# Patient Record
Sex: FEMALE | Race: BLACK OR AFRICAN AMERICAN | Employment: UNEMPLOYED | URBAN - METROPOLITAN AREA
[De-identification: names, ages, dates, MRNs, and addresses within clinical notes are randomized per-mention and may not be internally consistent; named-entity substitution may affect disease eponyms.]

---

## 2017-01-27 ENCOUNTER — OFFICE VISIT (OUTPATIENT)
Dept: ORTHOPEDIC SURGERY | Age: 32
End: 2017-01-27

## 2017-01-27 VITALS
HEIGHT: 59 IN | HEART RATE: 78 BPM | DIASTOLIC BLOOD PRESSURE: 88 MMHG | BODY MASS INDEX: 21.69 KG/M2 | WEIGHT: 107.6 LBS | SYSTOLIC BLOOD PRESSURE: 136 MMHG | RESPIRATION RATE: 12 BRPM

## 2017-01-27 DIAGNOSIS — M54.6 THORACIC BACK PAIN, UNSPECIFIED BACK PAIN LATERALITY, UNSPECIFIED CHRONICITY: ICD-10-CM

## 2017-01-27 DIAGNOSIS — M54.5 LOW BACK PAIN, UNSPECIFIED BACK PAIN LATERALITY, UNSPECIFIED CHRONICITY, WITH SCIATICA PRESENCE UNSPECIFIED: ICD-10-CM

## 2017-01-27 DIAGNOSIS — M54.50 LOW BACK PAIN WITHOUT SCIATICA, UNSPECIFIED BACK PAIN LATERALITY, UNSPECIFIED CHRONICITY: Primary | ICD-10-CM

## 2017-01-27 DIAGNOSIS — M54.12 BRACHIAL NEURITIS: ICD-10-CM

## 2017-01-27 DIAGNOSIS — M54.2 NECK PAIN: ICD-10-CM

## 2017-01-27 NOTE — PROGRESS NOTES
[facility]  [address], [address]  Phone: [phone]  Fax: [phone]        INITIAL CONSULTATION      HISTORY OF PRESENT ILLNESS:  Chacha Syed is a RH dominant  32 y.o. female whom is referred from Dr. Noel Mckeon secondary to neck and back pain. Pt has been diagnosed with fibromyalgia. Note from Dr. Noel Mckeon dated 2016 indicating patient was seen with c/o headaches and numbness and tingling. She is intolerant of NEURONTIN and TOPAMAX. Pt has been diagnosed with migraine headaches. ER Note from Kiarra Taylor dated 10/19/2016 indicating patient was seen with c/o headaches. Today, she c/o pain in the lower back, neck, left leg and right hand. Pt states that her lower back pain is most severe. Pt c/o pain radiating laterally down the left leg through the feet and toes. She reports intermittent right leg pain. She recently had a  section but is not pregnant or breast feeding at this time. Patient denies change in bowel or bladder habits. Patient denies loss of balance. She denies any prior spinal surgery or lumbar blocks. Pt has not recently tried physical therapy. BLE EMG dated 2016 was reviewed and were within normal limits. Cervical Spine CT from 2012 demonstrated sclerotic lucency through the anterior/inferior bleeding of C1. This is likely chronic. No definite acute fracture or subluxation of the cervical spine. She has tried Topamax in the past but no longer takes the medication. She reports no relief when try LYRICA or CYMBALTA. Pt will follow up with Dr. Noel Mckeon next week.  reviewed.        Past Medical History   Diagnosis Date    Abdominal pain 2009     With Constipation    Chronic constipation     Common migraine     FHx: allergies     GERD (gastroesophageal reflux disease)     HX OTHER MEDICAL      endoscopy    IBS (irritable bowel syndrome)     Pain      Pain in Neck and Back    Scoliosis     Urinary incontinence 11/10/2010   (NEX  Past Surgical History   Procedure Laterality Date    Hx  section      Endoscopy, colon, diagnostic     IUM) 40 mg capsule Take  by mouth daily.  fluticasone (FLOVENT DISKUS) 50 mcg/actuation inhaler Take  by inhalation.  clindamycin (CLEOCIN T) 1 % lotion Apply  to affected area two (2) times a day. use thin film on affected area      SUMAtriptan (IMITREX) 25 mg tablet Take 25 mg by mouth once as needed.  POLYETHYLENE GLYCOL 3350 (MIRALAX PO) Take  by mouth.  CALCIUM CARBONATE/MAG HYDROX (MYLANTA PO) Take  by mouth.  hyoscyamine (ANASPAZ, LEVSIN) 0.125 mg tablet Take 125 mcg by mouth every four (4) hours as needed.  promethazine (PHENERGAN) 12.5 mg tablet Take  by mouth every six (6) hours as needed.  topiramate (TOPAMAX) 50 mg tablet Take  by mouth two (2) times a day.  Ferrous Fumarate 325 mg (106 mg iron) Tab Take  by mouth.  ACETAMINOPHEN WITH CODEINE (TYLENOL-CODEINE #3 PO) Take  by mouth.  HYDROCODONE BIT/ACETAMINOPHEN (VICODIN PO) Take  by mouth.  METHOCARBAMOL, BULK, by Does Not Apply route.  OTHER,NON-FORMULARY,          Allergies   Allergen Reactions    Reglan [Metoclopramide] Not Reported This Time    Sulfa (Sulfonamide Antibiotics) Not Reported This Time    Wygesic Not Reported This Time            Family History   Problem Relation Age of Onset    Kidney Disease Father     Other Father      Pancreatic Disease    Cancer Father     Thyroid Disease Other      Grandparent         REVIEW OF SYSTEMS  Constitutional symptoms: Negative  Eyes: Negative  Ears, Nose, Throat, and Mouth: Negative  Cardiovascular: Negative  Respiratory: Negative  Genitourinary: Negative  Integumentary (Skin and/or breast): Negative  Musculoskeletal: Positive for cervical and lumbar  Extremities: Negative for edema.   Endocrine/Rheumatologic: Negative  Hematologic/Lymphatic: Negative  Allergic/Immunologic: Negative  Psychiatric: Negative       PHYSICAL EXAMINATION    Visit Vitals    /88    Pulse 78    Resp 12    Ht 4' 11\" (1.499 m)    Wt 107 lb 9.6 oz (48.8 kg)    BMI 21.73 kg/m2       CONSTITUTIONAL: NAD, A&O x 3  HEART: Regular rate and rhythm  ABDOMEN: Positive bowel sounds, soft, nontender, and nondistended  LUNGS: Clear to auscultation bilaterally. RANGE OF MOTION: The patient has full passive range of motion in all four extremities. SENSATION: Sensation is intact to light touch throughout. MOTOR:   Straight Leg Raise: Negative, bilateral  Perry: Negative, bilateral  Tandem Gait: Neg. Deep tendon reflexes are 1+ at the brachioradialis, biceps, and triceps. Deep tendon reflexes are 2+ at the knees and ankles bilaterally. Shoulder AB/Flex Elbow Flex Wrist Ext Elbow Ext Wrist Flex Hand Intrin Tone   Right +4/5 +4/5 +4/5 +4/5 +4/5 +4/5 +4/5   Left +4/5 +4/5 +4/5 +4/5 +4/5 +4/5 +4/5              Hip Flex Knee Ext Knee Flex Ankle DF GTE Ankle PF Tone   Right +4/5 +4/5 +4/5 +4/5 +4/5 +4/5 +4/5   Left +4/5 +4/5 +4/5 +4/5 +4/5 +4/5 +4/5     RADIOGRAPHS  Preliminary reading of lumbar plain films:  No acute pathology identified. Normal lumbar spine. These are being sent out for official reading by Dr. Ute García. ELIZA Harrell was seen today for neck pain and back pain. Diagnoses and all orders for this visit:    Low back pain without sciatica, unspecified back pain laterality, unspecified chronicity  -     [79191] L/S 2-3 views    Low back pain, unspecified back pain laterality, unspecified chronicity, with sciatica presence unspecified    Neck pain    Thoracic back pain, unspecified back pain laterality, unspecified chronicity    Brachial neuritis           IMPRESSIONS/RECOMMENDATIONS:  At this point, I do not think her pain is related to spine pathology. She has a negative LUE EMG and normal physical examination. In my opinion, her pain is related to her previously diagnosed fibromyalgia.  She will follow up with Dr. Caroline Pearson. Consideration referral to a chronic pain management center. I discussed with patient multiple treatment options including medications, physical therapy, lumbar blocks, and surgery. Pt is not particularly interested in surgery at this time. I will see the patient back in as needed. Written by Tamika Calderon, as dictated by Cassius Cowden, MD  I examined the patient, reviewed and agree with the note.

## 2017-01-27 NOTE — MR AVS SNAPSHOT
Visit Information Date & Time Provider Department Dept. Phone Encounter #  
 1/27/2017  1:20 PM Shara Garsia MD [facility] [place] [phone] [id number] Upcoming Health Maintenance Date Due DTaP/Tdap/Td series (1 - Tdap) 3/1/2006 PAP AKA CERVICAL CYTOLOGY 3/1/2006 INFLUENZA AGE 9 TO ADULT 8/1/2016 Allergies as of 1/27/2017  Review Complete On: 1/27/2017 By: Shara Garsia MD  
  
 Severity Noted Reaction Type Reactions Reglan [Metoclopramide]  11/10/2010    Not Reported This Time  
 Sulfa (Sulfonamide Antibiotics)  11/10/2010    Not Reported This Time Wygesic  11/10/2010    Not Reported This Time Current Immunizations  Never Reviewed No immunizations on file. Not reviewed this visit You Were Diagnosed With   
  
 Codes Comments Low back pain without sciatica, unspecified back pain laterality, unspecified chronicity    -  Primary ICD-10-CM: M54.5 ICD-9-CM: 724.2 Vitals BP Pulse Resp Height(growth percentile) Weight(growth percentile) BMI  
 136/88 78 12 4' 11\" (1.499 m) 107 lb 9.6 oz (48.8 kg) 21.73 kg/m2 OB Status Smoking Status Having regular periods Never Smoker BMI and BSA Data Body Mass Index Body Surface Area 21.73 kg/m 2 1.43 m 2 Preferred Pharmacy Pharmacy Name Phone [facility], 2004 [address] [phone] Your Updated Medication List  
  
   
This list is accurate as of: 1/27/17  2:25 PM.  Always use your most recent med list.  
  
  
  
  
 clindamycin 1 % lotion Commonly known as:  CLEOCIN T Apply  to affected area two (2) times a day. use thin film on affected area Ferrous Fumarate 325 mg (106 mg iron) Tab Take  by mouth. fluticasone 50 mcg/actuation inhaler Commonly known as:  FLOVENT DISKUS Take  by inhalation. hyoscyamine 0.125 mg tablet Commonly known as:  Anaid Donta Take 125 mcg by mouth every four (4) hours as needed. METHOCARBAMOL (BULK)  
by Does Not Apply route. MIRALAX PO Take  by mouth. MYLANTA PO Take  by mouth. NexIUM 40 mg capsule Generic drug:  esomeprazole Take  by mouth daily. OTHER(NON-FORMULARY)  
  
 promethazine 12.5 mg tablet Commonly known as:  PHENERGAN Take  by mouth every six (6) hours as needed. SUMAtriptan 25 mg tablet Commonly known as:  IMITREX Take 25 mg by mouth once as needed. topiramate 50 mg tablet Commonly known as:  TOPAMAX Take  by mouth two (2) times a day. TYLENOL-CODEINE #3 PO Take  by mouth. VICODIN PO Take  by mouth. We Performed the Following AMB POC XRAY, SPINE, LUMBOSACRAL; 2 O [40567 CPT(R)] Introducing [facility]! Ronni Lopez introduces [employer] patient portal. Now you can access parts of your medical record, email your doctor's office, and request medication refills online. 1. In your internet browser, go to [email or web address] 2. Click on the First Time User? Click Here link in the Sign In box. You will see the New Member Sign Up page. 3. Enter your [employer] Access Code exactly as it appears below. You will not need to use this code after youve completed the sign-up process. If you do not sign up before the expiration date, you must request a new code. · [employer] Access Code: [email or web address] Expires: 4/27/2017 12:46 PM 
 
4. Enter the last four digits of your Social Security Number (xxxx) and Date of Birth (mm/dd/yyyy) as indicated and click Submit. You will be taken to the next sign-up page. 5. Create a [employer] ID. This will be your [employer] login ID and cannot be changed, so think of one that is secure and easy to remember. 6. Create a [employer] password. You can change your password at any time. 7. Enter your Password Reset Question and Answer. This can be used at a later time if you forget your password. 8. Enter your e-mail address. You will receive e-mail notification when new information is available in [address] 9. Click Sign Up. You can now view and download portions of your medical record. 10. Click the Download Summary menu link to download a portable copy of your medical information. If you have questions, please visit the Frequently Asked Questions section of the [employer] website. Remember, [employer] is NOT to be used for urgent needs. For medical emergencies, dial 911. Now available from your iPhone and Android! Please provide this summary of care documentation to your next provider. Your primary care clinician is listed as Reilly Galarza. If you have any questions after today's visit, please call [phone].

## 2017-05-10 ENCOUNTER — OFFICE VISIT (OUTPATIENT)
Dept: ORTHOPEDIC SURGERY | Age: 32
End: 2017-05-10

## 2017-05-10 VITALS
WEIGHT: 114.6 LBS | SYSTOLIC BLOOD PRESSURE: 122 MMHG | HEART RATE: 86 BPM | RESPIRATION RATE: 14 BRPM | HEIGHT: 59 IN | BODY MASS INDEX: 23.1 KG/M2 | DIASTOLIC BLOOD PRESSURE: 87 MMHG

## 2017-05-10 DIAGNOSIS — M54.6 ACUTE BILATERAL THORACIC BACK PAIN: Primary | ICD-10-CM

## 2017-05-10 DIAGNOSIS — G89.29 CHRONIC BILATERAL LOW BACK PAIN WITH LEFT-SIDED SCIATICA: ICD-10-CM

## 2017-05-10 DIAGNOSIS — M54.42 CHRONIC BILATERAL LOW BACK PAIN WITH LEFT-SIDED SCIATICA: ICD-10-CM

## 2017-05-10 PROBLEM — M54.40 CHRONIC BILATERAL LOW BACK PAIN WITH SCIATICA: Status: ACTIVE | Noted: 2017-05-10

## 2017-05-10 RX ORDER — TRAMADOL HYDROCHLORIDE 50 MG/1
50 TABLET ORAL
COMMUNITY
End: 2018-12-06

## 2017-05-10 RX ORDER — KETOROLAC TROMETHAMINE 15 MG/ML
60 INJECTION, SOLUTION INTRAMUSCULAR; INTRAVENOUS ONCE
Qty: 1 VIAL | Refills: 0
Start: 2017-05-10 | End: 2017-05-10

## 2017-05-10 RX ORDER — METHYLPREDNISOLONE 4 MG/1
TABLET ORAL
Qty: 1 DOSE PACK | Refills: 0 | Status: SHIPPED | OUTPATIENT
Start: 2017-05-10 | End: 2018-12-06

## 2017-05-10 NOTE — PROGRESS NOTES
Chief complaint/History of Present Illness:  Chief Complaint   Patient presents with    Back Pain     Follow up     Leg Pain     TAISHASUHA De Los Santos is a  28 y.o.  female      HISTORY OF PRESENT ILLNESS:  The patient comes in today stating she is having a flare of low back pain, and it is going up into her thoracic spine. She states the thoracic part has been going on for two weeks. She does not remember any activity or injury or fall. She does have chronic low back pain that radiates to her left leg down to her toes. She describes the pain as sharp, tingling, and throbbing. She has tried heat, ice, stretching, and rest. She takes Tramadol through her PCP and nothing is really helping it. She denies fever or bowel or bladder dysfunction. She does have a history of fibromyalgia and migraines. PHYSICAL EXAM:  Mr. Akua Woodson is a 27-year-old female. She is alert and oriented. She has a full weight bearing, non-antalgic gait. She has 5/5 strength of her bilateral lower extremities and 4/5 strength of her bilateral upper extremities. She has negative Perrys and negative straight leg raise. Upon palpation of her thoracic spine, she is tender in the midline between her shoulder blades. ASSESSENT/PLAN:  This is a patient who is having her chronic low back pain with new onset thoracic pain. She is tender to palpation there. We did a thoracic AP and lateral x-ray. I do not see any compression fractures. We are going to try Toradol 60 mg IM followed by a Medrol Dosepak tomorrow. If she does not need the Medrol Dosepak, she does not need to take it. She does not have to take it if the Toradol resolves her pain. She knows not to take any other NSAIDs and to take the Medrol Dosepak with food. We will see her back as-needed.         Review of systems:    Past Medical History:   Diagnosis Date    Abdominal pain 1/2009    With Constipation    Chronic constipation     Common migraine     FHx: allergies     GERD (gastroesophageal reflux disease)     HX OTHER MEDICAL     endoscopy    IBS (irritable bowel syndrome)     Pain     Pain in Neck and Back    Scoliosis     Urinary incontinence 11/10/2010     Past Surgical History:   Procedure Laterality Date    ENDOSCOPY, COLON, DIAGNOSTIC      HX  SECTION       Social History     Social History    Marital status: SINGLE     Spouse name: N/A    Number of children: N/A    Years of education: N/A     Occupational History    Not on file. Social History Main Topics    Smoking status: Never Smoker    Smokeless tobacco: Never Used    Alcohol use No    Drug use: Not on file    Sexual activity: Not on file     Other Topics Concern    Not on file     Social History Narrative     Family History   Problem Relation Age of Onset    Kidney Disease Father     Other Father      Pancreatic Disease    Cancer Father     Thyroid Disease Other      Grandparent       Physical Exam:  Visit Vitals    /87 (BP 1 Location: Left arm, BP Patient Position: Sitting)    Pulse 86    Resp 14    Ht 4' 11\" (1.499 m)    Wt 114 lb 9.6 oz (52 kg)    BMI 23.15 kg/m2     Pain Scale: 5/10     has been . reviewed and is appropriate      Sharri was seen today for back pain and leg pain. Diagnoses and all orders for this visit:    Acute bilateral thoracic back pain  -     POC XRAY, SPINE; THOR-LUMB SP, 2 VIEW  -     KETOROLAC TROMETHAMINE INJ  -     ketorolac (TORADOL) 15 mg/mL soln injection; 4 mL by IntraMUSCular route once for 1 dose. -     THER/PROPH/DIAG INJECTION, SUBCUT/IM    Chronic bilateral low back pain with left-sided sciatica  -     POC XRAY, SPINE; THOR-LUMB SP, 2 VIEW  -     KETOROLAC TROMETHAMINE INJ  -     ketorolac (TORADOL) 15 mg/mL soln injection; 4 mL by IntraMUSCular route once for 1 dose.   -     THER/PROPH/DIAG INJECTION, SUBCUT/IM    Other orders  -     methylPREDNISolone (MEDROL DOSEPACK) 4 mg tablet; Per dose pack instructions            Follow-up Disposition:  Return if symptoms worsen or fail to improve.         We have informed Trev Mitchell to notify us for immediate appointment if she has any worsening neurogical symptoms or if an emergency situation presents, then call [phone]

## 2017-05-10 NOTE — PATIENT INSTRUCTIONS
Back Pain: Care Instructions  Your Care Instructions    Back pain has many possible causes. It is often related to problems with muscles and ligaments of the back. It may also be related to problems with the nerves, discs, or bones of the back. Moving, lifting, standing, sitting, or sleeping in an awkward way can strain the back. Sometimes you don't notice the injury until later. Arthritis is another common cause of back pain. Although it may hurt a lot, back pain usually improves on its own within several weeks. Most people recover in 12 weeks or less. Using good home treatment and being careful not to stress your back can help you feel better sooner. Follow-up care is a key part of your treatment and safety. Be sure to make and go to all appointments, and call your doctor if you are having problems. Its also a good idea to know your test results and keep a list of the medicines you take. How can you care for yourself at home? · Sit or lie in positions that are most comfortable and reduce your pain. Try one of these positions when you lie down:  ¨ Lie on your back with your knees bent and supported by large pillows. ¨ Lie on the floor with your legs on the seat of a sofa or chair. Eddie Lillian on your side with your knees and hips bent and a pillow between your legs. ¨ Lie on your stomach if it does not make pain worse. · Do not sit up in bed, and avoid soft couches and twisted positions. Bed rest can help relieve pain at first, but it delays healing. Avoid bed rest after the first day of back pain. · Change positions every 30 minutes. If you must sit for long periods of time, take breaks from sitting. Get up and walk around, or lie in a comfortable position. · Try using a heating pad on a low or medium setting for 15 to 20 minutes every 2 or 3 hours. Try a warm shower in place of one session with the heating pad. · You can also try an ice pack for 10 to 15 minutes every 2 to 3 hours.  Put a thin cloth between the ice pack and your skin. · Take pain medicines exactly as directed. ¨ If the doctor gave you a prescription medicine for pain, take it as prescribed. ¨ If you are not taking a prescription pain medicine, ask your doctor if you can take an over-the-counter medicine. · Take short walks several times a day. You can start with 5 to 10 minutes, 3 or 4 times a day, and work up to longer walks. Walk on level surfaces and avoid hills and stairs until your back is better. · Return to work and other activities as soon as you can. Continued rest without activity is usually not good for your back. · To prevent future back pain, do exercises to stretch and strengthen your back and stomach. Learn how to use good posture, safe lifting techniques, and proper body mechanics. When should you call for help? Call your doctor now or seek immediate medical care if:  · You have new or worsening numbness in your legs. · You have new or worsening weakness in your legs. (This could make it hard to stand up.)  · You lose control of your bladder or bowels. Watch closely for changes in your health, and be sure to contact your doctor if:  · Your pain gets worse. · You are not getting better after 2 weeks. Where can you learn more? Go to http://yuri-danilo.info/. Enter Q388 in the search box to learn more about \"Back Pain: Care Instructions. \"  Current as of: May 23, 2016  Content Version: 11.2  © 0934-3238 [employer]. Care instructions adapted under license by [employer] (which disclaims liability or warranty for this information). If you have questions about a medical condition or this instruction, always ask your healthcare professional. [address] any warranty or liability for your use of this information.

## 2018-12-06 ENCOUNTER — OFFICE VISIT (OUTPATIENT)
Dept: FAMILY MEDICINE CLINIC | Age: 33
End: 2018-12-06

## 2018-12-06 VITALS
WEIGHT: 130 LBS | RESPIRATION RATE: 16 BRPM | TEMPERATURE: 97 F | HEIGHT: 59 IN | BODY MASS INDEX: 26.21 KG/M2 | OXYGEN SATURATION: 98 % | SYSTOLIC BLOOD PRESSURE: 136 MMHG | DIASTOLIC BLOOD PRESSURE: 88 MMHG | HEART RATE: 100 BPM

## 2018-12-06 DIAGNOSIS — R06.02 SOB (SHORTNESS OF BREATH): ICD-10-CM

## 2018-12-06 DIAGNOSIS — R07.9 CHEST PAIN, UNSPECIFIED TYPE: Primary | ICD-10-CM

## 2018-12-06 DIAGNOSIS — M79.604 RIGHT LEG PAIN: ICD-10-CM

## 2018-12-06 DIAGNOSIS — R51.9 NONINTRACTABLE EPISODIC HEADACHE, UNSPECIFIED HEADACHE TYPE: ICD-10-CM

## 2018-12-06 DIAGNOSIS — M25.561 ACUTE PAIN OF RIGHT KNEE: ICD-10-CM

## 2018-12-06 RX ORDER — CYCLOBENZAPRINE HCL 5 MG
5 TABLET ORAL
Qty: 10 TAB | Refills: 0 | Status: SHIPPED | OUTPATIENT
Start: 2018-12-06 | End: 2019-03-31

## 2018-12-06 RX ORDER — ALBUTEROL SULFATE 90 UG/1
2 AEROSOL, METERED RESPIRATORY (INHALATION)
Qty: 1 INHALER | Refills: 0 | Status: SHIPPED | OUTPATIENT
Start: 2018-12-06

## 2018-12-06 NOTE — PROGRESS NOTES
OFFICE NOTE    Schuyler Quinteros is a 35 y.o. female presenting today for office visit. 12/6/2018  12:49 PM    Chief Complaint   Patient presents with    Chest Pain    Shortness of Breath    Headache       HPI: Here today for ED follow up. PCP Karolina Carcamo MD but she is considering switching. Evaluated at [facility] ED on 12/3 for multiple complaints but it appears that most bothersome was that chest pain and right knee pain. Today, she reports that she continues to have chest pains that come and go- do not seem to be with just activity, some at rest. She is not able to describe the pains because they vary from time to time. She reports that this is her \"first day really coming outside\" since the ED visit so she is not sure if it has been better or worse. Noticed that the cold air and driving made the pain occur this morning. No acid reflux- \"different type of pain. \" Does feel nauseated but this occurs a lot due to her \"GI issues. \" No wheezing but does feel SOB sometimes- does have asthma- denies having an Albuterol inhaler. No cough. Does hurt some to push on the chest wall and sometimes with movement but not all the time. Also complains of headaches that have been occurring- states that it is the same headache that she had in the ED- has not improved. But yet states that the headache moves from place to place- sometimes on the left side of her head and into her face and neck, sometimes on the right side, and sometimes in the whole head. History of migraines but usually has photosensitivity and phonophobia- has not had this. She has taken medications- what she is \"already prescribed\" but she is not sure of the names- noted Topamax and Imitrex on chart. She is not sure if these are what she has taken but she has also tried Motrin OTC- not sure if it helped.  Denies any dizziness, weakness, ear pain, numbness/tingling, speech difficulty, etc.     Also complains of right knee lump that she woke up with a few weeks ago- now resolved but continues to have right lower leg pain from the knee to her foot. No swelling or numbness/tingling. Hard to stand and hurts with movement. Had a similar problem in her left leg- the right leg is her \"good leg. \" She denies any injuries or possible causes. Left leg got better with time and she had to wear a brace and do PT for a while. Review of Systems   Constitutional: Negative for chills, fatigue and fever. HENT: Negative for congestion, sinus pressure and sore throat. Respiratory: Positive for shortness of breath. Negative for cough and wheezing. Cardiovascular: Positive for chest pain. Negative for palpitations and leg swelling. Gastrointestinal: Positive for nausea. Negative for abdominal pain, constipation, diarrhea and vomiting. Genitourinary: Negative for difficulty urinating and frequency. Musculoskeletal: Positive for arthralgias. Negative for back pain and myalgias. Skin: Negative for rash. Neurological: Positive for headaches. Negative for dizziness, tremors, seizures, syncope, weakness and numbness.          PHQ Screening   PHQ over the last two weeks 2018   Little interest or pleasure in doing things Not at all   Feeling down, depressed, irritable, or hopeless More than half the days   Total Score PHQ 2 2           History  Past Medical History:   Diagnosis Date    Asthma     Chronic constipation     Fibromyalgia     GERD (gastroesophageal reflux disease)     IBS (irritable bowel syndrome)     Migraines     unknown if aura    Psychiatric disorder     she denies any diagnosis despite being on antipsychotics, SSRIs, etc in the past    Scoliosis     Urinary incontinence     mixed       Past Surgical History:   Procedure Laterality Date    ENDOSCOPY, COLON, DIAGNOSTIC      HX  SECTION         Social History     Socioeconomic History    Marital status: SINGLE     Spouse name: Not on file    Number of children: Not on file  Years of education: Not on file    Highest education level: Not on file   Social Needs    Financial resource strain: Not on file    Food insecurity - worry: Not on file    Food insecurity - inability: Not on file    Transportation needs - medical: Not on file   IntraStage needs - non-medical: Not on file   Occupational History    Not on file   Tobacco Use    Smoking status: Never Smoker    Smokeless tobacco: Never Used   Substance and Sexual Activity    Alcohol use: No    Drug use: No    Sexual activity: Not on file   Other Topics Concern    Not on file   Social History Narrative    Not on file       Family History   Problem Relation Age of Onset    Kidney Disease Father     Other Father         Pancreatic Disease    Cancer Father     Heart Disease Father     Heart Attack Father     Thyroid Disease Other         Grandparent       Allergies   Allergen Reactions    Reglan [Metoclopramide] Not Reported This Time    Sulfa (Sulfonamide Antibiotics) Not Reported This Time    Wygesic Not Reported This Time       Current Outpatient Medications   Medication Sig Dispense Refill    hyoscyamine (ANASPAZ, LEVSIN) 0.125 mg tablet Take 125 mcg by mouth every four (4) hours as needed.  promethazine (PHENERGAN) 12.5 mg tablet Take  by mouth every six (6) hours as needed.  esomeprazole (NEXIUM) 40 mg capsule Take  by mouth daily.  fluticasone (FLOVENT DISKUS) 50 mcg/actuation inhaler Take  by inhalation.  topiramate (TOPAMAX) 50 mg tablet Take  by mouth two (2) times a day.  SUMAtriptan (IMITREX) 25 mg tablet Take 25 mg by mouth once as needed.  Ferrous Fumarate 325 mg (106 mg iron) Tab Take  by mouth.  POLYETHYLENE GLYCOL 3350 (MIRALAX PO) Take  by mouth.  CALCIUM CARBONATE/MAG HYDROX (MYLANTA PO) Take  by mouth.  traMADol (ULTRAM) 50 mg tablet Take 50 mg by mouth every six (6) hours as needed for Pain.       methylPREDNISolone (MEDROL DOSEPACK) 4 mg tablet Per dose pack instructions 1 Dose Pack 0    clindamycin (CLEOCIN T) 1 % lotion Apply  to affected area two (2) times a day. use thin film on affected area      ACETAMINOPHEN WITH CODEINE (TYLENOL-CODEINE #3 PO) Take  by mouth.  HYDROCODONE BIT/ACETAMINOPHEN (VICODIN PO) Take  by mouth.  METHOCARBAMOL, BULK, by Does Not Apply route. Rosemary Robbins,              Advance Care Planning:   Patient was offered the opportunity to discuss advance care planning NO   Does patient have an Advance Directive: If no, did you provide information on [facility]?          Patient Care Team:  Patient Care Team:  Abraham Bonilla MD as PCP - General ([facility])        LABS:    TROPONIN (12/03/2018 4:04 PM)  TROPONIN (12/03/2018 4:04 PM)   Component Value Ref Range   Troponin (T) Quantitative <0.01 0.00 - 0.01 ng/mL       D-DIMER QUANTITATIVE (12/03/2018 12:43 PM)  D-DIMER QUANTITATIVE (12/03/2018 12:43 PM)   Component Value Ref Range   DDIMER QUANTITATIVE 0.46 0.00 - 1.19 mcg FEU/mL        CBC WITH DIFFERENTIAL AUTO (12/03/2018 12:40 PM)  CBC WITH DIFFERENTIAL AUTO (12/03/2018 12:40 PM)   Component Value Ref Range   WBC x 10*3 6.8 4.0 - 11.0 K/uL   RBC x 10^6 4.58 3.80 - 5.20 M/uL   HGB 12.1 11.7 - 15.5 g/dL   HCT 38.1 35.1 - 46.5 %   MCV 83 80 - 95 fL   MCH 26 26 - 34 pg   MCHC 32 31 - 36 g/dL   RDW 14.4 10.0 - 15.5 %   Platelet 440 555 - 168 K/uL   MPV 11.2 9.0 - 13.0 fL   Segmented Neutrophils 48 40 - 75 %   Lymphocytes 46 (H) 20 - 45 %   Monocytes 6 3 - 12 %   Eosinophil 0 0 - 6 %   Basophils 0 0 - 2 %   Absolute Neutrophils 3.3 1.8 - 7.7 K/uL   Absolute Lymphocytes 3.1 1.0 - 4.8 K/uL   Absolute Monocyte Count 0.4 0.1 - 1.0 K/uL   Absolute Eosinophil 0.0 0.0 - 0.5 K/uL   Absolute Basophil Count 0.0 0.0 - 0.2 K/uL        BASIC METABOLIC PANEL (31/95/9412 12:40 PM)  BASIC METABOLIC PANEL (74/14/2285 12:40 PM)   Component Value Ref Range   Potassium 3.8 3.5 - 5.5 mmol/L   SODIUM 144 133 - 145 mmol/L   CHLORIDE 105 98 - 110 mmol/L   Glucose 87 70 - 99 mg/dL   CALCIUM 9.7 8.4 - 10.5 mg/dL   BUN 12 6 - 22 mg/dL   CREATININE 0.7 0.5 - 1.2 mg/dL   CO2 24 20 - 32 mmol/L   eGFR African American >60.0 >60.0   eGFR Non African American >60.0 >60.0   ANION GAP 14.6 mmol/L        TROPONIN (12/03/2018 12:40 PM)  TROPONIN (12/03/2018 12:40 PM)   Component Value Ref Range   Troponin (T) Quantitative <0.01 0.00 - 0.01 ng/mL         RADIOLOGY:    CHEST PA AND LATERAL (12/03/2018 12:54 PM)  CHEST PA AND LATERAL (12/03/2018 12:54 PM)   Specimen Performing Laboratory     RADIOLOGY       CHEST PA AND LATERAL (12/03/2018 12:54 PM)   Impressions       No active cardiopulmonary disease.        EKG 12 LEAD UNIT PERFORMED (12/03/2018 4:04 PM)  EKG 12 LEAD UNIT PERFORMED (12/03/2018 4:04 PM)   Component Value Ref Range   Heart Rate 88 bpm   RR Interval 682 ms   Atrial Rate 89 ms   P-R Interval 150 ms   P Duration 98 ms   P Horizontal Axis -3 deg   P Front Axis 64 deg   Q Onset 507 ms   QRSD Interval 79 ms   QT Interval 343 ms   QTcB 415 ms   QTcF 390 ms   QRS Horizontal Axis -47 deg   QRS Axis 7 deg   I-40 Front Axis 81 deg   t-40 Horizontal Axis -70 deg   T-40 Front Axis -13 deg   T Horizontal Axis 50 deg   T Wave Axis 15 deg   S-T Horizontal Axis 96 deg   S-T Front Axis 47 deg   Impression - BORDERLINE ECG -     Impression SR-Sinus rhythm-normal P axis, V-rate 50-99     Impression [id number] T wave abnormalities-T/QRS ratio < 1/20 or flat T     Impression -no sig change compared to previous-           Physical Exam   Constitutional: She is oriented to person, place, and time. She appears well-developed and well-nourished. No distress. HENT:   Right Ear: Tympanic membrane, external ear and ear canal normal.   Left Ear: Tympanic membrane, external ear and ear canal normal.   Nose: Nose normal. No mucosal edema or rhinorrhea. Right sinus exhibits no maxillary sinus tenderness and no frontal sinus tenderness. Left sinus exhibits no maxillary sinus tenderness and no frontal sinus tenderness. Mouth/Throat: Uvula is midline, oropharynx is clear and moist and mucous membranes are normal. No oropharyngeal exudate or posterior oropharyngeal erythema. Eyes: EOM and lids are normal. Pupils are equal, round, and reactive to light. Right eye exhibits no discharge. Left eye exhibits no discharge. Right eye exhibits normal extraocular motion. Left eye exhibits normal extraocular motion. Neck: Normal range of motion. Neck supple. Cardiovascular: Normal rate, regular rhythm and normal heart sounds. No murmur heard. Pulmonary/Chest: Effort normal and breath sounds normal. No respiratory distress. She exhibits tenderness. She exhibits no crepitus. Abdominal: Soft. Bowel sounds are normal. There is no tenderness. Musculoskeletal:        Right knee: She exhibits normal range of motion, no effusion, no erythema, no LCL laxity, normal patellar mobility and no MCL laxity. Tenderness found. Right lower leg: She exhibits tenderness. She exhibits no swelling and no deformity. Legs:  -negative Tracey's sign bilaterally   Lymphadenopathy:     She has no cervical adenopathy. Neurological: She is alert and oriented to person, place, and time. She has normal strength. No cranial nerve deficit or sensory deficit. She exhibits normal muscle tone. Coordination and gait normal. GCS eye subscore is 4. GCS verbal subscore is 5. GCS motor subscore is 6. Skin: Skin is warm and dry. No rash noted. Psychiatric: Her speech is normal and behavior is normal. Judgment normal. Her mood appears not anxious. Cognition and memory are normal. She does not exhibit a depressed mood.          Vitals:    12/06/18 1245   BP: 136/88   Pulse: 100   Resp: 16   Temp: 97 °F (36.1 °C)   TempSrc: Oral   SpO2: 98%   Weight: 130 lb (59 kg)   Height: 4' 11\" (1.499 m)   PainSc:   8   PainLoc: Head         Assessment and Plan    Chest pain, unspecified type  *Discussed with patient about possible causes including cardiac, respiratory, GI, musculoskeletal and/or mental health. Noted to have been on Depakote, Seroquel, and several other medications in the past- she denies ever being diagnosed with psychiatric illness- just went through a hard time with the loss of her father and Marielos Speaker so they were \"trying her on some medications. \" Not on any psychiatric medications, per patient, at this time. *Will refer to cardiology to father heart issues at young age. Trial Albuterol inhaler and trial NSAID and muscle relaxer with heat and ice. On PPI therapy. Consider referral to psychiatry if felt to be mental health related. - albuterol (PROVENTIL HFA, VENTOLIN HFA, PROAIR HFA) 90 mcg/actuation inhaler; Take 2 Puffs by inhalation every four (4) hours as needed for Wheezing or Shortness of Breath. Dispense: 1 Inhaler; Refill: 0  - cyclobenzaprine (FLEXERIL) 5 mg tablet; Take 1 Tab by mouth every twelve (12) hours as needed for Muscle Spasm(s). Dispense: 10 Tab; Refill: 0  - REFERRAL TO CARDIOLOGY    SOB (shortness of breath)  *Trial Albuterol, as above. - albuterol (PROVENTIL HFA, VENTOLIN HFA, PROAIR HFA) 90 mcg/actuation inhaler; Take 2 Puffs by inhalation every four (4) hours as needed for Wheezing or Shortness of Breath. Dispense: 1 Inhaler; Refill: 0    Nonintractable episodic headache, unspecified headache type  *Unable to determine what all she is taking at this time make recommendations. Could increase up on Topamax if she is in fact taking or if she is not taking, use as a preventative. Unsure if on Imitrex or other abortive therapy- recommend that she bring in her pill bottles if further management is needed or desired. PRN Motrin combined with Tylenol and Benadryl can be helpful. Acute pain of right knee/ Right leg pain  *Pain throughout the right lower leg from knee to lower calf- no clinical abnormalities noted.  Will trial PRN Flexeril and advised on Motrin, Tylenol, and heat. Negative Ddimer in ED, no findings today consistent with DVT and no history of clots. - cyclobenzaprine (FLEXERIL) 5 mg tablet; Take 1 Tab by mouth every twelve (12) hours as needed for Muscle Spasm(s). Dispense: 10 Tab; Refill: 0        *Plan of care reviewed with patient. Patient in agreement with plan and expresses understanding. All questions answered and patient encouraged to call or RTO if further questions or concerns. *Discussed with patient about symptoms that would require an ER visit. Patient understands symptoms that are an emergency and will go to the ER if they occur. Follow-up Disposition:  Return if symptoms worsen or fail to improve.

## 2019-01-04 ENCOUNTER — OFFICE VISIT (OUTPATIENT)
Dept: CARDIOLOGY CLINIC | Age: 34
End: 2019-01-04

## 2019-01-04 VITALS
DIASTOLIC BLOOD PRESSURE: 90 MMHG | HEIGHT: 59 IN | HEART RATE: 85 BPM | WEIGHT: 131 LBS | BODY MASS INDEX: 26.41 KG/M2 | SYSTOLIC BLOOD PRESSURE: 127 MMHG

## 2019-01-04 DIAGNOSIS — R06.02 SOB (SHORTNESS OF BREATH) ON EXERTION: ICD-10-CM

## 2019-01-04 DIAGNOSIS — Z82.49 FAMILY HISTORY OF CHRONIC HEART FAILURE: ICD-10-CM

## 2019-01-04 DIAGNOSIS — R07.9 CHEST PAIN, UNSPECIFIED TYPE: Primary | ICD-10-CM

## 2019-01-04 DIAGNOSIS — J45.909 UNCOMPLICATED ASTHMA, UNSPECIFIED ASTHMA SEVERITY, UNSPECIFIED WHETHER PERSISTENT: ICD-10-CM

## 2019-01-04 RX ORDER — MONTELUKAST SODIUM 10 MG/1
10 TABLET ORAL DAILY
COMMUNITY
End: 2020-01-15

## 2019-01-04 NOTE — LETTER
Saranya Mercer 1985 
 
1/4/2019 Dear Flash Johns MD 
 
I had the pleasure of evaluating  Ms. Drake Saez in office today. Below are the relevant portions of my assessment and plan of care. ICD-10-CM ICD-9-CM 1. Chest pain, unspecified type R07.9 786.50 EXERCISE CARDIAC STRESS TEST  
   ECHO ADULT COMPLETE Atypical chest pain. Possible angina possible spasm. Cannot rule out chest wall pain or GERD. Less likely pericarditis 2. Family history of chronic heart failure Z82.49 V17.49 Family history of cardiomyopathy with transplant in father. 3. SOB (shortness of breath) on exertion R06.02 786.05 EXERCISE CARDIAC STRESS TEST  
   ECHO ADULT COMPLETE New onset. Unclear etiology. Rule out pulmonary hypertension and LV dysfunction 4. Uncomplicated asthma, unspecified asthma severity, unspecified whether persistent J45.909 493.90 Patient on multiple medication relatively stable Current Outpatient Medications Medication Sig Dispense Refill  fluticasone propionate (FLONASE NA) by Nasal route.  montelukast (SINGULAIR) 10 mg tablet Take 10 mg by mouth daily.  Cetirizine (ZYRTEC) 10 mg cap Take  by mouth.  albuterol (PROVENTIL HFA, VENTOLIN HFA, PROAIR HFA) 90 mcg/actuation inhaler Take 2 Puffs by inhalation every four (4) hours as needed for Wheezing or Shortness of Breath. 1 Inhaler 0  
 cyclobenzaprine (FLEXERIL) 5 mg tablet Take 1 Tab by mouth every twelve (12) hours as needed for Muscle Spasm(s). 10 Tab 0  
 hyoscyamine (ANASPAZ, LEVSIN) 0.125 mg tablet Take 125 mcg by mouth every four (4) hours as needed.  promethazine (PHENERGAN) 12.5 mg tablet Take  by mouth every six (6) hours as needed.  esomeprazole (NEXIUM) 40 mg capsule Take  by mouth daily.  clindamycin (CLEOCIN T) 1 % lotion Apply  to affected area two (2) times a day. use thin film on affected area  SUMAtriptan (IMITREX) 25 mg tablet Take 25 mg by mouth once as needed.  POLYETHYLENE GLYCOL 3350 (MIRALAX PO) Take  by mouth.  CALCIUM CARBONATE/MAG HYDROX (MYLANTA PO) Take  by mouth.  topiramate (TOPAMAX) 50 mg tablet Take  by mouth two (2) times a day.  Ferrous Fumarate 325 mg (106 mg iron) Tab Take  by mouth. Orders Placed This Encounter  fluticasone propionate (FLONASE NA) Sig: by Nasal route.  montelukast (SINGULAIR) 10 mg tablet Sig: Take 10 mg by mouth daily.  Cetirizine (ZYRTEC) 10 mg cap Sig: Take  by mouth. If you have questions, please do not hesitate to call me. I look forward to following Ms. Nadeem Contreras along with you. Sincerely, Nabil Rosales MD

## 2019-01-04 NOTE — PROGRESS NOTES
HISTORY OF PRESENT ILLNESS  Johnathan Muniz is a 35 y.o. female. New Patient   The history is provided by the patient. This is a new problem. Associated symptoms include chest pain and shortness of breath. Pertinent negatives include no abdominal pain and no headaches. Chest Pain (Angina)    The history is provided by the patient. This is a recurrent problem. The current episode started more than 1 week ago. The problem has not changed since onset. The problem occurs every several days. The pain is associated with rest and exertion. The pain is present in the lateral region and substernal region. The quality of the pain is described as pressure-like and sharp. The pain does not radiate. Associated symptoms include shortness of breath. Pertinent negatives include no abdominal pain, no claudication, no cough, no dizziness, no fever, no headaches, no hemoptysis, no nausea, no orthopnea, no palpitations, no PND, no sputum production, no vomiting and no weakness. Shortness of Breath   The history is provided by the patient. This is a new problem. The problem occurs intermittently. The problem has not changed since onset. Associated symptoms include chest pain. Pertinent negatives include no fever, no headaches, no cough, no sputum production, no hemoptysis, no wheezing, no PND, no orthopnea, no vomiting, no abdominal pain, no rash, no leg swelling and no claudication. The problem's precipitants include exercise. Review of Systems   Constitutional: Negative for chills and fever. HENT: Negative for nosebleeds. Eyes: Negative for blurred vision and double vision. Respiratory: Positive for shortness of breath. Negative for cough, hemoptysis, sputum production and wheezing. Cardiovascular: Positive for chest pain. Negative for palpitations, orthopnea, claudication, leg swelling and PND. Gastrointestinal: Negative for abdominal pain, heartburn, nausea and vomiting.    Musculoskeletal: Negative for myalgias. Skin: Negative for rash. Neurological: Negative for dizziness, weakness and headaches. Endo/Heme/Allergies: Does not bruise/bleed easily. Family History   Problem Relation Age of Onset    Kidney Disease Father     Other Father         Pancreatic Disease    Cancer Father     Heart Disease Father     Heart Attack Father     Thyroid Disease Other         Grandparent       Past Medical History:   Diagnosis Date    Asthma     Chronic constipation     Fibromyalgia     GERD (gastroesophageal reflux disease)     IBS (irritable bowel syndrome)     Migraines     unknown if aura    Psychiatric disorder     she denies any diagnosis despite being on antipsychotics, SSRIs, etc in the past    Scoliosis     Urinary incontinence     mixed       Past Surgical History:   Procedure Laterality Date    HX  SECTION  ,     HX COLONOSCOPY      HX DILATION AND CURETTAGE      HX ENDOSCOPY      HX PELVIC LAPAROSCOPY         Social History     Tobacco Use    Smoking status: Never Smoker    Smokeless tobacco: Never Used   Substance Use Topics    Alcohol use: No       Allergies   Allergen Reactions    Reglan [Metoclopramide] Not Reported This Time    Sulfa (Sulfonamide Antibiotics) Not Reported This Time    Wygesic Not Reported This Time       Prior to Admission medications    Medication Sig Start Date End Date Taking? Authorizing Provider   fluticasone propionate (FLONASE NA) by Nasal route. Yes Provider, Historical   montelukast (SINGULAIR) 10 mg tablet Take 10 mg by mouth daily. Yes Provider, Historical   Cetirizine (ZYRTEC) 10 mg cap Take  by mouth. Yes Provider, Historical   albuterol (PROVENTIL HFA, VENTOLIN HFA, PROAIR HFA) 90 mcg/actuation inhaler Take 2 Puffs by inhalation every four (4) hours as needed for Wheezing or Shortness of Breath.  18  Yes Qamar PETERSON, HELEN   cyclobenzaprine (FLEXERIL) 5 mg tablet Take 1 Tab by mouth every twelve (12) hours as needed for Muscle Spasm(s). 12/6/18  Yes Eual Ates, NP   hyoscyamine (ANASPAZ, LEVSIN) 0.125 mg tablet Take 125 mcg by mouth every four (4) hours as needed. Yes Provider, Historical   promethazine (PHENERGAN) 12.5 mg tablet Take  by mouth every six (6) hours as needed. Yes Provider, Historical   esomeprazole (NEXIUM) 40 mg capsule Take  by mouth daily. Yes Provider, Historical   clindamycin (CLEOCIN T) 1 % lotion Apply  to affected area two (2) times a day. use thin film on affected area   Yes Provider, Historical   SUMAtriptan (IMITREX) 25 mg tablet Take 25 mg by mouth once as needed. Yes Provider, Historical   POLYETHYLENE GLYCOL 3350 (MIRALAX PO) Take  by mouth. Yes Provider, Historical   CALCIUM CARBONATE/MAG HYDROX (MYLANTA PO) Take  by mouth. Yes Provider, Historical   topiramate (TOPAMAX) 50 mg tablet Take  by mouth two (2) times a day. Provider, Historical   Ferrous Fumarate 325 mg (106 mg iron) Tab Take  by mouth. Provider, Historical         Visit Vitals  /90   Pulse 85   Ht 4' 11\" (1.499 m)   Wt 59.4 kg (131 lb)   BMI 26.46 kg/m²         Physical Exam   Constitutional: She is oriented to person, place, and time. She appears well-developed and well-nourished. HENT:   Head: Normocephalic and atraumatic. Eyes: Conjunctivae are normal.   Neck: Neck supple. No JVD present. No tracheal deviation present. No thyromegaly present. Cardiovascular: Normal rate and regular rhythm. PMI is not displaced. Exam reveals no gallop, no S3 and no decreased pulses. No murmur heard. Pulmonary/Chest: No respiratory distress. She has no wheezes. She has no rales. She exhibits no tenderness. Abdominal: Soft. There is no tenderness. Musculoskeletal: She exhibits no edema. Neurological: She is alert and oriented to person, place, and time. Skin: Skin is warm. Psychiatric: She has a normal mood and affect.      ECHO CARDIOGRAM [facility]  Component Name Value Ref Range EF Echo 60     Result Impression   :   NORMAL LEFT VENTRICULAR CAVITY SIZE AND SYSTOLIC FUNCTION WITH AN EJECTION FRACTION OF  60  %. NORMAL DIASTOLIC FUNCTION. NORMAL RIGHT HEART FUNCTION AND SIZE. NO HEMODYNAMICALLY SIGNIFICANT VALVULAR PATHOLOGY. NORMAL PULMONARY ARTERY PRESSURE OF 15 MM/HG. NO PREVIOUS REPORT FOR COMPARISON. I have personally reviewed patient's records available from hospital and other providers and incorporated findings in patient care. 12/2018- ER notes, labs, EKG, chest x-ray and prior records  Ms. Cody Copeland has a reminder for a \"due or due soon\" health maintenance. I have asked that she contact her primary care provider for follow-up on this health maintenance. No flowsheet data found. Assessment         ICD-10-CM ICD-9-CM    1. Chest pain, unspecified type R07.9 786.50 EXERCISE CARDIAC STRESS TEST      ECHO ADULT COMPLETE    Atypical chest pain. Possible angina possible spasm. Cannot rule out chest wall pain or GERD. Less likely pericarditis   2. Family history of chronic heart failure Z82.49 V17.49     Family history of cardiomyopathy with transplant in father. 3. SOB (shortness of breath) on exertion R06.02 786.05 EXERCISE CARDIAC STRESS TEST      ECHO ADULT COMPLETE    New onset. Unclear etiology. Rule out pulmonary hypertension and LV dysfunction   4. Uncomplicated asthma, unspecified asthma severity, unspecified whether persistent J45.909 493.90     Patient on multiple medication relatively stable       There are no discontinued medications. No orders of the defined types were placed in this encounter. Follow-up Disposition:  Return for F/u after tests.

## 2019-01-28 ENCOUNTER — OFFICE VISIT (OUTPATIENT)
Dept: ORTHOPEDIC SURGERY | Age: 34
End: 2019-01-28

## 2019-01-28 VITALS
TEMPERATURE: 98.1 F | BODY MASS INDEX: 26.97 KG/M2 | SYSTOLIC BLOOD PRESSURE: 138 MMHG | HEART RATE: 92 BPM | WEIGHT: 133.8 LBS | DIASTOLIC BLOOD PRESSURE: 77 MMHG | HEIGHT: 59 IN | RESPIRATION RATE: 18 BRPM | OXYGEN SATURATION: 99 %

## 2019-01-28 DIAGNOSIS — M54.12 CERVICAL NEURITIS: ICD-10-CM

## 2019-01-28 DIAGNOSIS — M79.7 FIBROMYALGIA: Primary | ICD-10-CM

## 2019-01-28 DIAGNOSIS — M54.2 CERVICAL PAIN: ICD-10-CM

## 2019-01-28 RX ORDER — METHYLPREDNISOLONE 4 MG/1
TABLET ORAL
Qty: 1 DOSE PACK | Refills: 0 | Status: SHIPPED | OUTPATIENT
Start: 2019-01-28 | End: 2019-03-31

## 2019-01-28 NOTE — PROGRESS NOTES
[facility]  [address], [place]   Phone: [phone]  Fax: [phone]        PROGRESS NOTE      HISTORY OF PRESENT ILLNESS:  The patient is a 35 y.o. female and was seen today for follow up of neck and back pain. Pt has been diagnosed with fibromyalgia. Note from Dr. Jose Bunn dated 2016 indicating patient was seen with c/o headaches and numbness and tingling. She is intolerant of NEURONTIN and TOPAMAX. Pt has been diagnosed with migraine headaches. ER Note from Gus Delilah dated 10/19/2016 indicating patient was seen with c/o headaches. Today, she c/o pain in the lower back, neck, left leg and right hand. Pt states that her lower back pain is most severe. Pt c/o pain radiating laterally down the left leg through the feet and toes. She reports intermittent right leg pain. She recently had a  section but is not pregnant or breast feeding at this time. Patient denies change in bowel or bladder habits. Patient denies loss of balance. She denies any prior spinal surgery or lumbar blocks. Pt has not recently tried physical therapy. BLE EMG dated 2016 was reviewed and were within normal limits. Cervical Spine CT from 2012 demonstrated sclerotic lucency through the anterior/inferior bleeding of C1. This is likely chronic. No definite acute fracture or subluxation of the cervical spine. She has tried Topamax in the past but no longer takes the medication. She reports no relief when try LYRICA or CYMBALTA. Pt will follow up with Dr. Jose Bunn next week. Preliminary reading of lumbar plain films revealed: no acute pathology identified. Normal lumbar spine. At her last clinical appointment, at that point, I did not think her pain was related to spine pathology. She had a negative LUE EMG and normal physical examination. In my opinion, her pain was related to her previously diagnosed fibromyalgia. She followed up with Dr. Jose Bunn.  Consideration referral to a chronic pain management center. I discussed with patient multiple treatment options including medications, physical therapy, lumbar blocks, and surgery. Pt is not particularly interested in surgery at that time. The patient returns today with progressive pain in the right upper trapezius extending into the RUE to digits 2-5 x 3-4 months. She rates her pain 8/10. I last saw this patient 1/27/17. She was scheduled to f/u prn. She denies specific injury or trauma. She reports dropping things with her right hand. She denies LOB. She had recent PT with slight relief. Her PCP put her on Neurontin, despite our records indicating she was previously intolerance to that medication. She continues to be followed by Dr. Rasheeda Venegas for migraines. She denies possibility of pregnancy or breastfeeding. Lumbar spine XR dated 5/29/18. Films were not available for my review. Per report, no evidence of fracture or subluxation. Cervical spine XR dated 6/12/18. Films were not available for my review. Per report, persistent small bony density inferior to the anterior ring of C1 unchanged from prior CT of December 2011. No acute osseous abnormality. Thoracic spine XR dated 10/1/18. Films were not available for my review. Per report, mild dextrorotary scoliosis with no acute fracture or subluxation. Right shoulder XR dated 1/8/19 had minimal degenerative changes right AC joint. Films were not available for my review. Note from Katia Macario NP dated 5/10/17 indicating patient was seen with c/o flare up of low back and thoracic spine pain. At that time, she underwent a Toradol injection and was given MDP. Note from Ching Brower MD dated 1/7/19 indicating patient was seen with c/o 2 week hx of RUE pain, radiating from the neck to the fingertips. Treated with OTC medications without relief. She was being treated with Neurontin.  reviewed. Body mass index is 27.02 kg/m².       PCP: Ching Brower MD      Past Medical History:   Diagnosis Date    Asthma     Chronic constipation     Fibromyalgia     GERD (gastroesophageal reflux disease)     IBS (irritable bowel syndrome)     Migraines     unknown if aura    Psychiatric disorder     she denies any diagnosis despite being on antipsychotics, SSRIs, etc in the past    Scoliosis     Urinary incontinence     mixed        Social History     Socioeconomic History    Marital status: SINGLE     Spouse name: Not on file    Number of children: Not on file    Years of education: Not on file    Highest education level: Not on file   Social Needs    Financial resource strain: Not on file    Food insecurity - worry: Not on file    Food insecurity - inability: Not on file   sliceX needs - medical: Not on file   sliceX needs - non-medical: Not on file   Occupational History    Not on file   Tobacco Use    Smoking status: Never Smoker    Smokeless tobacco: Never Used   Substance and Sexual Activity    Alcohol use: No    Drug use: No    Sexual activity: Not on file   Other Topics Concern    Not on file   Social History Narrative    Not on file       Current Outpatient Medications   Medication Sig Dispense Refill    fluticasone propionate (FLONASE NA) by Nasal route.  montelukast (SINGULAIR) 10 mg tablet Take 10 mg by mouth daily.  Cetirizine (ZYRTEC) 10 mg cap Take  by mouth.  albuterol (PROVENTIL HFA, VENTOLIN HFA, PROAIR HFA) 90 mcg/actuation inhaler Take 2 Puffs by inhalation every four (4) hours as needed for Wheezing or Shortness of Breath. 1 Inhaler 0    cyclobenzaprine (FLEXERIL) 5 mg tablet Take 1 Tab by mouth every twelve (12) hours as needed for Muscle Spasm(s). 10 Tab 0    hyoscyamine (ANASPAZ, LEVSIN) 0.125 mg tablet Take 125 mcg by mouth every four (4) hours as needed.  promethazine (PHENERGAN) 12.5 mg tablet Take  by mouth every six (6) hours as needed.       esomeprazole (NEXIUM) 40 mg capsule Take by mouth daily.  topiramate (TOPAMAX) 50 mg tablet Take  by mouth two (2) times a day.  clindamycin (CLEOCIN T) 1 % lotion Apply  to affected area two (2) times a day. use thin film on affected area      SUMAtriptan (IMITREX) 25 mg tablet Take 25 mg by mouth once as needed.  Ferrous Fumarate 325 mg (106 mg iron) Tab Take  by mouth.  POLYETHYLENE GLYCOL 3350 (MIRALAX PO) Take  by mouth.  CALCIUM CARBONATE/MAG HYDROX (MYLANTA PO) Take  by mouth. Allergies   Allergen Reactions    Reglan [Metoclopramide] Not Reported This Time    Sulfa (Sulfonamide Antibiotics) Not Reported This Time    Wygesic Not Reported This Time          PHYSICAL EXAMINATION    Visit Vitals  /77   Pulse 92   Temp 98.1 °F (36.7 °C)   Resp 18   Ht 4' 11\" (1.499 m)   Wt 133 lb 12.8 oz (60.7 kg)   SpO2 99%   BMI 27.02 kg/m²       CONSTITUTIONAL: NAD, A&O x 3  SENSATION: Intact to light touch throughout  NEURO: Keira's is negative bilaterally. RANGE OF MOTION: The patient has full passive range of motion in all four extremities. Tandem gait: despite coaxing, patient did not follow instructions for tandem gait testing. Shoulder AB/Flex Elbow Flex Wrist Ext Elbow Ext Wrist Flex Hand Intrin Tone   Right +4/5 +4/5 +4/5 +4/5 +4/5 +4/5 +4/5   Left +4/5 +4/5 +4/5 +4/5 +4/5 +4/5 +4/5                 ASSESSMENT   Diagnoses and all orders for this visit:    1. Fibromyalgia    2. Cervical pain    3. Cervical neuritis          IMPRESSION AND PLAN:  I will start her on Medrol Dosepak. I will set her up for an MRI of the cervical spine and EMG of the RUE. I advised patient to bring copies of films to next visit. Patient is neurologically intact. I will see the patient back following MRI and EMG. Written by Nithya Rebollar, as dictated by Vilma De Los Santos MD  I examined the patient, reviewed and agree with the note.

## 2019-01-29 ENCOUNTER — DOCUMENTATION ONLY (OUTPATIENT)
Dept: ORTHOPEDIC SURGERY | Age: 34
End: 2019-01-29

## 2019-01-29 NOTE — PROGRESS NOTES
EMG RUE is scheduled with Dr. Nomi Ruiz, [address] [place] [address], [address], [facility], [phone] on 02/19/19, arrive 9:45AM, test 10:00AM.     MRI of the Cervical Spine without contrast is scheduled at [place] [address], [phone], on 02/20/19, arrive 10:15AM, test 10:45AM. Pre-authorization to be obtained.

## 2019-01-29 NOTE — PROGRESS NOTES
MRI of the Cervical Spine without contrast is scheduled at [facility], [phone], on 02/20/19, arrive 10:15AM, test 10:45AM. [employer] pre-authorization [id number], effective 01/29/19-03/29/19

## 2019-02-05 ENCOUNTER — OFFICE VISIT (OUTPATIENT)
Dept: CARDIOLOGY CLINIC | Age: 34
End: 2019-02-05

## 2019-02-05 VITALS
DIASTOLIC BLOOD PRESSURE: 82 MMHG | WEIGHT: 135 LBS | HEART RATE: 93 BPM | HEIGHT: 59 IN | SYSTOLIC BLOOD PRESSURE: 128 MMHG | BODY MASS INDEX: 27.21 KG/M2

## 2019-02-05 DIAGNOSIS — R06.02 SOB (SHORTNESS OF BREATH) ON EXERTION: ICD-10-CM

## 2019-02-05 DIAGNOSIS — R07.9 CHEST PAIN, UNSPECIFIED TYPE: Primary | ICD-10-CM

## 2019-02-05 NOTE — PROGRESS NOTES
HISTORY OF PRESENT ILLNESS  Angie Rosales is a 35 y.o. female. Patient is here for follow up of diagnostic tests. Results will be discussed. Chest Pain (Angina)    The history is provided by the patient. This is a recurrent problem. The current episode started more than 1 week ago. The problem has been gradually improving. The problem occurs every several days. The pain is associated with rest and exertion. The pain is present in the lateral region and substernal region. The quality of the pain is described as pressure-like and sharp. The pain does not radiate. Associated symptoms include shortness of breath. Pertinent negatives include no abdominal pain, no claudication, no cough, no dizziness, no fever, no headaches, no hemoptysis, no nausea, no orthopnea, no palpitations, no PND, no sputum production, no vomiting and no weakness. Shortness of Breath   The history is provided by the patient. This is a new problem. The problem occurs intermittently. The problem has not changed since onset. Associated symptoms include chest pain. Pertinent negatives include no fever, no headaches, no cough, no sputum production, no hemoptysis, no wheezing, no PND, no orthopnea, no vomiting, no abdominal pain, no rash, no leg swelling and no claudication. The problem's precipitants include exercise. Review of Systems   Constitutional: Negative for chills and fever. HENT: Negative for nosebleeds. Eyes: Negative for blurred vision and double vision. Respiratory: Positive for shortness of breath. Negative for cough, hemoptysis, sputum production and wheezing. Cardiovascular: Positive for chest pain. Negative for palpitations, orthopnea, claudication, leg swelling and PND. Gastrointestinal: Negative for abdominal pain, heartburn, nausea and vomiting. Musculoskeletal: Negative for myalgias. Skin: Negative for rash. Neurological: Negative for dizziness, weakness and headaches.    Endo/Heme/Allergies: Does not bruise/bleed easily. Family History   Problem Relation Age of Onset    Kidney Disease Father     Other Father         Pancreatic Disease    Cancer Father     Heart Disease Father     Heart Attack Father     Thyroid Disease Other         Grandparent       Past Medical History:   Diagnosis Date    Asthma     Chronic constipation     Fibromyalgia     GERD (gastroesophageal reflux disease)     IBS (irritable bowel syndrome)     Migraines     unknown if aura    Psychiatric disorder     she denies any diagnosis despite being on antipsychotics, SSRIs, etc in the past    Scoliosis     Urinary incontinence     mixed       Past Surgical History:   Procedure Laterality Date    HX  SECTION  ,     HX COLONOSCOPY      HX DILATION AND CURETTAGE      HX ENDOSCOPY      HX PELVIC LAPAROSCOPY         Social History     Tobacco Use    Smoking status: Never Smoker    Smokeless tobacco: Never Used   Substance Use Topics    Alcohol use: No       Allergies   Allergen Reactions    Reglan [Metoclopramide] Not Reported This Time    Sulfa (Sulfonamide Antibiotics) Not Reported This Time    Wygesic Not Reported This Time       Prior to Admission medications    Medication Sig Start Date End Date Taking? Authorizing Provider   methylPREDNISolone (MEDROL DOSEPACK) 4 mg tablet Per dose pack instructions 19  Yes Alcides Woody MD   fluticasone propionate (FLONASE NA) by Nasal route. Yes Provider, Historical   montelukast (SINGULAIR) 10 mg tablet Take 10 mg by mouth daily. Yes Provider, Historical   Cetirizine (ZYRTEC) 10 mg cap Take  by mouth. Yes Provider, Historical   albuterol (PROVENTIL HFA, VENTOLIN HFA, PROAIR HFA) 90 mcg/actuation inhaler Take 2 Puffs by inhalation every four (4) hours as needed for Wheezing or Shortness of Breath. 18  Yes Misty Lopez NP   hyoscyamine (ANASPAZ, LEVSIN) 0.125 mg tablet Take 125 mcg by mouth every four (4) hours as needed.    Yes Provider, Historical   promethazine (PHENERGAN) 12.5 mg tablet Take  by mouth every six (6) hours as needed. Yes Provider, Historical   esomeprazole (NEXIUM) 40 mg capsule Take  by mouth daily. Yes Provider, Historical   clindamycin (CLEOCIN T) 1 % lotion Apply  to affected area two (2) times a day. use thin film on affected area   Yes Provider, Historical   SUMAtriptan (IMITREX) 25 mg tablet Take 25 mg by mouth once as needed. Yes Provider, Historical   POLYETHYLENE GLYCOL 3350 (MIRALAX PO) Take  by mouth. Yes Provider, Historical   CALCIUM CARBONATE/MAG HYDROX (MYLANTA PO) Take  by mouth. Yes Provider, Historical   cyclobenzaprine (FLEXERIL) 5 mg tablet Take 1 Tab by mouth every twelve (12) hours as needed for Muscle Spasm(s). Patient not taking: Reported on 1/28/2019 12/6/18   Jennifer PETERSON, NP   topiramate (TOPAMAX) 50 mg tablet Take  by mouth two (2) times a day. Provider, Historical   Ferrous Fumarate 325 mg (106 mg iron) Tab Take  by mouth. Provider, Historical         Visit Vitals  /82   Pulse 93   Ht 4' 11\" (1.499 m)   Wt 61.2 kg (135 lb)   BMI 27.27 kg/m²         Physical Exam   Constitutional: She is oriented to person, place, and time. She appears well-developed and well-nourished. HENT:   Head: Normocephalic and atraumatic. Eyes: Conjunctivae are normal.   Neck: Neck supple. No JVD present. No tracheal deviation present. No thyromegaly present. Cardiovascular: Normal rate and regular rhythm. PMI is not displaced. Exam reveals no gallop, no S3 and no decreased pulses. No murmur heard. Pulmonary/Chest: No respiratory distress. She has no wheezes. She has no rales. She exhibits no tenderness. Abdominal: Soft. There is no tenderness. Musculoskeletal: She exhibits no edema. Neurological: She is alert and oriented to person, place, and time. Skin: Skin is warm. Psychiatric: She has a normal mood and affect.      ECHO CARDIOGRAM [id number]/28/2015  [facility]  Component Name Value Ref Range   EF Echo 60     Result Impression   :   NORMAL LEFT VENTRICULAR CAVITY SIZE AND SYSTOLIC FUNCTION WITH AN EJECTION FRACTION OF  60  %. NORMAL DIASTOLIC FUNCTION. NORMAL RIGHT HEART FUNCTION AND SIZE. NO HEMODYNAMICALLY SIGNIFICANT VALVULAR PATHOLOGY. NORMAL PULMONARY ARTERY PRESSURE OF 15 MM/HG. NO PREVIOUS REPORT FOR COMPARISON. I have personally reviewed patient's records available from hospital and other providers and incorporated findings in patient care. 12/2018- ER notes, labs, EKG, chest x-ray and prior records  Ms. Brittanei Vu has a reminder for a \"due or due soon\" health maintenance. I have asked that she contact her primary care provider for follow-up on this health maintenance. Interpretation Summary 1/2019       · Normal cavity size, wall thickness, systolic function (ejection fraction normal) and diastolic function. The estimated ejection fraction is 56 - 60%. No regional wall motion abnormality noted. · Normal right ventricular size and function. · No hemodynamically significant valvular pathology. Interpretation Summary 1/2019    · Baseline ECG: Normal sinus rhythm. · Low risk Duke treadmill score. · Negative stress electrocardiogram.          Assessment         ICD-10-CM ICD-9-CM    1. Chest pain, unspecified type R07.9 786.50     improving  Negative stress test monitor clinically   2. SOB (shortness of breath) on exertion R06.02 786.05     Normal ejection fraction no valvular heart disease. There are no discontinued medications. No orders of the defined types were placed in this encounter. Follow-up Disposition:  Return if symptoms worsen or fail to improve.

## 2019-02-05 NOTE — PROGRESS NOTES
1. Have you been to the ER, urgent care clinic since your last visit? Hospitalized since your last visit? No     2. Have you seen or consulted any other health care providers outside of the [facility] since your last visit? Include any pap smears or colon screening. No     3. Since your last visit, have you had any of the following symptoms? .           4. Have you had any blood work, X-rays or cardiac testing? 5. Where do you normally have your labs drawn? 6. Do you need any refills today?

## 2019-03-13 ENCOUNTER — OFFICE VISIT (OUTPATIENT)
Dept: ORTHOPEDIC SURGERY | Age: 34
End: 2019-03-13

## 2019-03-13 VITALS
HEIGHT: 59 IN | BODY MASS INDEX: 27.9 KG/M2 | SYSTOLIC BLOOD PRESSURE: 120 MMHG | DIASTOLIC BLOOD PRESSURE: 79 MMHG | WEIGHT: 138.4 LBS | OXYGEN SATURATION: 99 % | HEART RATE: 90 BPM

## 2019-03-13 DIAGNOSIS — M79.7 FIBROMYALGIA: ICD-10-CM

## 2019-03-13 DIAGNOSIS — M54.12 CERVICAL RADICULOPATHY: ICD-10-CM

## 2019-03-13 DIAGNOSIS — M54.2 CERVICAL PAIN: Primary | ICD-10-CM

## 2019-03-13 RX ORDER — EPINEPHRINE 0.3 MG/.3ML
INJECTION SUBCUTANEOUS
Refills: 5 | COMMUNITY
Start: 2019-02-20 | End: 2022-09-01 | Stop reason: SDUPTHER

## 2019-03-13 RX ORDER — IBUPROFEN 800 MG/1
800 TABLET ORAL
COMMUNITY
Start: 2016-02-27 | End: 2019-03-31

## 2019-03-13 RX ORDER — LINACLOTIDE 145 UG/1
CAPSULE, GELATIN COATED ORAL
Refills: 0 | COMMUNITY
Start: 2019-01-16 | End: 2020-01-15

## 2019-03-13 RX ORDER — AMOXICILLIN AND CLAVULANATE POTASSIUM 875; 125 MG/1; MG/1
TABLET, FILM COATED ORAL
Refills: 0 | COMMUNITY
Start: 2019-03-07 | End: 2019-03-31

## 2019-03-13 RX ORDER — ESCITALOPRAM OXALATE 20 MG/1
TABLET ORAL
Refills: 0 | COMMUNITY
Start: 2019-01-16 | End: 2020-01-15

## 2019-03-13 RX ORDER — MOMETASONE FUROATE 50 UG/1
SPRAY, METERED NASAL
Refills: 0 | COMMUNITY
Start: 2019-02-20

## 2019-03-13 RX ORDER — GABAPENTIN 300 MG/1
CAPSULE ORAL
Refills: 1 | COMMUNITY
Start: 2019-01-07 | End: 2019-03-31

## 2019-03-13 RX ORDER — DOCUSATE SODIUM 100 MG/1
CAPSULE, LIQUID FILLED ORAL
Refills: 2 | COMMUNITY
Start: 2019-03-07 | End: 2020-01-15

## 2019-03-13 RX ORDER — RIZATRIPTAN BENZOATE 10 MG/1
TABLET, ORALLY DISINTEGRATING ORAL
Refills: 0 | COMMUNITY
Start: 2019-02-21 | End: 2020-11-02

## 2019-03-13 RX ORDER — GABAPENTIN 600 MG/1
TABLET ORAL
Refills: 0 | COMMUNITY
Start: 2019-01-16 | End: 2020-01-15

## 2019-03-13 RX ORDER — CETIRIZINE HCL 10 MG
TABLET ORAL
Refills: 0 | COMMUNITY
Start: 2018-12-18 | End: 2021-08-05 | Stop reason: ALTCHOICE

## 2019-03-13 RX ORDER — IRON SUCCINYL-PROTEIN COMPLEX 40MG/15ML
LIQUID (ML) ORAL
Refills: 2 | COMMUNITY
Start: 2019-02-27 | End: 2021-12-08

## 2019-03-13 NOTE — PROGRESS NOTES
[facility]  [address], [place]   Phone: [phone]  Fax: [phone]        PROGRESS NOTE      HISTORY OF PRESENT ILLNESS:  The patient is a 29 y.o. female and was seen today for follow up of progressive pain in the right upper trapezius extending into the RUE to digits 2-5 x 3-4 months. She was initially seen with neck and back pain. She denies specific injury or trauma. She reports dropping things with her right hand. She denies LOB. She had recent PT with slight relief. Her PCP put her on Neurontin, despite our records indicating she was previously intolerance to that medication. Pt has been diagnosed with fibromyalgia. Note from Dr. Madison Brewer dated 2016 indicating patient was seen with c/o headaches and numbness and tingling. She is intolerant of NEURONTIN and TOPAMAX. Pt has been diagnosed with migraine headaches. ER Note from Alice Sorto dated 10/19/2016 indicating patient was seen with c/o headaches. Note from Cleveland Velasquez NP dated 5/10/17 indicating patient was seen with c/o flare up of low back and thoracic spine pain. At that time, she underwent a Toradol injection and was given MDP. Note from Chris Collazo MD dated 19 indicating patient was seen with c/o 2 week hx of RUE pain, radiating from the neck to the fingertips. Treated with OTC medications without relief. She was being treated with Neurontin. Today, she c/o pain in the lower back, neck, left leg and right hand. Pt states that her lower back pain is most severe. Pt c/o pain radiating laterally down the left leg through the feet and toes. She reports intermittent right leg pain. She recently had a  section but is not pregnant or breast feeding at this time. Patient denies change in bowel or bladder habits. Patient denies loss of balance. She denies any prior spinal surgery or lumbar blocks. Pt has not recently tried physical therapy.  BLE EMG dated 2016 was reviewed and were within normal limits. Cervical Spine CT from 01/18/2012 demonstrated sclerotic lucency through the anterior/inferior of C1. This is likely chronic. No definite acute fracture or subluxation of the cervical spine. She has tried Topamax in the past but no longer takes the medication. She reports no relief when try LYRICA or CYMBALTA. Pt continues to be followed by Dr. Zain Curry for migraines. Preliminary reading of lumbar plain films revealed: no acute pathology identified. Normal lumbar spine. Lumbar spine XR dated 5/29/18. Films were not available for my review. Per report, no evidence of fracture or subluxation. Cervical spine XR dated 6/12/18. Films were not available for my review. Per report, persistent small bony density inferior to the anterior ring of C1 unchanged from prior CT of December 2011. No acute osseous abnormality. Thoracic spine XR dated 10/1/18. Films were not available for my review. Per report, mild dextrorotary scoliosis with no acute fracture or subluxation. Right shoulder XR dated 1/8/19 had minimal degenerative changes right AC joint. Films were not available for my review. At her last clinical appointment, I started her on Medrol Dosepak. I set her up for an MRI of the cervical spine and EMG of the RUE. The patient returns today with pain location and distribution remain unchanged. She rates her pain 7/10, previously 8/10. Pt completed MDP with temporary relief. She denies possibility of pregnancy or breastfeeding. She has a pending appointment with Dr. Zain Curry for migraines in April. Pt denies dropping things or loss of balance. Cervical spine MRI dated 2/27/19 reviewed. Per report, minimal nonspecific alignment straightening. Unremarkable cervical spine. A RUE EMG dated 2/21/19 was suggestive of a mild carpal tunnel syndrome.  reviewed. Body mass index is 27.95 kg/m².       PCP: Kris Agosto MD      Past Medical History:   Diagnosis Date  Asthma     Chronic constipation     Fibromyalgia     GERD (gastroesophageal reflux disease)     IBS (irritable bowel syndrome)     Migraines     unknown if aura    Psychiatric disorder     she denies any diagnosis despite being on antipsychotics, SSRIs, etc in the past    Scoliosis     Urinary incontinence     mixed        Social History     Socioeconomic History    Marital status: SINGLE     Spouse name: Not on file    Number of children: Not on file    Years of education: Not on file    Highest education level: Not on file   Social Needs    Financial resource strain: Not on file    Food insecurity - worry: Not on file    Food insecurity - inability: Not on file    Transportation needs - medical: Not on file   DyMynd needs - non-medical: Not on file   Occupational History    Not on file   Tobacco Use    Smoking status: Never Smoker    Smokeless tobacco: Never Used   Substance and Sexual Activity    Alcohol use: No    Drug use: No    Sexual activity: Not on file   Other Topics Concern    Not on file   Social History Narrative    Not on file       Current Outpatient Medications   Medication Sig Dispense Refill    cetirizine (ZYRTEC) 10 mg tablet TAKE 1 TABLET EVERY DAY  0    rizatriptan (MAXALT-MLT) 10 mg disintegrating tablet PLEASE SEE ATTACHED FOR DETAILED DIRECTIONS  0    mometasone (NASONEX) 50 mcg/actuation nasal spray USE 1-2 SPRAYS INTO EACH NOSTRIL EVERY DAY AS NEEDED  0    LINZESS 145 mcg cap capsule TAKE ONE CAPSULE EVERY DAY 30MIN PRIOR TO BREAKFAST  0    FERRETTS IPS 40 mg/15 mL liqd 15 ML ORALLY 2 TIMES PER DAY. 2    ibuprofen (MOTRIN) 800 mg tablet Take 800 mg by mouth.       gabapentin (NEURONTIN) 600 mg tablet TAKE 1 TABLET TWICE A DAY  0    gabapentin (NEURONTIN) 300 mg capsule TAKE 1 CAP AT BED FOR 3 DAYS THEN TAKE 1 CAP TWICE A DAY  1    escitalopram oxalate (LEXAPRO) 20 mg tablet TAKE 1 TABLET EVERY DAY  0    EPINEPHrine (EPIPEN) 0.3 mg/0.3 mL injection AS DIRECTED AS NEEDED INTRAMUSCULAR 1 DAYS  5    docusate sodium (COLACE) 100 mg capsule TAKE 1 CAPSULE BY MOUTH TWO TIMES DAILY  2    amoxicillin-clavulanate (AUGMENTIN) 875-125 mg per tablet TAKE TABLET BY MOUTH 2 TIMES DAILY UNTIL COMPLETED  0    methylPREDNISolone (MEDROL DOSEPACK) 4 mg tablet Per dose pack instructions 1 Dose Pack 0    fluticasone propionate (FLONASE NA) by Nasal route.  montelukast (SINGULAIR) 10 mg tablet Take 10 mg by mouth daily.  Cetirizine (ZYRTEC) 10 mg cap Take  by mouth.  albuterol (PROVENTIL HFA, VENTOLIN HFA, PROAIR HFA) 90 mcg/actuation inhaler Take 2 Puffs by inhalation every four (4) hours as needed for Wheezing or Shortness of Breath. 1 Inhaler 0    cyclobenzaprine (FLEXERIL) 5 mg tablet Take 1 Tab by mouth every twelve (12) hours as needed for Muscle Spasm(s). 10 Tab 0    hyoscyamine (ANASPAZ, LEVSIN) 0.125 mg tablet Take 125 mcg by mouth every four (4) hours as needed.  promethazine (PHENERGAN) 12.5 mg tablet Take  by mouth every six (6) hours as needed.  esomeprazole (NEXIUM) 40 mg capsule Take  by mouth daily.  topiramate (TOPAMAX) 50 mg tablet Take  by mouth two (2) times a day.  clindamycin (CLEOCIN T) 1 % lotion Apply  to affected area two (2) times a day. use thin film on affected area      SUMAtriptan (IMITREX) 25 mg tablet Take 25 mg by mouth once as needed.  Ferrous Fumarate 325 mg (106 mg iron) Tab Take  by mouth.  POLYETHYLENE GLYCOL 3350 (MIRALAX PO) Take  by mouth.  CALCIUM CARBONATE/MAG HYDROX (MYLANTA PO) Take  by mouth.          Allergies   Allergen Reactions    Reglan [Metoclopramide] Not Reported This Time    Sulfa (Sulfonamide Antibiotics) Not Reported This Time    Wygesic Not Reported This Time          PHYSICAL EXAMINATION    Visit Vitals  /79   Pulse 90   Ht 4' 11\" (1.499 m)   Wt 138 lb 6.4 oz (62.8 kg)   SpO2 99%   BMI 27.95 kg/m²       CONSTITUTIONAL: NAD, A&O x 3  SENSATION: Intact to light touch throughout  NEURO: Keira's is negative bilaterally. RANGE OF MOTION: The patient has full passive range of motion in all four extremities. Shoulder AB/Flex Elbow Flex Wrist Ext Elbow Ext Wrist Flex Hand Intrin Tone   Right +4/5 +4/5 +4/5 +4/5 +4/5 +4/5 +4/5   Left +4/5 +4/5 +4/5 +4/5 +4/5 +4/5 +4/5                 ASSESSMENT   Diagnoses and all orders for this visit:    1. Cervical pain    2. Cervical radiculopathy    3. Fibromyalgia          IMPRESSION AND PLAN:  After reviewing diagnostic testing, I do not appreciate any spinal pathology to account for her sxs. I recommend she f/u with her PCP to explore other possible causes for her pain. My sense is that her fibromyalgia may be a contributing factor to her sxs. I recommend she attend her appointment with Dr. Nakul Perea in April as scheduled. Patient is neurologically intact. I will see the patient back prn. Written by Hans Peabody, as dictated by Shruti Tinsley MD  I examined the patient, reviewed and agree with the note.

## 2019-03-13 NOTE — PROGRESS NOTES
Chief Complaint   Patient presents with    Neck Pain     neck pain     1. Have you been to the ER, urgent care clinic since your last visit? Hospitalized since your last visit? No    2. Have you seen or consulted any other health care providers outside of the [facility] since your last visit? Include any pap smears or colon screening.  No

## 2019-03-22 DIAGNOSIS — M79.7 FIBROMYALGIA: ICD-10-CM

## 2019-03-22 DIAGNOSIS — M54.12 CERVICAL NEURITIS: ICD-10-CM

## 2019-03-22 DIAGNOSIS — M54.2 CERVICAL PAIN: ICD-10-CM

## 2019-03-28 ENCOUNTER — OFFICE VISIT (OUTPATIENT)
Dept: FAMILY MEDICINE CLINIC | Age: 34
End: 2019-03-28

## 2019-03-28 VITALS
HEART RATE: 92 BPM | WEIGHT: 138 LBS | RESPIRATION RATE: 16 BRPM | BODY MASS INDEX: 27.82 KG/M2 | TEMPERATURE: 98.6 F | HEIGHT: 59 IN | OXYGEN SATURATION: 99 % | SYSTOLIC BLOOD PRESSURE: 135 MMHG | DIASTOLIC BLOOD PRESSURE: 82 MMHG

## 2019-03-28 DIAGNOSIS — M54.10 RADICULOPATHY, UNSPECIFIED SPINAL REGION: ICD-10-CM

## 2019-03-28 DIAGNOSIS — G56.01 CARPAL TUNNEL SYNDROME OF RIGHT WRIST: Primary | ICD-10-CM

## 2019-03-28 NOTE — PROGRESS NOTES
Chief Complaint   Patient presents with    Arm Pain     Right arm pain, radiates from shoulder to hand    Neck Pain     BUE radicular pain     1. Have you been to the ER, urgent care clinic since your last visit? Hospitalized since your last visit? No    2. Have you seen or consulted any other health care providers outside of the [address] [place] [address] since your last visit? Include any pap smears or colon screening. No     HPI  Colan Gertrude comes in for f/u care. Right UE pain that comes on and off. Radicular type pain. She is able to  things but has been dropping objects. She is right hand dominant. Has had EMG studies that showed mild carpal tunnel syndrome. Given persistence of the pain and discomfort and this symptoms and also with the findings of carpal tunnel syndrome I will refer her to the orthopedist to discuss more of this. Patient also has arm and neck pain. This seems like cervical radiculopathy. Has been followed up by the spine specialist.  She has been on gabapentin. She has also been seen by the neurologist.  I will have her see the orthopedist and then I will follow-up after that. She can take ibuprofen or Tylenol arthritis as needed for the pain. Patient seen last month by the cardiologist for chest pain. Had negative workup. Has been stable. No chest pain, shortness of breath or diaphoresis. She has a history of fibromyalgia.       Past Medical History  Past Medical History:   Diagnosis Date    Asthma     Chronic constipation     Fibromyalgia     GERD (gastroesophageal reflux disease)     IBS (irritable bowel syndrome)     Migraines     unknown if aura    Psychiatric disorder     she denies any diagnosis despite being on antipsychotics, SSRIs, etc in the past    Scoliosis     Urinary incontinence     mixed       Surgical History  Past Surgical History:   Procedure Laterality Date    HX  SECTION  ,     HX COLONOSCOPY      HX DILATION AND CURETTAGE      HX ENDOSCOPY      HX PELVIC LAPAROSCOPY          Medications  Current Outpatient Medications   Medication Sig Dispense Refill    cetirizine (ZYRTEC) 10 mg tablet TAKE 1 TABLET EVERY DAY  0    rizatriptan (MAXALT-MLT) 10 mg disintegrating tablet PLEASE SEE ATTACHED FOR DETAILED DIRECTIONS  0    mometasone (NASONEX) 50 mcg/actuation nasal spray USE 1-2 SPRAYS INTO EACH NOSTRIL EVERY DAY AS NEEDED  0    LINZESS 145 mcg cap capsule TAKE ONE CAPSULE EVERY DAY 30MIN PRIOR TO BREAKFAST  0    FERRETTS IPS 40 mg/15 mL liqd 15 ML ORALLY 2 TIMES PER DAY. 2    gabapentin (NEURONTIN) 600 mg tablet TAKE 1 TABLET TWICE A DAY  0    escitalopram oxalate (LEXAPRO) 20 mg tablet TAKE 1 TABLET EVERY DAY  0    EPINEPHrine (EPIPEN) 0.3 mg/0.3 mL injection AS DIRECTED AS NEEDED INTRAMUSCULAR 1 DAYS  5    docusate sodium (COLACE) 100 mg capsule TAKE 1 CAPSULE BY MOUTH TWO TIMES DAILY  2    fluticasone propionate (FLONASE NA) by Nasal route.  montelukast (SINGULAIR) 10 mg tablet Take 10 mg by mouth daily.  albuterol (PROVENTIL HFA, VENTOLIN HFA, PROAIR HFA) 90 mcg/actuation inhaler Take 2 Puffs by inhalation every four (4) hours as needed for Wheezing or Shortness of Breath. 1 Inhaler 0    hyoscyamine (ANASPAZ, LEVSIN) 0.125 mg tablet Take 125 mcg by mouth every four (4) hours as needed.  promethazine (PHENERGAN) 12.5 mg tablet Take  by mouth every six (6) hours as needed.  esomeprazole (NEXIUM) 40 mg capsule Take  by mouth daily.  clindamycin (CLEOCIN T) 1 % lotion Apply  to affected area two (2) times a day. use thin film on affected area      POLYETHYLENE GLYCOL 3350 (MIRALAX PO) Take  by mouth.  CALCIUM CARBONATE/MAG HYDROX (MYLANTA PO) Take  by mouth.          Allergies  Allergies   Allergen Reactions    Reglan [Metoclopramide] Not Reported This Time    Sulfa (Sulfonamide Antibiotics) Not Reported This Time    Wygesic Not Reported This Time       Family History  Family History   Problem Relation Age of Onset    Kidney Disease Father     Other Father         Pancreatic Disease    Cancer Father     Heart Disease Father     Heart Attack Father     Thyroid Disease Other         Grandparent       Social History  Social History     Socioeconomic History    Marital status: SINGLE     Spouse name: Not on file    Number of children: Not on file    Years of education: Not on file    Highest education level: Not on file   Occupational History    Not on file   Social Needs    Financial resource strain: Not on file    Food insecurity:     Worry: Not on file     Inability: Not on file    Transportation needs:     Medical: Not on file     Non-medical: Not on file   Tobacco Use    Smoking status: Never Smoker    Smokeless tobacco: Never Used   Substance and Sexual Activity    Alcohol use: No    Drug use: No    Sexual activity: Not on file   Lifestyle    Physical activity:     Days per week: Not on file     Minutes per session: Not on file    Stress: Not on file   Relationships    Social connections:     Talks on phone: Not on file     Gets together: Not on file     Attends Christian service: Not on file     Active member of club or organization: Not on file     Attends meetings of clubs or organizations: Not on file     Relationship status: Not on file    Intimate partner violence:     Fear of current or ex partner: Not on file     Emotionally abused: Not on file     Physically abused: Not on file     Forced sexual activity: Not on file   Other Topics Concern    Not on file   Social History Narrative    Not on file       Review of Systems  Review of Systems -view of systems is negative except as noted above in the HPI.     Vital Signs  Visit Vitals  /82 (BP 1 Location: Left arm, BP Patient Position: Sitting)   Pulse 92   Temp 98.6 °F (37 °C) (Oral)   Resp 16   Ht 4' 11\" (1.499 m)   Wt 138 lb (62.6 kg)   SpO2 99%   BMI 27.87 kg/m²         Physical Exam  Physical Examination: General appearance - oriented to person, place, and time  Mental status - alert, oriented to person, place, and time  Chest - no tachypnea, retractions or cyanosis  Heart - S1 and S2 normal  Neurological -patient has numbness tingling right upper extremity with diminished  strength. Tinel's and Phalen's signs are positive. Paracervical muscle tightness  Extremities - no pedal edema noted, intact peripheral pulses    Results  Results for orders placed or performed in visit on 01/31/19   EXERCISE CARDIAC STRESS TEST   Result Value Ref Range    Baseline HR 89 bpm    Baseline  mmHg    Stress Base Diastolic BP 74 mmHg    Post peak  bpm    Percent  %    Stress Base Systolic  mmHg    Stress Rate Pressure Product 25,232 bpm*mmHg    Stress Base Diastolic BP 78 mmHg    Target  bpm    Exercise duration time 6:25 min:sec    Estimated workload 8.2 METS    Stress Stage 1  bpm    Stress Stage 1 /78 mmHg    Stress Stage 2  bpm    Stress Stage 2 /84 mmHg    Recovery Stage 1  bpm    Recovery Stage 1 /78 mmHg    Recovery Stage 2  bpm    Recovery Stage 2 /74 mmHg    Recovery Stage 3  bpm    Recovery Stage 3 /72 mmHg    STRESS SR DUNN TREADMILL SCORE 6     ST Elevation (mm) 0 mm    ST Depression (mm) 0 mm    Angina Index 0        ASSESSMENT and PLAN    ICD-10-CM ICD-9-CM    1. Carpal tunnel syndrome of right wrist G56.01 354.0 REFERRAL TO ORTHOPEDICS   2. Radiculopathy, unspecified spinal region M54.10 729.2      reviewed diet, exercise and weight control  reviewed medications and side effects in detail      I have discussed the diagnosis with the patient and the intended plan of care as seen in the above orders. The patient has received an after-visit summary and questions were answered concerning future plans. I have discussed medication, side effects, and warnings with the patient in detail.  The patient verbalized understanding and is in agreement with the plan of care. The patient will contact the office with any additional concerns.     Davie Buchanan MD

## 2019-04-19 ENCOUNTER — DOCUMENTATION ONLY (OUTPATIENT)
Dept: ORTHOPEDIC SURGERY | Age: 34
End: 2019-04-19

## 2019-04-19 ENCOUNTER — OFFICE VISIT (OUTPATIENT)
Dept: ORTHOPEDIC SURGERY | Age: 34
End: 2019-04-19

## 2019-04-19 VITALS
HEART RATE: 90 BPM | WEIGHT: 141 LBS | HEIGHT: 59 IN | DIASTOLIC BLOOD PRESSURE: 91 MMHG | SYSTOLIC BLOOD PRESSURE: 133 MMHG | BODY MASS INDEX: 28.43 KG/M2 | RESPIRATION RATE: 16 BRPM | TEMPERATURE: 98.2 F | OXYGEN SATURATION: 99 %

## 2019-04-19 DIAGNOSIS — G56.01 CARPAL TUNNEL SYNDROME OF RIGHT WRIST: ICD-10-CM

## 2019-04-19 DIAGNOSIS — G56.03 BILATERAL CARPAL TUNNEL SYNDROME: Primary | ICD-10-CM

## 2019-04-19 DIAGNOSIS — G56.02 CARPAL TUNNEL SYNDROME OF LEFT WRIST: ICD-10-CM
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
## 2019-04-19 NOTE — PROGRESS NOTES
EMG MOE is [facility] J[facility] Steen[phone], arrive 9:45AM, test 10:00AM

## 2019-04-19 NOTE — PATIENT INSTRUCTIONS
Carpal Tunnel Syndrome: Care Instructions  Your Care Instructions    Carpal tunnel syndrome is a nerve problem. It can cause tingling, numbness, weakness, or pain in the fingers, thumb, and hand. The median nerve and several tough tissues called tendons run through a space in the wrist called the carpal tunnel. The repeated hand motions used in work and some hobbies and sports can put pressure on the nerve. Pregnancy and several conditions, including diabetes, arthritis, and an underactive thyroid, also can cause carpal tunnel syndrome. You may be able to limit an activity or do it differently to reduce your symptoms. You also can take other steps to feel better. If your symptoms are mild, 1 to 2 weeks of home treatment are likely to ease your pain. Surgery is needed only if other treatments do not work. Follow-up care is a key part of your treatment and safety. Be sure to make and go to all appointments, and call your doctor if you are having problems. It's also a good idea to know your test results and keep a list of the medicines you take. How can you care for yourself at home? · If possible, stop or reduce the activity that causes your symptoms. If you cannot stop the activity, take frequent breaks to rest and stretch or change hand positions to do a task. Try switching hands, such as when using a computer mouse. · Try to avoid bending or twisting your wrists. · Ask your doctor if you can take an over-the-counter pain medicine, such as acetaminophen (Tylenol), ibuprofen (Advil, Motrin), or naproxen (Aleve). Be safe with medicines. Read and follow all instructions on the label. · If your doctor prescribes corticosteroid medicine to help reduce pain and swelling, take it exactly as prescribed. Call your doctor if you think you are having a problem with your medicine. · Put ice or a cold pack on your wrist for 10 to 20 minutes at a time to ease pain.  Put a thin cloth between the ice and your skin.  · If your doctor or your physical or occupational therapist tells you to wear a wrist splint, wear it as directed to keep your wrist in a neutral position. This also eases pressure on your median nerve. · Ask your doctor whether you should have physical or occupational therapy to learn how to do tasks differently. · Try a yoga class to stretch your muscles and build strength in your hands and wrists. Yoga has been shown to ease carpal tunnel symptoms. To prevent carpal tunnel  · When working at a computer, keep your hands and wrists in line with your forearms. Hold your elbows close to your sides. Take a break every 10 to 15 minutes. · Try these exercises:  ? Warm up: Rotate your wrist up, down, and from side to side. Repeat this 4 times. Stretch your fingers far apart, relax them, then stretch them again. Repeat 4 times. Stretch your thumb by pulling it back gently, holding it, and then releasing it. Repeat 4 times. ? Prayer stretch: Start with your palms together in front of your chest just below your chin. Slowly lower your hands toward your waistline while keeping your hands close to your stomach and your palms together until you feel a mild to moderate stretch under your forearms. Hold for 10 to 20 seconds. Repeat 4 times. ? Wrist flexor stretch: Hold your arm in front of you with your palm up. Bend your wrist, pointing your hand toward the floor. With your other hand, gently bend your wrist further until you feel a mild to moderate stretch in your forearm. Hold for 10 to 20 seconds. Repeat 4 times. ? Wrist extensor stretch: Repeat the steps for the wrist flexor stretch, but begin with your extended hand palm down. · Squeeze a rubber exercise ball several times a day to keep your hands and fingers strong. · Avoid holding objects (such as a book) in one position for a long time. When possible, use your whole hand to grasp an object.  Using just the thumb and index finger can put stress on the wrist.  · Do not smoke. It can make this condition worse by reducing blood flow to the median nerve. If you need help quitting, talk to your doctor about stop-smoking programs and medicines. These can increase your chances of quitting for good. When should you call for help? Watch closely for changes in your health, and be sure to contact your doctor if:    · Your pain or other problems do not get better with home care.     · You want more information about physical or occupational therapy.     · You have side effects of your corticosteroid medicine, such as:  ? Weight gain. ? Mood changes. ? Trouble sleeping. ? Bruising easily.     · You have any other problems with your medicine. Where can you learn more? Go to http://yuri-danilo.info/. Enter R432 in the search box to learn more about \"Carpal Tunnel Syndrome: Care Instructions. \"  Current as of: September 20, 2018  Content Version: 11.9  © 5167-5495 [employer]. Care instructions adapted under license by [employer] (which disclaims liability or warranty for this information). If you have questions about a medical condition or this instruction, always ask your healthcare professional. [place] [address] any warranty or liability for your use of this information.

## 2019-04-24 ENCOUNTER — OFFICE VISIT (OUTPATIENT)
Dept: FAMILY MEDICINE CLINIC | Age: 34
End: 2019-04-24

## 2019-04-24 VITALS
BODY MASS INDEX: 27.82 KG/M2 | RESPIRATION RATE: 20 BRPM | OXYGEN SATURATION: 98 % | HEART RATE: 100 BPM | HEIGHT: 59 IN | DIASTOLIC BLOOD PRESSURE: 94 MMHG | SYSTOLIC BLOOD PRESSURE: 135 MMHG | TEMPERATURE: 98 F | WEIGHT: 138 LBS

## 2019-04-24 DIAGNOSIS — R20.2 NUMBNESS AND TINGLING: ICD-10-CM

## 2019-04-24 DIAGNOSIS — R20.0 NUMBNESS AND TINGLING: ICD-10-CM

## 2019-04-24 DIAGNOSIS — R10.32 LEFT LOWER QUADRANT PAIN: ICD-10-CM

## 2019-04-24 DIAGNOSIS — R51.9 INTRACTABLE HEADACHE, UNSPECIFIED CHRONICITY PATTERN, UNSPECIFIED HEADACHE TYPE: ICD-10-CM

## 2019-04-24 DIAGNOSIS — R10.9 FLANK PAIN: Primary | ICD-10-CM

## 2019-04-24 NOTE — PROGRESS NOTES
Chief Complaint   Patient presents with    Headache     pt c/o having a headache    Abdominal Pain     pt c/o left side pain    Back Pain     pt c/o back pain     1. Have you been to the ER, urgent care clinic since your last visit? Hospitalized since your last visit? yes [facility]- 04/22    2. Have you seen or consulted any other health care providers outside of the [address] [place] [address] since your last visit? Include any pap smears or colon screening. No     HPI  Cathy Hooker comes in for acute care. She has been unwell for 4 days. Patient has abdominal pain. This is in the left lower quadrant and suprapubic area. She also has left flank pain. Pain radiates to the lower extremities and to the whole back area. Her lower extremities feel numb and weak especially when she tries to walk. She is however able to walk and bear weight. She has fever and chills. Was seen in the emergency room recently and had urinalysis done that was positive for UTI symptoms. She was put on an antibiotic and she completed the medication. Today we did a urinalysis that is reassuring. Patient has some nausea but no vomiting. Feels that symptoms have been worse compared to when she was first seen. She does have guarding especially left lower quadrant in the suprapubic area. I am concerned about pyelonephritis. She complains of headache that is persistent with occasional blurry vision. Given the symptoms and the presentation patient does need to acute work-up and may need to have radiological studies including CT scan of abdomen pelvis and possibly head. I will have her go to the emergency room for an evaluation and stabilization. Patient was agreeable to this. She is here with her mom who will take her to the emergency room.     Past Medical History  Past Medical History:   Diagnosis Date    Asthma     Chronic constipation     Fibromyalgia     GERD (gastroesophageal reflux disease)     IBS (irritable bowel syndrome)     Migraines     unknown if aura    Psychiatric disorder     she denies any diagnosis despite being on antipsychotics, SSRIs, etc in the past    Scoliosis     Urinary incontinence     mixed       Surgical History  Past Surgical History:   Procedure Laterality Date    HX  SECTION  ,     HX COLONOSCOPY      HX DILATION AND CURETTAGE      HX ENDOSCOPY      HX PELVIC LAPAROSCOPY          Medications  Current Outpatient Medications   Medication Sig Dispense Refill    cetirizine (ZYRTEC) 10 mg tablet TAKE 1 TABLET EVERY DAY  0    rizatriptan (MAXALT-MLT) 10 mg disintegrating tablet PLEASE SEE ATTACHED FOR DETAILED DIRECTIONS  0    mometasone (NASONEX) 50 mcg/actuation nasal spray USE 1-2 SPRAYS INTO EACH NOSTRIL EVERY DAY AS NEEDED  0    LINZESS 145 mcg cap capsule TAKE ONE CAPSULE EVERY DAY 30MIN PRIOR TO BREAKFAST  0    FERRETTS IPS 40 mg/15 mL liqd 15 ML ORALLY 2 TIMES PER DAY. 2    gabapentin (NEURONTIN) 600 mg tablet TAKE 1 TABLET TWICE A DAY  0    escitalopram oxalate (LEXAPRO) 20 mg tablet TAKE 1 TABLET EVERY DAY  0    docusate sodium (COLACE) 100 mg capsule TAKE 1 CAPSULE BY MOUTH TWO TIMES DAILY  2    fluticasone propionate (FLONASE NA) by Nasal route.  montelukast (SINGULAIR) 10 mg tablet Take 10 mg by mouth daily.  albuterol (PROVENTIL HFA, VENTOLIN HFA, PROAIR HFA) 90 mcg/actuation inhaler Take 2 Puffs by inhalation every four (4) hours as needed for Wheezing or Shortness of Breath. 1 Inhaler 0    hyoscyamine (ANASPAZ, LEVSIN) 0.125 mg tablet Take 125 mcg by mouth every four (4) hours as needed.  promethazine (PHENERGAN) 12.5 mg tablet Take  by mouth every six (6) hours as needed.  esomeprazole (NEXIUM) 40 mg capsule Take  by mouth daily.  clindamycin (CLEOCIN T) 1 % lotion Apply  to affected area two (2) times a day. use thin film on affected area      POLYETHYLENE GLYCOL 3350 (MIRALAX PO) Take  by mouth.       CALCIUM CARBONATE/MAG HYDROX (MYLANTA PO) Take  by mouth.       EPINEPHrine (EPIPEN) 0.3 mg/0.3 mL injection AS DIRECTED AS NEEDED INTRAMUSCULAR 1 DAYS  5       Allergies  Allergies   Allergen Reactions    Reglan [Metoclopramide] Not Reported This Time    Sulfa (Sulfonamide Antibiotics) Not Reported This Time    Wygesic Not Reported This Time       Family History  Family History   Problem Relation Age of Onset    Kidney Disease Father     Other Father         Pancreatic Disease    Cancer Father     Heart Disease Father     Heart Attack Father     Thyroid Disease Other         Grandparent       Social History  Social History     Socioeconomic History    Marital status: SINGLE     Spouse name: Not on file    Number of children: Not on file    Years of education: Not on file    Highest education level: Not on file   Occupational History    Not on file   Social Needs    Financial resource strain: Not on file    Food insecurity:     Worry: Not on file     Inability: Not on file    Transportation needs:     Medical: Not on file     Non-medical: Not on file   Tobacco Use    Smoking status: Never Smoker    Smokeless tobacco: Never Used   Substance and Sexual Activity    Alcohol use: No    Drug use: No    Sexual activity: Not on file   Lifestyle    Physical activity:     Days per week: Not on file     Minutes per session: Not on file    Stress: Not on file   Relationships    Social connections:     Talks on phone: Not on file     Gets together: Not on file     Attends Synagogue service: Not on file     Active member of club or organization: Not on file     Attends meetings of clubs or organizations: Not on file     Relationship status: Not on file    Intimate partner violence:     Fear of current or ex partner: Not on file     Emotionally abused: Not on file     Physically abused: Not on file     Forced sexual activity: Not on file   Other Topics Concern    Not on file   Social History Narrative    Not on file Review of Systems  Review of Systems - Review of all systems is negative except as noted above in the HPI.     Vital Signs  Visit Vitals  BP (!) 135/94 (BP 1 Location: Left arm, BP Patient Position: Sitting)   Pulse 100   Temp 98 °F (36.7 °C)   Resp 20   Ht 4' 11\" (1.499 m)   Wt 138 lb (62.6 kg)   SpO2 98%   BMI 27.87 kg/m²         Physical Exam  Physical Examination: General appearance -in distress with low back pain and flank pain  Mental status - alert, oriented to person, place, and time  Mouth - mucous membranes moist, pharynx normal without lesions  Lymphatics - no palpable lymphadenopathy  Chest - clear to auscultation, no wheezes, rales or rhonchi, symmetric air entry  Heart - S1 and S2 normal  Abdomen - no rebound tenderness noted  Back exam - limited range of motion, pain with motion noted during exam, tenderness noted paralumbar muscles  Neurological - neck supple without rigidity  Musculoskeletal - full range of motion without pain  Extremities - no pedal edema noted, intact peripheral pulses    Results  Results for orders placed or performed in visit on 01/31/19   EXERCISE CARDIAC STRESS TEST   Result Value Ref Range    Baseline HR 89 bpm    Baseline  mmHg    Stress Base Diastolic BP 74 mmHg    Post peak  bpm    Percent  %    Stress Base Systolic  mmHg    Stress Rate Pressure Product 25,232 bpm*mmHg    Stress Base Diastolic BP 78 mmHg    Target  bpm    Exercise duration time 6:25 min:sec    Estimated workload 8.2 METS    Stress Stage 1  bpm    Stress Stage 1 /78 mmHg    Stress Stage 2  bpm    Stress Stage 2 /84 mmHg    Recovery Stage 1  bpm    Recovery Stage 1 /78 mmHg    Recovery Stage 2  bpm    Recovery Stage 2 /74 mmHg    Recovery Stage 3  bpm    Recovery Stage 3 /72 mmHg    STRESS SR DUNN TREADMILL SCORE 6     ST Elevation (mm) 0 mm    ST Depression (mm) 0 mm    Angina Index 0        ASSESSMENT and PLAN ICD-10-CM ICD-9-CM    1. Flank pain R10.9 789.09 AMB POC URINALYSIS DIP STICK AUTO W/O MICRO   2. Left lower quadrant pain R10.32 789.04 AMB POC URINALYSIS DIP STICK AUTO W/O MICRO   3. Numbness and tingling R20.0 782.0     R20.2     4. Intractable headache, unspecified chronicity pattern, unspecified headache type R51 784.0      lab results and schedule of future lab studies reviewed with patient  reviewed diet, exercise and weight control  reviewed medications and side effects in detail    I have discussed the diagnosis with the patient and the intended plan of care as seen in the above orders. The patient has received an after-visit summary and questions were answered concerning future plans. I have discussed medication, side effects, and warnings with the patient in detail. The patient verbalized understanding and is in agreement with the plan of care. The patient will contact the office with any additional concerns.     Dali Flores MD

## 2019-04-24 NOTE — PATIENT INSTRUCTIONS
Patient severe headache, numbness and tingling lower extremities and left lower quadrant pain. Also has severe left flank pain. Recently treated for UTI. Given worsening symptoms will benefit from lab workup and radiological testing possible CT scan of head and abdomen/pelvis. Referred to ED given severe headache and guarding LLQ.

## 2019-04-25 LAB
BILIRUB UR QL STRIP: NEGATIVE
GLUCOSE UR-MCNC: NEGATIVE MG/DL
KETONES P FAST UR STRIP-MCNC: NEGATIVE MG/DL
PH UR STRIP: 6 [PH] (ref 4.6–8)
PROT UR QL STRIP: NORMAL
SP GR UR STRIP: 1.02 (ref 1–1.03)
UA UROBILINOGEN AMB POC: NORMAL (ref 0.2–1)
URINALYSIS CLARITY POC: CLEAR
URINALYSIS COLOR POC: YELLOW
URINE BLOOD POC: NEGATIVE
URINE LEUKOCYTES POC: NEGATIVE
URINE NITRITES POC: NEGATIVE

## 2019-05-02 ENCOUNTER — OFFICE VISIT (OUTPATIENT)
Dept: FAMILY MEDICINE CLINIC | Age: 34
End: 2019-05-02

## 2019-05-02 VITALS
DIASTOLIC BLOOD PRESSURE: 87 MMHG | RESPIRATION RATE: 20 BRPM | SYSTOLIC BLOOD PRESSURE: 132 MMHG | TEMPERATURE: 98.6 F | BODY MASS INDEX: 28.02 KG/M2 | OXYGEN SATURATION: 99 % | WEIGHT: 139 LBS | HEIGHT: 59 IN | HEART RATE: 82 BPM

## 2019-05-02 DIAGNOSIS — R09.81 SINUS CONGESTION: ICD-10-CM

## 2019-05-02 DIAGNOSIS — N39.0 URINARY TRACT INFECTION WITHOUT HEMATURIA, SITE UNSPECIFIED: ICD-10-CM

## 2019-05-02 DIAGNOSIS — B37.31 YEAST VAGINITIS: ICD-10-CM

## 2019-05-02 DIAGNOSIS — R51.9 NONINTRACTABLE HEADACHE, UNSPECIFIED CHRONICITY PATTERN, UNSPECIFIED HEADACHE TYPE: Primary | ICD-10-CM

## 2019-05-02 DIAGNOSIS — E66.3 OVERWEIGHT (BMI 25.0-29.9): ICD-10-CM

## 2019-05-02 DIAGNOSIS — Z00.00 ENCOUNTER FOR MEDICARE ANNUAL WELLNESS EXAM: ICD-10-CM

## 2019-05-02 RX ORDER — FLUCONAZOLE 150 MG/1
150 TABLET ORAL DAILY
Qty: 1 TAB | Refills: 0 | Status: SHIPPED | OUTPATIENT
Start: 2019-05-02 | End: 2019-05-03

## 2019-05-02 NOTE — PROGRESS NOTES
This is an Initial Medicare Annual Wellness Exam (AWV) (Performed 12 months after IPPE or effective date of Medicare Part B enrollment, Once in a lifetime)    I have reviewed the patient's medical history in detail and updated the computerized patient record. History   Constance Olea comes in for [employer] wellness exam.    Past Medical History:   Diagnosis Date    Asthma     Chronic constipation     Fibromyalgia     GERD (gastroesophageal reflux disease)     IBS (irritable bowel syndrome)     Migraines     unknown if aura    Psychiatric disorder     she denies any diagnosis despite being on antipsychotics, SSRIs, etc in the past    Scoliosis     Urinary incontinence     mixed      Past Surgical History:   Procedure Laterality Date    HX  SECTION  ,     HX COLONOSCOPY      HX DILATION AND CURETTAGE      HX ENDOSCOPY      HX PELVIC LAPAROSCOPY       Current Outpatient Medications   Medication Sig Dispense Refill    cetirizine (ZYRTEC) 10 mg tablet TAKE 1 TABLET EVERY DAY  0    rizatriptan (MAXALT-MLT) 10 mg disintegrating tablet PLEASE SEE ATTACHED FOR DETAILED DIRECTIONS  0    mometasone (NASONEX) 50 mcg/actuation nasal spray USE 1-2 SPRAYS INTO EACH NOSTRIL EVERY DAY AS NEEDED  0    LINZESS 145 mcg cap capsule TAKE ONE CAPSULE EVERY DAY 30MIN PRIOR TO BREAKFAST  0    FERRETTS IPS 40 mg/15 mL liqd 15 ML ORALLY 2 TIMES PER DAY. 2    gabapentin (NEURONTIN) 600 mg tablet TAKE 1 TABLET TWICE A DAY  0    escitalopram oxalate (LEXAPRO) 20 mg tablet TAKE 1 TABLET EVERY DAY  0    EPINEPHrine (EPIPEN) 0.3 mg/0.3 mL injection AS DIRECTED AS NEEDED INTRAMUSCULAR 1 DAYS  5    docusate sodium (COLACE) 100 mg capsule TAKE 1 CAPSULE BY MOUTH TWO TIMES DAILY  2    fluticasone propionate (FLONASE NA) by Nasal route.  montelukast (SINGULAIR) 10 mg tablet Take 10 mg by mouth daily.       albuterol (PROVENTIL HFA, VENTOLIN HFA, PROAIR HFA) 90 mcg/actuation inhaler Take 2 Puffs by inhalation every four (4) hours as needed for Wheezing or Shortness of Breath. 1 Inhaler 0    hyoscyamine (ANASPAZ, LEVSIN) 0.125 mg tablet Take 125 mcg by mouth every four (4) hours as needed.  promethazine (PHENERGAN) 12.5 mg tablet Take  by mouth every six (6) hours as needed.  esomeprazole (NEXIUM) 40 mg capsule Take  by mouth daily.  clindamycin (CLEOCIN T) 1 % lotion Apply  to affected area two (2) times a day. use thin film on affected area      POLYETHYLENE GLYCOL 3350 (MIRALAX PO) Take  by mouth.  CALCIUM CARBONATE/MAG HYDROX (MYLANTA PO) Take  by mouth. Allergies   Allergen Reactions    Reglan [Metoclopramide] Not Reported This Time    Sulfa (Sulfonamide Antibiotics) Not Reported This Time    Wygesic Not Reported This Time     Family History   Problem Relation Age of Onset    Kidney Disease Father     Other Father         Pancreatic Disease    Cancer Father     Heart Disease Father     Heart Attack Father     Thyroid Disease Other         Grandparent     Social History     Tobacco Use    Smoking status: Never Smoker    Smokeless tobacco: Never Used   Substance Use Topics    Alcohol use: No     Patient Active Problem List   Diagnosis Code    Chronic constipation K59.09    Urinary incontinence R32    Low back pain without sciatica M54.5    Acute bilateral thoracic back pain M54.6    Chronic bilateral low back pain with sciatica M54.40, U46.66    Uncomplicated asthma R39.586    SOB (shortness of breath) on exertion R06.02    Family history of chronic heart failure Z82.49    Chest pain R07.9       Depression Risk Factor Screening:     3 most recent PHQ Screens 5/2/2019   Little interest or pleasure in doing things Not at all   Feeling down, depressed, irritable, or hopeless Not at all   Total Score PHQ 2 0     Alcohol Risk Factor Screening: You do not drink alcohol or very rarely. Functional Ability and Level of Safety:   1.  Was the patient's timed Up and GO test unsteady or longer than 30 seconds? Yes  2. Does the patient need help with the phone, transportation, shopping, preparing meals, housework, laundry, medications or managing money? No  3. Does the patients' home have rugs in the hallway, lack grab bars in the bathroom, lack handrails on the stairs or have poor lighting? No  4. Have you noticed any hearing difficulties? No  Hearing Evaluation:    Hearing Loss  Hearing is good. Activities of Daily Living  The home contains: no safety equipment. Patient does total self care    Fall Risk  No flowsheet data found. Abuse Screen  Patient is not abused    Cognitive Screening   Evaluation of Cognitive Function:  Has your family/caregiver stated any concerns about your memory: no  Normal    Patient Care Team   Patient Care Team:  Dax Lamar MD as PCP - General (Family Practice)    Assessment/Plan   Education and counseling provided:  Are appropriate based on today's review and evaluation  Pneumococcal Vaccine  Screening Pap and pelvic (covered once every 2 years)  1. Encounter for Medicare annual wellness exam    Health Maintenance Due   Topic Date Due    Pneumococcal 0-64 years (1 of 1 - PPSV23) 03/01/1991    DTaP/Tdap/Td series (1 - Tdap) 03/01/2006    PAP AKA CERVICAL CYTOLOGY  03/01/2006    MEDICARE YEARLY EXAM  03/14/2018     I have discussed the diagnosis with the patient and the intended plan of care as seen in the above orders. The patient has received an after-visit summary and questions were answered concerning future plans. I have discussed medication, side effects, and warnings with the patient in detail. The patient verbalized understanding and is in agreement with the plan of care. The patient will contact the office with any additional concerns. Personalized preventative plan of care was discussed, printed and given to patient.     Margo Medina MD

## 2019-05-02 NOTE — PROGRESS NOTES
[facility]  Lilatorres Raphael comes in for f/u care. Headache: seen by the neurologist, had injectable medication for headache. MRI head was normal. Still has weaknes sof the lower extremities and headache on and off. UTI: Seen with abdominal pain previously, sent to ED and ct abd pelvis normal. Noted to have UTI and is on keflex. Urine culture with mixed bacteria. She ritchie shave yeast vaginitis, will send in diflucan. Sinus and nasal congestion: on nasonex and zyrtec. Overweight: patient has a BMI of 28.07, discussed normal BMI, she will intensify lifestyle and dietary modification. Past Medical History  Past Medical History:   Diagnosis Date    Asthma     Chronic constipation     Fibromyalgia     GERD (gastroesophageal reflux disease)     IBS (irritable bowel syndrome)     Migraines     unknown if aura    Psychiatric disorder     she denies any diagnosis despite being on antipsychotics, SSRIs, etc in the past    Scoliosis     Urinary incontinence     mixed       Surgical History  Past Surgical History:   Procedure Laterality Date    HX  SECTION  ,     HX COLONOSCOPY      HX DILATION AND CURETTAGE      HX ENDOSCOPY      HX PELVIC LAPAROSCOPY          Medications  Current Outpatient Medications   Medication Sig Dispense Refill    cetirizine (ZYRTEC) 10 mg tablet TAKE 1 TABLET EVERY DAY  0    rizatriptan (MAXALT-MLT) 10 mg disintegrating tablet PLEASE SEE ATTACHED FOR DETAILED DIRECTIONS  0    mometasone (NASONEX) 50 mcg/actuation nasal spray USE 1-2 SPRAYS INTO EACH NOSTRIL EVERY DAY AS NEEDED  0    LINZESS 145 mcg cap capsule TAKE ONE CAPSULE EVERY DAY 30MIN PRIOR TO BREAKFAST  0    FERRETTS IPS 40 mg/15 mL liqd 15 ML ORALLY 2 TIMES PER DAY.   2    gabapentin (NEURONTIN) 600 mg tablet TAKE 1 TABLET TWICE A DAY  0    escitalopram oxalate (LEXAPRO) 20 mg tablet TAKE 1 TABLET EVERY DAY  0    EPINEPHrine (EPIPEN) 0.3 mg/0.3 mL injection AS DIRECTED AS NEEDED INTRAMUSCULAR 1 DAYS  5    docusate sodium (COLACE) 100 mg capsule TAKE 1 CAPSULE BY MOUTH TWO TIMES DAILY  2    fluticasone propionate (FLONASE NA) by Nasal route.  montelukast (SINGULAIR) 10 mg tablet Take 10 mg by mouth daily.  albuterol (PROVENTIL HFA, VENTOLIN HFA, PROAIR HFA) 90 mcg/actuation inhaler Take 2 Puffs by inhalation every four (4) hours as needed for Wheezing or Shortness of Breath. 1 Inhaler 0    hyoscyamine (ANASPAZ, LEVSIN) 0.125 mg tablet Take 125 mcg by mouth every four (4) hours as needed.  promethazine (PHENERGAN) 12.5 mg tablet Take  by mouth every six (6) hours as needed.  esomeprazole (NEXIUM) 40 mg capsule Take  by mouth daily.  clindamycin (CLEOCIN T) 1 % lotion Apply  to affected area two (2) times a day. use thin film on affected area      POLYETHYLENE GLYCOL 3350 (MIRALAX PO) Take  by mouth.  CALCIUM CARBONATE/MAG HYDROX (MYLANTA PO) Take  by mouth.          Allergies  Allergies   Allergen Reactions    Reglan [Metoclopramide] Not Reported This Time    Sulfa (Sulfonamide Antibiotics) Not Reported This Time    Wygesic Not Reported This Time       Family History  Family History   Problem Relation Age of Onset    Kidney Disease Father     Other Father         Pancreatic Disease    Cancer Father     Heart Disease Father     Heart Attack Father     Thyroid Disease Other         Grandparent       Social History  Social History     Socioeconomic History    Marital status: SINGLE     Spouse name: Not on file    Number of children: Not on file    Years of education: Not on file    Highest education level: Not on file   Occupational History    Not on file   Social Needs    Financial resource strain: Not on file    Food insecurity:     Worry: Not on file     Inability: Not on file    Transportation needs:     Medical: Not on file     Non-medical: Not on file   Tobacco Use    Smoking status: Never Smoker    Smokeless tobacco: Never Used   Substance and Sexual Activity    Alcohol use: No    Drug use: No    Sexual activity: Not on file   Lifestyle    Physical activity:     Days per week: Not on file     Minutes per session: Not on file    Stress: Not on file   Relationships    Social connections:     Talks on phone: Not on file     Gets together: Not on file     Attends Congregational service: Not on file     Active member of club or organization: Not on file     Attends meetings of clubs or organizations: Not on file     Relationship status: Not on file    Intimate partner violence:     Fear of current or ex partner: Not on file     Emotionally abused: Not on file     Physically abused: Not on file     Forced sexual activity: Not on file   Other Topics Concern    Not on file   Social History Narrative    Not on file       Review of Systems  Review of Systems - Review of all systems is negative except as noted above in the HPI.     Vital Signs  Visit Vitals  /87 (BP 1 Location: Right arm, BP Patient Position: Sitting)   Pulse 82   Temp 98.6 °F (37 °C) (Oral)   Resp 20   Ht 4' 11\" (1.499 m)   Wt 139 lb (63 kg)   SpO2 99%   BMI 28.07 kg/m²         Physical Exam  Physical Examination: General appearance - alert, well appearing, and in no distress, oriented to person, place, and time and acyanotic, in no respiratory distress  Mental status - alert, oriented to person, place, and time, affect appropriate to mood  Chest - clear to auscultation, no wheezes, rales or rhonchi, symmetric air entry  Heart - normal rate, regular rhythm, normal S1, S2, no murmurs, rubs, clicks or gallops  Neurological - alert, oriented, normal speech, no focal findings or movement disorder noted  Musculoskeletal - no joint tenderness, deformity or swelling  Extremities - intact peripheral pulses  Ears - bilateral TM's and external ear canals normal  Nose - mucosal congestion, mucosal erythema and clear rhinorrhea  Mouth - mucous membranes moist, pharynx normal without lesions          Results  Results for orders placed or performed in visit on 04/24/19   AMB POC URINALYSIS DIP STICK AUTO W/O MICRO   Result Value Ref Range    Color (UA POC) Yellow     Clarity (UA POC) Clear     Glucose (UA POC) Negative Negative    Bilirubin (UA POC) Negative Negative    Ketones (UA POC) Negative Negative    Specific gravity (UA POC) 1.025 1.001 - 1.035    Blood (UA POC) Negative Negative    pH (UA POC) 6.0 4.6 - 8.0    Protein (UA POC) Trace Negative    Urobilinogen (UA POC) 1 mg/dL 0.2 - 1    Nitrites (UA POC) Negative Negative    Leukocyte esterase (UA POC) Negative Negative       ASSESSMENT and PLAN  There are no diagnoses linked to this encounter. ICD-10-CM ICD-9-CM    1. Nonintractable headache, unspecified chronicity pattern, unspecified headache type R51 784.0    2. Yeast vaginitis B37.3 112.1 fluconazole (DIFLUCAN) 150 mg tablet   3. Urinary tract infection without hematuria, site unspecified N39.0 599.0    4. Sinus congestion R09.81 478.19    5. Overweight (BMI 25.0-29. 9) E66.3 278.02    Discussed the patient's BMI with her. The BMI follow up plan is as follows:     dietary management education, guidance, and counseling  encourage exercise  monitor weight  lab results and schedule of future lab studies reviewed with patient  reviewed diet, exercise and weight control  reviewed medications and side effects in detail      I have discussed the diagnosis with the patient and the intended plan of care as seen in the above orders. The patient has received an after-visit summary and questions were answered concerning future plans. I have discussed medication, side effects, and warnings with the patient in detail. The patient verbalized understanding and is in agreement with the plan of care. The patient will contact the office with any additional concerns. Edwina Muñoz MD    PLEASE NOTE:   This document has been produced using voice recognition software.  Unrecognized errors in transcription may be present

## 2019-05-02 NOTE — PATIENT INSTRUCTIONS
Medicare Part B Preventive Services Limitations Recommendation Scheduled   Bone Mass Measurement  (age 72 & older, biennial) A bone mass density test is recommended when a woman turns 65 to screen for osteoporosis. This test is only recommended one time, as a screening. Some providers will use this same test as a disease monitoring tool if you already have osteoporosis. N/a        Cardiovascular Screening Blood Tests (every 5 years)  Total cholesterol, HDL, Triglycerides and ECG Order blood work  as a panel if possible and adults with routine risk  an electrocardiogram (ECG) at intervals determined by your doctor. n/a    Colorectal Cancer Screening  -Fecal occult blood test (annual)  -Flexible sigmoidoscopy (5y)  -Screening colonoscopy (10y)  -Barium Enema Colorectal cancer screenings should be done for adults age 54-65 with no increased risk factors for colorectal cancer. There are a number of acceptable methods of screening for this type of cancer. Each test has its own benefits and drawbacks. Discuss with your doctor what is most appropriate for you during your annual wellness visit. The different tests include: colonoscopy (considered the best screening method), a fecal occult blood test, a fecal DNA test, and sigmoidoscopy.  n/a    Counseling to Prevent Tobacco Use (up to 8 sessions per year)  - Counseling greater than 3 and up to 10 minutes  - Counseling greater than 10 minutes Patients must be asymptomatic of tobacco-related conditions to receive as preventive service n/a    Diabetes Screening Tests (at least every 3 years, Medicare covers annually or at 6-month intervals for prediabetic patients)    Fasting blood sugar (FBS) or glucose tolerance test (GTT) -All adults age 38-68 who are overweight should have a diabetes screening test once every three years.   -Other screening tests and preventive services for persons with diabetes include: an eye exam to screen for diabetic retinopathy, a kidney function test, a foot exam, and stricter control over your cholesterol. n/a    Diabetes Self-Management Training (DSMT) (no USPSTF recommendation) Requires referral by treating physician for patient with diabetes or renal disease. 10 hours of initial DSMT session of no less than 30 minutes each in a continuous 12-month period. 2 hours of follow-up DSMT in subsequent years. n/a    Glaucoma Screening (no USPSTF recommendation) Diabetes mellitus, family history, , age 48 or over,  American, age 72 or over n/a    Human Immunodeficiency Virus (HIV) Screening (annually for increased risk patients)  HIV-1 and HIV-2 by EIA, ZACHARY, rapid antibody test, or oral mucosa transudate Patient must be at increased risk for HIV infection per USPSTF guidelines or pregnant. Tests covered annually for patients at increased risk. Pregnant patients may receive up to 3 test during pregnancy. Medical Nutrition Therapy (MNT) (for diabetes or renal disease not recommended schedule) Requires referral by treating physician for patient with diabetes or renal disease. Can be provided in same year as diabetes self-management training (DSMT), and CMS recommends medical nutrition therapy take place after DSMT. Up to 3 hours for initial year and 2 hours in subsequent years. Shingles Vaccination A shingles vaccine is also recommended once in a lifetime after age 61     Seasonal Influenza Vaccination (annually) All adults should have a flu vaccine yearly  utd    Pneumococcal Vaccination (once after 72) All adults over the age of 72 should receive the recommended pneumonia vaccines. Current USPSTF guidelines recommend a series of two vaccines for the best pneumonia protection.   due    Hepatitis B Vaccinations (if medium/high risk) Medium/high risk factors:  End-stage renal disease,  Hemophiliacs who received Factor VIII or IX concentrates, Clients of institutions for the mentally retarded, Persons who live in the same house as a HepB virus carrier, Homosexual men, Illicit injectable drug abusers. Screening Mammography (biennial age 54-69) Breast cancer screenings are recommended every other year for women of normal risk, age 54-69. n/a    Screening Pap Tests and Pelvic Examination (up to age 79 and after 79 if unknown history or abnormal study last 8 years) Cervical cancer screenings for women over age 72 are only recommended with certain risk factors due    Hepatitis C All adults born between 80 and 1965 should be screened once  n/a      Tetanus  All adults should have a tetanus vaccine every 10 years due                 Body Mass Index: Care Instructions  Your Care Instructions    Body mass index (BMI) can help you see if your weight is raising your risk for health problems. It uses a formula to compare how much you weigh with how tall you are. · A BMI lower than 18.5 is considered underweight. · A BMI between 18.5 and 24.9 is considered healthy. · A BMI between 25 and 29.9 is considered overweight. A BMI of 30 or higher is considered obese. If your BMI is in the normal range, it means that you have a lower risk for weight-related health problems. If your BMI is in the overweight or obese range, you may be at increased risk for weight-related health problems, such as high blood pressure, heart disease, stroke, arthritis or joint pain, and diabetes. If your BMI is in the underweight range, you may be at increased risk for health problems such as fatigue, lower protection (immunity) against illness, muscle loss, bone loss, hair loss, and hormone problems. BMI is just one measure of your risk for weight-related health problems. You may be at higher risk for health problems if you are not active, you eat an unhealthy diet, or you drink too much alcohol or use tobacco products. Follow-up care is a key part of your treatment and safety.  Be sure to make and go to all appointments, and call your doctor if you are having problems. It's also a good idea to know your test results and keep a list of the medicines you take. How can you care for yourself at home? · Practice healthy eating habits. This includes eating plenty of fruits, vegetables, whole grains, lean protein, and low-fat dairy. · If your doctor recommends it, get more exercise. Walking is a good choice. Bit by bit, increase the amount you walk every day. Try for at least 30 minutes on most days of the week. · Do not smoke. Smoking can increase your risk for health problems. If you need help quitting, talk to your doctor about stop-smoking programs and medicines. These can increase your chances of quitting for good. · Limit alcohol to 2 drinks a day for men and 1 drink a day for women. Too much alcohol can cause health problems. If you have a BMI higher than 25  · Your doctor may do other tests to check your risk for weight-related health problems. This may include measuring the distance around your waist. A waist measurement of more than 40 inches in men or 35 inches in women can increase the risk of weight-related health problems. · Talk with your doctor about steps you can take to stay healthy or improve your health. You may need to make lifestyle changes to lose weight and stay healthy, such as changing your diet and getting regular exercise. If you have a BMI lower than 18.5  · Your doctor may do other tests to check your risk for health problems. · Talk with your doctor about steps you can take to stay healthy or improve your health. You may need to make lifestyle changes to gain or maintain weight and stay healthy, such as getting more healthy foods in your diet and doing exercises to build muscle. Where can you learn more? Go to http://yuri-danilo.info/. Enter S176 in the search box to learn more about \"Body Mass Index: Care Instructions. \"  Current as of: October 13, 2016  Content Version: 11.4  © 4101-9333 [employer], [employer]. Care instructions adapted under license by [employer] (which disclaims liability or warranty for this information). If you have questions about a medical condition or this instruction, always ask your healthcare professional. [address] any warranty or liability for your use of this information.

## 2019-05-28 DIAGNOSIS — G56.02 CARPAL TUNNEL SYNDROME OF LEFT WRIST: ICD-10-CM

## 2019-06-07 ENCOUNTER — OFFICE VISIT (OUTPATIENT)
Dept: ORTHOPEDIC SURGERY | Age: 34
End: 2019-06-07

## 2019-06-07 VITALS
RESPIRATION RATE: 16 BRPM | OXYGEN SATURATION: 100 % | HEIGHT: 59 IN | TEMPERATURE: 99.3 F | BODY MASS INDEX: 28.47 KG/M2 | WEIGHT: 141.2 LBS | DIASTOLIC BLOOD PRESSURE: 87 MMHG | SYSTOLIC BLOOD PRESSURE: 125 MMHG | HEART RATE: 84 BPM

## 2019-06-07 DIAGNOSIS — G56.01 CARPAL TUNNEL SYNDROME OF RIGHT WRIST: Primary | ICD-10-CM

## 2019-06-07 DIAGNOSIS — G56.02 CARPAL TUNNEL SYNDROME OF LEFT WRIST: ICD-10-CM
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
## 2019-07-19 ENCOUNTER — OFFICE VISIT (OUTPATIENT)
Dept: ORTHOPEDIC SURGERY | Age: 34
End: 2019-07-19

## 2019-07-19 VITALS
SYSTOLIC BLOOD PRESSURE: 132 MMHG | HEIGHT: 59 IN | OXYGEN SATURATION: 100 % | BODY MASS INDEX: 28.59 KG/M2 | WEIGHT: 141.8 LBS | RESPIRATION RATE: 18 BRPM | HEART RATE: 84 BPM | DIASTOLIC BLOOD PRESSURE: 84 MMHG

## 2019-07-19 DIAGNOSIS — G56.01 CARPAL TUNNEL SYNDROME OF RIGHT WRIST: Primary | ICD-10-CM

## 2019-07-19 DIAGNOSIS — M79.7 FIBROMYALGIA: ICD-10-CM

## 2019-07-19 DIAGNOSIS — G56.02 CARPAL TUNNEL SYNDROME OF LEFT WRIST: ICD-10-CM

## 2019-07-19 DIAGNOSIS — M54.12 CERVICAL RADICULOPATHY: ICD-10-CM
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
## 2019-07-26 ENCOUNTER — OFFICE VISIT (OUTPATIENT)
Dept: FAMILY MEDICINE CLINIC | Age: 34
End: 2019-07-26

## 2019-07-26 VITALS
DIASTOLIC BLOOD PRESSURE: 87 MMHG | HEART RATE: 87 BPM | RESPIRATION RATE: 20 BRPM | HEIGHT: 59 IN | WEIGHT: 141 LBS | BODY MASS INDEX: 28.43 KG/M2 | TEMPERATURE: 98.6 F | SYSTOLIC BLOOD PRESSURE: 135 MMHG | OXYGEN SATURATION: 99 %

## 2019-07-26 DIAGNOSIS — S92.911D CLOSED NONDISPLACED FRACTURE OF PHALANX OF TOE OF RIGHT FOOT WITH ROUTINE HEALING, UNSPECIFIED TOE, SUBSEQUENT ENCOUNTER: Primary | ICD-10-CM

## 2019-07-26 DIAGNOSIS — R20.0 NUMBNESS AND TINGLING OF FOOT: ICD-10-CM

## 2019-07-26 DIAGNOSIS — R20.2 NUMBNESS AND TINGLING OF FOOT: ICD-10-CM
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
## 2019-07-29 ENCOUNTER — OFFICE VISIT (OUTPATIENT)
Dept: ORTHOPEDIC SURGERY | Age: 34
End: 2019-07-29

## 2019-07-29 VITALS
RESPIRATION RATE: 12 BRPM | HEIGHT: 59 IN | SYSTOLIC BLOOD PRESSURE: 152 MMHG | DIASTOLIC BLOOD PRESSURE: 88 MMHG | BODY MASS INDEX: 28.87 KG/M2 | OXYGEN SATURATION: 100 % | WEIGHT: 143.2 LBS | TEMPERATURE: 98.7 F | HEART RATE: 104 BPM

## 2019-07-29 DIAGNOSIS — S90.31XA CONTUSION OF RIGHT FOOT, INITIAL ENCOUNTER: Primary | ICD-10-CM

## 2019-07-29 NOTE — PROGRESS NOTES
1. Have you been to the ER, urgent care clinic since your last visit? Hospitalized since your last visit? No    2. Have you seen or consulted any other health care providers outside of the [address] [place] [address] since your last visit? Include any pap smears or colon screening.  No
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
## 2019-08-14 ENCOUNTER — OFFICE VISIT (OUTPATIENT)
Dept: ORTHOPEDIC SURGERY | Age: 34
End: 2019-08-14

## 2019-08-14 VITALS
WEIGHT: 142 LBS | HEIGHT: 59 IN | DIASTOLIC BLOOD PRESSURE: 84 MMHG | SYSTOLIC BLOOD PRESSURE: 136 MMHG | TEMPERATURE: 98.6 F | HEART RATE: 90 BPM | BODY MASS INDEX: 28.63 KG/M2 | RESPIRATION RATE: 14 BRPM | OXYGEN SATURATION: 98 %

## 2019-08-14 DIAGNOSIS — M79.7 FIBROMYALGIA: ICD-10-CM

## 2019-08-14 DIAGNOSIS — M54.2 CERVICAL PAIN: ICD-10-CM

## 2019-08-14 DIAGNOSIS — G56.01 CARPAL TUNNEL SYNDROME OF RIGHT WRIST: ICD-10-CM

## 2019-08-14 DIAGNOSIS — M54.2 NECK PAIN: Primary | ICD-10-CM

## 2019-08-14 RX ORDER — ERENUMAB-AOOE 70 MG/ML
INJECTION SUBCUTANEOUS
Refills: 3 | COMMUNITY
Start: 2019-07-30

## 2019-08-14 RX ORDER — UMECLIDINIUM BROMIDE AND VILANTEROL TRIFENATATE 62.5; 25 UG/1; UG/1
POWDER RESPIRATORY (INHALATION)
Refills: 6 | COMMUNITY
Start: 2019-07-24 | End: 2022-05-19

## 2019-08-14 NOTE — LETTER
8/14/19 Patient: Karen Forman YOB: 1985 Date of Visit: 8/14/2019 Jeri Wilkinson MD 
[facility] [address] Care [address] [place] [address] VIA In Basket Dear Jeri Wilkinson MD, Thank you for referring Ms. Sharri Chiu to [address] [place] [address] for evaluation. My notes for this consultation are attached. If you have questions, please do not hesitate to call me. I look forward to following your patient along with you. Sincerely, George Pfeiffer MD
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
## 2019-08-19 ENCOUNTER — OFFICE VISIT (OUTPATIENT)
Dept: ORTHOPEDIC SURGERY | Age: 34
End: 2019-08-19

## 2019-08-19 VITALS
SYSTOLIC BLOOD PRESSURE: 130 MMHG | OXYGEN SATURATION: 100 % | HEART RATE: 90 BPM | WEIGHT: 144.4 LBS | BODY MASS INDEX: 29.11 KG/M2 | HEIGHT: 59 IN | DIASTOLIC BLOOD PRESSURE: 81 MMHG | TEMPERATURE: 97.8 F

## 2019-08-19 DIAGNOSIS — S90.31XD CONTUSION OF RIGHT FOOT, SUBSEQUENT ENCOUNTER: Primary | ICD-10-CM

## 2019-08-19 DIAGNOSIS — S92.514D CLOSED NONDISPLACED FRACTURE OF PROXIMAL PHALANX OF LESSER TOE OF RIGHT FOOT WITH ROUTINE HEALING, SUBSEQUENT ENCOUNTER: ICD-10-CM

## 2019-08-19 NOTE — PATIENT INSTRUCTIONS
Look into Aggie shoes at [employer]     Broken Toe: Care Instructions  Your Care Instructions  You have broken (fractured) a bone in your toe. This kind of fracture does not need a special cast or brace. \"Faiza-taping\" your broken toe to a healthy toe next to it is almost always enough to treat the problem and ease symptoms. The toe may take 4 weeks or more to heal.  You heal best when you take good care of yourself. Eat a variety of healthy foods, and don't smoke. Follow-up care is a key part of your treatment and safety. Be sure to make and go to all appointments, and call your doctor if you are having problems. It's also a good idea to know your test results and keep a list of the medicines you take. How can you care for yourself at home? · Be safe with medicines. Take pain medicines exactly as directed. ? If the doctor gave you a prescription medicine for pain, take it as prescribed. ? If you are not taking a prescription pain medicine, ask your doctor if you can take an over-the-counter medicine. · If your toe is taped to the toe next to it, your doctor has shown you how to change the tape. Protect the skin by putting something soft, such as felt or foam, between your toes before you tape them together. Never tape the toes together skin-to-skin. Your broken toe may need to be faiza-taped for 2 to 4 weeks to heal.  · Rest and protect your toe. Do not walk on it until you can do so without too much pain. If the doctor has told you to use crutches, use them as instructed. · Put ice or a cold pack on your toe for 10 to 20 minutes at a time. Try to do this every 1 to 2 hours for the next 3 days (when you are awake) or until the swelling goes down. Put a thin cloth between the ice and your skin. · Prop up your foot on a pillow when you ice it or anytime you sit or lie down. Try to keep it above the level of your heart. This will help reduce swelling. · Make sure you go to your follow-up appointments. Your doctor will need to check that your toe is healing right. When should you call for help? Call your doctor now or seek immediate medical care if:    · You have severe pain.     · Your toe is cool or pale or changes color.     · You have tingling, weakness, or numbness in your toe.    Watch closely for changes in your health, and be sure to contact your doctor if:    · Pain and swelling get worse.     · You are not getting better as expected. Where can you learn more? Go to http://yuri-danilo.info/. Enter P167 in the search box to learn more about \"Broken Toe: Care Instructions. \"  Current as of: September 20, 2018  Content Version: 12.1  © 4396-9275 [employer]. Care instructions adapted under license by [employer] (which disclaims liability or warranty for this information). If you have questions about a medical condition or this instruction, always ask your healthcare professional. [address] any warranty or liability for your use of this information.

## 2019-08-19 NOTE — PROGRESS NOTES
AMBULATORY PROGRESS NOTE      Patient: Jose L Mares             MRN: [id number]     SSN: [id number] Body mass index is 29.17 kg/m². YOB: 1985     AGE: 29 y.o. SEX: female    PCP: Ellie Us MD     IMPRESSION/DIAGNOSIS AND TREATMENT PLAN     DIAGNOSES  1. Contusion of right foot, subsequent encounter    2. Closed nondisplaced fracture of proximal phalanx of lesser toe of right foot with routine healing, subsequent encounter        Orders Placed This Encounter    [30349] Foot Min 3V      Sharri Cheng understands her diagnoses and the proposed plan. She still has some tenderness to her right fifth toe middle phalanx, distal phalanx with some mild swelling. Her x-rays today show what looks like a hairline fracture to the right fifth toe middle phalanx with acceptable alignment. There is some soft tissue swelling about this right fifth toe. Otherwise, no dislocation or subluxation is seen. My plan is listed as below. Plan:    1) Continue wearing the CAM boot as directed. 2) Transition to hard shoe, such as Aggie, in 2 weeks. 3) Continue activity modification as directed. RTO - 4 weeks // PLEASE OBTAIN X-RAYS OF: right foot 3 VIEWS      HPI AND EXAMINATION     Sharri Cheng IS A 29 y.o. female who presents to my outpatient office for follow up of a contusion of the right foot. At the last visit, I provided an order for a short right CAM walker boot, instructed the patient to continue activity modification as directed, and to take OTC Ibuprofen as needed for pain. Date of Injury: 07/25/2019    Since we saw her last, Ms. Guera Giraldo states that she is doing much better, though she is still experiencing some pain in her right foot. She notes that her dorsal foot is doing much better, but she is still experiencing pain in her #5 toe. She reports that she has been wearing her CAM walker boot both outside and inside the home.      Visit Vitals  BP 130/81   Pulse 90   Temp 97.8 °F (36.6 °C) (Oral)   Ht 4' 11\" (1.499 m)   Wt 144 lb 6.4 oz (65.5 kg)   SpO2 100%   BMI 29.17 kg/m²     Appearance: Alert, well appearing and pleasant patient who is in no distress, oriented to person, place/time, and who follows commands. This patient is accompanied in the examination room by her self. Dementia: no dementia  Psychiatric: Affect and mood are appropriate. Patient arrives to office via: with assistive device: CAM boot   HEENT: Head normocephalic & atraumatic. Both pupils are round, non icteric sclera   Eye: EOM are intact and sclera are clear    Neck: ROM WNL and JVD neck is not present     Hearings Intact, does not require hearing aid device  Respiratory: Breathing is unlabored without accessory chest muscle use  Cardiovascular/Peripheral Vascular: Normal Pulses to each foot    ANKLE/FOOT right    Gait: in CAM boot  Tenderness: Mild tenderness over 5th toe. No tenderness across MTPs at this time     No midfoot tenderness. No ankle or hind foot tenderness  Cutaneous: Mild swelling to 5th toe. No ecchymosis   Joint Motion: Not tested at this time. Joint / Tendon Stability: Not tested at this time  Alignment: Forefoot, Midfoot, Hindfoot WNL. Neuro Motor/Sensory: NL/NL. Vascular: NL foot/ankle pulses. Lymphatics: No extremity lymphedema, No calf swelling, no tenderness to calf muscles.     CHART REVIEW     Past Medical History:   Diagnosis Date    Asthma     Chronic constipation     Fibromyalgia     GERD (gastroesophageal reflux disease)     IBS (irritable bowel syndrome)     Migraines     unknown if aura    Psychiatric disorder     she denies any diagnosis despite being on antipsychotics, SSRIs, etc in the past    Scoliosis     Urinary incontinence     mixed     Current Outpatient Medications   Medication Sig    ANORO ELLIPTA 62.5-25 mcg/actuation inhaler 1 INH DAILY - USE EVERY DAY    AIMOVIG AUTOINJECTOR 70 mg/mL injection AS DIRECTED - 1 INJECTION SUBQ ONCE MONTHLY    cetirizine (ZYRTEC) 10 mg tablet TAKE 1 TABLET EVERY DAY    rizatriptan (MAXALT-MLT) 10 mg disintegrating tablet PLEASE SEE ATTACHED FOR DETAILED DIRECTIONS    mometasone (NASONEX) 50 mcg/actuation nasal spray USE 1-2 SPRAYS INTO EACH NOSTRIL EVERY DAY AS NEEDED    LINZESS 145 mcg cap capsule TAKE ONE CAPSULE EVERY DAY 30MIN PRIOR TO BREAKFAST    FERRETTS IPS 40 mg/15 mL liqd 15 ML ORALLY 2 TIMES PER DAY.  gabapentin (NEURONTIN) 600 mg tablet TAKE 1 TABLET TWICE A DAY    escitalopram oxalate (LEXAPRO) 20 mg tablet TAKE 1 TABLET EVERY DAY    EPINEPHrine (EPIPEN) 0.3 mg/0.3 mL injection AS DIRECTED AS NEEDED INTRAMUSCULAR 1 DAYS    docusate sodium (COLACE) 100 mg capsule TAKE 1 CAPSULE BY MOUTH TWO TIMES DAILY    fluticasone propionate (FLONASE NA) by Nasal route.  montelukast (SINGULAIR) 10 mg tablet Take 10 mg by mouth daily.  albuterol (PROVENTIL HFA, VENTOLIN HFA, PROAIR HFA) 90 mcg/actuation inhaler Take 2 Puffs by inhalation every four (4) hours as needed for Wheezing or Shortness of Breath.  hyoscyamine (ANASPAZ, LEVSIN) 0.125 mg tablet Take 125 mcg by mouth every four (4) hours as needed.  promethazine (PHENERGAN) 12.5 mg tablet Take  by mouth every six (6) hours as needed.  esomeprazole (NEXIUM) 40 mg capsule Take  by mouth daily.  clindamycin (CLEOCIN T) 1 % lotion Apply  to affected area two (2) times a day. use thin film on affected area    POLYETHYLENE GLYCOL 3350 (MIRALAX PO) Take  by mouth.  CALCIUM CARBONATE/MAG HYDROX (MYLANTA PO) Take  by mouth. No current facility-administered medications for this visit. Allergies   Allergen Reactions    Oxycodone-Acetaminophen Other (comments)     Hallucinations     Prochlorperazine Other (comments)    Propoxyphene-Acetaminophen Unknown (comments)     Allergy to propoxyphene only.   Has previously tolerated acetaminophen    Reglan [Metoclopramide] Not Reported This Time    Sulfa (Sulfonamide Antibiotics) Not Reported This Time    Wygesic Not Reported This Time     Past Surgical History:   Procedure Laterality Date    HX  SECTION  ,     HX COLONOSCOPY      HX DILATION AND CURETTAGE      HX ENDOSCOPY      HX PELVIC LAPAROSCOPY       Social History     Occupational History    Not on file   Tobacco Use    Smoking status: Never Smoker    Smokeless tobacco: Never Used   Substance and Sexual Activity    Alcohol use: No    Drug use: No    Sexual activity: Not on file     Family History   Problem Relation Age of Onset    Kidney Disease Father     Other Father         Pancreatic Disease    Cancer Father     Heart Disease Father     Heart Attack Father     Thyroid Disease Other         Grandparent        REVIEW OF SYSTEMS : 2019  ALL BELOW ARE Negative except : SEE HPI     Constitutional: Negative for fever, chills and weight loss. Neg Weight Loss  Cardiovascular: Negative for chest pain, claudication and leg swelling. SOB, SPARKS   Gastrointestinal/Urological: Negative for  pain, N/V/D/C, Blood in stool or urine,dysuria                         Hematuria, Incontinence, pelvic pain  Musculoskeletal: see HPI. Neurological: Negative for dizziness and weakness, headaches,Visual Changes             Confusion,  Or Seizures,   Psychiatric/Behavioral: Negative for depression, memory loss and substance abuse. Extremities:  Negative for hair changes, rash or skin lesion changes. Hematologic: Negative for Bleeding problems, bruising, pallor or swollen lymph nodes. Peripheral Vascular: No calf pain, vascular vein tenderness to calf pain              No calf throbbing, posterior knee throbbing pain     DIAGNOSTIC IMAGING     No notes on file    Please see above section of this report. I have personally reviewed the results of the above study. The interpretation of this study is my professional opinion. Written by Juan Daniel Boston, as dictated by Dr. Loreto Tsang.  I, Dr. Batsheva Reynolds, confirm that all documentation is accurate.

## 2019-09-16 ENCOUNTER — OFFICE VISIT (OUTPATIENT)
Dept: ORTHOPEDIC SURGERY | Age: 34
End: 2019-09-16

## 2019-09-16 VITALS
TEMPERATURE: 98.5 F | SYSTOLIC BLOOD PRESSURE: 152 MMHG | DIASTOLIC BLOOD PRESSURE: 97 MMHG | HEART RATE: 93 BPM | OXYGEN SATURATION: 100 % | BODY MASS INDEX: 29.03 KG/M2 | HEIGHT: 59 IN | RESPIRATION RATE: 18 BRPM | WEIGHT: 144 LBS

## 2019-09-16 DIAGNOSIS — S90.31XD CONTUSION OF RIGHT FOOT, SUBSEQUENT ENCOUNTER: ICD-10-CM

## 2019-09-16 DIAGNOSIS — M79.671 RIGHT FOOT PAIN: ICD-10-CM

## 2019-09-16 DIAGNOSIS — S92.514D CLOSED NONDISPLACED FRACTURE OF PROXIMAL PHALANX OF LESSER TOE OF RIGHT FOOT WITH ROUTINE HEALING, SUBSEQUENT ENCOUNTER: Primary | ICD-10-CM

## 2019-09-16 NOTE — PROGRESS NOTES
AMBULATORY PROGRESS NOTE      Patient: Tony Rios             MRN: [id number]     SSN: [id number] Body mass index is 29.08 kg/m². YOB: 1985     AGE: 29 y.o. SEX: female    PCP: Sreedhar Ramos MD     IMPRESSION/DIAGNOSIS AND TREATMENT PLAN     DIAGNOSES  1. Closed nondisplaced fracture of proximal phalanx of lesser toe of right foot with routine healing, subsequent encounter    2. Right foot pain    3. Contusion of right foot, subsequent encounter        Orders Placed This Encounter    [81625] Foot Min 3V      Sharri Ibarra understands her diagnoses and the proposed plan. Plan:    1) Continue wearing CAM walker boot  2) Continue wearing Aggie shoe    RTO - PRN, as she is doing well. HPI AND EXAMINATION     Sharri Ibarra IS A 29 y.o. female who presents to my outpatient office for follow up of a contusion of the right foot. At the last visit, I advised patient to continue wearing the CAM walker boot, instructed patient to transition to hard shoe, such as Aggie, in 2 weeks, and to continue activity modification as directed. Date of Injury: 07/25/2019    Since we saw her last, Ms. Rell Quezada states she is doing well. She will continue to wear her CAM walker boot as needed and her Julio C shoes. Visit Vitals  BP (!) 152/97   Pulse 93   Temp 98.5 °F (36.9 °C) (Oral)   Resp 18   Ht 4' 11\" (1.499 m)   Wt 144 lb (65.3 kg)   SpO2 100%   BMI 29.08 kg/m²     Appearance: Alert, well appearing and pleasant patient who is in no distress, oriented to person, place/time, and who follows commands. This patient is accompanied in the examination room by her self. Dementia: no dementia  Psychiatric: Affect and mood are appropriate. Patient arrives to office via: with assistive device: CAM boot   HEENT: Head normocephalic & atraumatic.  Both pupils are round, non icteric sclera   Eye: EOM are intact and sclera are clear    Neck: ROM WNL and JVD neck is not present Hearings Intact, does not require hearing aid device  Respiratory: Breathing is unlabored without accessory chest muscle use  Cardiovascular/Peripheral Vascular: Normal Pulses to each foot    ANKLE/FOOT right    Gait: in CAM boot  Tenderness:   No tenderness over 5th toe. No tenderness across MTPs at this time     No midfoot tenderness. No ankle or hind foot tenderness  Cutaneous: Mild swelling to 5th toe. No ecchymosis   Joint Motion: Not tested at this time. Joint / Tendon Stability: Not tested at this time  Alignment: Forefoot, Midfoot, Hindfoot WNL. Neuro Motor/Sensory: NL/NL. Vascular: NL foot/ankle pulses. Lymphatics: No extremity lymphedema, No calf swelling, no tenderness to calf muscles. CHART REVIEW     Past Medical History:   Diagnosis Date    Asthma     Chronic constipation     Fibromyalgia     GERD (gastroesophageal reflux disease)     IBS (irritable bowel syndrome)     Migraines     unknown if aura    Psychiatric disorder     she denies any diagnosis despite being on antipsychotics, SSRIs, etc in the past    Scoliosis     Urinary incontinence     mixed     Current Outpatient Medications   Medication Sig    ANORO ELLIPTA 62.5-25 mcg/actuation inhaler 1 INH DAILY - USE EVERY DAY    AIMOVIG AUTOINJECTOR 70 mg/mL injection AS DIRECTED - 1 INJECTION SUBQ ONCE MONTHLY    cetirizine (ZYRTEC) 10 mg tablet TAKE 1 TABLET EVERY DAY    rizatriptan (MAXALT-MLT) 10 mg disintegrating tablet PLEASE SEE ATTACHED FOR DETAILED DIRECTIONS    mometasone (NASONEX) 50 mcg/actuation nasal spray USE 1-2 SPRAYS INTO EACH NOSTRIL EVERY DAY AS NEEDED    LINZESS 145 mcg cap capsule TAKE ONE CAPSULE EVERY DAY 30MIN PRIOR TO BREAKFAST    FERRETTS IPS 40 mg/15 mL liqd 15 ML ORALLY 2 TIMES PER DAY.     gabapentin (NEURONTIN) 600 mg tablet TAKE 1 TABLET TWICE A DAY    escitalopram oxalate (LEXAPRO) 20 mg tablet TAKE 1 TABLET EVERY DAY    EPINEPHrine (EPIPEN) 0.3 mg/0.3 mL injection AS DIRECTED AS NEEDED INTRAMUSCULAR 1 DAYS    docusate sodium (COLACE) 100 mg capsule TAKE 1 CAPSULE BY MOUTH TWO TIMES DAILY    fluticasone propionate (FLONASE NA) by Nasal route.  montelukast (SINGULAIR) 10 mg tablet Take 10 mg by mouth daily.  albuterol (PROVENTIL HFA, VENTOLIN HFA, PROAIR HFA) 90 mcg/actuation inhaler Take 2 Puffs by inhalation every four (4) hours as needed for Wheezing or Shortness of Breath.  hyoscyamine (ANASPAZ, LEVSIN) 0.125 mg tablet Take 125 mcg by mouth every four (4) hours as needed.  promethazine (PHENERGAN) 12.5 mg tablet Take  by mouth every six (6) hours as needed.  esomeprazole (NEXIUM) 40 mg capsule Take  by mouth daily.  clindamycin (CLEOCIN T) 1 % lotion Apply  to affected area two (2) times a day. use thin film on affected area    POLYETHYLENE GLYCOL 3350 (MIRALAX PO) Take  by mouth.  CALCIUM CARBONATE/MAG HYDROX (MYLANTA PO) Take  by mouth. No current facility-administered medications for this visit. Allergies   Allergen Reactions    Oxycodone-Acetaminophen Other (comments)     Hallucinations     Prochlorperazine Other (comments)    Propoxyphene-Acetaminophen Unknown (comments)     Allergy to propoxyphene only.   Has previously tolerated acetaminophen    Reglan [Metoclopramide] Not Reported This Time    Sulfa (Sulfonamide Antibiotics) Not Reported This Time    Wygesic Not Reported This Time     Past Surgical History:   Procedure Laterality Date    HX  SECTION  ,     HX COLONOSCOPY      HX DILATION AND CURETTAGE      HX ENDOSCOPY      HX PELVIC LAPAROSCOPY       Social History     Occupational History    Not on file   Tobacco Use    Smoking status: Never Smoker    Smokeless tobacco: Never Used   Substance and Sexual Activity    Alcohol use: No    Drug use: No    Sexual activity: Not on file     Family History   Problem Relation Age of Onset    Kidney Disease Father     Other Father         Pancreatic Disease    Cancer Father  Heart Disease Father     Heart Attack Father     Thyroid Disease Other         Grandparent        REVIEW OF SYSTEMS : 9/16/2019  ALL BELOW ARE Negative except : SEE HPI     Constitutional: Negative for fever, chills and weight loss. Neg Weight Loss  Cardiovascular: Negative for chest pain, claudication and leg swelling. SOB, SPARKS   Gastrointestinal/Urological: Negative for  pain, N/V/D/C, Blood in stool or urine,dysuria                         Hematuria, Incontinence, pelvic pain  Musculoskeletal: see HPI. Neurological: Negative for dizziness and weakness, headaches,Visual Changes             Confusion,  Or Seizures,   Psychiatric/Behavioral: Negative for depression, memory loss and substance abuse. Extremities:  Negative for hair changes, rash or skin lesion changes. Hematologic: Negative for Bleeding problems, bruising, pallor or swollen lymph nodes. Peripheral Vascular: No calf pain, vascular vein tenderness to calf pain              No calf throbbing, posterior knee throbbing pain     DIAGNOSTIC IMAGING       FOOT X RAYS 3 VIEWS Right   9/16/2019    NON WEIGHT BEARING    X RAYS AT [address] [place] [address]  9/16/2019      Bones: No fractures or dislocations. No focal osteolytic or osteoblastic process     Bone Spurs: No significant bone spurs  Foot Alignment: WNL  Joint Condition: No Significant OA  Soft Tissues: Normal, No radiopaque foreign body and No abnormal calcific densities to soft tissues   No ankle joint effusion in lateral projection. Mineralization: Suggests  no Osteopenia    I have personally reviewed the results of the above study. The interpretation of this study is my professional opinion  Written by Elizabet Maldonado, as dictated by Dr. Zandra Teresa. I, Dr. Zandra Teresa, confirm that all documentation is accurate.

## 2019-09-16 NOTE — PROGRESS NOTES
1. Have you been to the ER, urgent care clinic since your last visit? Hospitalized since your last visit? No    2. Have you seen or consulted any other health care providers outside of the [address] [place] [address] since your last visit? Include any pap smears or colon screening.  No

## 2019-09-27 ENCOUNTER — OFFICE VISIT (OUTPATIENT)
Dept: ORTHOPEDIC SURGERY | Age: 34
End: 2019-09-27

## 2019-09-27 VITALS
OXYGEN SATURATION: 99 % | HEART RATE: 105 BPM | WEIGHT: 144.6 LBS | TEMPERATURE: 97.5 F | DIASTOLIC BLOOD PRESSURE: 93 MMHG | RESPIRATION RATE: 16 BRPM | HEIGHT: 59 IN | BODY MASS INDEX: 29.15 KG/M2 | SYSTOLIC BLOOD PRESSURE: 141 MMHG

## 2019-09-27 DIAGNOSIS — M79.7 FIBROMYALGIA: ICD-10-CM

## 2019-09-27 DIAGNOSIS — M54.2 NECK PAIN: Primary | ICD-10-CM

## 2019-09-27 DIAGNOSIS — G56.01 CARPAL TUNNEL SYNDROME OF RIGHT WRIST: ICD-10-CM

## 2019-09-27 NOTE — LETTER
9/27/19 Patient: Masood Brown YOB: 1985 Date of Visit: 9/27/2019 Rochelle Oliva MD 
[facility] [address] [address] [place] [address] VIA In Basket Dear Rochelle Oliva MD, Thank you for referring Ms. Sharri Chiu to [address] [place] [address] for evaluation. My notes for this consultation are attached. If you have questions, please do not hesitate to call me. I look forward to following your patient along with you. Sincerely, Jarret Elizabeth MD
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
## 2019-10-03 ENCOUNTER — OFFICE VISIT (OUTPATIENT)
Dept: FAMILY MEDICINE CLINIC | Age: 34
End: 2019-10-03

## 2019-10-03 VITALS
TEMPERATURE: 99 F | WEIGHT: 144 LBS | RESPIRATION RATE: 16 BRPM | DIASTOLIC BLOOD PRESSURE: 94 MMHG | BODY MASS INDEX: 29.03 KG/M2 | HEART RATE: 91 BPM | OXYGEN SATURATION: 99 % | SYSTOLIC BLOOD PRESSURE: 150 MMHG | HEIGHT: 59 IN

## 2019-10-03 DIAGNOSIS — J02.0 STREP THROAT: Primary | ICD-10-CM

## 2019-10-03 DIAGNOSIS — M79.601 PAIN IN INFERIOR RIGHT UPPER EXTREMITY: ICD-10-CM

## 2019-10-03 DIAGNOSIS — R51.9 NONINTRACTABLE HEADACHE, UNSPECIFIED CHRONICITY PATTERN, UNSPECIFIED HEADACHE TYPE: ICD-10-CM

## 2019-10-03 DIAGNOSIS — M54.2 CERVICALGIA: ICD-10-CM

## 2019-10-03 DIAGNOSIS — I10 ESSENTIAL HYPERTENSION: ICD-10-CM

## 2019-10-03 DIAGNOSIS — F39 MOOD DISORDER (HCC): ICD-10-CM

## 2019-10-03 LAB
FLUAV+FLUBV AG NOSE QL IA.RAPID: NEGATIVE POS/NEG
FLUAV+FLUBV AG NOSE QL IA.RAPID: NEGATIVE POS/NEG
S PYO AG THROAT QL: POSITIVE
VALID INTERNAL CONTROL?: YES
VALID INTERNAL CONTROL?: YES

## 2019-10-03 RX ORDER — AMLODIPINE BESYLATE 10 MG/1
10 TABLET ORAL DAILY
Qty: 30 TAB | Refills: 2 | Status: SHIPPED | OUTPATIENT
Start: 2019-10-03 | End: 2019-10-23 | Stop reason: DRUGHIGH

## 2019-10-03 RX ORDER — AZITHROMYCIN 250 MG/1
TABLET, FILM COATED ORAL
Qty: 6 TAB | Refills: 0 | Status: SHIPPED | OUTPATIENT
Start: 2019-10-03 | End: 2019-10-08

## 2019-10-03 NOTE — PROGRESS NOTES
Chief Complaint   Patient presents with    Sore Throat    Headache    Chills     sweats      1. Have you been to the ER, urgent care clinic since your last visit? Hospitalized since your last visit? No    2. Have you seen or consulted any other health care providers outside of the [address] [place] [address] since your last visit? Include any pap smears or colon screening. No     HPI  Sharri Santoyo comes in for acute care. She has sore throat, fever, chills, ear pain and generalized body aches. Has nasal congestion with chest discomfort. Taken OTC medication without much relief. Slight cough on and off, no wheeze. Rapid strep test is positive, flu test negative. Will treat with azithromycin. HTN: BP is elevated. Will start on amlodipine  Pain right upper extremity: patient has pain on and off RUE and has been seen by ortho. Would like to see a different orthopedist. Will try [facility]. Headache: has neuropathy and headaches, seen by Dr Mariposa Lama. She is on Aimovig, Maxalt and gabapentin. Continue current treatment plan. Mood disorder: Patient is on Lexapro. Stable on this medication. Continue current treatment plan.     Past Medical History  Past Medical History:   Diagnosis Date    Asthma     Chronic constipation     Fibromyalgia     GERD (gastroesophageal reflux disease)     IBS (irritable bowel syndrome)     Migraines     unknown if aura    Psychiatric disorder     she denies any diagnosis despite being on antipsychotics, SSRIs, etc in the past    Scoliosis     Urinary incontinence     mixed       Surgical History  Past Surgical History:   Procedure Laterality Date    HX  SECTION  ,     HX COLONOSCOPY      HX DILATION AND CURETTAGE      HX ENDOSCOPY      HX PELVIC LAPAROSCOPY          Medications  Current Outpatient Medications   Medication Sig Dispense Refill    ANORO ELLIPTA 62.5-25 mcg/actuation inhaler 1 INH DAILY - USE EVERY DAY  6    AIMOVIG AUTOINJECTOR 70 mg/mL injection AS DIRECTED - 1 INJECTION SUBQ ONCE MONTHLY  3    cetirizine (ZYRTEC) 10 mg tablet TAKE 1 TABLET EVERY DAY  0    rizatriptan (MAXALT-MLT) 10 mg disintegrating tablet PLEASE SEE ATTACHED FOR DETAILED DIRECTIONS  0    mometasone (NASONEX) 50 mcg/actuation nasal spray USE 1-2 SPRAYS INTO EACH NOSTRIL EVERY DAY AS NEEDED  0    LINZESS 145 mcg cap capsule TAKE ONE CAPSULE EVERY DAY 30MIN PRIOR TO BREAKFAST  0    FERRETTS IPS 40 mg/15 mL liqd 15 ML ORALLY 2 TIMES PER DAY. 2    gabapentin (NEURONTIN) 600 mg tablet TAKE 1 TABLET TWICE A DAY  0    escitalopram oxalate (LEXAPRO) 20 mg tablet TAKE 1 TABLET EVERY DAY  0    EPINEPHrine (EPIPEN) 0.3 mg/0.3 mL injection AS DIRECTED AS NEEDED INTRAMUSCULAR 1 DAYS  5    docusate sodium (COLACE) 100 mg capsule TAKE 1 CAPSULE BY MOUTH TWO TIMES DAILY  2    fluticasone propionate (FLONASE NA) by Nasal route.  montelukast (SINGULAIR) 10 mg tablet Take 10 mg by mouth daily.  albuterol (PROVENTIL HFA, VENTOLIN HFA, PROAIR HFA) 90 mcg/actuation inhaler Take 2 Puffs by inhalation every four (4) hours as needed for Wheezing or Shortness of Breath. 1 Inhaler 0    hyoscyamine (ANASPAZ, LEVSIN) 0.125 mg tablet Take 125 mcg by mouth every four (4) hours as needed.  promethazine (PHENERGAN) 12.5 mg tablet Take  by mouth every six (6) hours as needed.  esomeprazole (NEXIUM) 40 mg capsule Take  by mouth daily.  clindamycin (CLEOCIN T) 1 % lotion Apply  to affected area two (2) times a day. use thin film on affected area      POLYETHYLENE GLYCOL 3350 (MIRALAX PO) Take  by mouth.  CALCIUM CARBONATE/MAG HYDROX (MYLANTA PO) Take  by mouth. Allergies  Allergies   Allergen Reactions    Oxycodone-Acetaminophen Other (comments)     Hallucinations     Prochlorperazine Other (comments)    Propoxyphene-Acetaminophen Unknown (comments)     Allergy to propoxyphene only.   Has previously tolerated acetaminophen    Reglan [Metoclopramide] Not Reported This Time    Sulfa (Sulfonamide Antibiotics) Not Reported This Time    Wygesic Not Reported This Time       Family History  Family History   Problem Relation Age of Onset    Kidney Disease Father     Other Father         Pancreatic Disease    Cancer Father     Heart Disease Father     Heart Attack Father     Thyroid Disease Other         Grandparent       Social History  Social History     Socioeconomic History    Marital status: SINGLE     Spouse name: Not on file    Number of children: Not on file    Years of education: Not on file    Highest education level: Not on file   Occupational History    Not on file   Social Needs    Financial resource strain: Not on file    Food insecurity:     Worry: Not on file     Inability: Not on file    Transportation needs:     Medical: Not on file     Non-medical: Not on file   Tobacco Use    Smoking status: Never Smoker    Smokeless tobacco: Never Used   Substance and Sexual Activity    Alcohol use: No    Drug use: No    Sexual activity: Not on file   Lifestyle    Physical activity:     Days per week: Not on file     Minutes per session: Not on file    Stress: Not on file   Relationships    Social connections:     Talks on phone: Not on file     Gets together: Not on file     Attends Congregational service: Not on file     Active member of club or organization: Not on file     Attends meetings of clubs or organizations: Not on file     Relationship status: Not on file    Intimate partner violence:     Fear of current or ex partner: Not on file     Emotionally abused: Not on file     Physically abused: Not on file     Forced sexual activity: Not on file   Other Topics Concern    Not on file   Social History Narrative    Not on file       Review of Systems  Review of Systems - Review of all systems is negative except as noted above in the HPI.     Vital Signs  Visit Vitals  BP (!) 150/94 (BP 1 Location: Left arm, BP Patient Position: Sitting)   Pulse 91   Temp 99 °F (37.2 °C) (Oral)   Resp 16   Ht 4' 11\" (1.499 m)   Wt 144 lb (65.3 kg)   SpO2 99%   BMI 29.08 kg/m²         Physical Exam  Physical Examination: General appearance - alert, well appearing, and in no distress, oriented to person, place, and time and acyanotic, in no respiratory distress  Mental status - alert, oriented to person, place, and time, affect appropriate to mood  Ears - bilateral TM's and external ear canals normal  Nose - mucosal erythema  Mouth - erythematous  Neck - supple, no significant adenopathy  Lymphatics - no palpable lymphadenopathy  Chest - clear to auscultation, no wheezes, rales or rhonchi, symmetric air entry, no tachypnea, retractions or cyanosis  Heart - normal rate and regular rhythm, S1 and S2 normal  Back exam - limited range of motion  Neurological - motor and sensory grossly normal bilaterally  Musculoskeletal - no muscular tenderness noted  Extremities - no pedal edema noted, intact peripheral pulses    Results  Results for orders placed or performed in visit on 04/24/19   AMB POC URINALYSIS DIP STICK AUTO W/O MICRO   Result Value Ref Range    Color (UA POC) Yellow     Clarity (UA POC) Clear     Glucose (UA POC) Negative Negative    Bilirubin (UA POC) Negative Negative    Ketones (UA POC) Negative Negative    Specific gravity (UA POC) 1.025 1.001 - 1.035    Blood (UA POC) Negative Negative    pH (UA POC) 6.0 4.6 - 8.0    Protein (UA POC) Trace Negative    Urobilinogen (UA POC) 1 mg/dL 0.2 - 1    Nitrites (UA POC) Negative Negative    Leukocyte esterase (UA POC) Negative Negative       ASSESSMENT and PLAN    ICD-10-CM ICD-9-CM    1. Strep throat J02.0 034.0 AMB POC RAPID STREP A      AMB POC LAKHWINDER INFLUENZA A/B TEST      amLODIPine (NORVASC) 10 mg tablet      azithromycin (ZITHROMAX) 250 mg tablet   2. Essential hypertension I10 401.9 amLODIPine (NORVASC) 10 mg tablet   3. Cervicalgia M54.2 723.1 REFERRAL TO ORTHOPEDICS   4.  Pain in inferior right upper extremity M79.601 729.5 REFERRAL TO ORTHOPEDICS   5. Nonintractable headache, unspecified chronicity pattern, unspecified headache type R51 784.0    6. Mood disorder ([facility] F39 296.90      lab results and schedule of future lab studies reviewed with patient  reviewed diet, exercise and weight control  cardiovascular risk and specific lipid/LDL goals reviewed  reviewed medications and side effects in detail      I have discussed the diagnosis with the patient and the intended plan of care as seen in the above orders. The patient has received an after-visit summary and questions were answered concerning future plans. I have discussed medication, side effects, and warnings with the patient in detail. The patient verbalized understanding and is in agreement with the plan of care. The patient will contact the office with any additional concerns. Chris Rebollar MD    PLEASE NOTE:   This document has been produced using voice recognition software.  Unrecognized errors in transcription may be present

## 2019-10-03 NOTE — PATIENT INSTRUCTIONS
DASH Diet: Care Instructions  Your Care Instructions    The DASH diet is an eating plan that can help lower your blood pressure. DASH stands for Dietary Approaches to Stop Hypertension. Hypertension is high blood pressure. The DASH diet focuses on eating foods that are high in calcium, potassium, and magnesium. These nutrients can lower blood pressure. The foods that are highest in these nutrients are fruits, vegetables, low-fat dairy products, nuts, seeds, and legumes. But taking calcium, potassium, and magnesium supplements instead of eating foods that are high in those nutrients does not have the same effect. The DASH diet also includes whole grains, fish, and poultry. The DASH diet is one of several lifestyle changes your doctor may recommend to lower your high blood pressure. Your doctor may also want you to decrease the amount of sodium in your diet. Lowering sodium while following the DASH diet can lower blood pressure even further than just the DASH diet alone. Follow-up care is a key part of your treatment and safety. Be sure to make and go to all appointments, and call your doctor if you are having problems. It's also a good idea to know your test results and keep a list of the medicines you take. How can you care for yourself at home? Following the DASH diet  · Eat 4 to 5 servings of fruit each day. A serving is 1 medium-sized piece of fruit, ½ cup chopped or canned fruit, 1/4 cup dried fruit, or 4 ounces (½ cup) of fruit juice. Choose fruit more often than fruit juice. · Eat 4 to 5 servings of vegetables each day. A serving is 1 cup of lettuce or raw leafy vegetables, ½ cup of chopped or cooked vegetables, or 4 ounces (½ cup) of vegetable juice. Choose vegetables more often than vegetable juice. · Get 2 to 3 servings of low-fat and fat-free dairy each day. A serving is 8 ounces of milk, 1 cup of yogurt, or 1 ½ ounces of cheese. · Eat 6 to 8 servings of grains each day.  A serving is 1 slice of bread, 1 ounce of dry cereal, or ½ cup of cooked rice, pasta, or cooked cereal. Try to choose whole-grain products as much as possible. · Limit lean meat, poultry, and fish to 2 servings each day. A serving is 3 ounces, about the size of a deck of cards. · Eat 4 to 5 servings of nuts, seeds, and legumes (cooked dried beans, lentils, and split peas) each week. A serving is 1/3 cup of nuts, 2 tablespoons of seeds, or ½ cup of cooked beans or peas. · Limit fats and oils to 2 to 3 servings each day. A serving is 1 teaspoon of vegetable oil or 2 tablespoons of salad dressing. · Limit sweets and added sugars to 5 servings or less a week. A serving is 1 tablespoon jelly or jam, ½ cup sorbet, or 1 cup of lemonade. · Eat less than 2,300 milligrams (mg) of sodium a day. If you limit your sodium to 1,500 mg a day, you can lower your blood pressure even more. Tips for success  · Start small. Do not try to make dramatic changes to your diet all at once. You might feel that you are missing out on your favorite foods and then be more likely to not follow the plan. Make small changes, and stick with them. Once those changes become habit, add a few more changes. · Try some of the following:  ? Make it a goal to eat a fruit or vegetable at every meal and at snacks. This will make it easy to get the recommended amount of fruits and vegetables each day. ? Try yogurt topped with fruit and nuts for a snack or healthy dessert. ? Add lettuce, tomato, cucumber, and onion to sandwiches. ? Combine a ready-made pizza crust with low-fat mozzarella cheese and lots of vegetable toppings. Try using tomatoes, squash, spinach, broccoli, carrots, cauliflower, and onions. ? Have a variety of cut-up vegetables with a low-fat dip as an appetizer instead of chips and dip. ? Sprinkle sunflower seeds or chopped almonds over salads. Or try adding chopped walnuts or almonds to cooked vegetables.   ? Try some vegetarian meals using beans and peas. Add garbanzo or kidney beans to salads. Make burritos and tacos with mashed thompson beans or black beans. Where can you learn more? Go to http://yuri-danilo.info/. Enter K021 in the search box to learn more about \"DASH Diet: Care Instructions. \"  Current as of: April 9, 2019  Content Version: 12.2  © 1845-9763 [employer], [employer]. Care instructions adapted under license by [employer] (which disclaims liability or warranty for this information). If you have questions about a medical condition or this instruction, always ask your healthcare professional. [address] any warranty or liability for your use of this information.

## 2019-10-22 ENCOUNTER — TELEPHONE (OUTPATIENT)
Dept: FAMILY MEDICINE CLINIC | Age: 34
End: 2019-10-22

## 2019-10-22 NOTE — TELEPHONE ENCOUNTER
Patient stated Dr Greg Horan put her on bp medication and she thinks the dosage may be too high, after she takes it she feels weak and fatique. All she can do is lay down afterwards,  Her head is starting to hurt and she's having some numbness  Currently is on Amlodipine 10mg and wonder if he should change it to 5mg, she hasn't taking it in 4 days.  Please be advised and contact pt for advice when available

## 2019-10-23 RX ORDER — AMLODIPINE BESYLATE 5 MG/1
5 TABLET ORAL DAILY
Qty: 30 TAB | Refills: 2 | Status: SHIPPED | OUTPATIENT
Start: 2019-10-23 | End: 2020-01-15

## 2019-10-23 NOTE — TELEPHONE ENCOUNTER
Call made to pt and no answer. Left message to decrease Norvasc to 5 mg, may break the 10 mg tabs in 1/2. Also a new script sent to pharmacy. Follow up at already scheduled appointment.

## 2019-10-23 NOTE — TELEPHONE ENCOUNTER
Will send in amlodipine 5 mg daily. Patient should start the lower dose. She should follow-up with me in clinic as previously discussed.   Pradeep Sherman MD

## 2019-11-04 ENCOUNTER — TELEPHONE (OUTPATIENT)
Dept: FAMILY MEDICINE CLINIC | Age: 34
End: 2019-11-04

## 2019-11-04 ENCOUNTER — OFFICE VISIT (OUTPATIENT)
Dept: FAMILY MEDICINE CLINIC | Age: 34
End: 2019-11-04

## 2019-11-04 VITALS
HEIGHT: 59 IN | TEMPERATURE: 98.4 F | DIASTOLIC BLOOD PRESSURE: 100 MMHG | HEART RATE: 90 BPM | OXYGEN SATURATION: 99 % | RESPIRATION RATE: 16 BRPM | WEIGHT: 145 LBS | BODY MASS INDEX: 29.23 KG/M2 | SYSTOLIC BLOOD PRESSURE: 154 MMHG

## 2019-11-04 DIAGNOSIS — E66.3 OVERWEIGHT (BMI 25.0-29.9): ICD-10-CM

## 2019-11-04 DIAGNOSIS — G89.29 CHRONIC BILATERAL LOW BACK PAIN WITH LEFT-SIDED SCIATICA: ICD-10-CM

## 2019-11-04 DIAGNOSIS — F39 MOOD DISORDER (HCC): ICD-10-CM

## 2019-11-04 DIAGNOSIS — I10 ESSENTIAL HYPERTENSION: Primary | ICD-10-CM

## 2019-11-04 DIAGNOSIS — R51.9 NONINTRACTABLE HEADACHE, UNSPECIFIED CHRONICITY PATTERN, UNSPECIFIED HEADACHE TYPE: ICD-10-CM

## 2019-11-04 DIAGNOSIS — M54.42 CHRONIC BILATERAL LOW BACK PAIN WITH LEFT-SIDED SCIATICA: ICD-10-CM

## 2019-11-04 NOTE — TELEPHONE ENCOUNTER
Patient is office today for follow up. Patient is requesting for her orthopaedic referral to be sent to another location. Patient prefer not to be seen with ROLANDO at this time.

## 2019-11-04 NOTE — PROGRESS NOTES
Chief Complaint   Patient presents with    Hypertension     1. Have you been to the ER, urgent care clinic since your last visit? Hospitalized since your last visit? No    2. Have you seen or consulted any other health care providers outside of the [address] [place] [address] since your last visit? Include any pap smears or colon screening. No     HPI  Sharri Machado comes in for f/u care. HTN: BP still elevated. She will try the amlodipine 5 mg daily. She had not been taking the amlodipine 10 mg that it made her feel unwell. She will try the 5 mg and let us know how this goes. If she does not tolerate this we will put on a different medication. Mood disorder: patient is stressed out with family issues and housing and other related issues. She is on lexapro. Discussed mood disorder  and acute stress. She will continue with medication. Chronic pain: patient has chronic back and shoulder pain. She was initially seen by the spine specialist but would prefer to be seen by a different provider. We will try and set her up with a different provider. She does get numbness and tingling upper extremities and is on gabapentin for this. Migraine headache: she is f/u by the neurologist. She is on aimovig. Taking 140 mg. She is also on Maxalt. Sleep apnea: Patient has sleep apnea and await sleep study. She has been followed up with a neurologist on this. Overweight: Patient has a BMI of 29.29. She will intensify lifestyle and dietary modification. Patient does state that she has generalized malaise and fatigue. Feels that most of the time. This could be due to the fibromyalgia and the mood disorder. She denies dysuria or urinary urgency though she states that she has frequent micturition. No flank pain or suprapubic pain. States that she does not think she is a UTI.     Past Medical History  Past Medical History:   Diagnosis Date    Asthma     Chronic constipation     Fibromyalgia     GERD (gastroesophageal reflux disease)     IBS (irritable bowel syndrome)     Migraines     unknown if aura    Psychiatric disorder     she denies any diagnosis despite being on antipsychotics, SSRIs, etc in the past    Scoliosis     Urinary incontinence     mixed       Surgical History  Past Surgical History:   Procedure Laterality Date    HX  SECTION  ,     HX COLONOSCOPY      HX DILATION AND CURETTAGE      HX ENDOSCOPY      HX PELVIC LAPAROSCOPY          Medications  Current Outpatient Medications   Medication Sig Dispense Refill    amLODIPine (NORVASC) 5 mg tablet Take 1 Tab by mouth daily. 30 Tab 2    ANORO ELLIPTA 62.5-25 mcg/actuation inhaler 1 INH DAILY - USE EVERY DAY  6    AIMOVIG AUTOINJECTOR 70 mg/mL injection AS DIRECTED - 1 INJECTION SUBQ ONCE MONTHLY  3    rizatriptan (MAXALT-MLT) 10 mg disintegrating tablet PLEASE SEE ATTACHED FOR DETAILED DIRECTIONS  0    mometasone (NASONEX) 50 mcg/actuation nasal spray USE 1-2 SPRAYS INTO EACH NOSTRIL EVERY DAY AS NEEDED  0    LINZESS 145 mcg cap capsule TAKE ONE CAPSULE EVERY DAY 30MIN PRIOR TO BREAKFAST  0    FERRETTS IPS 40 mg/15 mL liqd 15 ML ORALLY 2 TIMES PER DAY. 2    gabapentin (NEURONTIN) 600 mg tablet TAKE 1 TABLET TWICE A DAY  0    escitalopram oxalate (LEXAPRO) 20 mg tablet TAKE 1 TABLET EVERY DAY  0    EPINEPHrine (EPIPEN) 0.3 mg/0.3 mL injection AS DIRECTED AS NEEDED INTRAMUSCULAR 1 DAYS  5    docusate sodium (COLACE) 100 mg capsule TAKE 1 CAPSULE BY MOUTH TWO TIMES DAILY  2    fluticasone propionate (FLONASE NA) by Nasal route.  montelukast (SINGULAIR) 10 mg tablet Take 10 mg by mouth daily.  albuterol (PROVENTIL HFA, VENTOLIN HFA, PROAIR HFA) 90 mcg/actuation inhaler Take 2 Puffs by inhalation every four (4) hours as needed for Wheezing or Shortness of Breath. 1 Inhaler 0    hyoscyamine (ANASPAZ, LEVSIN) 0.125 mg tablet Take 125 mcg by mouth every four (4) hours as needed.       promethazine (PHENERGAN) 12.5 mg tablet Take  by mouth every six (6) hours as needed.  esomeprazole (NEXIUM) 40 mg capsule Take  by mouth daily.  clindamycin (CLEOCIN T) 1 % lotion Apply  to affected area two (2) times a day. use thin film on affected area      POLYETHYLENE GLYCOL 3350 (MIRALAX PO) Take  by mouth.  CALCIUM CARBONATE/MAG HYDROX (MYLANTA PO) Take  by mouth.  cetirizine (ZYRTEC) 10 mg tablet TAKE 1 TABLET EVERY DAY  0       Allergies  Allergies   Allergen Reactions    Oxycodone-Acetaminophen Other (comments)     Hallucinations     Prochlorperazine Other (comments)    Propoxyphene-Acetaminophen Unknown (comments)     Allergy to propoxyphene only.   Has previously tolerated acetaminophen    Reglan [Metoclopramide] Not Reported This Time    Sulfa (Sulfonamide Antibiotics) Not Reported This Time    Wygesic Not Reported This Time       Family History  Family History   Problem Relation Age of Onset    Kidney Disease Father     Other Father         Pancreatic Disease    Cancer Father     Heart Disease Father     Heart Attack Father     Thyroid Disease Other         Grandparent       Social History  Social History     Socioeconomic History    Marital status: SINGLE     Spouse name: Not on file    Number of children: Not on file    Years of education: Not on file    Highest education level: Not on file   Occupational History    Not on file   Social Needs    Financial resource strain: Not on file    Food insecurity:     Worry: Not on file     Inability: Not on file    Transportation needs:     Medical: Not on file     Non-medical: Not on file   Tobacco Use    Smoking status: Never Smoker    Smokeless tobacco: Never Used   Substance and Sexual Activity    Alcohol use: No    Drug use: No    Sexual activity: Not on file   Lifestyle    Physical activity:     Days per week: Not on file     Minutes per session: Not on file    Stress: Not on file   Relationships    Social connections:     Talks on phone: Not on file     Gets together: Not on file     Attends Buddhism service: Not on file     Active member of club or organization: Not on file     Attends meetings of clubs or organizations: Not on file     Relationship status: Not on file    Intimate partner violence:     Fear of current or ex partner: Not on file     Emotionally abused: Not on file     Physically abused: Not on file     Forced sexual activity: Not on file   Other Topics Concern    Not on file   Social History Narrative    Not on file       Review of Systems  Review of Systems - Review of all systems is negative except as noted above in the HPI. Vital Signs  Visit Vitals  BP (!) 154/100 (BP 1 Location: Right arm, BP Patient Position: Sitting)   Pulse 90   Temp 98.4 °F (36.9 °C) (Oral)   Resp 16   Ht 4' 11\" (1.499 m)   Wt 145 lb (65.8 kg)   SpO2 99%   BMI 29.29 kg/m²         Physical Exam  Physical Examination: General appearance - oriented to person, place, and time, overweight, acyanotic, in no respiratory distress and well hydrated  Mental status - alert, oriented to person, place, and time, affect appropriate to mood  Lymphatics - no palpable lymphadenopathy, no hepatosplenomegaly  Chest - clear to auscultation, no wheezes, rales or rhonchi, symmetric air entry  Heart - normal rate and regular rhythm, S1 and S2 normal  Abdomen - no rebound tenderness noted  Neurological - motor and sensory grossly normal bilaterally  Extremities - no pedal edema noted, intact peripheral pulses    Results  Results for orders placed or performed in visit on 10/03/19   AMB POC RAPID STREP A   Result Value Ref Range    VALID INTERNAL CONTROL POC Yes     Group A Strep Ag Positive Negative   AMB POC LAKHWINDER INFLUENZA A/B TEST   Result Value Ref Range    VALID INTERNAL CONTROL POC Yes     Influenza A Ag POC Negative Negative Pos/Neg    Influenza B Ag POC Negative Negative Pos/Neg       ASSESSMENT and PLAN    ICD-10-CM ICD-9-CM    1.  Essential hypertension I10 401.9    2. Mood disorder (HCC) F39 296.90    3. Nonintractable headache, unspecified chronicity pattern, unspecified headache type R51 784.0    4. Overweight (BMI 25.0-29. 9) E66.3 278.02    5. Chronic bilateral low back pain with left-sided sciatica M54.42 724.2     G89.29 724.3      338.29    Discussed the patient's BMI with her. The BMI follow up plan is as follows:   dietary management education, guidance, and counseling  encourage exercise  monitor weight  lab results and schedule of future lab studies reviewed with patient  reviewed diet, exercise and weight control  cardiovascular risk and specific lipid/LDL goals reviewed  reviewed medications and side effects in detail    I have discussed the diagnosis with the patient and the intended plan of care as seen in the above orders. The patient has received an after-visit summary and questions were answered concerning future plans. I have discussed medication, side effects, and warnings with the patient in detail. The patient verbalized understanding and is in agreement with the plan of care. The patient will contact the office with any additional concerns. Greg Riley MD    PLEASE NOTE:   This document has been produced using voice recognition software.  Unrecognized errors in transcription may be present

## 2019-11-04 NOTE — PATIENT INSTRUCTIONS
Body Mass Index: Care Instructions  Your Care Instructions    Body mass index (BMI) can help you see if your weight is raising your risk for health problems. It uses a formula to compare how much you weigh with how tall you are. · A BMI lower than 18.5 is considered underweight. · A BMI between 18.5 and 24.9 is considered healthy. · A BMI between 25 and 29.9 is considered overweight. A BMI of 30 or higher is considered obese. If your BMI is in the normal range, it means that you have a lower risk for weight-related health problems. If your BMI is in the overweight or obese range, you may be at increased risk for weight-related health problems, such as high blood pressure, heart disease, stroke, arthritis or joint pain, and diabetes. If your BMI is in the underweight range, you may be at increased risk for health problems such as fatigue, lower protection (immunity) against illness, muscle loss, bone loss, hair loss, and hormone problems. BMI is just one measure of your risk for weight-related health problems. You may be at higher risk for health problems if you are not active, you eat an unhealthy diet, or you drink too much alcohol or use tobacco products. Follow-up care is a key part of your treatment and safety. Be sure to make and go to all appointments, and call your doctor if you are having problems. It's also a good idea to know your test results and keep a list of the medicines you take. How can you care for yourself at home? · Practice healthy eating habits. This includes eating plenty of fruits, vegetables, whole grains, lean protein, and low-fat dairy. · If your doctor recommends it, get more exercise. Walking is a good choice. Bit by bit, increase the amount you walk every day. Try for at least 30 minutes on most days of the week. · Do not smoke. Smoking can increase your risk for health problems. If you need help quitting, talk to your doctor about stop-smoking programs and medicines. These can increase your chances of quitting for good. · Limit alcohol to 2 drinks a day for men and 1 drink a day for women. Too much alcohol can cause health problems. If you have a BMI higher than 25  · Your doctor may do other tests to check your risk for weight-related health problems. This may include measuring the distance around your waist. A waist measurement of more than 40 inches in men or 35 inches in women can increase the risk of weight-related health problems. · Talk with your doctor about steps you can take to stay healthy or improve your health. You may need to make lifestyle changes to lose weight and stay healthy, such as changing your diet and getting regular exercise. If you have a BMI lower than 18.5  · Your doctor may do other tests to check your risk for health problems. · Talk with your doctor about steps you can take to stay healthy or improve your health. You may need to make lifestyle changes to gain or maintain weight and stay healthy, such as getting more healthy foods in your diet and doing exercises to build muscle. Where can you learn more? Go to http://yuri-danilo.info/. Enter S176 in the search box to learn more about \"Body Mass Index: Care Instructions. \"  Current as of: October 13, 2016  Content Version: 11.4  © 6397-6497 [employer]. Care instructions adapted under license by [employer] (which disclaims liability or warranty for this information). If you have questions about a medical condition or this instruction, always ask your healthcare professional. [address] any warranty or liability for your use of this information.

## 2019-11-06 NOTE — TELEPHONE ENCOUNTER
Referral has been updated and faxed to [id number]. See referral for further details.

## 2019-12-02 ENCOUNTER — OFFICE VISIT (OUTPATIENT)
Dept: FAMILY MEDICINE CLINIC | Age: 34
End: 2019-12-02

## 2019-12-02 VITALS
HEIGHT: 59 IN | WEIGHT: 143 LBS | TEMPERATURE: 97.8 F | SYSTOLIC BLOOD PRESSURE: 147 MMHG | HEART RATE: 73 BPM | BODY MASS INDEX: 28.83 KG/M2 | OXYGEN SATURATION: 98 % | RESPIRATION RATE: 16 BRPM | DIASTOLIC BLOOD PRESSURE: 96 MMHG

## 2019-12-02 DIAGNOSIS — M54.2 CERVICALGIA: ICD-10-CM

## 2019-12-02 DIAGNOSIS — I10 ESSENTIAL HYPERTENSION: Primary | ICD-10-CM

## 2019-12-02 DIAGNOSIS — R51.9 NONINTRACTABLE HEADACHE, UNSPECIFIED CHRONICITY PATTERN, UNSPECIFIED HEADACHE TYPE: ICD-10-CM

## 2019-12-02 NOTE — PROGRESS NOTES
Chief Complaint   Patient presents with    Hypertension     1. Have you been to the ER, urgent care clinic since your last visit? Hospitalized since your last visit? No    2. Have you seen or consulted any other health care providers outside of the [address] [place] [address] since your last visit? Include any pap smears or colon screening. No     HPI  Sharri Valladares comes in for f/u care. HTN: BP is elevated, patient is on amlodipine 5 mg daily. She was unable to tolerate 10 mg daily. Denies changes in vision or focal weakness. She has migraines and is on medication for this. She is seen by the neurologist. Declines to have medication adjusted. Says she is in pain and this causes her BP to go up. Will try to get pain addressed prior to BP medication adjustment. Neck pain: patient has neck pain with cervicalgia, she has shoulder pain. She has been seen by the spine specialist previously. Has been referred to see a different specialist. She has the information to call and set up appointment. Past Medical History  Past Medical History:   Diagnosis Date    Asthma     Chronic constipation     Fibromyalgia     GERD (gastroesophageal reflux disease)     IBS (irritable bowel syndrome)     Migraines     unknown if aura    Psychiatric disorder     she denies any diagnosis despite being on antipsychotics, SSRIs, etc in the past    Scoliosis     Urinary incontinence     mixed       Surgical History  Past Surgical History:   Procedure Laterality Date    HX  SECTION  ,     HX COLONOSCOPY      HX DILATION AND CURETTAGE      HX ENDOSCOPY      HX PELVIC LAPAROSCOPY          Medications  Current Outpatient Medications   Medication Sig Dispense Refill    amLODIPine (NORVASC) 5 mg tablet Take 1 Tab by mouth daily.  30 Tab 2    ANORO ELLIPTA 62.5-25 mcg/actuation inhaler 1 INH DAILY - USE EVERY DAY  6    AIMOVIG AUTOINJECTOR 70 mg/mL injection AS DIRECTED - 1 INJECTION SUBQ ONCE MONTHLY  3  cetirizine (ZYRTEC) 10 mg tablet TAKE 1 TABLET EVERY DAY  0    rizatriptan (MAXALT-MLT) 10 mg disintegrating tablet PLEASE SEE ATTACHED FOR DETAILED DIRECTIONS  0    mometasone (NASONEX) 50 mcg/actuation nasal spray USE 1-2 SPRAYS INTO EACH NOSTRIL EVERY DAY AS NEEDED  0    LINZESS 145 mcg cap capsule TAKE ONE CAPSULE EVERY DAY 30MIN PRIOR TO BREAKFAST  0    FERRETTS IPS 40 mg/15 mL liqd 15 ML ORALLY 2 TIMES PER DAY. 2    gabapentin (NEURONTIN) 600 mg tablet TAKE 1 TABLET TWICE A DAY  0    escitalopram oxalate (LEXAPRO) 20 mg tablet TAKE 1 TABLET EVERY DAY  0    EPINEPHrine (EPIPEN) 0.3 mg/0.3 mL injection AS DIRECTED AS NEEDED INTRAMUSCULAR 1 DAYS  5    docusate sodium (COLACE) 100 mg capsule TAKE 1 CAPSULE BY MOUTH TWO TIMES DAILY  2    fluticasone propionate (FLONASE NA) by Nasal route.  montelukast (SINGULAIR) 10 mg tablet Take 10 mg by mouth daily.  albuterol (PROVENTIL HFA, VENTOLIN HFA, PROAIR HFA) 90 mcg/actuation inhaler Take 2 Puffs by inhalation every four (4) hours as needed for Wheezing or Shortness of Breath. 1 Inhaler 0    hyoscyamine (ANASPAZ, LEVSIN) 0.125 mg tablet Take 125 mcg by mouth every four (4) hours as needed.  promethazine (PHENERGAN) 12.5 mg tablet Take  by mouth every six (6) hours as needed.  esomeprazole (NEXIUM) 40 mg capsule Take  by mouth daily.  clindamycin (CLEOCIN T) 1 % lotion Apply  to affected area two (2) times a day. use thin film on affected area      POLYETHYLENE GLYCOL 3350 (MIRALAX PO) Take  by mouth.  CALCIUM CARBONATE/MAG HYDROX (MYLANTA PO) Take  by mouth. Allergies  Allergies   Allergen Reactions    Oxycodone-Acetaminophen Other (comments)     Hallucinations     Prochlorperazine Other (comments)    Propoxyphene-Acetaminophen Unknown (comments)     Allergy to propoxyphene only.   Has previously tolerated acetaminophen    Reglan [Metoclopramide] Not Reported This Time    Sulfa (Sulfonamide Antibiotics) Not Reported This Time    Ramírez Not Reported This Time       Family History  Family History   Problem Relation Age of Onset    Kidney Disease Father     Other Father         Pancreatic Disease    Cancer Father     Heart Disease Father     Heart Attack Father     Thyroid Disease Other         Grandparent       Social History  Social History     Socioeconomic History    Marital status: SINGLE     Spouse name: Not on file    Number of children: Not on file    Years of education: Not on file    Highest education level: Not on file   Occupational History    Not on file   Social Needs    Financial resource strain: Not on file    Food insecurity:     Worry: Not on file     Inability: Not on file    Transportation needs:     Medical: Not on file     Non-medical: Not on file   Tobacco Use    Smoking status: Never Smoker    Smokeless tobacco: Never Used   Substance and Sexual Activity    Alcohol use: No    Drug use: No    Sexual activity: Not on file   Lifestyle    Physical activity:     Days per week: Not on file     Minutes per session: Not on file    Stress: Not on file   Relationships    Social connections:     Talks on phone: Not on file     Gets together: Not on file     Attends [employer] service: Not on file     Active member of club or organization: Not on file     Attends meetings of clubs or organizations: Not on file     Relationship status: Not on file    Intimate partner violence:     Fear of current or ex partner: Not on file     Emotionally abused: Not on file     Physically abused: Not on file     Forced sexual activity: Not on file   Other Topics Concern    Not on file   Social History Narrative    Not on file       Review of Systems  Review of Systems - Review of all systems is negative except as noted above in the HPI.     Vital Signs  Visit Vitals  BP (!) 147/96 (BP 1 Location: Right arm, BP Patient Position: Sitting)   Pulse 73   Temp 97.8 °F (36.6 °C) (Oral)   Resp 16   Ht 4' 11\" (1.499 m)   Wt 143 lb (64.9 kg)   SpO2 98%   BMI 28.88 kg/m²         Physical Exam  Physical Examination: General appearance - oriented to person, place, and time and acyanotic, in no respiratory distress  Mental status - alert, oriented to person, place, and time, affect appropriate to mood  Mouth - mucous membranes moist, pharynx normal without lesions  Neck - limited ROM with paracervical muscle tightness  Lymphatics - no palpable lymphadenopathy, no hepatosplenomegaly  Chest - clear to auscultation, no wheezes, rales or rhonchi, symmetric air entry, no tachypnea, retractions or cyanosis  Heart - normal rate and regular rhythm, S1 and S2 normal  Neurological - motor and sensory grossly normal bilaterally  Extremities - no pedal edema noted, intact peripheral pulses    Results  Results for orders placed or performed in visit on 10/03/19   AMB POC RAPID STREP A   Result Value Ref Range    VALID INTERNAL CONTROL POC Yes     Group A Strep Ag Positive Negative   AMB POC LAKHWINDER INFLUENZA A/B TEST   Result Value Ref Range    VALID INTERNAL CONTROL POC Yes     Influenza A Ag POC Negative Negative Pos/Neg    Influenza B Ag POC Negative Negative Pos/Neg       ASSESSMENT and PLAN    ICD-10-CM ICD-9-CM    1. Essential hypertension I10 401.9    2. Nonintractable headache, unspecified chronicity pattern, unspecified headache type R51 784.0    3. Cervicalgia M54.2 723.1      reviewed diet, exercise and weight control  reviewed medications and side effects in detail    I have discussed the diagnosis with the patient and the intended plan of care as seen in the above orders. The patient has received an after-visit summary and questions were answered concerning future plans. I have discussed medication, side effects, and warnings with the patient in detail. The patient verbalized understanding and is in agreement with the plan of care. The patient will contact the office with any additional concerns.     Chelsy Amanda, MD    PLEASE NOTE:   This document has been produced using voice recognition software.  Unrecognized errors in transcription may be present

## 2020-01-06 ENCOUNTER — OFFICE VISIT (OUTPATIENT)
Dept: FAMILY MEDICINE CLINIC | Age: 35
End: 2020-01-06

## 2020-01-06 VITALS
OXYGEN SATURATION: 97 % | DIASTOLIC BLOOD PRESSURE: 88 MMHG | SYSTOLIC BLOOD PRESSURE: 144 MMHG | RESPIRATION RATE: 16 BRPM | TEMPERATURE: 98.2 F | HEART RATE: 116 BPM | BODY MASS INDEX: 29.23 KG/M2 | HEIGHT: 59 IN | WEIGHT: 145 LBS

## 2020-01-06 DIAGNOSIS — M25.512 LEFT SHOULDER PAIN, UNSPECIFIED CHRONICITY: ICD-10-CM

## 2020-01-06 DIAGNOSIS — M54.2 CERVICALGIA: ICD-10-CM

## 2020-01-06 DIAGNOSIS — I10 ESSENTIAL HYPERTENSION: ICD-10-CM

## 2020-01-06 DIAGNOSIS — M62.838 MUSCLE SPASM: ICD-10-CM

## 2020-01-06 DIAGNOSIS — R07.9 CHEST PAIN, UNSPECIFIED TYPE: Primary | ICD-10-CM

## 2020-01-06 RX ORDER — CYCLOBENZAPRINE HCL 10 MG
10 TABLET ORAL
Qty: 30 TAB | Refills: 0 | Status: SHIPPED | OUTPATIENT
Start: 2020-01-06 | End: 2020-01-15

## 2020-01-06 RX ORDER — HYDROCHLOROTHIAZIDE 12.5 MG/1
12.5 TABLET ORAL DAILY
Qty: 30 TAB | Refills: 2 | Status: SHIPPED | OUTPATIENT
Start: 2020-01-06 | End: 2020-02-26

## 2020-01-06 NOTE — PROGRESS NOTES
Chief Complaint   Patient presents with   [facility] Follow Up     chest pain no improvement      1. Have you been to the ER, urgent care clinic since your last visit? Hospitalized since your last visit? No    2. Have you seen or consulted any other health care providers outside of the [address] [place] [address] since your last visit? Include any pap smears or colon screening. No     HPI  Sharri Ford comes in for f/u care. She was seen in ED 3 days ago for chest pain. She was seen at [facility] [place]. She has labs done that were noted to be normal. Still has chest pain. This is constant. Worse with certain movements. Associated headache, neck and back ache. Pain is retrosternal. Has left neck, shoulder and arm pain. She hds episodes of diaphoresis previously. I will do EKG. Will request previous records and refer patient to  cardiologist for further evaluation. Patient had EKG done and this showed some ST depression. No ST elevations. Also had nonspecific T abnormality. Given she has a chest pain that is retrosternal radiating to the neck and arm I will have her go to the emergency room for further evaluation and management. Will need to have cardiac enzymes checked. Patient is agreeable to this. She would prefer to drive herself to the emergency room. Pain has been there for 3 days. It is constant. She denies dizziness or syncope. Shoulder and neck pain: patient has left shoulder and neck pain. She has noted swelling in the left shoulder and neck. I will do x ray of cervical spine and left shoulder. HTN: Patient unable to tolerate amlodipine. She would like a different medication. I will give hctz 12.5 mg daily.     Past Medical History  Past Medical History:   Diagnosis Date    Asthma     Chronic constipation     Fibromyalgia     GERD (gastroesophageal reflux disease)     IBS (irritable bowel syndrome)     Migraines     unknown if aura    Psychiatric disorder     she denies any diagnosis despite being on antipsychotics, SSRIs, etc in the past    Scoliosis     Urinary incontinence     mixed       Surgical History  Past Surgical History:   Procedure Laterality Date    HX  SECTION  , 2016    HX COLONOSCOPY      HX DILATION AND CURETTAGE      HX ENDOSCOPY      HX PELVIC LAPAROSCOPY          Medications  Current Outpatient Medications   Medication Sig Dispense Refill    ANORO ELLIPTA 62.5-25 mcg/actuation inhaler 1 INH DAILY - USE EVERY DAY  6    AIMOVIG AUTOINJECTOR 70 mg/mL injection AS DIRECTED - 1 INJECTION SUBQ ONCE MONTHLY  3    cetirizine (ZYRTEC) 10 mg tablet TAKE 1 TABLET EVERY DAY  0    rizatriptan (MAXALT-MLT) 10 mg disintegrating tablet PLEASE SEE ATTACHED FOR DETAILED DIRECTIONS  0    mometasone (NASONEX) 50 mcg/actuation nasal spray USE 1-2 SPRAYS INTO EACH NOSTRIL EVERY DAY AS NEEDED  0    LINZESS 145 mcg cap capsule TAKE ONE CAPSULE EVERY DAY 30MIN PRIOR TO BREAKFAST  0    FERRETTS IPS 40 mg/15 mL liqd 15 ML ORALLY 2 TIMES PER DAY. 2    gabapentin (NEURONTIN) 600 mg tablet TAKE 1 TABLET TWICE A DAY  0    EPINEPHrine (EPIPEN) 0.3 mg/0.3 mL injection AS DIRECTED AS NEEDED INTRAMUSCULAR 1 DAYS  5    docusate sodium (COLACE) 100 mg capsule TAKE 1 CAPSULE BY MOUTH TWO TIMES DAILY  2    fluticasone propionate (FLONASE NA) by Nasal route.  montelukast (SINGULAIR) 10 mg tablet Take 10 mg by mouth daily.  albuterol (PROVENTIL HFA, VENTOLIN HFA, PROAIR HFA) 90 mcg/actuation inhaler Take 2 Puffs by inhalation every four (4) hours as needed for Wheezing or Shortness of Breath. 1 Inhaler 0    hyoscyamine (ANASPAZ, LEVSIN) 0.125 mg tablet Take 125 mcg by mouth every four (4) hours as needed.  promethazine (PHENERGAN) 12.5 mg tablet Take  by mouth every six (6) hours as needed.  esomeprazole (NEXIUM) 40 mg capsule Take  by mouth daily.  clindamycin (CLEOCIN T) 1 % lotion Apply  to affected area two (2) times a day.  use thin film on affected area      POLYETHYLENE GLYCOL 3350 (MIRALAX PO) Take  by mouth.  CALCIUM CARBONATE/MAG HYDROX (MYLANTA PO) Take  by mouth.  amLODIPine (NORVASC) 5 mg tablet Take 1 Tab by mouth daily. 30 Tab 2    escitalopram oxalate (LEXAPRO) 20 mg tablet TAKE 1 TABLET EVERY DAY  0       Allergies  Allergies   Allergen Reactions    Oxycodone-Acetaminophen Other (comments)     Hallucinations     Prochlorperazine Other (comments)    Propoxyphene-Acetaminophen Unknown (comments)     Allergy to propoxyphene only.   Has previously tolerated acetaminophen    Reglan [Metoclopramide] Not Reported This Time    Sulfa (Sulfonamide Antibiotics) Not Reported This Time    Wygesic Not Reported This Time       Family History  Family History   Problem Relation Age of Onset    Kidney Disease Father     Other Father         Pancreatic Disease    Cancer Father     Heart Disease Father     Heart Attack Father     Thyroid Disease Other         Grandparent       Social History  Social History     Socioeconomic History    Marital status: SINGLE     Spouse name: Not on file    Number of children: Not on file    Years of education: Not on file    Highest education level: Not on file   Occupational History    Not on file   Social Needs    Financial resource strain: Not on file    Food insecurity:     Worry: Not on file     Inability: Not on file    Transportation needs:     Medical: Not on file     Non-medical: Not on file   Tobacco Use    Smoking status: Never Smoker    Smokeless tobacco: Never Used   Substance and Sexual Activity    Alcohol use: No    Drug use: No    Sexual activity: Not on file   Lifestyle    Physical activity:     Days per week: Not on file     Minutes per session: Not on file    Stress: Not on file   Relationships    Social connections:     Talks on phone: Not on file     Gets together: Not on file     Attends Buddhist service: Not on file     Active member of club or organization: Not on file     Attends meetings of clubs or organizations: Not on file     Relationship status: Not on file    Intimate partner violence:     Fear of current or ex partner: Not on file     Emotionally abused: Not on file     Physically abused: Not on file     Forced sexual activity: Not on file   Other Topics Concern    Not on file   Social History Narrative    Not on file       Review of Systems  Review of Systems - Review of all systems is negative except as noted above in the HPI. Vital Signs  Visit Vitals  /88 (BP 1 Location: Left arm, BP Patient Position: Sitting)   Pulse (!) 116   Temp 98.2 °F (36.8 °C) (Oral)   Resp 16   Ht 4' 11\" (1.499 m)   Wt 145 lb (65.8 kg)   SpO2 97%   BMI 29.29 kg/m²         Physical Exam  Physical Examination: General appearance - oriented to person, place, and time, overweight and acyanotic, in no respiratory distress  Mental status - alert, oriented to person, place, and time, affect appropriate to mood  Neck -discomfort to palpation left aspect of the neck. Lymphatics - no palpable lymphadenopathy, no hepatosplenomegaly  Chest - clear to auscultation, no wheezes, rales or rhonchi, symmetric air entry  Heart - normal rate and regular rhythm, S1 and S2 normal  Back exam - limited range of motion  Neurological - normal muscle tone, no tremors, strength 5/5  Musculoskeletal - no joint tenderness, deformity or swelling  Extremities - no pedal edema noted, intact peripheral pulses    Results  Results for orders placed or performed in visit on 10/03/19   AMB POC RAPID STREP A   Result Value Ref Range    VALID INTERNAL CONTROL POC Yes     Group A Strep Ag Positive Negative   AMB POC LAKHWINDER INFLUENZA A/B TEST   Result Value Ref Range    VALID INTERNAL CONTROL POC Yes     Influenza A Ag POC Negative Negative Pos/Neg    Influenza B Ag POC Negative Negative Pos/Neg       ASSESSMENT and PLAN    ICD-10-CM ICD-9-CM    1.  Chest pain, unspecified type R07.9 786.50 AMB POC EKG ROUTINE W/ 12 LEADS, INTER & REP      REFERRAL TO CARDIOLOGY   2. Left shoulder pain, unspecified chronicity M25.512 719.41 XR SHOULDER LT AP/LAT MIN 2 V   3. Cervicalgia M54.2 723.1 XR SPINE CERV 4 OR 5 V   4. Muscle spasm M62.838 728.85 cyclobenzaprine (FLEXERIL) 10 mg tablet   5. Essential hypertension I10 401.9 hydroCHLOROthiazide (HYDRODIURIL) 12.5 mg tablet     lab results and schedule of future lab studies reviewed with patient  reviewed diet, exercise and weight control  reviewed medications and side effects in detail  Patient was sent to the emergency room for further evaluation and management. I have discussed the diagnosis with the patient and the intended plan of care as seen in the above orders. The patient has received an after-visit summary and questions were answered concerning future plans. I have discussed medication, side effects, and warnings with the patient in detail. The patient verbalized understanding and is in agreement with the plan of care. The patient will contact the office with any additional concerns. Trey Messer MD    PLEASE NOTE:   This document has been produced using voice recognition software.  Unrecognized errors in transcription may be present

## 2020-01-06 NOTE — PATIENT INSTRUCTIONS
Patient with chest pain ongoing 3 days that is retrosternal. Also has left neck and arm pain. Did EKG that shows non specific ST depression. Sent to ED for evaluation and labs and further management.   Evy Bland MD

## 2020-01-10 ENCOUNTER — OFFICE VISIT (OUTPATIENT)
Dept: CARDIOLOGY CLINIC | Age: 35
End: 2020-01-10

## 2020-01-10 VITALS
HEART RATE: 99 BPM | HEIGHT: 59 IN | SYSTOLIC BLOOD PRESSURE: 131 MMHG | BODY MASS INDEX: 29.23 KG/M2 | DIASTOLIC BLOOD PRESSURE: 85 MMHG | WEIGHT: 145 LBS

## 2020-01-10 DIAGNOSIS — J45.909 UNCOMPLICATED ASTHMA, UNSPECIFIED ASTHMA SEVERITY, UNSPECIFIED WHETHER PERSISTENT: ICD-10-CM

## 2020-01-10 DIAGNOSIS — Z82.49 FAMILY HISTORY OF CHRONIC HEART FAILURE: ICD-10-CM

## 2020-01-10 DIAGNOSIS — R07.9 CHEST PAIN, UNSPECIFIED TYPE: Primary | ICD-10-CM

## 2020-01-10 NOTE — PROGRESS NOTES
HISTORY OF PRESENT ILLNESS  Sharri Olmedo is a 29 y.o. female. Patient is here for follow up of diagnostic tests. Results will be discussed. Chest Pain (Angina)    The history is provided by the patient. This is a recurrent problem. The current episode started more than 1 week ago. The problem has not changed since onset. The problem occurs every several days. The pain is associated with rest and exertion. The pain is present in the lateral region and substernal region. The quality of the pain is described as pressure-like and sharp. The pain does not radiate. Pertinent negatives include no dizziness, no nausea, no palpitations and no weakness. Review of Systems   Constitutional: Negative for chills. HENT: Negative for nosebleeds. Eyes: Negative for blurred vision and double vision. Cardiovascular: Positive for chest pain. Negative for palpitations. Gastrointestinal: Negative for heartburn and nausea. Musculoskeletal: Negative for myalgias. Neurological: Negative for dizziness and weakness. Endo/Heme/Allergies: Does not bruise/bleed easily.      Family History   Problem Relation Age of Onset    Kidney Disease Father     Other Father         Pancreatic Disease    Cancer Father     Heart Disease Father     Heart Attack Father     Thyroid Disease Other         Grandparent       Past Medical History:   Diagnosis Date    Asthma     Chronic constipation     Fibromyalgia     GERD (gastroesophageal reflux disease)     IBS (irritable bowel syndrome)     Migraines     unknown if aura    Psychiatric disorder     she denies any diagnosis despite being on antipsychotics, SSRIs, etc in the past    Scoliosis     Urinary incontinence     mixed       Past Surgical History:   Procedure Laterality Date    HX  SECTION  ,     HX COLONOSCOPY      HX DILATION AND CURETTAGE      HX ENDOSCOPY      HX PELVIC LAPAROSCOPY         Social History     Tobacco Use    Smoking status: Never Smoker    Smokeless tobacco: Never Used   Substance Use Topics    Alcohol use: No       Allergies   Allergen Reactions    Oxycodone-Acetaminophen Other (comments)     Hallucinations     Prochlorperazine Other (comments)    Propoxyphene-Acetaminophen Unknown (comments)     Allergy to propoxyphene only. Has previously tolerated acetaminophen    Reglan [Metoclopramide] Not Reported This Time    Sulfa (Sulfonamide Antibiotics) Not Reported This Time    Wygesic Not Reported This Time       Prior to Admission medications    Medication Sig Start Date End Date Taking? Authorizing Provider   cyclobenzaprine (FLEXERIL) 10 mg tablet Take 1 Tab by mouth three (3) times daily as needed for Muscle Spasm(s). 1/6/20  Yes Chelsy Amanda MD   hydroCHLOROthiazide (HYDRODIURIL) 12.5 mg tablet Take 1 Tab by mouth daily. 1/6/20  Yes Chelsy Amanda MD   amLODIPine (NORVASC) 5 mg tablet Take 1 Tab by mouth daily.  10/23/19  Yes Chelsy Amanda MD   ANORO ELLIPTA 62.5-25 mcg/actuation inhaler 1 INH DAILY - USE EVERY DAY 7/24/19  Yes Provider, Historical   AIMOVIG AUTOINJECTOR 70 mg/mL injection AS DIRECTED - 1 INJECTION SUBQ ONCE MONTHLY 7/30/19  Yes Provider, Historical   cetirizine (ZYRTEC) 10 mg tablet TAKE 1 TABLET EVERY DAY 12/18/18  Yes Provider, Historical   rizatriptan (MAXALT-MLT) 10 mg disintegrating tablet PLEASE SEE ATTACHED FOR DETAILED DIRECTIONS 2/21/19  Yes Provider, Historical   mometasone (NASONEX) 50 mcg/actuation nasal spray USE 1-2 SPRAYS INTO EACH NOSTRIL EVERY DAY AS NEEDED 2/20/19  Yes Provider, Historical   LINZESS 145 mcg cap capsule TAKE ONE CAPSULE EVERY DAY 30MIN PRIOR TO BREAKFAST 1/16/19  Yes Provider, Historical   FERRETTS IPS 40 mg/15 mL liqd 15 ML ORALLY 2 TIMES PER DAY. 2/27/19  Yes Provider, Historical   gabapentin (NEURONTIN) 600 mg tablet TAKE 1 TABLET TWICE A DAY 1/16/19  Yes Provider, Historical   escitalopram oxalate (LEXAPRO) 20 mg tablet TAKE 1 TABLET EVERY DAY 1/16/19 Yes Provider, Historical   EPINEPHrine (EPIPEN) 0.3 mg/0.3 mL injection AS DIRECTED AS NEEDED INTRAMUSCULAR 1 DAYS 2/20/19  Yes Provider, Historical   docusate sodium (COLACE) 100 mg capsule TAKE 1 CAPSULE BY MOUTH TWO TIMES DAILY 3/7/19  Yes Provider, Historical   fluticasone propionate (FLONASE NA) by Nasal route. Yes Provider, Historical   montelukast (SINGULAIR) 10 mg tablet Take 10 mg by mouth daily. Yes Provider, Historical   albuterol (PROVENTIL HFA, VENTOLIN HFA, PROAIR HFA) 90 mcg/actuation inhaler Take 2 Puffs by inhalation every four (4) hours as needed for Wheezing or Shortness of Breath. 12/6/18  Yes Socorro Jonas, HELEN   hyoscyamine (ANASPAZ, LEVSIN) 0.125 mg tablet Take 125 mcg by mouth every four (4) hours as needed. Yes Provider, Historical   promethazine (PHENERGAN) 12.5 mg tablet Take  by mouth every six (6) hours as needed. Yes Provider, Historical   esomeprazole (NEXIUM) 40 mg capsule Take  by mouth daily. Yes Provider, Historical   POLYETHYLENE GLYCOL 3350 (MIRALAX PO) Take  by mouth. Yes Provider, Historical   CALCIUM CARBONATE/MAG HYDROX (MYLANTA PO) Take  by mouth. Yes Provider, Historical   clindamycin (CLEOCIN T) 1 % lotion Apply  to affected area two (2) times a day. use thin film on affected area    Provider, Historical         Visit Vitals  /85   Pulse 99   Ht 4' 11\" (1.499 m)   Wt 65.8 kg (145 lb)   BMI 29.29 kg/m²         Physical Exam   Constitutional: She is oriented to person, place, and time. She appears well-developed and well-nourished. HENT:   Head: Normocephalic and atraumatic. Eyes: Conjunctivae are normal.   Neck: Neck supple. No JVD present. No tracheal deviation present. No thyromegaly present. Cardiovascular: Normal rate and regular rhythm. PMI is not displaced. Exam reveals no gallop, no S3 and no decreased pulses. No murmur heard. Pulmonary/Chest: No respiratory distress. She has no wheezes. She has no rales. She exhibits no tenderness. Abdominal: Soft. There is no tenderness. Musculoskeletal:         General: No edema. Neurological: She is alert and oriented to person, place, and time. Skin: Skin is warm. Psychiatric: She has a normal mood and affect. ECHO CARDIOGRAM [id number]/28/2015  [facility]  Component Name Value Ref Range   EF Echo 60     Result Impression   :   NORMAL LEFT VENTRICULAR CAVITY SIZE AND SYSTOLIC FUNCTION WITH AN EJECTION FRACTION OF  60  %. NORMAL DIASTOLIC FUNCTION. NORMAL RIGHT HEART FUNCTION AND SIZE. NO HEMODYNAMICALLY SIGNIFICANT VALVULAR PATHOLOGY. NORMAL PULMONARY ARTERY PRESSURE OF 15 MM/HG. NO PREVIOUS REPORT FOR COMPARISON. I have personally reviewed patient's records available from hospital and other providers and incorporated findings in patient care. 12/2018- ER notes, labs, EKG, chest x-ray and prior records  Ms. Kamran Carrero has a reminder for a \"due or due soon\" health maintenance. I have asked that she contact her primary care provider for follow-up on this health maintenance. Interpretation Summary 1/2019       · Normal cavity size, wall thickness, systolic function (ejection fraction normal) and diastolic function. The estimated ejection fraction is 56 - 60%. No regional wall motion abnormality noted. · Normal right ventricular size and function. · No hemodynamically significant valvular pathology. Interpretation Summary 1/2019    · Baseline ECG: Normal sinus rhythm. · Low risk Duke treadmill score. · Negative stress electrocardiogram.          Assessment         ICD-10-CM ICD-9-CM    1. Chest pain, unspecified type R07.9 786.50 ECHO STRESS   2. Family history of chronic heart failure Z82.49 V17.49    3. Uncomplicated asthma, unspecified asthma severity, unspecified whether persistent J45.909 493.90        There are no discontinued medications.     Orders Placed This Encounter    ECHO STRESS     Standing Status:   Future     Standing Expiration Date:   7/10/2020 Order Specific Question:   Is Patient Pregnant? Answer:   Unknown     Order Specific Question:   Contrast Enhancement (Bubble Study, Definity, Optison) may be used if criteria listed in established evidence-based protocol has been identified. Answer:   Yes     Order Specific Question:   Enhanced Imaging (Myocardial Strain, 3D post-processing) may be used if criteria listed in established evidence-based protocol has been identified. Answer:   Yes     Order Specific Question:   Stressing Agent     Answer:   Exercise       Follow-up and Dispositions    · Return in about 1 month (around 2/10/2020). Patient is 28-year-old female with history of hypertension seen for episodes of sharp chest pain with some tightness. No radiation. Chest pain is intermittent. We will proceed with stress echocardiogram to assess CAD. Follow-up post test result.

## 2020-01-10 NOTE — PATIENT INSTRUCTIONS

## 2020-01-15 ENCOUNTER — OFFICE VISIT (OUTPATIENT)
Dept: FAMILY MEDICINE CLINIC | Age: 35
End: 2020-01-15

## 2020-01-15 VITALS
HEIGHT: 59 IN | TEMPERATURE: 96.8 F | DIASTOLIC BLOOD PRESSURE: 98 MMHG | WEIGHT: 144 LBS | HEART RATE: 122 BPM | RESPIRATION RATE: 16 BRPM | BODY MASS INDEX: 29.03 KG/M2 | OXYGEN SATURATION: 97 % | SYSTOLIC BLOOD PRESSURE: 138 MMHG

## 2020-01-15 DIAGNOSIS — R73.9 HYPERGLYCEMIA: ICD-10-CM

## 2020-01-15 DIAGNOSIS — R30.0 DYSURIA: ICD-10-CM

## 2020-01-15 DIAGNOSIS — E87.6 HYPOKALEMIA: ICD-10-CM

## 2020-01-15 DIAGNOSIS — R51.9 NONINTRACTABLE EPISODIC HEADACHE, UNSPECIFIED HEADACHE TYPE: ICD-10-CM

## 2020-01-15 DIAGNOSIS — R61 EXCESSIVE SWEATING: ICD-10-CM

## 2020-01-15 DIAGNOSIS — I10 ESSENTIAL HYPERTENSION: Primary | ICD-10-CM

## 2020-01-15 LAB
BILIRUB UR QL STRIP: NEGATIVE
GLUCOSE UR-MCNC: NEGATIVE MG/DL
KETONES P FAST UR STRIP-MCNC: NEGATIVE MG/DL
PH UR STRIP: 6 [PH] (ref 4.6–8)
PROT UR QL STRIP: NEGATIVE
SP GR UR STRIP: 1.01 (ref 1–1.03)
UA UROBILINOGEN AMB POC: NORMAL (ref 0.2–1)
URINALYSIS CLARITY POC: CLEAR
URINALYSIS COLOR POC: YELLOW
URINE BLOOD POC: NEGATIVE
URINE LEUKOCYTES POC: NORMAL
URINE NITRITES POC: NEGATIVE

## 2020-01-15 RX ORDER — POTASSIUM CHLORIDE 20 MEQ/1
TABLET, EXTENDED RELEASE ORAL
Qty: 30 TAB | Refills: 1 | Status: SHIPPED | OUTPATIENT
Start: 2020-01-15 | End: 2020-02-09

## 2020-01-15 NOTE — PROGRESS NOTES
Chief Complaint   Patient presents with    Follow-up     er follow up chest pain and numbness and tingling      1. Have you been to the ER, urgent care clinic since your last visit? Hospitalized since your last visit? Yes When: 01/06/2019 Where: [facility] Reason for visit: chest pain  . 2. Have you seen or consulted any other health care providers outside of the [address] [place] [address] since your last visit? Include any pap smears or colon screening. No     HPI  Sharri Arango comes in for follow-up care. Patient has numbness that is just generalized all over the body. She feels a fullness in her head and at times just feels like her whole body is numb. She had episodes of chest pain with shortness of breath and has been evaluated several times in the emergency room and here at this clinic. At the time she had various tests done including thyroid studies, cardiac studies and these were all negative. She denies syncope or palpitations. She however feels unwell. She denies depression but given what is going on at times she cries a lot. She has been noted to have hyperglycemia and thus wonders about diabetes. She has a family h/o diabetes. I will request labs today. She has hypertension and takes hctz. We will continue with this medication. She has a h/o chronic migraine headache and is on various medications including imitrex and aimovig. She has seen the neurologist for this. Given chronicity of headache I will order head CT scan. Patient has noted excessive sweating at bedtime. I will recheck labs and inform her of the results. She has a h/o thyroid disorder and I will recheck labs for this. She had lab drawn previously that showed hypokalemia. She has not been on potassium supplementation and I will send in prescription for this.       Past Medical History  Past Medical History:   Diagnosis Date    Asthma     Chronic constipation     Fibromyalgia     GERD (gastroesophageal reflux disease)     IBS (irritable bowel syndrome)     Migraines     unknown if aura    Psychiatric disorder     she denies any diagnosis despite being on antipsychotics, SSRIs, etc in the past    Scoliosis     Urinary incontinence     mixed       Surgical History  Past Surgical History:   Procedure Laterality Date    HX  SECTION  ,     HX COLONOSCOPY      HX DILATION AND CURETTAGE      HX ENDOSCOPY      HX PELVIC LAPAROSCOPY          Medications  Current Outpatient Medications   Medication Sig Dispense Refill    potassium chloride (K-DUR, KLOR-CON) 20 mEq tablet Take 20 meq 2 x day for 3 days then daily. 30 Tab 1    cyclobenzaprine (FLEXERIL) 10 mg tablet Take 1 Tab by mouth three (3) times daily as needed for Muscle Spasm(s). 30 Tab 0    hydroCHLOROthiazide (HYDRODIURIL) 12.5 mg tablet Take 1 Tab by mouth daily. 30 Tab 2    ANORO ELLIPTA 62.5-25 mcg/actuation inhaler 1 INH DAILY - USE EVERY DAY  6    AIMOVIG AUTOINJECTOR 70 mg/mL injection AS DIRECTED - 1 INJECTION SUBQ ONCE MONTHLY  3    cetirizine (ZYRTEC) 10 mg tablet TAKE 1 TABLET EVERY DAY  0    rizatriptan (MAXALT-MLT) 10 mg disintegrating tablet PLEASE SEE ATTACHED FOR DETAILED DIRECTIONS  0    mometasone (NASONEX) 50 mcg/actuation nasal spray USE 1-2 SPRAYS INTO EACH NOSTRIL EVERY DAY AS NEEDED  0    FERRETTS IPS 40 mg/15 mL liqd 15 ML ORALLY 2 TIMES PER DAY. 2    EPINEPHrine (EPIPEN) 0.3 mg/0.3 mL injection AS DIRECTED AS NEEDED INTRAMUSCULAR 1 DAYS  5    albuterol (PROVENTIL HFA, VENTOLIN HFA, PROAIR HFA) 90 mcg/actuation inhaler Take 2 Puffs by inhalation every four (4) hours as needed for Wheezing or Shortness of Breath. 1 Inhaler 0    promethazine (PHENERGAN) 12.5 mg tablet Take  by mouth every six (6) hours as needed.  esomeprazole (NEXIUM) 40 mg capsule Take  by mouth daily.  POLYETHYLENE GLYCOL 3350 (MIRALAX PO) Take  by mouth.  CALCIUM CARBONATE/MAG HYDROX (MYLANTA PO) Take  by mouth.          Allergies  Allergies Allergen Reactions    Oxycodone-Acetaminophen Other (comments)     Hallucinations     Prochlorperazine Other (comments)    Propoxyphene-Acetaminophen Unknown (comments)     Allergy to propoxyphene only.   Has previously tolerated acetaminophen    Reglan [Metoclopramide] Not Reported This Time    Sulfa (Sulfonamide Antibiotics) Not Reported This Time    Wygesic Not Reported This Time       Family History  Family History   Problem Relation Age of Onset    Kidney Disease Father     Other Father         Pancreatic Disease    Cancer Father     Heart Disease Father     Heart Attack Father     Thyroid Disease Other         Grandparent       Social History  Social History     Socioeconomic History    Marital status: SINGLE     Spouse name: Not on file    Number of children: Not on file    Years of education: Not on file    Highest education level: Not on file   Occupational History    Not on file   Social Needs    Financial resource strain: Not on file    Food insecurity:     Worry: Not on file     Inability: Not on file    Transportation needs:     Medical: Not on file     Non-medical: Not on file   Tobacco Use    Smoking status: Never Smoker    Smokeless tobacco: Never Used   Substance and Sexual Activity    Alcohol use: No    Drug use: No    Sexual activity: Not on file   Lifestyle    Physical activity:     Days per week: Not on file     Minutes per session: Not on file    Stress: Not on file   Relationships    Social connections:     Talks on phone: Not on file     Gets together: Not on file     Attends [employer] service: Not on file     Active member of club or organization: Not on file     Attends meetings of clubs or organizations: Not on file     Relationship status: Not on file    Intimate partner violence:     Fear of current or ex partner: Not on file     Emotionally abused: Not on file     Physically abused: Not on file     Forced sexual activity: Not on file   Other Topics Concern  Not on file   Social History Narrative    Not on file       Review of Systems  Review of Systems - Review of all systems is negative except as noted above in the HPI. Vital Signs  Visit Vitals  BP (!) 138/98 (BP 1 Location: Left arm, BP Patient Position: Sitting)   Pulse (!) 122   Temp 96.8 °F (36 °C) (Oral)   Resp 16   Ht 4' 11\" (1.499 m)   Wt 144 lb (65.3 kg)   SpO2 97%   BMI 29.08 kg/m²         Physical Exam  Physical Examination: General appearance - oriented to person, place, and time, overweight, acyanotic, in no respiratory distress and anxious  Mental status - alert, oriented to person, place, and time, anxious  Nose - normal and patent, no erythema, discharge or polyps  Mouth - mucous membranes moist, pharynx normal without lesions  Neck - supple, no significant adenopathy  Lymphatics - no palpable lymphadenopathy, no hepatosplenomegaly  Chest - clear to auscultation, no wheezes, rales or rhonchi, symmetric air entry  Heart - normal rate, regular rhythm, normal S1, S2, no murmurs, rubs, clicks or gallops  Abdomen - soft, nontender, nondistended, no masses or organomegaly  Neurological - alert, oriented, normal speech, no focal findings or movement disorder noted  Musculoskeletal - full range of motion without pain  Extremities - no pedal edema noted, intact peripheral pulses    Results  Results for orders placed or performed in visit on 10/03/19   AMB POC RAPID STREP A   Result Value Ref Range    VALID INTERNAL CONTROL POC Yes     Group A Strep Ag Positive Negative   AMB POC LAKHWINDER INFLUENZA A/B TEST   Result Value Ref Range    VALID INTERNAL CONTROL POC Yes     Influenza A Ag POC Negative Negative Pos/Neg    Influenza B Ag POC Negative Negative Pos/Neg       ASSESSMENT and PLAN    ICD-10-CM ICD-9-CM    1. Essential hypertension I10 401.9 CBC WITH AUTOMATED DIFF      METABOLIC PANEL, BASIC   2. Nonintractable episodic headache, unspecified headache type R51 784.0 CANCELED: CT HEAD W CONT   3. Hyperglycemia R73.9 790.29 HEMOGLOBIN A1C WITH EAG      COLLECTION VENOUS BLOOD,VENIPUNCTURE   4. Hypokalemia E87.6 276.8 potassium chloride (K-DUR, KLOR-CON) 20 mEq tablet      METABOLIC PANEL, BASIC      COLLECTION VENOUS BLOOD,VENIPUNCTURE   5. Excessive sweating R61 780.8 TSH 3RD GENERATION   6. Dysuria R30.0 788.1 AMB POC URINALYSIS DIP STICK AUTO W/O MICRO     lab results and schedule of future lab studies reviewed with patient  reviewed diet, exercise and weight control  cardiovascular risk and specific lipid/LDL goals reviewed  reviewed medications and side effects in detail  radiology results and schedule of future radiology studies reviewed with patient    I have discussed the diagnosis with the patient and the intended plan of care as seen in the above orders. The patient has received an after-visit summary and questions were answered concerning future plans. I have discussed medication, side effects, and warnings with the patient in detail. The patient verbalized understanding and is in agreement with the plan of care. The patient will contact the office with any additional concerns. Mayito Chapa MD    PLEASE NOTE:   This document has been produced using voice recognition software.  Unrecognized errors in transcription may be present

## 2020-01-19 LAB
BASOPHILS # BLD AUTO: 0 X10E3/UL (ref 0–0.2)
BASOPHILS NFR BLD AUTO: 0 %
BUN SERPL-MCNC: NORMAL MG/DL
CALCIUM SERPL-MCNC: NORMAL MG/DL
CHLORIDE SERPL-SCNC: NORMAL MMOL/L
CO2 SERPL-SCNC: NORMAL MMOL/L
CREAT SERPL-MCNC: NORMAL MG/DL
EOSINOPHIL # BLD AUTO: 0.1 X10E3/UL (ref 0–0.4)
EOSINOPHIL NFR BLD AUTO: 1 %
ERYTHROCYTE [DISTWIDTH] IN BLOOD BY AUTOMATED COUNT: 14 % (ref 11.7–15.4)
EST. AVERAGE GLUCOSE BLD GHB EST-MCNC: 143 MG/DL
GLUCOSE SERPL-MCNC: NORMAL MG/DL
HBA1C MFR BLD: 6.6 % (ref 4.8–5.6)
HCT VFR BLD AUTO: 38.5 % (ref 34–46.6)
HGB BLD-MCNC: 12.1 G/DL (ref 11.1–15.9)
IMM GRANULOCYTES # BLD AUTO: 0 X10E3/UL (ref 0–0.1)
IMM GRANULOCYTES NFR BLD AUTO: 0 %
LYMPHOCYTES # BLD AUTO: 2.2 X10E3/UL (ref 0.7–3.1)
LYMPHOCYTES NFR BLD AUTO: 29 %
MCH RBC QN AUTO: 26.4 PG (ref 26.6–33)
MCHC RBC AUTO-ENTMCNC: 31.4 G/DL (ref 31.5–35.7)
MCV RBC AUTO: 84 FL (ref 79–97)
MONOCYTES # BLD AUTO: 0.4 X10E3/UL (ref 0.1–0.9)
MONOCYTES NFR BLD AUTO: 5 %
NEUTROPHILS # BLD AUTO: 4.9 X10E3/UL (ref 1.4–7)
NEUTROPHILS NFR BLD AUTO: 65 %
PLATELET # BLD AUTO: 225 X10E3/UL (ref 150–450)
POTASSIUM SERPL-SCNC: NORMAL MMOL/L
RBC # BLD AUTO: 4.58 X10E6/UL (ref 3.77–5.28)
SODIUM SERPL-SCNC: NORMAL MMOL/L
TSH SERPL DL<=0.005 MIU/L-ACNC: 3.6 UIU/ML (ref 0.45–4.5)
WBC # BLD AUTO: 7.6 X10E3/UL (ref 3.4–10.8)

## 2020-01-22 NOTE — PROGRESS NOTES
Please let patient know her HbA1c 6.6. This is in the diabetes range. She should intensify lifestyle and dietary modification. She should set up an appointment to come in and discuss management of diabetes. Rest of her labs are reassuring.   Margo Medina MD

## 2020-01-23 ENCOUNTER — OFFICE VISIT (OUTPATIENT)
Dept: FAMILY MEDICINE CLINIC | Age: 35
End: 2020-01-23

## 2020-01-23 VITALS
HEIGHT: 59 IN | TEMPERATURE: 98.4 F | DIASTOLIC BLOOD PRESSURE: 87 MMHG | WEIGHT: 145 LBS | SYSTOLIC BLOOD PRESSURE: 135 MMHG | HEART RATE: 105 BPM | BODY MASS INDEX: 29.23 KG/M2 | RESPIRATION RATE: 16 BRPM | OXYGEN SATURATION: 100 %

## 2020-01-23 DIAGNOSIS — E11.65 TYPE 2 DIABETES MELLITUS WITH HYPERGLYCEMIA, WITHOUT LONG-TERM CURRENT USE OF INSULIN (HCC): Primary | ICD-10-CM

## 2020-01-23 DIAGNOSIS — G43.909 MIGRAINE WITHOUT STATUS MIGRAINOSUS, NOT INTRACTABLE, UNSPECIFIED MIGRAINE TYPE: ICD-10-CM

## 2020-01-23 DIAGNOSIS — I10 ESSENTIAL HYPERTENSION: ICD-10-CM

## 2020-01-23 LAB
MICROALBUMIN UR TEST STR-MCNC: 80 MG/L
MICROALBUMIN/CREAT RATIO POC: >300 MG/G

## 2020-01-23 RX ORDER — LANCETS
EACH MISCELLANEOUS
Qty: 100 EACH | Refills: 3 | Status: SHIPPED | OUTPATIENT
Start: 2020-01-23 | End: 2020-10-13 | Stop reason: SDUPTHER

## 2020-01-23 RX ORDER — INSULIN PUMP SYRINGE, 3 ML
EACH MISCELLANEOUS
Qty: 1 KIT | Refills: 0 | Status: SHIPPED | OUTPATIENT
Start: 2020-01-23 | End: 2020-05-19

## 2020-01-23 NOTE — PROGRESS NOTES
Chief Complaint   Patient presents with    Numbness     tingling follow up      1. Have you been to the ER, urgent care clinic since your last visit? Hospitalized since your last visit? No    2. Have you seen or consulted any other health care providers outside of the [address] [place] [address] since your last visit? Include any pap smears or colon screening. No     HPI  Sharri Casillas comes in for follow-up care. Patient was seen previously with generalized numbness and tingling. Did labs. Her HbA1c has come back at 6.6. This puts her in the diabetes range. Patient has diabetes mellitus type 2. We discussed lifestyle and dietary modification. She would prefer to start off with this. I will order blood glucose meter and test strips and lancets. She will keep a blood glucose log. I will refer her to the nutritionist to discuss diabetes and dietary management of the same. We discussed chronic and acute complications of high blood sugar. I will refer patient to the ophthalmologist to get her eye exam done. We will do a foot exam at next visit. Discussed medication management of diabetes. She would prefer to hold off on this at the moment. I will follow-up at next visit with her blood glucose log. Patient has hypertension. Her blood pressure is stable. She is on HCTZ and takes potassium supplementation. She has a history of migraine headaches. She takes Maxalt and is also on Aimovig. She has Phenergan for occasional nausea. I will check labs today. Will check lipid panel, CMP and microalbumin.     Past Medical History  Past Medical History:   Diagnosis Date    Asthma     Chronic constipation     Fibromyalgia     GERD (gastroesophageal reflux disease)     IBS (irritable bowel syndrome)     Migraines     unknown if aura    Psychiatric disorder     she denies any diagnosis despite being on antipsychotics, SSRIs, etc in the past    Scoliosis     Urinary incontinence     mixed Surgical History  Past Surgical History:   Procedure Laterality Date    HX  SECTION  , 2016    HX COLONOSCOPY      HX DILATION AND CURETTAGE      HX ENDOSCOPY      HX PELVIC LAPAROSCOPY          Medications  Current Outpatient Medications   Medication Sig Dispense Refill    Blood-Glucose Meter monitoring kit Check blood glucose daily 1 Kit 0    lancets misc Check blood glucose daily 100 Each 3    glucose blood VI test strips (ASCENSIA AUTODISC VI, ONE TOUCH ULTRA TEST VI) strip Check blood glucose daily 100 Strip 3    potassium chloride (K-DUR, KLOR-CON) 20 mEq tablet Take 20 meq 2 x day for 3 days then daily. 30 Tab 1    hydroCHLOROthiazide (HYDRODIURIL) 12.5 mg tablet Take 1 Tab by mouth daily. 30 Tab 2    ANORO ELLIPTA 62.5-25 mcg/actuation inhaler 1 INH DAILY - USE EVERY DAY  6    AIMOVIG AUTOINJECTOR 70 mg/mL injection AS DIRECTED - 1 INJECTION SUBQ ONCE MONTHLY  3    cetirizine (ZYRTEC) 10 mg tablet TAKE 1 TABLET EVERY DAY  0    rizatriptan (MAXALT-MLT) 10 mg disintegrating tablet PLEASE SEE ATTACHED FOR DETAILED DIRECTIONS  0    mometasone (NASONEX) 50 mcg/actuation nasal spray USE 1-2 SPRAYS INTO EACH NOSTRIL EVERY DAY AS NEEDED  0    FERRETTS IPS 40 mg/15 mL liqd 15 ML ORALLY 2 TIMES PER DAY. 2    EPINEPHrine (EPIPEN) 0.3 mg/0.3 mL injection AS DIRECTED AS NEEDED INTRAMUSCULAR 1 DAYS  5    albuterol (PROVENTIL HFA, VENTOLIN HFA, PROAIR HFA) 90 mcg/actuation inhaler Take 2 Puffs by inhalation every four (4) hours as needed for Wheezing or Shortness of Breath. 1 Inhaler 0    promethazine (PHENERGAN) 12.5 mg tablet Take  by mouth every six (6) hours as needed.  esomeprazole (NEXIUM) 40 mg capsule Take  by mouth daily.  POLYETHYLENE GLYCOL 3350 (MIRALAX PO) Take  by mouth.  CALCIUM CARBONATE/MAG HYDROX (MYLANTA PO) Take  by mouth.          Allergies  Allergies   Allergen Reactions    Oxycodone-Acetaminophen Other (comments)     Hallucinations     Prochlorperazine Other (comments)    Propoxyphene-Acetaminophen Unknown (comments)     Allergy to propoxyphene only.   Has previously tolerated acetaminophen    Reglan [Metoclopramide] Not Reported This Time    Sulfa (Sulfonamide Antibiotics) Not Reported This Time    Wygesic Not Reported This Time       Family History  Family History   Problem Relation Age of Onset    Kidney Disease Father     Other Father         Pancreatic Disease    Cancer Father     Heart Disease Father     Heart Attack Father     Thyroid Disease Other         Grandparent       Social History  Social History     Socioeconomic History    Marital status: SINGLE     Spouse name: Not on file    Number of children: Not on file    Years of education: Not on file    Highest education level: Not on file   Occupational History    Not on file   Social Needs    Financial resource strain: Not on file    Food insecurity:     Worry: Not on file     Inability: Not on file    Transportation needs:     Medical: Not on file     Non-medical: Not on file   Tobacco Use    Smoking status: Never Smoker    Smokeless tobacco: Never Used   Substance and Sexual Activity    Alcohol use: No    Drug use: No    Sexual activity: Not on file   Lifestyle    Physical activity:     Days per week: Not on file     Minutes per session: Not on file    Stress: Not on file   Relationships    Social connections:     Talks on phone: Not on file     Gets together: Not on file     Attends Hoahaoism service: Not on file     Active member of club or organization: Not on file     Attends meetings of clubs or organizations: Not on file     Relationship status: Not on file    Intimate partner violence:     Fear of current or ex partner: Not on file     Emotionally abused: Not on file     Physically abused: Not on file     Forced sexual activity: Not on file   Other Topics Concern    Not on file   Social History Narrative    Not on file     Review of Systems  Review of Systems - Review of all systems is negative except as noted above in the HPI. Vital Signs  Visit Vitals  /87 (BP 1 Location: Left arm, BP Patient Position: Sitting)   Pulse (!) 105   Temp 98.4 °F (36.9 °C) (Oral)   Resp 16   Ht 4' 11\" (1.499 m)   Wt 145 lb (65.8 kg)   SpO2 100%   BMI 29.29 kg/m²     Physical Exam  Physical Examination: General appearance - alert, well appearing, and in no distress, oriented to person, place, and time and overweight  Mental status - alert, oriented to person, place, and time  Neck - supple, no significant adenopathy  Lymphatics - no palpable lymphadenopathy, no hepatosplenomegaly  Chest - clear to auscultation, no wheezes, rales or rhonchi, symmetric air entry  Heart - normal rate and regular rhythm, S1 and S2 normal  Neurological - motor and sensory grossly normal bilaterally  Musculoskeletal - no muscular tenderness noted  Extremities - no pedal edema noted    Results  Results for orders placed or performed in visit on 01/15/20   CBC WITH AUTOMATED DIFF   Result Value Ref Range    WBC 7.6 3.4 - 10.8 x10E3/uL    RBC 4.58 3.77 - 5.28 x10E6/uL    HGB 12.1 11.1 - 15.9 g/dL    HCT 38.5 34.0 - 46.6 %    MCV 84 79 - 97 fL    MCH 26.4 (L) 26.6 - 33.0 pg    MCHC 31.4 (L) 31.5 - 35.7 g/dL    RDW 14.0 11.7 - 15.4 %    PLATELET 050 820 - 245 x10E3/uL    NEUTROPHILS 65 Not Estab. %    Lymphocytes 29 Not Estab. %    MONOCYTES 5 Not Estab. %    EOSINOPHILS 1 Not Estab. %    BASOPHILS 0 Not Estab. %    ABS. NEUTROPHILS 4.9 1.4 - 7.0 x10E3/uL    Abs Lymphocytes 2.2 0.7 - 3.1 x10E3/uL    ABS. MONOCYTES 0.4 0.1 - 0.9 x10E3/uL    ABS. EOSINOPHILS 0.1 0.0 - 0.4 x10E3/uL    ABS. BASOPHILS 0.0 0.0 - 0.2 x10E3/uL    IMMATURE GRANULOCYTES 0 Not Estab. %    ABS. IMM.  GRANS. 0.0 0.0 - 0.1 R02C0/FV   METABOLIC PANEL, BASIC   Result Value Ref Range    Glucose CANCELED mg/dL    BUN CANCELED     Creatinine CANCELED     Sodium CANCELED     Potassium CANCELED     Chloride CANCELED     CO2 CANCELED Calcium CANCELED    HEMOGLOBIN A1C WITH EAG   Result Value Ref Range    Hemoglobin A1c 6.6 (H) 4.8 - 5.6 %    Estimated average glucose 143 mg/dL   TSH 3RD GENERATION   Result Value Ref Range    TSH 3.600 0.450 - 4.500 uIU/mL   AMB POC URINALYSIS DIP STICK AUTO W/O MICRO   Result Value Ref Range    Color (UA POC) Yellow     Clarity (UA POC) Clear     Glucose (UA POC) Negative Negative    Bilirubin (UA POC) Negative Negative    Ketones (UA POC) Negative Negative    Specific gravity (UA POC) 1.015 1.001 - 1.035    Blood (UA POC) Negative Negative    pH (UA POC) 6.0 4.6 - 8.0    Protein (UA POC) Negative Negative    Urobilinogen (UA POC) 0.2 mg/dL 0.2 - 1    Nitrites (UA POC) Negative Negative    Leukocyte esterase (UA POC) Trace Negative       ASSESSMENT and PLAN    ICD-10-CM ICD-9-CM    1. Type 2 diabetes mellitus with hyperglycemia, without long-term current use of insulin (HCC) E11.65 250.00 LIPID PANEL     654.55 METABOLIC PANEL, COMPREHENSIVE      AMB POC URINE, MICROALBUMIN, SEMIQUANTITATIVE      REFERRAL TO OPHTHALMOLOGY      Blood-Glucose Meter monitoring kit      lancets misc      glucose blood VI test strips (ASCENSIA AUTODISC VI, ONE TOUCH ULTRA TEST VI) strip      REFERRAL TO NUTRITION   2. Essential hypertension I10 401.9    3.  Migraine without status migrainosus, not intractable, unspecified migraine type G43.909 346.90      current treatment plan is effective, no change in therapy  lab results and schedule of future lab studies reviewed with patient  reviewed diet, exercise and weight control  cardiovascular risk and specific lipid/LDL goals reviewed  reviewed medications and side effects in detail  specific diabetic recommendations: low cholesterol diet, weight control and daily exercise discussed, home glucose monitoring emphasized, all medications, side effects and compliance discussed carefully, annual eye examinations at Ophthalmology discussed, glycohemoglobin and other lab monitoring discussed and long term diabetic complications discussed      I have discussed the diagnosis with the patient and the intended plan of care as seen in the above orders. The patient has received an after-visit summary and questions were answered concerning future plans. I have discussed medication, side effects, and warnings with the patient in detail. The patient verbalized understanding and is in agreement with the plan of care. The patient will contact the office with any additional concerns. Bard Nageotte, MD    PLEASE NOTE:   This document has been produced using voice recognition software.  Unrecognized errors in transcription may be present

## 2020-01-24 LAB
ALBUMIN SERPL-MCNC: 4.3 G/DL (ref 3.8–4.8)
ALBUMIN/GLOB SERPL: 1.5 {RATIO} (ref 1.2–2.2)
ALP SERPL-CCNC: 104 IU/L (ref 39–117)
ALT SERPL-CCNC: 21 IU/L (ref 0–32)
AST SERPL-CCNC: 25 IU/L (ref 0–40)
BILIRUB SERPL-MCNC: 0.3 MG/DL (ref 0–1.2)
BUN SERPL-MCNC: 11 MG/DL (ref 6–20)
BUN/CREAT SERPL: 12 (ref 9–23)
CALCIUM SERPL-MCNC: 9.4 MG/DL (ref 8.7–10.2)
CHLORIDE SERPL-SCNC: 102 MMOL/L (ref 96–106)
CHOLEST SERPL-MCNC: 177 MG/DL (ref 100–199)
CO2 SERPL-SCNC: 19 MMOL/L (ref 20–29)
CREAT SERPL-MCNC: 0.91 MG/DL (ref 0.57–1)
GLOBULIN SER CALC-MCNC: 2.9 G/DL (ref 1.5–4.5)
GLUCOSE SERPL-MCNC: 149 MG/DL (ref 65–99)
HDLC SERPL-MCNC: 32 MG/DL
INTERPRETATION, 910389: NORMAL
LDLC SERPL CALC-MCNC: 123 MG/DL (ref 0–99)
POTASSIUM SERPL-SCNC: 3.6 MMOL/L (ref 3.5–5.2)
PROT SERPL-MCNC: 7.2 G/DL (ref 6–8.5)
SODIUM SERPL-SCNC: 144 MMOL/L (ref 134–144)
TRIGL SERPL-MCNC: 109 MG/DL (ref 0–149)
VLDLC SERPL CALC-MCNC: 22 MG/DL (ref 5–40)

## 2020-01-25 ENCOUNTER — HOSPITAL ENCOUNTER (OUTPATIENT)
Dept: CT IMAGING | Age: 35
Discharge: HOME | End: 2020-01-25
Attending: FAMILY MEDICINE
Payer: MEDICARE

## 2020-01-25 DIAGNOSIS — R51.9 NONINTRACTABLE EPISODIC HEADACHE, UNSPECIFIED HEADACHE TYPE: ICD-10-CM

## 2020-01-25 PROCEDURE — 70450 CT HEAD/BRAIN W/O DYE: CPT

## 2020-01-30 ENCOUNTER — TELEPHONE (OUTPATIENT)
Dept: FAMILY MEDICINE CLINIC | Age: 35
End: 2020-01-30

## 2020-01-31 DIAGNOSIS — E11.65 TYPE 2 DIABETES MELLITUS WITH HYPERGLYCEMIA, WITHOUT LONG-TERM CURRENT USE OF INSULIN (HCC): ICD-10-CM

## 2020-01-31 DIAGNOSIS — E87.6 HYPOKALEMIA: ICD-10-CM

## 2020-01-31 DIAGNOSIS — R61 EXCESSIVE SWEATING: ICD-10-CM

## 2020-01-31 DIAGNOSIS — I10 ESSENTIAL HYPERTENSION: ICD-10-CM

## 2020-01-31 DIAGNOSIS — R73.9 HYPERGLYCEMIA: ICD-10-CM

## 2020-02-06 DIAGNOSIS — E87.6 HYPOKALEMIA: ICD-10-CM

## 2020-02-06 NOTE — PROGRESS NOTES
After obtaining identifiers-patient was advised of CT Scan results.   Patient verbalized understanding

## 2020-02-07 ENCOUNTER — OFFICE VISIT (OUTPATIENT)
Dept: ORTHOPEDIC SURGERY | Age: 35
End: 2020-02-07

## 2020-02-07 ENCOUNTER — DOCUMENTATION ONLY (OUTPATIENT)
Dept: ORTHOPEDIC SURGERY | Age: 35
End: 2020-02-07

## 2020-02-07 VITALS
WEIGHT: 144 LBS | BODY MASS INDEX: 29.03 KG/M2 | RESPIRATION RATE: 16 BRPM | OXYGEN SATURATION: 99 % | SYSTOLIC BLOOD PRESSURE: 127 MMHG | HEIGHT: 59 IN | DIASTOLIC BLOOD PRESSURE: 85 MMHG | HEART RATE: 101 BPM

## 2020-02-07 DIAGNOSIS — M54.12 CERVICAL RADICULOPATHY: ICD-10-CM

## 2020-02-07 DIAGNOSIS — G56.01 CARPAL TUNNEL SYNDROME OF RIGHT WRIST: ICD-10-CM

## 2020-02-07 DIAGNOSIS — G56.02 CARPAL TUNNEL SYNDROME OF LEFT WRIST: ICD-10-CM

## 2020-02-07 DIAGNOSIS — M79.7 FIBROMYALGIA: Primary | ICD-10-CM

## 2020-02-07 NOTE — PROGRESS NOTES
EMG BUE is scheduled with Dr. Mario Morales, [address] [place] [address], [address], [phone] on 03/17/20, arrive 9:50AM, test 10:00AM.

## 2020-02-07 NOTE — PATIENT INSTRUCTIONS
Carpal Tunnel Syndrome: Care Instructions  Your Care Instructions    Carpal tunnel syndrome is a nerve problem. It can cause tingling, numbness, weakness, or pain in the fingers, thumb, and hand. The median nerve and several tough tissues called tendons run through a space in the wrist called the carpal tunnel. The repeated hand motions used in work and some hobbies and sports can put pressure on the nerve. Pregnancy and several conditions, including diabetes, arthritis, and an underactive thyroid, also can cause carpal tunnel syndrome. You may be able to limit an activity or do it differently to reduce your symptoms. You also can take other steps to feel better. If your symptoms are mild, 1 to 2 weeks of home treatment are likely to ease your pain. Surgery is needed only if other treatments do not work. Follow-up care is a key part of your treatment and safety. Be sure to make and go to all appointments, and call your doctor if you are having problems. It's also a good idea to know your test results and keep a list of the medicines you take. How can you care for yourself at home? · If possible, stop or reduce the activity that causes your symptoms. If you cannot stop the activity, take frequent breaks to rest and stretch or change hand positions to do a task. Try switching hands, such as when using a computer mouse. · Try to avoid bending or twisting your wrists. · Ask your doctor if you can take an over-the-counter pain medicine, such as acetaminophen (Tylenol), ibuprofen (Advil, Motrin), or naproxen (Aleve). Be safe with medicines. Read and follow all instructions on the label. · If your doctor prescribes corticosteroid medicine to help reduce pain and swelling, take it exactly as prescribed. Call your doctor if you think you are having a problem with your medicine. · Put ice or a cold pack on your wrist for 10 to 20 minutes at a time to ease pain.  Put a thin cloth between the ice and your skin.  · If your doctor or your physical or occupational therapist tells you to wear a wrist splint, wear it as directed to keep your wrist in a neutral position. This also eases pressure on your median nerve. · Ask your doctor whether you should have physical or occupational therapy to learn how to do tasks differently. · Try a yoga class to stretch your muscles and build strength in your hands and wrists. Yoga has been shown to ease carpal tunnel symptoms. To prevent carpal tunnel  · When working at a computer, keep your hands and wrists in line with your forearms. Hold your elbows close to your sides. Take a break every 10 to 15 minutes. · Try these exercises:  ? Warm up: Rotate your wrist up, down, and from side to side. Repeat this 4 times. Stretch your fingers far apart, relax them, then stretch them again. Repeat 4 times. Stretch your thumb by pulling it back gently, holding it, and then releasing it. Repeat 4 times. ? Prayer stretch: Start with your palms together in front of your chest just below your chin. Slowly lower your hands toward your waistline while keeping your hands close to your stomach and your palms together until you feel a mild to moderate stretch under your forearms. Hold for 10 to 20 seconds. Repeat 4 times. ? Wrist flexor stretch: Hold your arm in front of you with your palm up. Bend your wrist, pointing your hand toward the floor. With your other hand, gently bend your wrist further until you feel a mild to moderate stretch in your forearm. Hold for 10 to 20 seconds. Repeat 4 times. ? Wrist extensor stretch: Repeat the steps for the wrist flexor stretch, but begin with your extended hand palm down. · Squeeze a rubber exercise ball several times a day to keep your hands and fingers strong. · Avoid holding objects (such as a book) in one position for a long time. When possible, use your whole hand to grasp an object.  Using just the thumb and index finger can put stress on the wrist.  · Do not smoke. It can make this condition worse by reducing blood flow to the median nerve. If you need help quitting, talk to your doctor about stop-smoking programs and medicines. These can increase your chances of quitting for good. When should you call for help? Watch closely for changes in your health, and be sure to contact your doctor if:    · Your pain or other problems do not get better with home care.     · You want more information about physical or occupational therapy.     · You have side effects of your corticosteroid medicine, such as:  ? Weight gain. ? Mood changes. ? Trouble sleeping. ? Bruising easily.     · You have any other problems with your medicine. Where can you learn more? Go to http://yuri-danilo.info/. Enter R432 in the search box to learn more about \"Carpal Tunnel Syndrome: Care Instructions. \"  Current as of: June 26, 2019  Content Version: 12.2  © 7898-4512 [employer]. Care instructions adapted under license by [employer] (which disclaims liability or warranty for this information). If you have questions about a medical condition or this instruction, always ask your healthcare professional. [place] [address] any warranty or liability for your use of this information.

## 2020-02-09 RX ORDER — POTASSIUM CHLORIDE 20 MEQ/1
TABLET, EXTENDED RELEASE ORAL
Qty: 30 TAB | Refills: 1 | Status: SHIPPED | OUTPATIENT
Start: 2020-02-09 | End: 2020-02-26

## 2020-02-26 ENCOUNTER — OFFICE VISIT (OUTPATIENT)
Dept: FAMILY MEDICINE CLINIC | Age: 35
End: 2020-02-26

## 2020-02-26 VITALS
HEART RATE: 89 BPM | RESPIRATION RATE: 18 BRPM | TEMPERATURE: 97.7 F | BODY MASS INDEX: 29.43 KG/M2 | WEIGHT: 146 LBS | SYSTOLIC BLOOD PRESSURE: 143 MMHG | HEIGHT: 59 IN | OXYGEN SATURATION: 98 % | DIASTOLIC BLOOD PRESSURE: 81 MMHG

## 2020-02-26 DIAGNOSIS — I10 ESSENTIAL HYPERTENSION: ICD-10-CM

## 2020-02-26 DIAGNOSIS — E11.65 TYPE 2 DIABETES MELLITUS WITH HYPERGLYCEMIA, WITHOUT LONG-TERM CURRENT USE OF INSULIN (HCC): Primary | ICD-10-CM

## 2020-02-26 DIAGNOSIS — E11.9 COMPREHENSIVE DIABETIC FOOT EXAMINATION, TYPE 2 DM, ENCOUNTER FOR (HCC): ICD-10-CM

## 2020-02-26 DIAGNOSIS — E78.5 DYSLIPIDEMIA: ICD-10-CM

## 2020-02-26 RX ORDER — OLMESARTAN MEDOXOMIL 40 MG/1
40 TABLET ORAL DAILY
Qty: 30 TAB | Refills: 2 | Status: SHIPPED | OUTPATIENT
Start: 2020-02-26 | End: 2020-03-18 | Stop reason: SINTOL

## 2020-02-26 NOTE — PROGRESS NOTES
Chief Complaint   Patient presents with    Hypertension    Results    Medication Evaluation     patient states medication is causing dry mouth      1. Have you been to the ER, urgent care clinic since your last visit? Hospitalized since your last visit? No    2. Have you seen or consulted any other health care providers outside of the [address] [place] [address] since your last visit? Include any pap smears or colon screening. No     HPI  Sharri Rasmussen comes in for f/u care. DM2: Patient has a history of diabetes mellitus type 2. Her last HbA1c was 6.6. She comes in with a blood glucose log. Her numbers range between 100-140 though she does have some numbers that are slightly up at 170. She is doing lifestyle and dietary modification. She is due to talk to the dietitian tomorrow to discuss dietary strategies to help with diabetes. She declines to take any medication for now. We will plan to recheck her HbA1c in 2 months. If still elevated we will consider starting on a medication. I did a foot exam today that is stable. She is scheduled to get her eye check. HTN: Patient's blood pressure slightly elevated at 143/81. She was on HCTZ and potassium supplementation. Unable to tolerate medication. She would like to try something different. I will put her on Benicar 40 mg daily. We will recheck at next visit. Dyslipidemia: Patient had labs done. Should LDL of 143. Discussed hyperlipidemia and the advantages of taking medication to bring this down especially in a patient with diabetes. Patient would prefer to hold off for now. She is doing lifestyle and dietary modification. We will recheck at next visit. If still elevated then she will consider taking medication. She should exercise and take a diet low in polysaturated fats.       Past Medical History  Past Medical History:   Diagnosis Date    Asthma     Chronic constipation     Fibromyalgia     GERD (gastroesophageal reflux disease)     IBS (irritable bowel syndrome)     Migraines     unknown if aura    Psychiatric disorder     she denies any diagnosis despite being on antipsychotics, SSRIs, etc in the past    Scoliosis     Urinary incontinence     mixed       Surgical History  Past Surgical History:   Procedure Laterality Date    HX  SECTION  ,     HX COLONOSCOPY      HX DILATION AND CURETTAGE      HX ENDOSCOPY      HX PELVIC LAPAROSCOPY          Medications  Current Outpatient Medications   Medication Sig Dispense Refill    potassium chloride (KLOR-CON M20) 20 mEq tablet TAKE 20 MEQ (1 TABLET) TWO TIMES A DAY FOR 3 DAYS, THEN DAILY 30 Tab 1    Blood-Glucose Meter monitoring kit Check blood glucose daily 1 Kit 0    lancets misc Check blood glucose daily 100 Each 3    glucose blood VI test strips (ASCENSIA AUTODISC VI, ONE TOUCH ULTRA TEST VI) strip Check blood glucose daily 100 Strip 3    hydroCHLOROthiazide (HYDRODIURIL) 12.5 mg tablet Take 1 Tab by mouth daily. 30 Tab 2    ANORO ELLIPTA 62.5-25 mcg/actuation inhaler 1 INH DAILY - USE EVERY DAY  6    AIMOVIG AUTOINJECTOR 70 mg/mL injection AS DIRECTED - 1 INJECTION SUBQ ONCE MONTHLY  3    cetirizine (ZYRTEC) 10 mg tablet TAKE 1 TABLET EVERY DAY  0    rizatriptan (MAXALT-MLT) 10 mg disintegrating tablet PLEASE SEE ATTACHED FOR DETAILED DIRECTIONS  0    mometasone (NASONEX) 50 mcg/actuation nasal spray USE 1-2 SPRAYS INTO EACH NOSTRIL EVERY DAY AS NEEDED  0    FERRETTS IPS 40 mg/15 mL liqd 15 ML ORALLY 2 TIMES PER DAY. 2    EPINEPHrine (EPIPEN) 0.3 mg/0.3 mL injection AS DIRECTED AS NEEDED INTRAMUSCULAR 1 DAYS  5    albuterol (PROVENTIL HFA, VENTOLIN HFA, PROAIR HFA) 90 mcg/actuation inhaler Take 2 Puffs by inhalation every four (4) hours as needed for Wheezing or Shortness of Breath. 1 Inhaler 0    promethazine (PHENERGAN) 12.5 mg tablet Take  by mouth every six (6) hours as needed.       esomeprazole (NEXIUM) 40 mg capsule Take  by mouth daily.      POLYETHYLENE GLYCOL 3350 (MIRALAX PO) Take  by mouth.  CALCIUM CARBONATE/MAG HYDROX (MYLANTA PO) Take  by mouth. Allergies  Allergies   Allergen Reactions    Iodine Hives and Nausea and Vomiting    Iodinated Contrast Media Hives    Oxycodone-Acetaminophen Other (comments)     Hallucinations     Prochlorperazine Other (comments)    Propoxyphene-Acetaminophen Unknown (comments)     Allergy to propoxyphene only.   Has previously tolerated acetaminophen    Reglan [Metoclopramide] Not Reported This Time    Sulfa (Sulfonamide Antibiotics) Not Reported This Time    Wygesic Not Reported This Time       Family History  Family History   Problem Relation Age of Onset    Kidney Disease Father     Other Father         Pancreatic Disease    Cancer Father     Heart Disease Father     Heart Attack Father     Thyroid Disease Other         Grandparent       Social History  Social History     Socioeconomic History    Marital status: SINGLE     Spouse name: Not on file    Number of children: Not on file    Years of education: Not on file    Highest education level: Not on file   Occupational History    Not on file   Social Needs    Financial resource strain: Not on file    Food insecurity:     Worry: Not on file     Inability: Not on file    Transportation needs:     Medical: Not on file     Non-medical: Not on file   Tobacco Use    Smoking status: Never Smoker    Smokeless tobacco: Never Used   Substance and Sexual Activity    Alcohol use: No    Drug use: No    Sexual activity: Not on file   Lifestyle    Physical activity:     Days per week: Not on file     Minutes per session: Not on file    Stress: Not on file   Relationships    Social connections:     Talks on phone: Not on file     Gets together: Not on file     Attends Judaism service: Not on file     Active member of club or organization: Not on file     Attends meetings of clubs or organizations: Not on file Relationship status: Not on file    Intimate partner violence:     Fear of current or ex partner: Not on file     Emotionally abused: Not on file     Physically abused: Not on file     Forced sexual activity: Not on file   Other Topics Concern    Not on file   Social History Narrative    Not on file       Review of Systems  Review of Systems - Review of all systems is negative except as noted above in the HPI.     Vital Signs  Visit Vitals  /81 (BP 1 Location: Left arm, BP Patient Position: Sitting)   Pulse 89   Temp 97.7 °F (36.5 °C) (Oral)   Resp 18   Ht 4' 11\" (1.499 m)   Wt 146 lb (66.2 kg)   SpO2 98%   BMI 29.49 kg/m²         Physical Exam  Physical Examination: General appearance - oriented to person, place, and time, overweight and acyanotic, in no respiratory distress  Mental status - alert, oriented to person, place, and time, affect appropriate to mood  Lymphatics - no palpable lymphadenopathy, no hepatosplenomegaly  Chest - clear to auscultation, no wheezes, rales or rhonchi, symmetric air entry  Heart - normal rate and regular rhythm, S1 and S2 normal  Abdomen - no rebound tenderness noted  Neurological - neck supple without rigidity  Extremities - no pedal edema noted, intact peripheral pulses    Diabetic foot exam:     Left Foot:   Visual Exam: normal    Pulse DP: 2+ (normal)   Filament test: normal sensation        Right Foot:   Visual Exam: normal    Pulse DP: 2+ (normal)   Filament test: normal sensation      Results  Results for orders placed or performed in visit on 70/78/82   METABOLIC PANEL, COMPREHENSIVE   Result Value Ref Range    Glucose 149 (H) 65 - 99 mg/dL    BUN 11 6 - 20 mg/dL    Creatinine 0.91 0.57 - 1.00 mg/dL    GFR est non-AA 83 >59 mL/min/1.73    GFR est AA 95 >59 mL/min/1.73    BUN/Creatinine ratio 12 9 - 23    Sodium 144 134 - 144 mmol/L    Potassium 3.6 3.5 - 5.2 mmol/L    Chloride 102 96 - 106 mmol/L    CO2 19 (L) 20 - 29 mmol/L    Calcium 9.4 8.7 - 10.2 mg/dL Protein, total 7.2 6.0 - 8.5 g/dL    Albumin 4.3 3.8 - 4.8 g/dL    GLOBULIN, TOTAL 2.9 1.5 - 4.5 g/dL    A-G Ratio 1.5 1.2 - 2.2    Bilirubin, total 0.3 0.0 - 1.2 mg/dL    Alk. phosphatase 104 39 - 117 IU/L    AST (SGOT) 25 0 - 40 IU/L    ALT (SGPT) 21 0 - 32 IU/L   LIPID PANEL   Result Value Ref Range    Cholesterol, total 177 100 - 199 mg/dL    Triglyceride 109 0 - 149 mg/dL    HDL Cholesterol 32 (L) >39 mg/dL    VLDL, calculated 22 5 - 40 mg/dL    LDL, calculated 123 (H) 0 - 99 mg/dL   CVD REPORT   Result Value Ref Range    INTERPRETATION Note    AMB POC URINE, MICROALBUMIN, SEMIQUANTITATIVE   Result Value Ref Range    Microalbumin urine (POC) 80 MG/L    Microalbumin/creat ratio (POC) >300 <30 MG/G       ASSESSMENT and PLAN    ICD-10-CM ICD-9-CM    1. Type 2 diabetes mellitus with hyperglycemia, without long-term current use of insulin ([facility]) E11.65 250.00 HM DIABETES FOOT EXAM     790.29    2. Comprehensive diabetic foot examination, type 2 DM, encounter [facility] E11.9 250.00  DIABETES FOOT EXAM   3. Essential hypertension I10 401.9 olmesartan (BENICAR) 40 mg tablet   4. Dyslipidemia E78.5 272.4      lab results and schedule of future lab studies reviewed with patient  reviewed diet, exercise and weight control  cardiovascular risk and specific lipid/LDL goals reviewed  reviewed medications and side effects in detail  specific diabetic recommendations: low cholesterol diet, weight control and daily exercise discussed, home glucose monitoring emphasized, all medications, side effects and compliance discussed carefully, foot care discussed and Podiatry visits discussed, annual eye examinations at Ophthalmology discussed, glycohemoglobin and other lab monitoring discussed and long term diabetic complications discussed      I have discussed the diagnosis with the patient and the intended plan of care as seen in the above orders.  The patient has received an after-visit summary and questions were answered concerning future plans. I have discussed medication, side effects, and warnings with the patient in detail. The patient verbalized understanding and is in agreement with the plan of care. The patient will contact the office with any additional concerns. Sudha Faye MD    PLEASE NOTE:   This document has been produced using voice recognition software.  Unrecognized errors in transcription may be present

## 2020-02-27 ENCOUNTER — HOSPITAL ENCOUNTER (OUTPATIENT)
Dept: NUTRITION | Age: 35
Discharge: HOME | End: 2020-02-27
Payer: MEDICARE

## 2020-02-27 PROCEDURE — 97802 MEDICAL NUTRITION INDIV IN: CPT

## 2020-02-27 NOTE — PROGRESS NOTES
[address] [place] [address], [employer], [address] [place] [address] Road  Phone: [phone] Fax: [phone]   Nutrition Assessment - Medical Nutrition Therapy   Outpatient Initial Evaluation         Patient Name: Chapis Martinez : 1985   Treatment Diagnosis: Diabetes type2   Referral Source: Linda Maradiaga MD [facility]): 2020     Gender: female Age: 29 y.o. Ht: 59 in Wt:  145.8 lb  kg   BMI: 29.4 BMR   Male  BMR Female      Past Medical History:  HTN, food allergies     Pertinent Medications:   See MAR     Biochemical Data:   Lab Results   Component Value Date/Time    Hemoglobin A1c 6.6 (H) 01/15/2020 03:53 AM     Lab Results   Component Value Date/Time    Sodium 144 2020 12:52 PM    Potassium 3.6 2020 12:52 PM    Chloride 102 2020 12:52 PM    CO2 19 (L) 2020 12:52 PM    Anion gap 7 2009 11:50 AM    Glucose 149 (H) 2020 12:52 PM    BUN 11 2020 12:52 PM    Creatinine 0.91 2020 12:52 PM    BUN/Creatinine ratio 12 2020 12:52 PM    GFR est AA 95 2020 12:52 PM    GFR est non-AA 83 2020 12:52 PM    Calcium 9.4 2020 12:52 PM    Bilirubin, total 0.3 2020 12:52 PM    AST (SGOT) 25 2020 12:52 PM    Alk. phosphatase 104 2020 12:52 PM    Protein, total 7.2 2020 12:52 PM    Albumin 4.3 2020 12:52 PM    A-G Ratio 1.5 2020 12:52 PM    ALT (SGPT) 21 2020 12:52 PM     Lab Results   Component Value Date/Time    Cholesterol, total 177 2020 12:52 PM    HDL Cholesterol 32 (L) 2020 12:52 PM    LDL, calculated 123 (H) 2020 12:52 PM    VLDL, calculated 22 2020 12:52 PM    Triglyceride 109 2020 12:52 PM     Lab Results   Component Value Date/Time    ALT (SGPT) 21 2020 12:52 PM    AST (SGOT) 25 2020 12:52 PM    Alk.  phosphatase 104 2020 12:52 PM    Bilirubin, total 0.3 2020 12:52 PM     Lab Results   Component Value Date/Time    Creatinine 0.91 01/23/2020 12:52 PM     Lab Results   Component Value Date/Time    BUN 11 01/23/2020 12:52 PM     Lab Results   Component Value Date/Time    Microalbumin urine (POC) 80 01/23/2020 03:00 PM        Assessment:    Pt is here seeking help and guidance to make dietary lifestyle changes to help reduce her blood sugars, and promote weight loss. She is a single mom of 2 boys (4 and 15 yo). She makes most meal at home, but does eat out for lunch when running errands. She is not exercising currently (stretching 2 times per week). SHe is currently not on and medication to help with her blood sugars. She admits she knows what is causing the majority of her problems. She consumes 40-60 oz of Mt. Dew each day, she makes poor choices when she eats out for lunch, and doesn't like breakfast foods (though she does eat something in the mornings). She is afraid to give up the [place]. Dew as she suffers from Migraine headaches and the caffeine helps control these. Discussed how to wean off soda and can replace with green tea as well as reduce her dependence on caffeine. Pt is also interested in getting a gym membership and has friends and family she can go with to stay motivated. She is highly motivated to make changes and get her herself healthy. Pt expressed comprehension, high motivation, and compliance is expected. Food & Nutrition: N/A       Estimate Needs   Calories:  6629-4185 Protein: 105-123 Carbs:  Fat: 47-54   Kcal/day  g/day  g/day  g/day        percent: 30  35  30               Nutrition Diagnosis Excessive Carbohydrate intake related to food -and nutrition- knowledge deficit and food and nutrition compliance limitations as evidenced by Hyperglycemia (fasting BG > 126 mg/dL), and Hemoglobin A1c > 6%, and pt's food recall reveals high carbohydrate intake at meals and snacks.      Nutrition Intervention &  Education: Educated pt on the pathophysiology of Type II Diabetes, insulin resistance and the rationale for dietary modifications and increased activity. Educated pt on lean proteins, healthy fats, non-starchy vegetables, and complex carbohydrate food sources. Discussed limiting carbohydrates, label reading, meal timing, and appropriate serving sizes. Encouraged pt to avoid sugary beverages. Reviewed meal builder tool, calorie and macro breakdown as well as exercise parameters. 1.Meal Plan: Assures nutritional adequacy  Structured so as to consistently promote energy deficit  Sufficiently satisfying so that the patient can maintain the meal plan and make it fit into his or her lifestyle; .  2. Physical activity of moderate intensity for 30 to 60 minutes daily:  Planned activities (walking, swimming, cycling, jogging, etc)  Lifestyle changes (change environment to use more energy by walking, taking stairs, reduce sedentary time by watching less television, etc.) Many overweight or obese individuals will need to begin low-intensity activities for only 5 to 10 minutes per session; however, goals should include increasing frequency, intensity, and time. 3. Behavior change: Identify and interrupt cues to inappropriate eating behaviors (environmental and emotional). Build a network of support for the patient. Integrate meal plan and physical activity into lifestyle so as to maintain weight loss.    Handouts Provided: [x]  Carbohydrates  [x]  Protein  [x]  Non-starchy Vegatbles  [x]  Food Label  [x]  Meal and Snack Ideas  [x]  Food Journals [x]  Diabetes  []  Cholesterol  []  Sodium  [x]  Gen Nutr Guidelines  []  SBGM Guidelines  []  Others:   Information Reviewed with: Patient   Readiness to Change Stage: []  Pre-contemplative    []  Contemplative  []  Preparation               [x]  Action                  []  Maintenance   Potential Barriers to Learning: []  Decline in memory    []  Language barrier   []  Other:  []  Emotional                  []  Limited mobility  []  Lack of motivation [] Vision, hearing or cognitive impairment   Expected Compliance: Good      Nutritional Goal - To promote lifestyle changes to result in:    [x]  Weight loss  [x]  Improved diabetic control  []  Decreased cholesterol levels  []  Decreased blood pressure  []  Weight maintenance []  Preventing any interactions associated with food allergies  []  Adequate weight gain toward goal weight  []  Other:        Patient Goals:   1. Follow consistent and appropriate meal timing; follow a structured timeline, space meals by 4-5 hours with snacks between if going longer than 5 hours  2. Focus on good sources of protein; Never eat carbs alone, always pair them with protein,   3. Have a plan; use the meal builder tool + diet plan for good portion contol and balance, plan out meals/snacks ahead of time, and create shopping lists  4. Exercise for 150 min each week divided as schedule permits. 5. Reduce Mt. Dew intake to 2-3 (12 oz) cans per day for one week; then reduce to 8 oz cans; then start to replace [address].  Dew with unsweetened tea     Dietitian Signature: Cora Naylor MA, RD Date: 2/27/2020   Follow-up: 10 April @ 1000 Time: 10:06 AM

## 2020-03-17 ENCOUNTER — TELEPHONE (OUTPATIENT)
Dept: FAMILY MEDICINE CLINIC | Age: 35
End: 2020-03-17

## 2020-03-17 NOTE — TELEPHONE ENCOUNTER
Patient states she stopped taking her BP medication because she was getting really bad headaches. She is requesting alternative medication with lower side effects and low dose.

## 2020-03-18 RX ORDER — AMLODIPINE BESYLATE 5 MG/1
TABLET ORAL
Qty: 90 TAB | Refills: 0 | Status: SHIPPED | OUTPATIENT
Start: 2020-03-18 | End: 2020-03-19 | Stop reason: ALTCHOICE

## 2020-03-18 RX ORDER — AMLODIPINE BESYLATE 5 MG/1
5 TABLET ORAL DAILY
Qty: 30 TAB | Refills: 1 | Status: SHIPPED | OUTPATIENT
Start: 2020-03-18 | End: 2020-03-18

## 2020-03-19 ENCOUNTER — TELEPHONE (OUTPATIENT)
Dept: FAMILY MEDICINE CLINIC | Age: 35
End: 2020-03-19

## 2020-03-19 RX ORDER — OLMESARTAN MEDOXOMIL 40 MG/1
40 TABLET ORAL DAILY
Qty: 30 TAB | Refills: 1 | Status: SHIPPED | OUTPATIENT
Start: 2020-03-19 | End: 2020-03-22

## 2020-03-19 NOTE — TELEPHONE ENCOUNTER
Patient states she has already tried Benicar at this time. Informed patient I will get back with her on Monday provider has left for today

## 2020-03-19 NOTE — TELEPHONE ENCOUNTER
Patient called to inform Dr Talat Vazquez that the bp medication (amlodipine) does not work for her. Want to know if Dr Talat Vazquez wanted her to take it again or if he can put in a alternate medication.  Please contact patient when available to discuss

## 2020-03-24 ENCOUNTER — TELEPHONE (OUTPATIENT)
Dept: FAMILY MEDICINE CLINIC | Age: 35
End: 2020-03-24

## 2020-03-24 RX ORDER — ATENOLOL 25 MG/1
25 TABLET ORAL DAILY
Qty: 30 TAB | Refills: 1 | Status: SHIPPED | OUTPATIENT
Start: 2020-03-24 | End: 2020-04-16

## 2020-03-24 NOTE — TELEPHONE ENCOUNTER
Patient stated she was waiting to be contacted about the [facility] medication that Dr Carlos Avendaño sent to the pharmacy. Explained that it was the same medication she tried and it doesn't work for her.  Please be advised and contact when available

## 2020-03-25 ENCOUNTER — OFFICE VISIT (OUTPATIENT)
Dept: CARDIOLOGY CLINIC | Age: 35
End: 2020-03-25

## 2020-03-25 VITALS
HEIGHT: 59 IN | DIASTOLIC BLOOD PRESSURE: 78 MMHG | WEIGHT: 146 LBS | TEMPERATURE: 97 F | BODY MASS INDEX: 29.43 KG/M2 | SYSTOLIC BLOOD PRESSURE: 120 MMHG | HEART RATE: 91 BPM

## 2020-03-25 DIAGNOSIS — R07.9 CHEST PAIN, UNSPECIFIED TYPE: Primary | ICD-10-CM

## 2020-03-25 NOTE — PROGRESS NOTES
HISTORY OF PRESENT ILLNESS  Sharri Murillo Asp is a 28 y.o. female. Patient is here for follow up of diagnostic tests. Results will be discussed. Chest Pain (Angina)    The history is provided by the patient. The current episode started more than 1 week ago. The problem has not changed since onset. The problem occurs rarely. The pain is associated with rest and exertion. The pain is present in the lateral region and substernal region. The quality of the pain is described as pressure-like and sharp. The pain does not radiate. Pertinent negatives include no dizziness, no nausea, no palpitations and no weakness. Review of Systems   Constitutional: Negative for chills. HENT: Negative for nosebleeds. Eyes: Negative for blurred vision and double vision. Cardiovascular: Positive for chest pain. Negative for palpitations. Gastrointestinal: Negative for heartburn and nausea. Musculoskeletal: Negative for myalgias. Neurological: Negative for dizziness and weakness. Endo/Heme/Allergies: Does not bruise/bleed easily.      Family History   Problem Relation Age of Onset    Kidney Disease Father     Other Father         Pancreatic Disease    Cancer Father     Heart Disease Father     Heart Attack Father     Thyroid Disease Other         Grandparent       Past Medical History:   Diagnosis Date    Asthma     Chronic constipation     Fibromyalgia     GERD (gastroesophageal reflux disease)     IBS (irritable bowel syndrome)     Migraines     unknown if aura    Psychiatric disorder     she denies any diagnosis despite being on antipsychotics, SSRIs, etc in the past    Scoliosis     Urinary incontinence     mixed       Past Surgical History:   Procedure Laterality Date    HX  SECTION  ,     HX COLONOSCOPY      HX DILATION AND CURETTAGE      HX ENDOSCOPY      HX PELVIC LAPAROSCOPY         Social History     Tobacco Use    Smoking status: Never Smoker    Smokeless tobacco: Never Used   Substance Use Topics    Alcohol use: No       Allergies   Allergen Reactions    Iodine Hives and Nausea and Vomiting    Iodinated Contrast Media Hives    Oxycodone-Acetaminophen Other (comments)     Hallucinations     Prochlorperazine Other (comments)    Propoxyphene-Acetaminophen Unknown (comments)     Allergy to propoxyphene only. Has previously tolerated acetaminophen    Reglan [Metoclopramide] Not Reported This Time    Sulfa (Sulfonamide Antibiotics) Not Reported This Time    Wygesic Not Reported This Time       Prior to Admission medications    Medication Sig Start Date End Date Taking? Authorizing Provider   atenoloL (TENORMIN) 25 mg tablet Take 1 Tab by mouth daily.  3/24/20  Yes João Chacko MD   Blood-Glucose Meter monitoring kit Check blood glucose daily 1/23/20  Yes Chelsy Moses MD   lancets misc Check blood glucose daily 1/23/20  Yes Chelsy Moses MD   glucose blood VI test strips (ASCENSIA AUTODISC VI, ONE TOUCH ULTRA TEST VI) strip Check blood glucose daily 1/23/20  Yes Chelsy Amanda MD   ANORO ELLIPTA 62.5-25 mcg/actuation inhaler 1 INH DAILY - USE EVERY DAY 7/24/19  Yes Provider, Historical   AIMOVIG AUTOINJECTOR 70 mg/mL injection AS DIRECTED - 1 INJECTION SUBQ ONCE MONTHLY 7/30/19  Yes Provider, Historical   cetirizine (ZYRTEC) 10 mg tablet TAKE 1 TABLET EVERY DAY 12/18/18  Yes Provider, Historical   rizatriptan (MAXALT-MLT) 10 mg disintegrating tablet PLEASE SEE ATTACHED FOR DETAILED DIRECTIONS 2/21/19  Yes Provider, Historical   mometasone (NASONEX) 50 mcg/actuation nasal spray USE 1-2 SPRAYS INTO EACH NOSTRIL EVERY DAY AS NEEDED 2/20/19  Yes Provider, Historical   FERRETTS IPS 40 mg/15 mL liqd 15 ML ORALLY 2 TIMES PER DAY. 2/27/19  Yes Provider, Historical   EPINEPHrine (EPIPEN) 0.3 mg/0.3 mL injection AS DIRECTED AS NEEDED INTRAMUSCULAR 1 DAYS 2/20/19  Yes Provider, Historical   albuterol (PROVENTIL HFA, VENTOLIN HFA, PROAIR HFA) 90 mcg/actuation inhaler Take 2 Puffs by inhalation every four (4) hours as needed for Wheezing or Shortness of Breath. 12/6/18  Yes Shanon PETERSON NP   promethazine (PHENERGAN) 12.5 mg tablet Take  by mouth every six (6) hours as needed. Yes Provider, Historical   esomeprazole (NEXIUM) 40 mg capsule Take  by mouth daily. Yes Provider, Historical   POLYETHYLENE GLYCOL 3350 (MIRALAX PO) Take  by mouth. Yes Provider, Historical   CALCIUM CARBONATE/MAG HYDROX (MYLANTA PO) Take  by mouth. Yes Provider, Historical   olmesartan (BENICAR) 40 mg tablet TAKE 1 TABLET BY MOUTH EVERY DAY 3/22/20   Chelsy Fisher MD         Visit Vitals  /78   Pulse 91   Temp 97 °F (36.1 °C)   Ht 4' 11\" (1.499 m)   Wt 66.2 kg (146 lb)   BMI 29.49 kg/m²         Physical Exam   Constitutional: She is oriented to person, place, and time. She appears well-developed and well-nourished. HENT:   Head: Normocephalic and atraumatic. Eyes: Conjunctivae are normal.   Neck: Neck supple. No JVD present. No tracheal deviation present. No thyromegaly present. Cardiovascular: Normal rate and regular rhythm. PMI is not displaced. Exam reveals no gallop, no S3 and no decreased pulses. No murmur heard. Pulmonary/Chest: No respiratory distress. She has no wheezes. She has no rales. She exhibits no tenderness. Abdominal: Soft. There is no abdominal tenderness. Musculoskeletal:         General: No edema. Neurological: She is alert and oriented to person, place, and time. Skin: Skin is warm. Psychiatric: She has a normal mood and affect. ECHO CARDIOGRAM [id number]/28/2015  [facility]  Component Name Value Ref Range   EF Echo 60     Result Impression   :   NORMAL LEFT VENTRICULAR CAVITY SIZE AND SYSTOLIC FUNCTION WITH AN EJECTION FRACTION OF  60  %. NORMAL DIASTOLIC FUNCTION. NORMAL RIGHT HEART FUNCTION AND SIZE. NO HEMODYNAMICALLY SIGNIFICANT VALVULAR PATHOLOGY. NORMAL PULMONARY ARTERY PRESSURE OF 15 MM/HG.    NO PREVIOUS REPORT FOR COMPARISON. I have personally reviewed patient's records available from hospital and other providers and incorporated findings in patient care. 12/2018- ER notes, labs, EKG, chest x-ray and prior records  Ms. Oswald Camargo has a reminder for a \"due or due soon\" health maintenance. I have asked that she contact her primary care provider for follow-up on this health maintenance. Interpretation Summary 1/2019       · Normal cavity size, wall thickness, systolic function (ejection fraction normal) and diastolic function. The estimated ejection fraction is 56 - 60%. No regional wall motion abnormality noted. · Normal right ventricular size and function. · No hemodynamically significant valvular pathology. Interpretation Summary 1/2019    · Baseline ECG: Normal sinus rhythm. · Low risk Duke treadmill score. · Negative stress electrocardiogram.        03/10/20   ECHO STRESS 03/11/2020 3/12/2020    Narrative · Baseline ECG: Normal sinus rhythm, non-specific ST-T wave abnormalities. · Moderate risk Duke treadmill score. · Negative stress test.  · Normal stress echocardiogram. Low risk study. Signed by: Chris Ervin MD       Assessment         ICD-10-CM ICD-9-CM    1. Chest pain, unspecified type R07.9 786.50        There are no discontinued medications. No orders of the defined types were placed in this encounter. Follow-up and Dispositions    · Return in about 6 months (around 9/25/2020). Patient is 80-year-old female with history of hypertension seen for episodes of sharp chest pain with some tightness. No radiation. Stress echo report reviewed with patient. No ischemia. Follow-up with PCP.

## 2020-03-25 NOTE — PROGRESS NOTES
1. Have you been to the ER, urgent care clinic since your last visit? Hospitalized since your last visit?     no    2. Have you seen or consulted any other health care providers outside of the [address] [place] [address] since your last visit? Include any pap smears or colon screening. No     3. Since your last visit, have you had any of the following symptoms?  no

## 2020-03-30 ENCOUNTER — TELEPHONE (OUTPATIENT)
Dept: ORTHOPEDIC SURGERY | Age: 35
End: 2020-03-30

## 2020-03-30 ENCOUNTER — VIRTUAL VISIT (OUTPATIENT)
Dept: ORTHOPEDIC SURGERY | Age: 35
End: 2020-03-30

## 2020-03-30 DIAGNOSIS — M79.7 FIBROMYALGIA: Primary | ICD-10-CM

## 2020-03-30 DIAGNOSIS — M54.2 NECK PAIN: ICD-10-CM

## 2020-03-30 DIAGNOSIS — G56.03 BILATERAL CARPAL TUNNEL SYNDROME: ICD-10-CM
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
## 2020-03-30 NOTE — TELEPHONE ENCOUNTER
Per pt Rhemalogy told pt they do not treat for Fibromyalgia. Pt requesting a different rheumatologist that treat this condition.     Pt V#[phone]

## 2020-04-08 NOTE — TELEPHONE ENCOUNTER
Patient called again and is requesting to speak with Kelvin Sweeney. Patient said it was in regards to the EMG and the specialist she was referred to. Patient said she called a week ago and no one has called her back. Patient tel. [phone]. Note: patient aware she needs to Reschedule the in office appointment with Kelvin Sweeney on 5/1/20.

## 2020-04-09 NOTE — TELEPHONE ENCOUNTER
Patient was asking about rheumatology referral I let her know we are waiting to find another doctor who will treat for her condition and also accepts her insurance, patient understands.

## 2020-04-10 ENCOUNTER — APPOINTMENT (OUTPATIENT)
Dept: NUTRITION | Age: 35
End: 2020-04-10

## 2020-04-16 RX ORDER — ATENOLOL 25 MG/1
TABLET ORAL
Qty: 30 TAB | Refills: 1 | Status: SHIPPED | OUTPATIENT
Start: 2020-04-16 | End: 2020-05-11

## 2020-05-01 ENCOUNTER — VIRTUAL VISIT (OUTPATIENT)
Dept: ORTHOPEDIC SURGERY | Age: 35
End: 2020-05-01

## 2020-05-01 DIAGNOSIS — M79.7 FIBROMYALGIA: ICD-10-CM

## 2020-05-01 DIAGNOSIS — G56.02 CARPAL TUNNEL SYNDROME OF LEFT WRIST: ICD-10-CM

## 2020-05-01 DIAGNOSIS — G56.01 CARPAL TUNNEL SYNDROME OF RIGHT WRIST: Primary | ICD-10-CM

## 2020-05-01 DIAGNOSIS — M54.12 CERVICAL RADICULOPATHY: ICD-10-CM

## 2020-05-01 NOTE — PROGRESS NOTES
Since your last office visit have you had any    1. New medical diagnoses No  2. Changes in your medications  No  3. Surgical procedures No  4. Changes in your Allergies to medication No  5. What is your pain level today 8/10      Sharri Simpson is a 28 y.o. female who was seen by synchronous (real-time) audio-video technology on 5/1/2020. Consent: Massiel Kumar, who was seen by synchronous (real-time) audio-video technology, and/or her healthcare decision maker, is aware that this patient-initiated, Telehealth encounter on 5/1/2020 is a billable service, with coverage as determined by her insurance carrier. She is aware that she may receive a bill and has provided verbal consent to proceed: Yes. Patient seen from home via D[employer]. Me    Assessment & Plan:     Diagnoses and all orders for this visit:    1. Carpal tunnel syndrome of right wrist  -     SCHEDULE SURGERY    2. Carpal tunnel syndrome of left wrist    3. Cervical radiculopathy    4. Fibromyalgia      Follow-up and Dispositions    · Return in about 5 weeks (around 6/5/2020) for H&P, Consent. At this point we will set her up for a right carpal tunnel release as soon as we can. She would like to do this sometime in June. We will see her back 1 week prior to her scheduled surgery for an H&P and consent. I spent at least 23 minutes on this visit with this established patient. [phone]  Subjective:   Massiel Kumar is a 28 y.o. female who was seen for Hand Pain (Bilateral numbness)    HPI: Patient is seen today virtually for follow-up of her bilateral hand numbness and tingling. She was previously diagnosed with carpal tunnel syndrome that was only mild in nature however we recently got repeat EMGs because of worsening symptoms. Additionally she has a history of fibromyalgia but we have been having difficulty finding someone to treat her as most rheumatologist in the area do not treat this.   Ports the right side still to be worse than the left with numbness and tingling the goes up the arm. She does report the left side is worsening. Prior to Admission medications    Medication Sig Start Date End Date Taking? Authorizing Provider   atenoloL (TENORMIN) 25 mg tablet TAKE 1 TABLET BY MOUTH EVERY DAY 4/16/20   Chelsy Amanda MD   olmesartan (BENICAR) 40 mg tablet TAKE 1 TABLET BY MOUTH EVERY DAY 3/22/20   Chelsy Amanda MD   Blood-Glucose Meter monitoring kit Check blood glucose daily 1/23/20   Chelsy Amanda MD   lancets misc Check blood glucose daily 1/23/20   Chelsy Amanda MD   glucose blood VI test strips (ASCENSIA AUTODISC VI, ONE TOUCH ULTRA TEST VI) strip Check blood glucose daily 1/23/20   Chelsy Amanda MD   ANORO ELLIPTA 62.5-25 mcg/actuation inhaler 1 INH DAILY - USE EVERY DAY 7/24/19   Provider, Historical   AIMOVIG AUTOINJECTOR 70 mg/mL injection AS DIRECTED - 1 INJECTION SUBQ ONCE MONTHLY 7/30/19   Provider, Historical   cetirizine (ZYRTEC) 10 mg tablet TAKE 1 TABLET EVERY DAY 12/18/18   Provider, Historical   rizatriptan (MAXALT-MLT) 10 mg disintegrating tablet PLEASE SEE ATTACHED FOR DETAILED DIRECTIONS 2/21/19   Provider, Historical   mometasone (NASONEX) 50 mcg/actuation nasal spray USE 1-2 SPRAYS INTO EACH NOSTRIL EVERY DAY AS NEEDED 2/20/19   Provider, Historical   FERRETTS IPS 40 mg/15 mL liqd 15 ML ORALLY 2 TIMES PER DAY. 2/27/19   Provider, Historical   EPINEPHrine (EPIPEN) 0.3 mg/0.3 mL injection AS DIRECTED AS NEEDED INTRAMUSCULAR 1 DAYS 2/20/19   Provider, Historical   albuterol (PROVENTIL HFA, VENTOLIN HFA, PROAIR HFA) 90 mcg/actuation inhaler Take 2 Puffs by inhalation every four (4) hours as needed for Wheezing or Shortness of Breath. 12/6/18   Ella Ledezma D, NP   promethazine (PHENERGAN) 12.5 mg tablet Take  by mouth every six (6) hours as needed. Provider, Historical   esomeprazole (NEXIUM) 40 mg capsule Take  by mouth daily.     Provider, Historical   POLYETHYLENE GLYCOL 3350 (MIRALAX PO) Take  by mouth. Provider, Historical   CALCIUM CARBONATE/MAG HYDROX (MYLANTA PO) Take  by mouth. Provider, Historical     Allergies   Allergen Reactions    Iodine Hives and Nausea and Vomiting    Iodinated Contrast Media Hives    Oxycodone-Acetaminophen Other (comments)     Hallucinations     Prochlorperazine Other (comments)    Propoxyphene-Acetaminophen Unknown (comments)     Allergy to propoxyphene only. Has previously tolerated acetaminophen    Reglan [Metoclopramide] Not Reported This Time    Sulfa (Sulfonamide Antibiotics) Not Reported This Time    Wygesic Not Reported This Time       Past Medical History:   Diagnosis Date    Asthma     Chronic constipation     Fibromyalgia     GERD (gastroesophageal reflux disease)     IBS (irritable bowel syndrome)     Migraines     unknown if aura    Psychiatric disorder     she denies any diagnosis despite being on antipsychotics, SSRIs, etc in the past    Scoliosis     Urinary incontinence     mixed     Past Surgical History:   Procedure Laterality Date    HX  SECTION  ,     HX COLONOSCOPY      HX DILATION AND CURETTAGE      HX ENDOSCOPY      HX PELVIC LAPAROSCOPY       Family History   Problem Relation Age of Onset    Kidney Disease Father     Other Father         Pancreatic Disease    Cancer Father     Heart Disease Father     Heart Attack Father     Thyroid Disease Other         Grandparent     Social History     Tobacco Use    Smoking status: Never Smoker    Smokeless tobacco: Never Used   Substance Use Topics    Alcohol use: No       Review of Systems   Constitutional: Negative. Negative for chills and fever. HENT: Negative. Eyes: Negative. Respiratory: Negative for cough, shortness of breath and wheezing. Cardiovascular: Negative. Negative for chest pain and palpitations. Gastrointestinal: Negative. Negative for abdominal pain, nausea and vomiting.    Genitourinary: Negative. Musculoskeletal:        Bilateral Hand Pain   Skin: Negative. Neurological: Positive for tingling, sensory change and focal weakness. Endo/Heme/Allergies: Negative. Psychiatric/Behavioral: Negative. Objective: There were no vitals taken for this visit. General: alert, cooperative, no distress   Mental  status: normal mood, behavior, speech, dress, motor activity, and thought processes, able to follow commands   HENT: NCAT   Neck: no visualized mass   Resp: no respiratory distress   Neuro: no gross deficits   Skin: no discoloration or lesions of concern on visible areas   Psychiatric: normal affect, consistent with stated mood, no evidence of hallucinations     Additional exam findings:     NEUROVASCULAR: On virtual self examination the patient appears to have bilateral positive carpal Tinel's and carpal compression tests. She has no feeling on the tips of her right thumb index and middle finger currently. There appears to be sensation intact on the tips of her left side. BUE EMG dated 3/17/2020 suggested: severe right-sided carpal tunnel syndrome with motor axonal involvement. Moderate left-sided carpal tunnel syndrome, which appears to be worse than her prior EMG of 5/2019. We discussed the expected course, resolution and complications of the diagnosis(es) in detail. Medication risks, benefits, costs, interactions, and alternatives were discussed as indicated. I advised her to contact the office if her condition worsens, changes or fails to improve as anticipated. She expressed understanding with the diagnosis(es) and plan. Milly Arango is a 28 y.o. female who was evaluated by a video visit encounter for concerns as above. Patient identification was verified prior to start of the visit. A caregiver was present when appropriate.  Due to this being a TeleHealth encounter (During AVOZH-99 public health emergency), evaluation of the following organ systems was limited: Vitals/Constitutional/EENT/Resp/CV/GI//MS/Neuro/Skin/Heme-Lymph-Imm. Pursuant to the emergency declaration under the [facility], [facility] waiver authority and the [employer] Act, this Virtual  Visit was conducted, with patient's (and/or legal guardian's) consent, to reduce the patient's risk of exposure to COVID-19 and provide necessary medical care. Services were provided through a video synchronous discussion virtually to substitute for in-person clinic visit. Patient and provider were located at their individual homes.       An Estrada DO

## 2020-05-04 ENCOUNTER — TELEPHONE (OUTPATIENT)
Dept: ORTHOPEDIC SURGERY | Age: 35
End: 2020-05-04

## 2020-05-04 NOTE — TELEPHONE ENCOUNTER
Spoke to patient Friday 5/1 about setting up surgery. 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 She would like to know how long she would have to avoid lifting before scheduling a surgery date as she will need to find someone that can help her with the children during recovery. Also, discussed rheumatology referral and advised patient to talk to PCP about helping her find someone that accepts fibromyalgia patients. Patient asked if we can refer her with a different diagnosis than fibromyalgia, in order for her to 'get in the door'. Please advise.

## 2020-05-04 NOTE — TELEPHONE ENCOUNTER
2 weeks no lifting beyond 5 lbs, no tight grasping, or pushing off    2 more weeks no lifting beyond 10 pounds    Then no restrictions    I suggest she seek fibromyalgia treatment from her PCP. Unfortunately I dont have another dx I can use.

## 2020-05-05 NOTE — TELEPHONE ENCOUNTER
Spoke with patient and offered to book a surgery date for a later time but patient declined. Patient given my phone number and states she will call me when she was ready to proceed.  Patient stated it would likely be the end of July or early August.

## 2020-05-05 NOTE — TELEPHONE ENCOUNTER
Advised of restrictions for post surgery, patient is going to hold off for a while, was transferred to [facility] to discuss future dates.

## 2020-05-11 ENCOUNTER — TELEPHONE (OUTPATIENT)
Dept: FAMILY MEDICINE CLINIC | Age: 35
End: 2020-05-11

## 2020-05-11 NOTE — TELEPHONE ENCOUNTER
Will confirm with patient to see if she is currently taking medication do not have medication on patient chart at this time

## 2020-05-19 ENCOUNTER — VIRTUAL VISIT (OUTPATIENT)
Dept: FAMILY MEDICINE CLINIC | Age: 35
End: 2020-05-19

## 2020-05-19 DIAGNOSIS — M79.604 PAIN IN BOTH LOWER EXTREMITIES: Primary | ICD-10-CM

## 2020-05-19 DIAGNOSIS — F39 MOOD DISORDER (HCC): ICD-10-CM

## 2020-05-19 DIAGNOSIS — M25.50 ARTHRALGIA, UNSPECIFIED JOINT: ICD-10-CM

## 2020-05-19 DIAGNOSIS — Z71.89 ACP (ADVANCE CARE PLANNING): ICD-10-CM

## 2020-05-19 DIAGNOSIS — E11.65 TYPE 2 DIABETES MELLITUS WITH HYPERGLYCEMIA, WITHOUT LONG-TERM CURRENT USE OF INSULIN (HCC): ICD-10-CM

## 2020-05-19 DIAGNOSIS — G43.909 MIGRAINE WITHOUT STATUS MIGRAINOSUS, NOT INTRACTABLE, UNSPECIFIED MIGRAINE TYPE: ICD-10-CM

## 2020-05-19 DIAGNOSIS — R20.0 NUMBNESS AND TINGLING: ICD-10-CM

## 2020-05-19 DIAGNOSIS — K21.9 GASTROESOPHAGEAL REFLUX DISEASE, ESOPHAGITIS PRESENCE NOT SPECIFIED: ICD-10-CM

## 2020-05-19 DIAGNOSIS — I10 ESSENTIAL (PRIMARY) HYPERTENSION: ICD-10-CM

## 2020-05-19 DIAGNOSIS — M79.605 PAIN IN BOTH LOWER EXTREMITIES: Primary | ICD-10-CM

## 2020-05-19 DIAGNOSIS — E78.5 DYSLIPIDEMIA: ICD-10-CM

## 2020-05-19 DIAGNOSIS — Z00.00 ENCOUNTER FOR MEDICARE ANNUAL WELLNESS EXAM: ICD-10-CM

## 2020-05-19 DIAGNOSIS — R20.2 NUMBNESS AND TINGLING: ICD-10-CM

## 2020-05-19 DIAGNOSIS — Z13.31 SCREENING FOR DEPRESSION: ICD-10-CM

## 2020-05-19 NOTE — PATIENT INSTRUCTIONS
Medicare Wellness Visit, Female The best way to live healthy is to have a lifestyle where you eat a well-balanced diet, exercise regularly, limit alcohol use, and quit all forms of tobacco/nicotine, if applicable. Regular preventive services are another way to keep healthy. Preventive services (vaccines, screening tests, monitoring & exams) can help personalize your care plan, which helps you manage your own care. Screening tests can find health problems at the earliest stages, when they are easiest to treat. [facility] follows the current, evidence-based guidelines published by the [facility] ([facility]) when recommending preventive services for our patients. Because we follow these guidelines, sometimes recommendations change over time as research supports it. (For example, mammograms used to be recommended annually. Even though Medicare will still pay for an annual mammogram, the newer guidelines recommend a mammogram every two years for women of average risk). Of course, you and your doctor may decide to screen more often for some diseases, based on your risk and your co-morbidities (chronic disease you are already diagnosed with). Preventive services for you include: - Medicare offers their members a free annual wellness visit, which is time for you and your primary care provider to discuss and plan for your preventive service needs. Take advantage of this benefit every year! 
-All adults over the age of 72 should receive the recommended pneumonia vaccines. Current USPSTF guidelines recommend a series of two vaccines for the best pneumonia protection.  
-All adults should have a flu vaccine yearly and a tetanus vaccine every 10 years.  
-All adults age 48 and older should receive the shingles vaccines (series of two vaccines). -All adults age 38-68 who are overweight should have a diabetes screening test once every three years. -All adults born between [birth year] and 1965 should be screened once for Hepatitis C. 
-Other screening tests and preventive services for persons with diabetes include: an eye exam to screen for diabetic retinopathy, a kidney function test, a foot exam, and stricter control over your cholesterol.  
-Cardiovascular screening for adults with routine risk involves an electrocardiogram (ECG) at intervals determined by your doctor.  
-Colorectal cancer screenings should be done for adults age 54-65 with no increased risk factors for colorectal cancer. There are a number of acceptable methods of screening for this type of cancer. Each test has its own benefits and drawbacks. Discuss with your doctor what is most appropriate for you during your annual wellness visit. The different tests include: colonoscopy (considered the best screening method), a fecal occult blood test, a fecal DNA test, and sigmoidoscopy. 
 
-A bone mass density test is recommended when a woman turns 65 to screen for osteoporosis. This test is only recommended one time, as a screening. Some providers will use this same test as a disease monitoring tool if you already have osteoporosis. -Breast cancer screenings are recommended every other year for women of normal risk, age 54-69. 
-Cervical cancer screenings for women over age 72 are only recommended with certain risk factors. Here is a list of your current Health Maintenance items (your personalized list of preventive services) with a due date: 
Health Maintenance Due Topic Date Due  
 DTaP/Tdap/Td  (1 - Tdap) 03/01/2006  Pap Test  03/01/2006 Jermain Tong Annual Well Visit  05/02/2020

## 2020-05-19 NOTE — PROGRESS NOTES
This is the Subsequent Medicare Annual Wellness Exam, performed 12 months or more after the Initial AWV or the last Subsequent AWV    Consent: Grant Yang, who was seen by synchronous (real-time) audio-video technology, and/or her healthcare decision maker, is aware that this patient-initiated, Telehealth encounter on 2020 is a billable service. While AWVs are fully covered by Medicare, any services rendered on this date that are not included in an AWV are subject to additional billing, with coverage as determined by her insurance carrier. She is aware that she may receive a bill for any such additional services and has provided verbal consent to proceed: Yes. I have reviewed the patient's medical history in detail and updated the computerized patient record.      History   Sharri Doss is seen for Medicare wellness exam.    Patient Active Problem List   Diagnosis Code    Chronic constipation K59.09    Urinary incontinence R32    Low back pain without sciatica M54.5    Acute bilateral thoracic back pain M54.6    Chronic bilateral low back pain with sciatica M54.40, G52.09    Uncomplicated asthma U27.456    SOB (shortness of breath) on exertion R06.02    Family history of chronic heart failure Z82.49    Chest pain R07.9     Past Medical History:   Diagnosis Date    Asthma     Chronic constipation     Fibromyalgia     GERD (gastroesophageal reflux disease)     IBS (irritable bowel syndrome)     Migraines     unknown if aura    Psychiatric disorder     she denies any diagnosis despite being on antipsychotics, SSRIs, etc in the past    Scoliosis     Urinary incontinence     mixed      Past Surgical History:   Procedure Laterality Date    HX  SECTION  ,     HX COLONOSCOPY      HX DILATION AND CURETTAGE      HX ENDOSCOPY      HX PELVIC LAPAROSCOPY       Current Outpatient Medications   Medication Sig Dispense Refill    atenoloL (TENORMIN) 25 mg tablet TAKE 1 TABLET BY MOUTH EVERY DAY 90 Tab 1    lancets misc Check blood glucose daily 100 Each 3    glucose blood VI test strips (ASCENSIA AUTODISC VI, ONE TOUCH ULTRA TEST VI) strip Check blood glucose daily 100 Strip 3    ANORO ELLIPTA 62.5-25 mcg/actuation inhaler 1 INH DAILY - USE EVERY DAY  6    AIMOVIG AUTOINJECTOR 70 mg/mL injection AS DIRECTED - 1 INJECTION SUBQ ONCE MONTHLY  3    cetirizine (ZYRTEC) 10 mg tablet TAKE 1 TABLET EVERY DAY  0    rizatriptan (MAXALT-MLT) 10 mg disintegrating tablet PLEASE SEE ATTACHED FOR DETAILED DIRECTIONS  0    mometasone (NASONEX) 50 mcg/actuation nasal spray USE 1-2 SPRAYS INTO EACH NOSTRIL EVERY DAY AS NEEDED  0    FERRETTS IPS 40 mg/15 mL liqd 15 ML ORALLY 2 TIMES PER DAY. 2    EPINEPHrine (EPIPEN) 0.3 mg/0.3 mL injection AS DIRECTED AS NEEDED INTRAMUSCULAR 1 DAYS  5    albuterol (PROVENTIL HFA, VENTOLIN HFA, PROAIR HFA) 90 mcg/actuation inhaler Take 2 Puffs by inhalation every four (4) hours as needed for Wheezing or Shortness of Breath. 1 Inhaler 0    promethazine (PHENERGAN) 12.5 mg tablet Take  by mouth every six (6) hours as needed.  esomeprazole (NEXIUM) 40 mg capsule Take  by mouth daily.  POLYETHYLENE GLYCOL 3350 (MIRALAX PO) Take  by mouth.  CALCIUM CARBONATE/MAG HYDROX (MYLANTA PO) Take  by mouth. Allergies   Allergen Reactions    Iodine Hives and Nausea and Vomiting    Iodinated Contrast Media Hives    Oxycodone-Acetaminophen Other (comments)     Hallucinations     Prochlorperazine Other (comments)    Propoxyphene-Acetaminophen Unknown (comments)     Allergy to propoxyphene only.   Has previously tolerated acetaminophen    Reglan [Metoclopramide] Not Reported This Time    Sulfa (Sulfonamide Antibiotics) Not Reported This Time    Wygesic Not Reported This Time       Family History   Problem Relation Age of Onset    Kidney Disease Father     Other Father         Pancreatic Disease    Cancer Father     Heart Disease Father  Heart Attack Father     Thyroid Disease Other         Grandparent     Social History     Tobacco Use    Smoking status: Never Smoker    Smokeless tobacco: Never Used   Substance Use Topics    Alcohol use: No       Depression Risk Factor Screening:     3 most recent PHQ Screens 5/19/2020   PHQ Not Done -   Little interest or pleasure in doing things Not at all   Feeling down, depressed, irritable, or hopeless Not at all   Total Score PHQ 2 0       Alcohol Risk Factor Screening:   Do you average 1 drink per night or more than 7 drinks a week:  No    On any one occasion in the past three months have you have had more than 3 drinks containing alcohol:  No      Functional Ability and Level of Safety:   Hearing: Hearing is good. Activities of Daily Living: The home contains: no safety equipment. Patient does total self care    Ambulation: with no difficulty    Fall Risk:  No flowsheet data found. Abuse Screen:  Patient is not abused    Cognitive Screening   Has your family/caregiver stated any concerns about your memory: no  Cognitive Screening: Normal - Verbal Fluency Test    Patient Care Team   Patient Care Team:  Tiffani Romero MD as PCP - General (Family Practice)  Tiffani Romero MD as PCP - [facility] Empaneled Provider    Assessment/Plan   Education and counseling provided:  Are appropriate based on today's review and evaluation    1. Encounter for Medicare annual wellness exam    2. ACP (advance care planning)    3. Screening for depression  - [address] Maintenance Due   Topic Date Due    DTaP/Tdap/Td series (1 - Tdap) 03/01/2006    PAP AKA CERVICAL CYTOLOGY  03/01/2006    Medicare Yearly Exam  05/02/2020       Lavelle Morales is a 28 y.o. female who was evaluated by an audio-video encounter for concerns as above. Patient identification was verified prior to start of the visit. A caregiver was present when appropriate.  Due to this being a TeleHealth encounter (During TVLVT-65 public health emergency), evaluation of the following organ systems was limited: Vitals/Constitutional/EENT/Resp/CV/GI//MS/Neuro/Skin/Heme-Lymph-Imm. Pursuant to the emergency declaration under the [address] [place] [address], [facility] waiver authority and the [employer] Act, this Virtual Visit was conducted, with patient's (and/or legal guardian's) consent, to reduce the patient's risk of exposure to COVID-19 and provide necessary medical care. Services were provided through a synchronous discussion virtually to substitute for in-person clinic visit. I was in the office. The patient was at home.       Marcela Martell MD

## 2020-05-19 NOTE — ACP (ADVANCE CARE PLANNING)
Advance Care Planning       Advance Care Planning (ACP) Physician/NP/PA (Provider) Conversation        Date of ACP Conversation: 5/19/2020    Conversation Conducted with:   Patient with Decision Making [address] Maker:    Current Designated Health Care Decision Maker:   If no Authorized Decision Maker has previously been identified, then patient chooses [place]:  \"Who would you like to name as your primary health care decision-maker? \"    Name: María Ly relationship: Sister phone number: [phone]  \"Can this person be reached easily? \" YES  Patient states that she would like all treatments given. She will fill out forms to be given when she comes in at her next visit to reflect this. Care Preferences:    Hospitalization: \"If your health worsens and it becomes clear that your chance of recovery is unlikely, what would your preference be regarding hospitalization? \"  If the patient would want hospitalization, answer \"yes\". If the patient would prefer comfort-focused treatment without hospitalization, answer \"no\". yes      Ventilation: \"If you were in your present state of health and suddenly became very ill and were unable to breathe on your own, what would your preference be about the use of a ventilator (breathing machine) if it was available to you? \"    If patient would desire the use of a ventilator (breathing machine), answer \"yes\", if not answer \"no\":yes    \"If your health worsens and it becomes clear that your chance of recovery is unlikely, what would your preference be about the use of a ventilator (breathing machine) if it was available to you? \"   yes      Resuscitation:  \"CPR works best to restart the heart when there is a sudden event, like a heart attack, in someone who is otherwise healthy. Unfortunately, CPR does not typically restart the heart for people who have serious health conditions or who are very sick. \"    \"In the event your heart stopped as a result of an underlying serious health condition, would you want attempts to be made to restart your heart (answer \"yes\" for attempt to resuscitate) or would you prefer a natural death (answer \"no\" for do not attempt to resuscitate)? \"   yes      Conversation Outcomes / Follow-Up Plan:   Recommended completion of Advance Directive      Length of Voluntary ACP Conversation in minutes:  16 minutes      Chelsy Duggan MD

## 2020-05-19 NOTE — PROGRESS NOTES
Grant Yang is a 28 y.o. female who was seen by synchronous (real-time) audio-video technology on 5/19/2020. Consent: Grant Yang, who was seen by synchronous (real-time) audio-video technology, and/or her healthcare decision maker, is aware that this patient-initiated, Telehealth encounter on 5/19/2020 is a billable service, with coverage as determined by her insurance carrier. She is aware that she may receive a bill and has provided verbal consent to proceed: Yes. Assessment & Plan:       ICD-10-CM ICD-9-CM    1. Pain in both lower extremities M79.604 729.5 CK    M79.605  RHEUMATOID FACTOR, QT      REFERRAL TO RHEUMATOLOGY   2. Numbness and tingling R20.0 782.0     R20.2     3. Type 2 diabetes mellitus with hyperglycemia, without long-term current use of insulin (HCC) E11.65 250.00 CBC WITH AUTOMATED DIFF     477.92 METABOLIC PANEL, COMPREHENSIVE      HEMOGLOBIN A1C WITH EAG   4. Essential (primary) hypertension I10 401.9    5. Dyslipidemia E78.5 272.4 LIPID PANEL   6. Migraine without status migrainosus, not intractable, unspecified migraine type G43.909 346.90    7. Mood disorder (HCC) F39 296.90    8. Arthralgia, unspecified joint M25.50 719.40 RHEUMATOID FACTOR, QT      REFERRAL TO RHEUMATOLOGY   9. Gastroesophageal reflux disease, esophagitis presence not specified K21.9 530.81    10. Encounter for Medicare annual wellness exam Z00.00 V70.0    11. ACP (advance care planning) Z71.89 V65.49    12. Screening for depression Z13.31 V79.0 GA DEPRESSION SCREEN ANNUAL       Subjective:   Grant Yang is a 28 y.o. female who was seen for Leg Pain; Tingling; Diabetes; Hypertension; and Annual Wellness Visit  Patient is seen for follow-up care. Leg pain/neuropathy: Patient has numbness, tingling and weakness of bilateral lower extremities. This extends from the knees down to her feet. It has been there daily and worse at night.   She at times feels like she is dragging her feet when she is walking due to the numbness and the tingling. I suspect diabetic neuropathy. Discussed medications including amitriptyline. She has been on this before but would prefer to hold off on this for now. She would like to have lab work done and then follow-up after that. Patient has in the past been seen by neurologist and has had nerve conduction studies done. This is for neuropathy upper extremities. She does have carpal tunnel syndrome upper extremities. Diabetes mellitus type 2: Patient currently doing lifestyle and dietary modification. We had referred her to see the dietitian. She prefers to hold off on medication. Needs a recheck of labs. We will check a HbA1c. I will also check other labs including CBC and CMP. I will follow-up with the results. May need to be on a medication. Patient states that her blood glucose numbers range between 120 and 170. Hypertension: Patient is on atenolol 25 mg daily. Yesterday taking the medication. We will recheck her blood pressure at next visit. She denies changes in vision or focal weakness. Migraine headache: Patient has a history of migraine headache. She has been followed up by the neurologist.  She is on Aimovig and takes Maxalt as needed for acute headache. Has been having breakthrough headaches on and off. She will follow-up with a specialist.  Acute stress disorder: Patient has been acutely stressed lately given what is going on with her son. She has a 3year-old who is autistic and has been having trouble sleeping and is at times difficult to control. This has caused a lot of stress. She has to take him to the hospital for evaluation and currently having multiple virtual visit. This is stressed out. She does not want any medication to for mood disorder or acute stress. Insomnia: Patient has trouble with sleep. She has been seen by the sleep specialist and has underwent sleep study.   We will follow-up with their recommendations. Arthralgia: Patient has generalized arthralgia with morning stiffness and pain on and off all her joints. She denies swelling or redness of the joints at the moment. She would like to be referred to see a rheumatologist.  Referral will be placed. We will request labs. GERD: Patient has gastroesophageal reflux disease. She is on Nexium. Stable on the medication. Denies heartburn, hematemesis or dark stools. Allergy: Patient has a history of allergy. She is on Nasonex and Zyrtec. Occasional sneezing and nasal congestion. Continue with the current treatment plan. Respiratory: Patient has wheeze on and off. She is on Anoro Ellipta and albuterol as needed. Patient has dyslipidemia. She has been doing lifestyle and dietary modification. We will recheck lipid panel today. Patient states that she had Pap smear done last year. She will follow-up with her gynecologist this week for recheck Pap smear as her previous one was abnormal so the colposcopy turned out to be normal.  We will request the records. Prior to Admission medications    Medication Sig Start Date End Date Taking?  Authorizing Provider   atenoloL (TENORMIN) 25 mg tablet TAKE 1 TABLET BY MOUTH EVERY DAY 5/11/20   Chelsy Amanda MD   olmesartan (BENICAR) 40 mg tablet TAKE 1 TABLET BY MOUTH EVERY DAY 3/22/20 5/19/20  Chelsy Max MD   lancets misc Check blood glucose daily 1/23/20   Chelsy Amanda MD   glucose blood VI test strips (ASCENSIA AUTODISC VI, ONE TOUCH ULTRA TEST VI) strip Check blood glucose daily 1/23/20   Chelsy Max MD   Blood-Glucose Meter monitoring kit Check blood glucose daily 1/23/20 5/19/20  Chelsy Amanda MD   ANORO ELLIPTA 62.5-25 mcg/actuation inhaler 1 INH DAILY - USE EVERY DAY 7/24/19   Provider, Historical   AIMOVIG AUTOINJECTOR 70 mg/mL injection AS DIRECTED - 1 INJECTION SUBQ ONCE MONTHLY 7/30/19   Provider, Historical   cetirizine (ZYRTEC) 10 mg tablet TAKE 1 TABLET EVERY DAY 12/18/18   Provider, Historical   rizatriptan (MAXALT-MLT) 10 mg disintegrating tablet PLEASE SEE ATTACHED FOR DETAILED DIRECTIONS 2/21/19   Provider, Historical   mometasone (NASONEX) 50 mcg/actuation nasal spray USE 1-2 SPRAYS INTO EACH NOSTRIL EVERY DAY AS NEEDED 2/20/19   Provider, Historical   FERRETTS IPS 40 mg/15 mL liqd 15 ML ORALLY 2 TIMES PER DAY. 2/27/19   Provider, Historical   EPINEPHrine (EPIPEN) 0.3 mg/0.3 mL injection AS DIRECTED AS NEEDED INTRAMUSCULAR 1 DAYS 2/20/19   Provider, Historical   albuterol (PROVENTIL HFA, VENTOLIN HFA, PROAIR HFA) 90 mcg/actuation inhaler Take 2 Puffs by inhalation every four (4) hours as needed for Wheezing or Shortness of Breath. 12/6/18   Paul PETERSON, NP   promethazine (PHENERGAN) 12.5 mg tablet Take  by mouth every six (6) hours as needed. Provider, Historical   esomeprazole (NEXIUM) 40 mg capsule Take  by mouth daily. Provider, Historical   POLYETHYLENE GLYCOL 3350 (MIRALAX PO) Take  by mouth. Provider, Historical   CALCIUM CARBONATE/MAG HYDROX (MYLANTA PO) Take  by mouth. Provider, Historical     Allergies   Allergen Reactions    Iodine Hives and Nausea and Vomiting    Iodinated Contrast Media Hives    Oxycodone-Acetaminophen Other (comments)     Hallucinations     Prochlorperazine Other (comments)    Propoxyphene-Acetaminophen Unknown (comments)     Allergy to propoxyphene only. Has previously tolerated acetaminophen    Reglan [Metoclopramide] Not Reported This Time    Sulfa (Sulfonamide Antibiotics) Not Reported This Time    Wygesic Not Reported This Time     ROS Review of all systems is negative except as noted above in the HPI. Objective:   Vital Signs: (As obtained by patient/caregiver at home)  There were no vitals taken for this visit.      Constitutional: [x] Appears well-developed and well-nourished [x] No apparent distress      Mental status: [x] Alert and awake  [x] Oriented to person/place/time [x] Able to follow commands HENT: [x] Normocephalic, atraumatic     Pulmonary/Chest: [x] Respiratory effort normal   [x] No visualized signs of difficulty breathing or respiratory distress        Neurological:        [x] No Facial Asymmetry (Cranial nerve 7 motor function) (limited exam due to video visit)          [x] No gaze palsy               Psychiatric:       [x] Normal Affect      We discussed the expected course, resolution and complications of the diagnosis(es) in detail. Medication risks, benefits, costs, interactions, and alternatives were discussed as indicated. I advised her to contact the office if her condition worsens, changes or fails to improve as anticipated. She expressed understanding with the diagnosis(es) and plan. Keisha Mcgovern is a 28 y.o. female who was evaluated by a video visit encounter for concerns as above. Patient identification was verified prior to start of the visit. A caregiver was present when appropriate. Due to this being a TeleHealth encounter (During [facility]-28 public health emergency), evaluation of the following organ systems was limited: Vitals/Constitutional/EENT/Resp/CV/GI//MS/Neuro/Skin/Heme-Lymph-Imm. Pursuant to the emergency declaration under the [facility], 1135 waiver authority and the [employer] and [employer] General Act, this Virtual  Visit was conducted, with patient's (and/or legal guardian's) consent, to reduce the patient's risk of exposure to COVID-19 and provide necessary medical care. Services were provided through a video synchronous discussion virtually to substitute for in-person clinic visit. Patient and provider were located at their individual homes.       Guanako Gonsales MD

## 2020-05-21 ENCOUNTER — LAB ONLY (OUTPATIENT)
Dept: FAMILY MEDICINE CLINIC | Age: 35
End: 2020-05-21

## 2020-05-21 DIAGNOSIS — Z01.89 ENCOUNTER FOR LABORATORY TEST: Primary | ICD-10-CM

## 2020-05-22 LAB
ALBUMIN SERPL-MCNC: 4.3 G/DL (ref 3.8–4.8)
ALBUMIN/GLOB SERPL: 1.8 {RATIO} (ref 1.2–2.2)
ALP SERPL-CCNC: 86 IU/L (ref 39–117)
ALT SERPL-CCNC: 12 IU/L (ref 0–32)
AST SERPL-CCNC: 15 IU/L (ref 0–40)
BASOPHILS # BLD AUTO: 0 X10E3/UL (ref 0–0.2)
BASOPHILS NFR BLD AUTO: 1 %
BILIRUB SERPL-MCNC: 0.3 MG/DL (ref 0–1.2)
BUN SERPL-MCNC: 8 MG/DL (ref 6–20)
BUN/CREAT SERPL: 9 (ref 9–23)
CALCIUM SERPL-MCNC: 9 MG/DL (ref 8.7–10.2)
CHLORIDE SERPL-SCNC: 108 MMOL/L (ref 96–106)
CHOLEST SERPL-MCNC: 161 MG/DL (ref 100–199)
CK SERPL-CCNC: 165 U/L (ref 32–182)
CO2 SERPL-SCNC: 23 MMOL/L (ref 20–29)
CREAT SERPL-MCNC: 0.86 MG/DL (ref 0.57–1)
EOSINOPHIL # BLD AUTO: 0.1 X10E3/UL (ref 0–0.4)
EOSINOPHIL NFR BLD AUTO: 1 %
ERYTHROCYTE [DISTWIDTH] IN BLOOD BY AUTOMATED COUNT: 14.4 % (ref 11.7–15.4)
EST. AVERAGE GLUCOSE BLD GHB EST-MCNC: 123 MG/DL
GLOBULIN SER CALC-MCNC: 2.4 G/DL (ref 1.5–4.5)
GLUCOSE SERPL-MCNC: 107 MG/DL (ref 65–99)
HBA1C MFR BLD: 5.9 % (ref 4.8–5.6)
HCT VFR BLD AUTO: 35.4 % (ref 34–46.6)
HDLC SERPL-MCNC: 29 MG/DL
HGB BLD-MCNC: 11.4 G/DL (ref 11.1–15.9)
IMM GRANULOCYTES # BLD AUTO: 0 X10E3/UL (ref 0–0.1)
IMM GRANULOCYTES NFR BLD AUTO: 0 %
INTERPRETATION, 910389: NORMAL
LDLC SERPL CALC-MCNC: 116 MG/DL (ref 0–99)
LYMPHOCYTES # BLD AUTO: 2.3 X10E3/UL (ref 0.7–3.1)
LYMPHOCYTES NFR BLD AUTO: 38 %
MCH RBC QN AUTO: 26.5 PG (ref 26.6–33)
MCHC RBC AUTO-ENTMCNC: 32.2 G/DL (ref 31.5–35.7)
MCV RBC AUTO: 82 FL (ref 79–97)
MONOCYTES # BLD AUTO: 0.4 X10E3/UL (ref 0.1–0.9)
MONOCYTES NFR BLD AUTO: 6 %
NEUTROPHILS # BLD AUTO: 3.3 X10E3/UL (ref 1.4–7)
NEUTROPHILS NFR BLD AUTO: 54 %
PLATELET # BLD AUTO: 252 X10E3/UL (ref 150–450)
POTASSIUM SERPL-SCNC: 3.6 MMOL/L (ref 3.5–5.2)
PROT SERPL-MCNC: 6.7 G/DL (ref 6–8.5)
RBC # BLD AUTO: 4.3 X10E6/UL (ref 3.77–5.28)
RHEUMATOID FACT SERPL-ACNC: <10 IU/ML (ref 0–13.9)
SODIUM SERPL-SCNC: 143 MMOL/L (ref 134–144)
TRIGL SERPL-MCNC: 79 MG/DL (ref 0–149)
VLDLC SERPL CALC-MCNC: 16 MG/DL (ref 5–40)
WBC # BLD AUTO: 6 X10E3/UL (ref 3.4–10.8)

## 2020-05-28 NOTE — PROGRESS NOTES
Please let patient know her HbA1c is 5.9. This is an improvement from the previous 6.6. She should continue with lifestyle and dietary modification. Her LDL cholesterol has come down to 116. She should exercise and take a diet in polysaturated fats. We would want this to go down to below 100. If she is willing I can send a low-dose cholesterol-lowering medication to help. Recheck labs in 6 months.   Mitchel Malagon MD

## 2020-05-28 NOTE — PROGRESS NOTES
Spoke with patient at this time regarding lab results. Patient verbalized understanding. Patient will diet and exercise to help with cholesterol at this time

## 2020-06-04 DIAGNOSIS — M25.50 ARTHRALGIA, UNSPECIFIED JOINT: ICD-10-CM

## 2020-06-04 DIAGNOSIS — E11.65 TYPE 2 DIABETES MELLITUS WITH HYPERGLYCEMIA, WITHOUT LONG-TERM CURRENT USE OF INSULIN (HCC): ICD-10-CM

## 2020-06-04 DIAGNOSIS — M79.604 PAIN IN BOTH LOWER EXTREMITIES: ICD-10-CM

## 2020-06-04 DIAGNOSIS — M79.605 PAIN IN BOTH LOWER EXTREMITIES: ICD-10-CM

## 2020-06-04 DIAGNOSIS — E78.5 DYSLIPIDEMIA: ICD-10-CM

## 2020-07-01 ENCOUNTER — OFFICE VISIT (OUTPATIENT)
Dept: FAMILY MEDICINE CLINIC | Age: 35
End: 2020-07-01

## 2020-07-01 VITALS
SYSTOLIC BLOOD PRESSURE: 131 MMHG | DIASTOLIC BLOOD PRESSURE: 87 MMHG | BODY MASS INDEX: 29.84 KG/M2 | TEMPERATURE: 98.4 F | WEIGHT: 148 LBS | HEIGHT: 59 IN | RESPIRATION RATE: 16 BRPM | OXYGEN SATURATION: 100 % | HEART RATE: 80 BPM

## 2020-07-01 DIAGNOSIS — G43.909 MIGRAINE WITHOUT STATUS MIGRAINOSUS, NOT INTRACTABLE, UNSPECIFIED MIGRAINE TYPE: ICD-10-CM

## 2020-07-01 DIAGNOSIS — M72.2 PLANTAR FASCIITIS: ICD-10-CM

## 2020-07-01 DIAGNOSIS — K21.9 GASTROESOPHAGEAL REFLUX DISEASE, ESOPHAGITIS PRESENCE NOT SPECIFIED: ICD-10-CM

## 2020-07-01 DIAGNOSIS — F39 MOOD DISORDER (HCC): ICD-10-CM

## 2020-07-01 DIAGNOSIS — I10 ESSENTIAL (PRIMARY) HYPERTENSION: ICD-10-CM

## 2020-07-01 DIAGNOSIS — M79.671 PAIN IN BOTH FEET: Primary | ICD-10-CM

## 2020-07-01 DIAGNOSIS — E78.5 DYSLIPIDEMIA: ICD-10-CM

## 2020-07-01 DIAGNOSIS — E11.65 TYPE 2 DIABETES MELLITUS WITH HYPERGLYCEMIA, WITHOUT LONG-TERM CURRENT USE OF INSULIN (HCC): ICD-10-CM

## 2020-07-01 DIAGNOSIS — M79.672 PAIN IN BOTH FEET: Primary | ICD-10-CM

## 2020-07-01 RX ORDER — PROMETHAZINE HYDROCHLORIDE 12.5 MG/1
TABLET ORAL
Status: CANCELLED | OUTPATIENT
Start: 2020-07-01

## 2020-07-01 RX ORDER — NAPROXEN 500 MG/1
500 TABLET ORAL
Qty: 60 TAB | Refills: 1 | Status: SHIPPED | OUTPATIENT
Start: 2020-07-01 | End: 2020-08-17

## 2020-07-01 NOTE — PROGRESS NOTES
Chief Complaint   Patient presents with    Follow-up     HTN     Leg Pain     and foot pain     Fatigue     1. Have you been to the ER, urgent care clinic since your last visit? Hospitalized since your last visit? No    2. Have you seen or consulted any other health care providers outside of the [address] [place] [address] since your last visit? Include any pap smears or colon screening. No     HPI  Sharquita Elfredia Severe comes in for f/u care. Foot pain: patient has pain both feet on and off. No swelling. No clear triggers. Pain radiates to the legs and she has discomfort walking. She has heel pain likely due to plantar fasciitis. I will send in naproxen. HTN: Patient has hypertension. She is on atenolol. Tolerating the medication. Blood pressure is stable. She denies headache, changes in vision or focal weakness. GERD: Patient has a history of gastroesophageal reflux disease. She is on Nexium. She does get epigastric discomfort and heartburn. No hematemesis or dark stools. Continue current treatment plan. Migraine headache: Patient has a history of migraine headaches. She is on Aimovig and Maxalt. Currently headaches have been controlled. Asthma: Patient has asthma. Has occasional cough and wheeze. She has Anoro Ellipta and albuterol inhaler. She denies chest pain, shortness of breath or hemoptysis. She will continue with the current treatment plan. Fatigue: Patient has been having episodes of fatigue and malaise. At times she does not want to get out of bed in the morning. She denies being depressed. We have checked labs in the past and these have been stable. We will recheck her HbA1c. She has not been keeping a blood glucose log. Diabetes mellitus type 2: Patient is on lifestyle and dietary modification. Her last HbA1c was 5.9. We will recheck this today. Dyslipidemia: Patient has a history of dyslipidemia. She is doing lifestyle and dietary modification.   We had discussed taking cholesterol-lowering medication but she declined. I will recheck lipid panel at next visit. Past Medical History  Past Medical History:   Diagnosis Date    Asthma     Chronic constipation     Fibromyalgia     GERD (gastroesophageal reflux disease)     IBS (irritable bowel syndrome)     Migraines     unknown if aura    Psychiatric disorder     she denies any diagnosis despite being on antipsychotics, SSRIs, etc in the past    Scoliosis     Urinary incontinence     mixed       Surgical History  Past Surgical History:   Procedure Laterality Date    HX  SECTION  , 2016    HX COLONOSCOPY      HX DILATION AND CURETTAGE      HX ENDOSCOPY      HX PELVIC LAPAROSCOPY          Medications  Current Outpatient Medications   Medication Sig Dispense Refill    atenoloL (TENORMIN) 25 mg tablet TAKE 1 TABLET BY MOUTH EVERY DAY 90 Tab 1    lancets misc Check blood glucose daily 100 Each 3    glucose blood VI test strips (ASCENSIA AUTODISC VI, ONE TOUCH ULTRA TEST VI) strip Check blood glucose daily 100 Strip 3    ANORO ELLIPTA 62.5-25 mcg/actuation inhaler 1 INH DAILY - USE EVERY DAY  6    AIMOVIG AUTOINJECTOR 70 mg/mL injection AS DIRECTED - 1 INJECTION SUBQ ONCE MONTHLY  3    cetirizine (ZYRTEC) 10 mg tablet TAKE 1 TABLET EVERY DAY  0    rizatriptan (MAXALT-MLT) 10 mg disintegrating tablet PLEASE SEE ATTACHED FOR DETAILED DIRECTIONS  0    mometasone (NASONEX) 50 mcg/actuation nasal spray USE 1-2 SPRAYS INTO EACH NOSTRIL EVERY DAY AS NEEDED  0    FERRETTS IPS 40 mg/15 mL liqd 15 ML ORALLY 2 TIMES PER DAY. 2    EPINEPHrine (EPIPEN) 0.3 mg/0.3 mL injection AS DIRECTED AS NEEDED INTRAMUSCULAR 1 DAYS  5    albuterol (PROVENTIL HFA, VENTOLIN HFA, PROAIR HFA) 90 mcg/actuation inhaler Take 2 Puffs by inhalation every four (4) hours as needed for Wheezing or Shortness of Breath. 1 Inhaler 0    esomeprazole (NEXIUM) 40 mg capsule Take  by mouth daily.       POLYETHYLENE GLYCOL 3350 (Illene Pati PO) Take  by mouth.  CALCIUM CARBONATE/MAG HYDROX (MYLANTA PO) Take  by mouth. Allergies  Allergies   Allergen Reactions    Iodine Hives and Nausea and Vomiting    Iodinated Contrast Media Hives    Oxycodone-Acetaminophen Other (comments)     Hallucinations     Prochlorperazine Other (comments)    Propoxyphene-Acetaminophen Unknown (comments)     Allergy to propoxyphene only.   Has previously tolerated acetaminophen    Reglan [Metoclopramide] Not Reported This Time    Sulfa (Sulfonamide Antibiotics) Not Reported This Time    Wygesic Not Reported This Time       Family History  Family History   Problem Relation Age of Onset    Kidney Disease Father     Other Father         Pancreatic Disease    Cancer Father     Heart Disease Father     Heart Attack Father     Thyroid Disease Other         Grandparent       Social History  Social History     Socioeconomic History    Marital status: SINGLE     Spouse name: Not on file    Number of children: Not on file    Years of education: Not on file    Highest education level: Not on file   Occupational History    Not on file   Social Needs    Financial resource strain: Not on file    Food insecurity     Worry: Not on file     Inability: Not on file    Transportation needs     Medical: Not on file     Non-medical: Not on file   Tobacco Use    Smoking status: Never Smoker    Smokeless tobacco: Never Used   Substance and Sexual Activity    Alcohol use: No    Drug use: No    Sexual activity: Not on file   Lifestyle    Physical activity     Days per week: Not on file     Minutes per session: Not on file    Stress: Not on file   Relationships    Social connections     Talks on phone: Not on file     Gets together: Not on file     Attends [employer] service: Not on file     Active member of club or organization: Not on file     Attends meetings of clubs or organizations: Not on file     Relationship status: Not on file    Intimate partner violence     Fear of current or ex partner: Not on file     Emotionally abused: Not on file     Physically abused: Not on file     Forced sexual activity: Not on file   Other Topics Concern    Not on file   Social History Narrative    Not on file       Review of Systems  Review of Systems - Review of all systems is negative except as noted above in the HPI. Vital Signs  Visit Vitals  /87 (BP 1 Location: Left arm, BP Patient Position: Sitting)   Pulse 80   Temp 98.4 °F (36.9 °C) (Oral)   Resp 16   Ht 4' 11\" (1.499 m)   Wt 148 lb (67.1 kg)   SpO2 100%   BMI 29.89 kg/m²       Physical Exam  Physical Examination: General appearance - alert, well appearing, and in no distress, oriented to person, place, and time and acyanotic, in no respiratory distress  Mental status - alert, oriented to person, place, and time, affect appropriate to mood  Lymphatics - no palpable lymphadenopathy  Chest - clear to auscultation, no wheezes, rales or rhonchi, symmetric air entry  Heart - normal rate and regular rhythm, S1 and S2 normal  Abdomen - no rebound tenderness noted  Neurological - motor and sensory grossly normal bilaterally  Musculoskeletal -bilateral foot pain especially at the heels. Extremities - no pedal edema noted, intact peripheral pulses      Results  Results for orders placed or performed in visit on 05/19/20   CBC WITH AUTOMATED DIFF   Result Value Ref Range    WBC 6.0 3.4 - 10.8 x10E3/uL    RBC 4.30 3.77 - 5.28 x10E6/uL    HGB 11.4 11.1 - 15.9 g/dL    HCT 35.4 34.0 - 46.6 %    MCV 82 79 - 97 fL    MCH 26.5 (L) 26.6 - 33.0 pg    MCHC 32.2 31.5 - 35.7 g/dL    RDW 14.4 11.7 - 15.4 %    PLATELET 811 727 - 429 x10E3/uL    NEUTROPHILS 54 Not Estab. %    Lymphocytes 38 Not Estab. %    MONOCYTES 6 Not Estab. %    EOSINOPHILS 1 Not Estab. %    BASOPHILS 1 Not Estab. %    ABS. NEUTROPHILS 3.3 1.4 - 7.0 x10E3/uL    Abs Lymphocytes 2.3 0.7 - 3.1 x10E3/uL    ABS. MONOCYTES 0.4 0.1 - 0.9 x10E3/uL    ABS.  EOSINOPHILS 0.1 0.0 - 0.4 x10E3/uL    ABS. BASOPHILS 0.0 0.0 - 0.2 x10E3/uL    IMMATURE GRANULOCYTES 0 Not Estab. %    ABS. IMM. GRANS. 0.0 0.0 - 0.1 [id number]   METABOLIC PANEL, COMPREHENSIVE   Result Value Ref Range    Glucose 107 (H) 65 - 99 mg/dL    BUN 8 6 - 20 mg/dL    Creatinine 0.86 0.57 - 1.00 mg/dL    GFR est non-AA 88 >59 mL/min/1.73    GFR est  >59 mL/min/1.73    BUN/Creatinine ratio 9 9 - 23    Sodium 143 134 - 144 mmol/L    Potassium 3.6 3.5 - 5.2 mmol/L    Chloride 108 (H) 96 - 106 mmol/L    CO2 23 20 - 29 mmol/L    Calcium 9.0 8.7 - 10.2 mg/dL    Protein, total 6.7 6.0 - 8.5 g/dL    Albumin 4.3 3.8 - 4.8 g/dL    GLOBULIN, TOTAL 2.4 1.5 - 4.5 g/dL    A-G Ratio 1.8 1.2 - 2.2    Bilirubin, total 0.3 0.0 - 1.2 mg/dL    Alk. phosphatase 86 39 - 117 IU/L    AST (SGOT) 15 0 - 40 IU/L    ALT (SGPT) 12 0 - 32 IU/L   LIPID PANEL   Result Value Ref Range    Cholesterol, total 161 100 - 199 mg/dL    Triglyceride 79 0 - 149 mg/dL    HDL Cholesterol 29 (L) >39 mg/dL    VLDL, calculated 16 5 - 40 mg/dL    LDL, calculated 116 (H) 0 - 99 mg/dL   HEMOGLOBIN A1C WITH EAG   Result Value Ref Range    Hemoglobin A1c 5.9 (H) 4.8 - 5.6 %    Estimated average glucose 123 mg/dL   RHEUMATOID FACTOR, QL   Result Value Ref Range    Rheumatoid factor <10.0 0.0 - 13.9 IU/mL   CK   Result Value Ref Range    Creatine Kinase,Total 165 32 - 182 U/L   CVD REPORT   Result Value Ref Range    INTERPRETATION Note        ASSESSMENT and PLAN    ICD-10-CM ICD-9-CM    1. Pain in both feet M79.671 729.5 naproxen (NAPROSYN) 500 mg tablet    M79.672     2. Plantar fasciitis M72.2 728.71 naproxen (NAPROSYN) 500 mg tablet   3. Essential (primary) hypertension I10 401.9    4. Type 2 diabetes mellitus with hyperglycemia, without long-term current use of insulin ([facility]) E11.65 250.00 AMB POC HEMOGLOBIN A1C     790.29    5. Migraine without status migrainosus, not intractable, unspecified migraine type G43.909 346.90    6. Mood disorder ([facility]) F39 296.90    7. Dyslipidemia E78.5 272.4    8. Gastroesophageal reflux disease, esophagitis presence not specified K21.9 530.81      lab results and schedule of future lab studies reviewed with patient  reviewed diet, exercise and weight control  cardiovascular risk and specific lipid/LDL goals reviewed  reviewed medications and side effects in detail  specific diabetic recommendations: low cholesterol diet, weight control and daily exercise discussed, home glucose monitoring emphasized, all medications, side effects and compliance discussed carefully, glycohemoglobin and other lab monitoring discussed and long term diabetic complications discussed      I have discussed the diagnosis with the patient and the intended plan of care as seen in the above orders. The patient has received an after-visit summary and questions were answered concerning future plans. I have discussed medication, side effects, and warnings with the patient in detail. The patient verbalized understanding and is in agreement with the plan of care. The patient will contact the office with any additional concerns. Jodie Melendez MD    PLEASE NOTE:   This document has been produced using voice recognition software.  Unrecognized errors in transcription may be present

## 2020-08-17 ENCOUNTER — OFFICE VISIT (OUTPATIENT)
Dept: FAMILY MEDICINE CLINIC | Age: 35
End: 2020-08-17

## 2020-08-17 VITALS
BODY MASS INDEX: 30.64 KG/M2 | WEIGHT: 152 LBS | TEMPERATURE: 98.4 F | SYSTOLIC BLOOD PRESSURE: 138 MMHG | OXYGEN SATURATION: 100 % | HEART RATE: 85 BPM | HEIGHT: 59 IN | DIASTOLIC BLOOD PRESSURE: 84 MMHG | RESPIRATION RATE: 16 BRPM

## 2020-08-17 DIAGNOSIS — R20.0 NUMBNESS AND TINGLING: ICD-10-CM

## 2020-08-17 DIAGNOSIS — M79.671 PAIN IN BOTH FEET: ICD-10-CM

## 2020-08-17 DIAGNOSIS — E66.9 OBESITY (BMI 30-39.9): ICD-10-CM

## 2020-08-17 DIAGNOSIS — I10 ESSENTIAL (PRIMARY) HYPERTENSION: ICD-10-CM

## 2020-08-17 DIAGNOSIS — M79.605 PAIN IN BOTH LOWER EXTREMITIES: ICD-10-CM

## 2020-08-17 DIAGNOSIS — E11.9 DIABETES MELLITUS TYPE 2, DIET-CONTROLLED (HCC): Primary | ICD-10-CM

## 2020-08-17 DIAGNOSIS — M25.50 ARTHRALGIA, UNSPECIFIED JOINT: ICD-10-CM

## 2020-08-17 DIAGNOSIS — K21.9 GASTROESOPHAGEAL REFLUX DISEASE, ESOPHAGITIS PRESENCE NOT SPECIFIED: ICD-10-CM

## 2020-08-17 DIAGNOSIS — M79.604 PAIN IN BOTH LOWER EXTREMITIES: ICD-10-CM

## 2020-08-17 DIAGNOSIS — M72.2 PLANTAR FASCIITIS: ICD-10-CM

## 2020-08-17 DIAGNOSIS — R51.9 NONINTRACTABLE EPISODIC HEADACHE, UNSPECIFIED HEADACHE TYPE: ICD-10-CM

## 2020-08-17 DIAGNOSIS — R20.2 NUMBNESS AND TINGLING: ICD-10-CM

## 2020-08-17 DIAGNOSIS — M79.672 PAIN IN BOTH FEET: ICD-10-CM

## 2020-08-17 LAB — HBA1C MFR BLD HPLC: 6.5 %

## 2020-08-17 RX ORDER — CEPHALEXIN 500 MG/1
CAPSULE ORAL
COMMUNITY
Start: 2020-07-31 | End: 2020-09-21

## 2020-08-17 RX ORDER — VALACYCLOVIR HYDROCHLORIDE 500 MG/1
500 TABLET, FILM COATED ORAL DAILY
COMMUNITY
Start: 2020-08-12

## 2020-08-17 RX ORDER — INDOMETHACIN 50 MG/1
50 CAPSULE ORAL
Qty: 60 CAP | Refills: 2 | Status: SHIPPED | OUTPATIENT
Start: 2020-08-17 | End: 2021-05-17 | Stop reason: ALTCHOICE

## 2020-08-17 NOTE — PROGRESS NOTES
Chief Complaint   Patient presents with    Hypertension     DM     Leg Pain     foot pain  follow up review rheumatolgy note in media tab      1. Have you been to the ER, urgent care clinic since your last visit? Hospitalized since your last visit? No    2. Have you seen or consulted any other health care providers outside of the [address] [place] [address] since your last visit? Include any pap smears or colon screening. No     HPI  Sharri South comes in for follow-up care. Bilateral foot pain: Patient has bilateral foot pain. Is just burning sensation. Occasionally it feels swollen. Is at the plantar aspect of both feet. It is mainly located in the area covered by the plantar fascia. We discussed plantar fasciitis. Suspect this would be causing some of her pain. She has been on naproxen and this has not helped much. I will discontinue this and have her take indomethacin. Will not give prednisone at the moment as her blood glucose numbers have been elevated. I will refer her to see the foot specialist.  She has seen Dr. Adolfo Hazel in the past and would like to be referred back to him. Referral is placed. Discussed supportive care including wearing orthotics, icing, elevation and also heat application. Leg pain: Patient has bilateral leg pain. This radiates from her feet to the legs anteriorly and posteriorly. No swelling or redness of the legs however. Pain comes on and off. Not associated with activity will patient states that at times when pain is very severe she is unable to stand on both lower extremities. Request to have x-rays of the legs done to evaluate for any abnormality. Question of shin splints. I will do x-ray tibia-fibula bilateral legs. Generalized body aches: Patient has generalized body aches. This affects her joints and her muscles. Affects both upper and lower extremities.   She has been reviewed by several specialists including the rheumatologist.  Studies done just showed an elevated sed rate at 34. Rest were unrevealing. I have done a rheumatoid factor and FAIZA test in the past that were negative. Question of fibromyalgia. If symptoms persist will consider starting patient on Cymbalta. Diabetes mellitus type 2: Patient has type 2 diabetes mellitus. In the past her HbA1c was elevated at 6.6. Put on metformin but she did not tolerate and decided to do lifestyle and dietary modification. Subsequently her HbA1c came down to 5.9. She has not been doing much lifestyle and dietary modification. She does not have a blood glucose log. Today and HbA1c had gone up to 6.5. She states that her blood glucose numbers are sometimes elevated. We discussed management of diabetes. Uncontrolled diabetes can cause neuropathic pain with numbness and tingling of the feet. She has burning sensation of her feet and this could be as a result of uncontrolled blood glucose. She would prefer to do lifestyle and dietary modification and keep a blood glucose log and follow-up with me at next visit. She will also call the dietitian and get in for an appointment to discuss lifestyle and dietary changes. Neuropathy: Patient has numbness and tingling bilateral feet and lower extremities. Question of neuropathy. Has had EMG studies done by the neurologist and was told that everything looked normal.  We will request the report. I will consider putting her on a medication to help with symptoms. May try Cymbalta as this also helps with fibromyalgia and mood disorder. Headache: Patient has a history of migraine headaches and has been on Aimovig and Maxalt. Has been seen by the neurologist.  Continue management as recommended by the neurologist.  GERD: Patient has a history of gastroesophageal reflux disease. Denies heartburn or dark stools. She is on Nexium as needed. Continue current treatment plan. Hypertension: Patient has a history of hypertension. Blood pressure is stable.   She is on atenolol 25 mg daily. She will continue with this medication. She denies headache, changes in vision or focal weakness. Obesity: Patient has a BMI of 30.70. She will intensify lifestyle and dietary modification. Past Medical History  Past Medical History:   Diagnosis Date    Asthma     Chronic constipation     Fibromyalgia     GERD (gastroesophageal reflux disease)     IBS (irritable bowel syndrome)     Migraines     unknown if aura    Psychiatric disorder     she denies any diagnosis despite being on antipsychotics, SSRIs, etc in the past    Scoliosis     Urinary incontinence     mixed       Surgical History  Past Surgical History:   Procedure Laterality Date    HX  SECTION  ,     HX COLONOSCOPY      HX DILATION AND CURETTAGE      HX ENDOSCOPY      HX PELVIC LAPAROSCOPY          Medications  Current Outpatient Medications   Medication Sig Dispense Refill    valACYclovir (VALTREX) 500 mg tablet TAKE 1 TABLET BY MOUTH TWICE A DAY FOR 5 DAYS      cephALEXin (KEFLEX) 500 mg capsule TAKE 1 CAPSULE TWICE A DAY X 14 DAYS      naproxen (NAPROSYN) 500 mg tablet Take 1 Tab by mouth two (2) times daily as needed for Pain.  60 Tab 1    atenoloL (TENORMIN) 25 mg tablet TAKE 1 TABLET BY MOUTH EVERY DAY 90 Tab 1    lancets misc Check blood glucose daily 100 Each 3    glucose blood VI test strips (ASCENSIA AUTODISC VI, ONE TOUCH ULTRA TEST VI) strip Check blood glucose daily 100 Strip 3    ANORO ELLIPTA 62.5-25 mcg/actuation inhaler 1 INH DAILY - USE EVERY DAY  6    AIMOVIG AUTOINJECTOR 70 mg/mL injection AS DIRECTED - 1 INJECTION SUBQ ONCE MONTHLY  3    cetirizine (ZYRTEC) 10 mg tablet TAKE 1 TABLET EVERY DAY  0    rizatriptan (MAXALT-MLT) 10 mg disintegrating tablet PLEASE SEE ATTACHED FOR DETAILED DIRECTIONS  0    mometasone (NASONEX) 50 mcg/actuation nasal spray USE 1-2 SPRAYS INTO EACH NOSTRIL EVERY DAY AS NEEDED  0    FERRETTS IPS 40 mg/15 mL liqd 15 ML ORALLY 2 TIMES PER DAY. 2    EPINEPHrine (EPIPEN) 0.3 mg/0.3 mL injection AS DIRECTED AS NEEDED INTRAMUSCULAR 1 DAYS  5    albuterol (PROVENTIL HFA, VENTOLIN HFA, PROAIR HFA) 90 mcg/actuation inhaler Take 2 Puffs by inhalation every four (4) hours as needed for Wheezing or Shortness of Breath. 1 Inhaler 0    esomeprazole (NEXIUM) 40 mg capsule Take  by mouth daily.  POLYETHYLENE GLYCOL 3350 (MIRALAX PO) Take  by mouth.  CALCIUM CARBONATE/MAG HYDROX (MYLANTA PO) Take  by mouth. Allergies  Allergies   Allergen Reactions    Iodine Hives and Nausea and Vomiting    Iodinated Contrast Media Hives    Oxycodone-Acetaminophen Other (comments)     Hallucinations     Prochlorperazine Other (comments)    Propoxyphene-Acetaminophen Unknown (comments)     Allergy to propoxyphene only.   Has previously tolerated acetaminophen    Reglan [Metoclopramide] Not Reported This Time    Sulfa (Sulfonamide Antibiotics) Not Reported This Time    Wygesic Not Reported This Time       Family History  Family History   Problem Relation Age of Onset    Kidney Disease Father     Other Father         Pancreatic Disease    Cancer Father     Heart Disease Father     Heart Attack Father     Thyroid Disease Other         Grandparent       Social History  Social History     Socioeconomic History    Marital status: SINGLE     Spouse name: Not on file    Number of children: Not on file    Years of education: Not on file    Highest education level: Not on file   Occupational History    Not on file   Social Needs    Financial resource strain: Not on file    Food insecurity     Worry: Not on file     Inability: Not on file    Transportation needs     Medical: Not on file     Non-medical: Not on file   Tobacco Use    Smoking status: Never Smoker    Smokeless tobacco: Never Used   Substance and Sexual Activity    Alcohol use: No    Drug use: No    Sexual activity: Not on file   Lifestyle    Physical activity     Days per week: Not on file     Minutes per session: Not on file    Stress: Not on file   Relationships    Social connections     Talks on phone: Not on file     Gets together: Not on file     Attends Spiritism service: Not on file     Active member of club or organization: Not on file     Attends meetings of clubs or organizations: Not on file     Relationship status: Not on file    Intimate partner violence     Fear of current or ex partner: Not on file     Emotionally abused: Not on file     Physically abused: Not on file     Forced sexual activity: Not on file   Other Topics Concern    Not on file   Social History Narrative    Not on file       Review of Systems  Review of Systems - Review of all systems is negative except as noted above in the HPI. Vital Signs  Visit Vitals  /84   Pulse 85   Temp 98.4 °F (36.9 °C) (Oral)   Resp 16   Ht 4' 11\" (1.499 m)   Wt 152 lb (68.9 kg)   SpO2 100%   BMI 30.70 kg/m²         Physical Exam  Physical Examination: General appearance - oriented to person, place, and time, overweight and acyanotic, in no respiratory distress  Mental status - affect appropriate to mood  Neck - supple, no significant adenopathy  Lymphatics - no palpable lymphadenopathy, no hepatosplenomegaly  Chest - clear to auscultation, no wheezes, rales or rhonchi, symmetric air entry  Heart - normal rate and regular rhythm, S1 and S2 normal  Abdomen - no rebound tenderness noted  Back exam - limited range of motion  Neurological - abnormal neurological exam unchanged from prior examinations  Musculoskeletal -mild discomfort to palpation bilateral legs. No swelling or erythema. Extremities - intact peripheral pulses, discomfort to palpation bilateral heels and plantar aspect of the feet.         Results  Results for orders placed or performed in visit on 05/19/20   CBC WITH AUTOMATED DIFF   Result Value Ref Range    WBC 6.0 3.4 - 10.8 x10E3/uL    RBC 4.30 3.77 - 5.28 x10E6/uL    HGB 11.4 11.1 - 15.9 g/dL    HCT 35.4 34.0 - 46.6 %    MCV 82 79 - 97 fL    MCH 26.5 (L) 26.6 - 33.0 pg    MCHC 32.2 31.5 - 35.7 g/dL    RDW 14.4 11.7 - 15.4 %    PLATELET 892 588 - 506 x10E3/uL    NEUTROPHILS 54 Not Estab. %    Lymphocytes 38 Not Estab. %    MONOCYTES 6 Not Estab. %    EOSINOPHILS 1 Not Estab. %    BASOPHILS 1 Not Estab. %    ABS. NEUTROPHILS 3.3 1.4 - 7.0 x10E3/uL    Abs Lymphocytes 2.3 0.7 - 3.1 x10E3/uL    ABS. MONOCYTES 0.4 0.1 - 0.9 x10E3/uL    ABS. EOSINOPHILS 0.1 0.0 - 0.4 x10E3/uL    ABS. BASOPHILS 0.0 0.0 - 0.2 x10E3/uL    IMMATURE GRANULOCYTES 0 Not Estab. %    ABS. IMM. GRANS. 0.0 0.0 - 0.1 L95R9/SS   METABOLIC PANEL, COMPREHENSIVE   Result Value Ref Range    Glucose 107 (H) 65 - 99 mg/dL    BUN 8 6 - 20 mg/dL    Creatinine 0.86 0.57 - 1.00 mg/dL    GFR est non-AA 88 >59 mL/min/1.73    GFR est  >59 mL/min/1.73    BUN/Creatinine ratio 9 9 - 23    Sodium 143 134 - 144 mmol/L    Potassium 3.6 3.5 - 5.2 mmol/L    Chloride 108 (H) 96 - 106 mmol/L    CO2 23 20 - 29 mmol/L    Calcium 9.0 8.7 - 10.2 mg/dL    Protein, total 6.7 6.0 - 8.5 g/dL    Albumin 4.3 3.8 - 4.8 g/dL    GLOBULIN, TOTAL 2.4 1.5 - 4.5 g/dL    A-G Ratio 1.8 1.2 - 2.2    Bilirubin, total 0.3 0.0 - 1.2 mg/dL    Alk. phosphatase 86 39 - 117 IU/L    AST (SGOT) 15 0 - 40 IU/L    ALT (SGPT) 12 0 - 32 IU/L   LIPID PANEL   Result Value Ref Range    Cholesterol, total 161 100 - 199 mg/dL    Triglyceride 79 0 - 149 mg/dL    HDL Cholesterol 29 (L) >39 mg/dL    VLDL, calculated 16 5 - 40 mg/dL    LDL, calculated 116 (H) 0 - 99 mg/dL   HEMOGLOBIN A1C WITH EAG   Result Value Ref Range    Hemoglobin A1c 5.9 (H) 4.8 - 5.6 %    Estimated average glucose 123 mg/dL   RHEUMATOID FACTOR, QL   Result Value Ref Range    Rheumatoid factor <10.0 0.0 - 13.9 IU/mL   CK   Result Value Ref Range    Creatine Kinase,Total 165 32 - 182 U/L   CVD REPORT   Result Value Ref Range    INTERPRETATION Note        ASSESSMENT and PLAN    ICD-10-CM ICD-9-CM    1.  Diabetes mellitus type 2, diet-controlled (HCC)  E11.9 250.00 AMB POC HEMOGLOBIN A1C   2. Pain in both feet  M79.671 729.5 REFERRAL TO ORTHOPEDICS    M79.672  indomethacin (INDOCIN) 50 mg capsule      XR FOOT LT MIN 3 V      XR FOOT RT MIN 3 V   3. Plantar fasciitis  M72.2 728.71 REFERRAL TO ORTHOPEDICS      indomethacin (INDOCIN) 50 mg capsule   4. Pain in both lower extremities  M79.604 729.5 XR TIB/FIB LT    M79.605  XR TIB/FIB RT   5. Essential (primary) hypertension  I10 401.9    6. Gastroesophageal reflux disease, esophagitis presence not specified  K21.9 530.81    7. Numbness and tingling  R20.0 782.0     R20.2     8. Arthralgia, unspecified joint  M25.50 719.40    9. Nonintractable episodic headache, unspecified headache type  R51 784.0    10. Obesity (BMI 30-39. 9)  E66.9 278.00      lab results and schedule of future lab studies reviewed with patient  reviewed diet, exercise and weight control  cardiovascular risk and specific lipid/LDL goals reviewed  reviewed medications and side effects in detail  specific diabetic recommendations: low cholesterol diet, weight control and daily exercise discussed, home glucose monitoring emphasized, all medications, side effects and compliance discussed carefully, foot care discussed and Podiatry visits discussed, annual eye examinations at Ophthalmology discussed, glycohemoglobin and other lab monitoring discussed and long term diabetic complications discussed  radiology results and schedule of future radiology studies reviewed with patient      I have discussed the diagnosis with the patient and the intended plan of care as seen in the above orders. The patient has received an after-visit summary and questions were answered concerning future plans. I have discussed medication, side effects, and warnings with the patient in detail. The patient verbalized understanding and is in agreement with the plan of care. The patient will contact the office with any additional concerns.     I spent at least 55 minutes on this visit with this established patient. Wolfgang Koehler MD    PLEASE NOTE:   This document has been produced using voice recognition software.  Unrecognized errors in transcription may be present

## 2020-09-02 NOTE — PROGRESS NOTES
Labs ordered by PCP at this time. Patient tolerated well at this time. No other concerns noted at this time. Venipunture to right forearm at this time. PROGRESS NOTE:   Authored by Dr. Kiko Stanley MD  Pager [phone] [facility], [address] [place],    Patient is a 33y old  Female who presents with a chief complaint of Needs dialysis (02 Sep 2020 07:41)      SUBJECTIVE / OVERNIGHT EVENTS:    ADDITIONAL REVIEW OF SYSTEMS:    MEDICATIONS  (STANDING):  FLUoxetine 10 milliGRAM(s) Oral daily  furosemide    Tablet 80 milliGRAM(s) Oral daily  heparin   Injectable 5000 Unit(s) SubCutaneous every 8 hours  influenza   Vaccine 0.5 milliLiter(s) IntraMuscular once  levothyroxine 25 MICROGram(s) Oral daily    MEDICATIONS  (PRN):  acetaminophen   Tablet .. 650 milliGRAM(s) Oral every 6 hours PRN Mild Pain (1 - 3), Moderate Pain (4 - 6)  polyethylene glycol 3350 17 Gram(s) Oral daily PRN Constipation      CAPILLARY BLOOD GLUCOSE        I&O's Summary      PHYSICAL EXAM:  Vital Signs Last 24 Hrs  T(C): 37.1 (02 Sep 2020 12:20), Max: 37.1 (02 Sep 2020 12:20)  T(F): 98.7 (02 Sep 2020 12:20), Max: 98.7 (02 Sep 2020 12:20)  HR: 71 (02 Sep 2020 12:20) (70 - 80)  BP: 151/92 (02 Sep 2020 12:20) (114/77 - 151/92)  BP(mean): --  RR: 19 (02 Sep 2020 12:20) (17 - 20)  SpO2: 99% (02 Sep 2020 12:20) (94% - 100%)    GENERAL: No acute distress, well-developed  HEAD:  Atraumatic, Normocephalic  EYES: EOMI, PERRLA, conjunctiva and sclera clear  NECK: Supple, no lymphadenopathy, no JVD  CHEST/LUNG: CTAB; No wheezes, rales, or rhonchi  HEART: Regular rate and rhythm; No murmurs, rubs, or gallops  ABDOMEN: Soft, non-tender, non-distended; normal bowel sounds, no organomegaly  EXTREMITIES:  2+ peripheral pulses b/l, No clubbing, cyanosis, or edema  NEUROLOGY: A&O x 3, no focal deficits  SKIN: No rashes or lesions    LABS:                        8.7    7.05  )-----------( 285      ( 02 Sep 2020 07:12 )             27.6     09-02    142  |  107  |  32<H>  ----------------------------<  73  3.7   |  20<L>  |  4.96<H>    Ca    8.8      02 Sep 2020 07:12  Phos  5.3     09-02  Mg     1.9     09-02    TPro  6.8  /  Alb  3.6  /  TBili  0.2  /  DBili  x   /  AST  21  /  ALT  8<L>  /  AlkPhos  107  09-01                RADIOLOGY & ADDITIONAL TESTS:  Results Reviewed:   Imaging Personally Reviewed:  Electrocardiogram Personally Reviewed:    COORDINATION OF CARE:  Care Discussed with Consultants/Other Providers [Y/N]:  Prior or Outpatient Records Reviewed [Y/N]: PROGRESS NOTE:   Authored by Dr. Kiko Stanley MD  Pager [phone] [facility], [address] [place],    Patient is a 33y old  Female who presents with a chief complaint of Needs dialysis (02 Sep 2020 07:41)      SUBJECTIVE / OVERNIGHT EVENTS: Patient had no significant events overnight. Had 1st session of HD yest. Patient seen and examined at bedside this AM. Patient complains of headache, nausea, and one episode of diarrhea with loose stools. She complained of temporary dizziness after HD. Otherwise, patient feels well with improvement in symptoms. SOB is improved.     ADDITIONAL REVIEW OF SYSTEMS: Patient denies any fever, chest pain, SOB, abd pain, vomiting, bleeding.     MEDICATIONS  (STANDING):  FLUoxetine 10 milliGRAM(s) Oral daily  furosemide    Tablet 80 milliGRAM(s) Oral daily  heparin   Injectable 5000 Unit(s) SubCutaneous every 8 hours  influenza   Vaccine 0.5 milliLiter(s) IntraMuscular once  levothyroxine 25 MICROGram(s) Oral daily    MEDICATIONS  (PRN):  acetaminophen   Tablet .. 650 milliGRAM(s) Oral every 6 hours PRN Mild Pain (1 - 3), Moderate Pain (4 - 6)  polyethylene glycol 3350 17 Gram(s) Oral daily PRN Constipation      CAPILLARY BLOOD GLUCOSE        I&O's Summary      PHYSICAL EXAM:  Vital Signs Last 24 Hrs  T(C): 37.1 (02 Sep 2020 12:20), Max: 37.1 (02 Sep 2020 12:20)  T(F): 98.7 (02 Sep 2020 12:20), Max: 98.7 (02 Sep 2020 12:20)  HR: 71 (02 Sep 2020 12:20) (70 - 80)  BP: 151/92 (02 Sep 2020 12:20) (114/77 - 151/92)  BP(mean): --  RR: 19 (02 Sep 2020 12:20) (17 - 20)  SpO2: 99% (02 Sep 2020 12:20) (94% - 100%)    GENERAL: No acute distress, well-developed  HEAD:  Atraumatic, Normocephalic  CHEST/LUNG: CTAB; No wheezes, rales, or rhonchi  HEART: Regular rate and rhythm; No murmurs, rubs, or gallops  ABDOMEN: Soft, non-tender, non-distended; normal bowel sounds, no organomegaly  EXTREMITIES:  No bleeding from L wrist, but bounding pulse present. 2+ peripheral pulses b/l, No edema b/l  NEUROLOGY: A&O x 3, no focal deficits  SKIN: No rashes or lesions    LABS:                        8.7    7.05  )-----------( 285      ( 02 Sep 2020 07:12 )             27.6     09-02    142  |  107  |  32<H>  ----------------------------<  73  3.7   |  20<L>  |  4.96<H>    Ca    8.8      02 Sep 2020 07:12  Phos  5.3     09-02  Mg     1.9     09-02    TPro  6.8  /  Alb  3.6  /  TBili  0.2  /  DBili  x   /  AST  21  /  ALT  8<L>  /  AlkPhos  107  09-01                RADIOLOGY & ADDITIONAL TESTS:  Results Reviewed:   Imaging Personally Reviewed:  Electrocardiogram Personally Reviewed:    COORDINATION OF CARE:  Care Discussed with Consultants/Other Providers [Y/N]:  Prior or Outpatient Records Reviewed [Y/N]:

## 2020-09-16 ENCOUNTER — OFFICE VISIT (OUTPATIENT)
Dept: ORTHOPEDIC SURGERY | Age: 35
End: 2020-09-16

## 2020-09-16 VITALS
TEMPERATURE: 97.3 F | BODY MASS INDEX: 30.52 KG/M2 | OXYGEN SATURATION: 99 % | DIASTOLIC BLOOD PRESSURE: 93 MMHG | HEART RATE: 79 BPM | HEIGHT: 59 IN | WEIGHT: 151.4 LBS | SYSTOLIC BLOOD PRESSURE: 166 MMHG

## 2020-09-16 DIAGNOSIS — M72.2 PLANTAR FASCIITIS, BILATERAL: Primary | ICD-10-CM

## 2020-09-16 NOTE — PATIENT INSTRUCTIONS
You have been provided with an order for durable medical equipment that you may  at an outside facility as our office does not carry the equipment you need. You may pick it up at any medical supply company you like.  Listed below are a few different locations for your convenience:    [address] [place] [address] Rd, [address]  Phone: [phone]    [employer] medial arch support, visco elastic, bilateral

## 2020-09-21 ENCOUNTER — HOSPITAL ENCOUNTER (OUTPATIENT)
Age: 35
Setting detail: OBSERVATION
Discharge: HOME | End: 2020-09-22
Attending: FAMILY MEDICINE | Admitting: INTERNAL MEDICINE
Payer: MEDICARE

## 2020-09-21 ENCOUNTER — APPOINTMENT (OUTPATIENT)
Dept: GENERAL RADIOLOGY | Age: 35
End: 2020-09-21
Attending: FAMILY MEDICINE
Payer: MEDICARE

## 2020-09-21 ENCOUNTER — TELEPHONE (OUTPATIENT)
Dept: FAMILY MEDICINE CLINIC | Age: 35
End: 2020-09-21

## 2020-09-21 DIAGNOSIS — E11.8 ACUTE DM TYPE 2 CAUSING COMPLICATION (HCC): ICD-10-CM

## 2020-09-21 DIAGNOSIS — R07.89 COSTOCHONDRAL CHEST PAIN: ICD-10-CM

## 2020-09-21 DIAGNOSIS — G89.29 CHRONIC BILATERAL LOW BACK PAIN WITH LEFT-SIDED SCIATICA: ICD-10-CM

## 2020-09-21 DIAGNOSIS — R07.9 CHEST PAIN, UNSPECIFIED TYPE: Primary | ICD-10-CM

## 2020-09-21 DIAGNOSIS — M54.42 CHRONIC BILATERAL LOW BACK PAIN WITH LEFT-SIDED SCIATICA: ICD-10-CM

## 2020-09-21 DIAGNOSIS — M54.2 NECK PAIN: ICD-10-CM

## 2020-09-21 PROBLEM — E66.9 DIABETES MELLITUS TYPE 2 IN OBESE (HCC): Status: ACTIVE | Noted: 2020-09-21

## 2020-09-21 PROBLEM — M54.50 LOW BACK PAIN: Status: ACTIVE | Noted: 2020-09-21

## 2020-09-21 PROBLEM — R53.1 WEAKNESS: Status: ACTIVE | Noted: 2019-04-24

## 2020-09-21 PROBLEM — I10 HYPERTENSION: Status: ACTIVE | Noted: 2020-09-21

## 2020-09-21 PROBLEM — E55.9 VITAMIN D DEFICIENCY: Status: ACTIVE | Noted: 2020-09-21

## 2020-09-21 PROBLEM — E11.9 DIABETES MELLITUS WITHOUT COMPLICATION (HCC): Status: ACTIVE | Noted: 2020-09-21

## 2020-09-21 PROBLEM — E11.69 DIABETES MELLITUS TYPE 2 IN OBESE (HCC): Status: ACTIVE | Noted: 2020-09-21

## 2020-09-21 PROBLEM — E11.9 NEW ONSET TYPE 2 DIABETES MELLITUS (HCC): Status: ACTIVE | Noted: 2020-09-21

## 2020-09-21 LAB
ALBUMIN SERPL-MCNC: 5.4 G/DL (ref 3.5–4.7)
ALBUMIN/GLOB SERPL: 1.2 {RATIO}
ALP SERPL-CCNC: 153 U/L (ref 38–126)
ALT SERPL-CCNC: 26 U/L (ref 3–52)
ANION GAP SERPL CALC-SCNC: 14 MMOL/L
APPEARANCE UR: CLEAR
AST SERPL W P-5'-P-CCNC: 22 U/L (ref 14–74)
BACTERIA URNS QL MICRO: NEGATIVE /HPF
BASOPHILS # BLD: 0 K/UL (ref 0–0.1)
BASOPHILS NFR BLD: 0 % (ref 0–2)
BILIRUB SERPL-MCNC: 0.6 MG/DL (ref 0.2–1)
BILIRUB UR QL: NEGATIVE
BUN SERPL-MCNC: 12 MG/DL (ref 9–21)
BUN/CREAT SERPL: 13
CA-I BLD-MCNC: 10.5 MG/DL (ref 8.5–10.5)
CHLORIDE SERPL-SCNC: 96 MMOL/L (ref 94–111)
CO2 SERPL-SCNC: 23 MMOL/L (ref 21–33)
COLOR UR: ABNORMAL
CREAT SERPL-MCNC: 0.9 MG/DL (ref 0.7–1.2)
EOSINOPHIL # BLD: 0.1 K/UL (ref 0–0.4)
EOSINOPHIL NFR BLD: 1 % (ref 0–5)
EPITH CASTS URNS QL MICRO: ABNORMAL /LPF (ref 0–20)
ERYTHROCYTE [DISTWIDTH] IN BLOOD BY AUTOMATED COUNT: 13.4 % (ref 11.6–14.5)
GLOBULIN SER CALC-MCNC: 4.4 G/DL
GLUCOSE BLD STRIP.AUTO-MCNC: 381 MG/DL (ref 70–110)
GLUCOSE BLD STRIP.AUTO-MCNC: >600 MG/DL (ref 70–110)
GLUCOSE SERPL-MCNC: 607 MG/DL (ref 70–110)
GLUCOSE UR STRIP.AUTO-MCNC: >1000 MG/DL
HCT VFR BLD AUTO: 44.2 % (ref 35–45)
HGB BLD-MCNC: 15 % (ref 12–16)
HGB UR QL STRIP: ABNORMAL
IMM GRANULOCYTES # BLD AUTO: 0 K/UL
IMM GRANULOCYTES NFR BLD AUTO: 0 %
KETONES UR QL STRIP.AUTO: NEGATIVE MG/DL
LEUKOCYTE ESTERASE UR QL STRIP.AUTO: NEGATIVE
LYMPHOCYTES # BLD: 2.4 K/UL (ref 0.9–3.6)
LYMPHOCYTES NFR BLD: 34 % (ref 21–52)
MAGNESIUM SERPL-MCNC: 2.3 MG/DL (ref 1.7–2.8)
MCH RBC QN AUTO: 27.9 PG (ref 24–34)
MCHC RBC AUTO-ENTMCNC: 33.9 G/DL (ref 31–37)
MCV RBC AUTO: 82.3 FL (ref 74–97)
MONOCYTES # BLD: 0.4 K/UL (ref 0.05–1.2)
MONOCYTES NFR BLD: 6 % (ref 3–10)
NEUTS SEG # BLD: 4.1 K/UL (ref 1.8–8)
NEUTS SEG NFR BLD: 59 % (ref 40–73)
NITRITE UR QL STRIP.AUTO: NEGATIVE
PERFORMED BY, TECHID: ABNORMAL
PERFORMED BY, TECHID: ABNORMAL
PH UR STRIP: 6 [PH] (ref 5–9)
PLATELET # BLD AUTO: 199 K/UL (ref 135–420)
PMV BLD AUTO: 12.1 FL
POTASSIUM SERPL-SCNC: 4.2 MMOL/L (ref 3.2–5.1)
PROT SERPL-MCNC: 9.8 G/DL (ref 6.1–8.4)
PROT UR STRIP-MCNC: 15 MG/DL
RBC # BLD AUTO: 5.37 M/UL (ref 4.2–5.3)
RBC #/AREA URNS HPF: ABNORMAL /HPF (ref 0–2)
SODIUM SERPL-SCNC: 133 MMOL/L (ref 135–145)
SP GR UR REFRACTOMETRY: 1.01 (ref 1–1.03)
TROPONIN I SERPL-MCNC: <0.02 NG/ML (ref 0.02–0.05)
UROBILINOGEN UR QL STRIP.AUTO: 0.2 EU/DL (ref 0.2–1)
WBC # BLD AUTO: 7 K/UL (ref 4.6–13.2)
WBC URNS QL MICRO: ABNORMAL /HPF (ref 0–4)

## 2020-09-21 PROCEDURE — 93005 ELECTROCARDIOGRAM TRACING: CPT

## 2020-09-21 PROCEDURE — 81001 URINALYSIS AUTO W/SCOPE: CPT

## 2020-09-21 PROCEDURE — 80053 COMPREHEN METABOLIC PANEL: CPT

## 2020-09-21 PROCEDURE — 99218 HC RM OBSERVATION: CPT

## 2020-09-21 PROCEDURE — 99285 EMERGENCY DEPT VISIT HI MDM: CPT

## 2020-09-21 PROCEDURE — 71045 X-RAY EXAM CHEST 1 VIEW: CPT

## 2020-09-21 PROCEDURE — 36415 COLL VENOUS BLD VENIPUNCTURE: CPT

## 2020-09-21 PROCEDURE — 96375 TX/PRO/DX INJ NEW DRUG ADDON: CPT

## 2020-09-21 PROCEDURE — 84484 ASSAY OF TROPONIN QUANT: CPT

## 2020-09-21 PROCEDURE — 82962 GLUCOSE BLOOD TEST: CPT

## 2020-09-21 PROCEDURE — 96361 HYDRATE IV INFUSION ADD-ON: CPT

## 2020-09-21 PROCEDURE — 96374 THER/PROPH/DIAG INJ IV PUSH: CPT

## 2020-09-21 PROCEDURE — 83735 ASSAY OF MAGNESIUM: CPT

## 2020-09-21 PROCEDURE — 74011636637 HC RX REV CODE- 636/637: Performed by: FAMILY MEDICINE

## 2020-09-21 PROCEDURE — 85025 COMPLETE CBC W/AUTO DIFF WBC: CPT

## 2020-09-21 PROCEDURE — 74011250636 HC RX REV CODE- 250/636: Performed by: EMERGENCY MEDICINE

## 2020-09-21 RX ORDER — KETOROLAC TROMETHAMINE 30 MG/ML
30 INJECTION, SOLUTION INTRAMUSCULAR; INTRAVENOUS
Status: COMPLETED | OUTPATIENT
Start: 2020-09-21 | End: 2020-09-21

## 2020-09-21 RX ORDER — IBUPROFEN 400 MG/1
400 TABLET ORAL
Status: DISCONTINUED | OUTPATIENT
Start: 2020-09-21 | End: 2020-09-22

## 2020-09-21 RX ORDER — CETIRIZINE HCL 10 MG
10 TABLET ORAL DAILY
Status: CANCELLED | OUTPATIENT
Start: 2020-09-22

## 2020-09-21 RX ORDER — POTASSIUM CHLORIDE AND SODIUM CHLORIDE 450; 150 MG/100ML; MG/100ML
INJECTION, SOLUTION INTRAVENOUS CONTINUOUS
Status: DISCONTINUED | OUTPATIENT
Start: 2020-09-21 | End: 2020-09-21

## 2020-09-21 RX ORDER — SODIUM CHLORIDE 9 MG/ML
150 INJECTION, SOLUTION INTRAVENOUS CONTINUOUS
Status: DISCONTINUED | OUTPATIENT
Start: 2020-09-21 | End: 2020-09-21

## 2020-09-21 RX ORDER — INSULIN GLARGINE 100 [IU]/ML
10 INJECTION, SOLUTION SUBCUTANEOUS
Status: DISCONTINUED | OUTPATIENT
Start: 2020-09-21 | End: 2020-09-22 | Stop reason: HOSPADM

## 2020-09-21 RX ORDER — MONTELUKAST SODIUM 10 MG/1
1 TABLET ORAL
COMMUNITY
Start: 2020-07-02

## 2020-09-21 RX ORDER — MONTELUKAST SODIUM 10 MG/1
10 TABLET ORAL
Status: CANCELLED | OUTPATIENT
Start: 2020-09-21

## 2020-09-21 RX ORDER — DIAZEPAM 10 MG/2ML
2 INJECTION INTRAMUSCULAR
Status: COMPLETED | OUTPATIENT
Start: 2020-09-21 | End: 2020-09-21

## 2020-09-21 RX ORDER — METFORMIN HYDROCHLORIDE 500 MG/1
500 TABLET ORAL 2 TIMES DAILY WITH MEALS
Status: DISCONTINUED | OUTPATIENT
Start: 2020-09-21 | End: 2020-09-22 | Stop reason: HOSPADM

## 2020-09-21 RX ORDER — ALBUTEROL SULFATE 90 UG/1
2 AEROSOL, METERED RESPIRATORY (INHALATION)
Status: CANCELLED | OUTPATIENT
Start: 2020-09-21

## 2020-09-21 RX ORDER — ATENOLOL 25 MG/1
25 TABLET ORAL DAILY
Status: CANCELLED | OUTPATIENT
Start: 2020-09-22

## 2020-09-21 RX ORDER — ENOXAPARIN SODIUM 100 MG/ML
40 INJECTION SUBCUTANEOUS EVERY 24 HOURS
Status: DISCONTINUED | OUTPATIENT
Start: 2020-09-21 | End: 2020-09-22 | Stop reason: HOSPADM

## 2020-09-21 RX ORDER — POTASSIUM CHLORIDE AND SODIUM CHLORIDE 450; 150 MG/100ML; MG/100ML
INJECTION, SOLUTION INTRAVENOUS CONTINUOUS
Status: DISCONTINUED | OUTPATIENT
Start: 2020-09-21 | End: 2020-09-22 | Stop reason: HOSPADM

## 2020-09-21 RX ORDER — MAGNESIUM SULFATE 100 %
4 CRYSTALS MISCELLANEOUS AS NEEDED
Status: DISCONTINUED | OUTPATIENT
Start: 2020-09-21 | End: 2020-09-22 | Stop reason: HOSPADM

## 2020-09-21 RX ORDER — NITROGLYCERIN 0.4 MG/1
0.4 TABLET SUBLINGUAL AS NEEDED
Status: DISCONTINUED | OUTPATIENT
Start: 2020-09-21 | End: 2020-09-22 | Stop reason: HOSPADM

## 2020-09-21 RX ORDER — GUAIFENESIN 100 MG/5ML
81 LIQUID (ML) ORAL DAILY
Status: DISCONTINUED | OUTPATIENT
Start: 2020-09-22 | End: 2020-09-22 | Stop reason: HOSPADM

## 2020-09-21 RX ORDER — OLMESARTAN MEDOXOMIL 40 MG/1
1 TABLET ORAL DAILY
COMMUNITY
Start: 2020-08-16 | End: 2020-11-11

## 2020-09-21 RX ORDER — DEXTROSE 50 % IN WATER (D50W) INTRAVENOUS SYRINGE
25-50 AS NEEDED
Status: DISCONTINUED | OUTPATIENT
Start: 2020-09-21 | End: 2020-09-22 | Stop reason: HOSPADM

## 2020-09-21 RX ORDER — INSULIN LISPRO 100 [IU]/ML
INJECTION, SOLUTION INTRAVENOUS; SUBCUTANEOUS
Status: DISCONTINUED | OUTPATIENT
Start: 2020-09-22 | End: 2020-09-22 | Stop reason: HOSPADM

## 2020-09-21 RX ORDER — PANTOPRAZOLE SODIUM 40 MG/1
40 TABLET, DELAYED RELEASE ORAL
Status: CANCELLED | OUTPATIENT
Start: 2020-09-22

## 2020-09-21 RX ADMIN — DIAZEPAM 2 MG: 5 INJECTION, SOLUTION INTRAMUSCULAR; INTRAVENOUS at 21:22

## 2020-09-21 RX ADMIN — KETOROLAC TROMETHAMINE 30 MG: 30 INJECTION, SOLUTION INTRAMUSCULAR at 21:22

## 2020-09-21 RX ADMIN — SODIUM CHLORIDE 1000 ML: 9 INJECTION, SOLUTION INTRAVENOUS at 20:36

## 2020-09-21 RX ADMIN — INSULIN HUMAN 10 UNITS: 100 INJECTION, SOLUTION PARENTERAL at 20:36

## 2020-09-21 NOTE — TELEPHONE ENCOUNTER
Spoke with patient at this time. Advised patient to seek ER at this time. Patient complain of chest pain,Blurred vision,elevated BP AND elevated blood glucose. Patient will seek ER at this time. Patient has an follow up on Monday

## 2020-09-21 NOTE — ED NOTES
Bedside and Verbal shift change report given to Isiah Almonte (oncoming nurse) by Carly Gupta (offgoing nurse). Report included the following information SBAR, ED Summary, MAR and Recent Results.

## 2020-09-21 NOTE — ED PROVIDER NOTES
EMERGENCY DEPARTMENT HISTORY AND PHYSICAL EXAM      Date: 9/21/2020  Patient Name: Melo Walls    History of Presenting Illness     Chief Complaint   Patient presents with    High Blood Sugar    Chest Pain         History Provided By: Patient    Additional History (Context):     6:22 PM    Sharquita Elfredia Severe is a 28 y.o. female with pertinent PMHx of IBS, GERD, asthma, HTN, and DM (non insulin dependent) presenting ambulatory to the ED c/o non-radiating chest pain x 2-3 days. She describes her chest pain as constant, worsening pressure with occasional sharp pains, exacerbated with movement. Pain is primarily mid-sternal and under the left breast, currently rated 8/10. Pt reports taking ASA 81 mg 2-3 hours ago without improvement of pain. States she was seen by cardiology twice previously for similar pain, with no acute findings. Pt also notes that her blood glucose levels have been elevated for the past 2-3 days, states her glucometer reads \"high\". She notes associated increased urine output beginning 3 days ago and blurry vision beginning yesterday. Denies SOB. PCP: Alexander Staples MD    There are no other complaints, changes, or physical findings at this time. Current Facility-Administered Medications   Medication Dose Route Frequency Provider Last Rate Last Dose    0.9% sodium chloride infusion  150 mL/hr IntraVENous CONTINUOUS Amparo Stahl MD         Current Outpatient Medications   Medication Sig Dispense Refill    montelukast (SINGULAIR) 10 mg tablet Take 1 Tab by mouth nightly.  olmesartan (BENICAR) 40 mg tablet Take 1 Tab by mouth daily.  valACYclovir (VALTREX) 500 mg tablet TAKE 1 TABLET BY MOUTH TWICE A DAY FOR 5 DAYS      indomethacin (INDOCIN) 50 mg capsule Take 1 Cap by mouth two (2) times daily as needed for Pain.  60 Cap 2    atenoloL (TENORMIN) 25 mg tablet TAKE 1 TABLET BY MOUTH EVERY DAY 90 Tab 1    lancets misc Check blood glucose daily 100 Each 3    glucose blood VI test strips (ASCENSIA AUTODISC VI, ONE TOUCH ULTRA TEST VI) strip Check blood glucose daily 100 Strip 3    ANORO ELLIPTA 62.5-25 mcg/actuation inhaler 1 INH DAILY - USE EVERY DAY  6    AIMOVIG AUTOINJECTOR 70 mg/mL injection AS DIRECTED - 1 INJECTION SUBQ ONCE MONTHLY  3    cetirizine (ZYRTEC) 10 mg tablet TAKE 1 TABLET EVERY DAY  0    rizatriptan (MAXALT-MLT) 10 mg disintegrating tablet PLEASE SEE ATTACHED FOR DETAILED DIRECTIONS  0    mometasone (NASONEX) 50 mcg/actuation nasal spray USE 1-2 SPRAYS INTO EACH NOSTRIL EVERY DAY AS NEEDED  0    FERRETTS IPS 40 mg/15 mL liqd 15 ML ORALLY 2 TIMES PER DAY. 2    EPINEPHrine (EPIPEN) 0.3 mg/0.3 mL injection AS DIRECTED AS NEEDED INTRAMUSCULAR 1 DAYS  5    albuterol (PROVENTIL HFA, VENTOLIN HFA, PROAIR HFA) 90 mcg/actuation inhaler Take 2 Puffs by inhalation every four (4) hours as needed for Wheezing or Shortness of Breath. 1 Inhaler 0    esomeprazole (NEXIUM) 40 mg capsule Take  by mouth daily.  POLYETHYLENE GLYCOL 3350 (MIRALAX PO) Take  by mouth.  CALCIUM CARBONATE/MAG HYDROX (MYLANTA PO) Take  by mouth.          Past History     Past Medical History:  Past Medical History:   Diagnosis Date    Asthma     Chronic constipation     Diabetes ([address]     Fibromyalgia     GERD (gastroesophageal reflux disease)     Hypertension     IBS (irritable bowel syndrome)     Migraines     unknown if aura    Psychiatric disorder     she denies any diagnosis despite being on antipsychotics, SSRIs, etc in the past    Scoliosis     Urinary incontinence     mixed       Past Surgical History:  Past Surgical History:   Procedure Laterality Date    HX  SECTION  ,     HX COLONOSCOPY      HX DILATION AND CURETTAGE      HX ENDOSCOPY      HX PELVIC LAPAROSCOPY         Family History:  Family History   Problem Relation Age of Onset    Kidney Disease Father     Other Father         Pancreatic Disease    Cancer Father    [facility] Heart Disease Father     Heart Attack Father     Thyroid Disease Other         Grandparent       Social History:  Social History     Tobacco Use    Smoking status: Never Smoker    Smokeless tobacco: Never Used   Substance Use Topics    Alcohol use: No    Drug use: No       Allergies: Allergies   Allergen Reactions    Iodine Hives and Nausea and Vomiting    Iodinated Contrast Media Hives    Oxycodone-Acetaminophen Other (comments)     Hallucinations     Prochlorperazine Other (comments)    Propoxyphene-Acetaminophen Unknown (comments)     Allergy to propoxyphene only. Has previously tolerated acetaminophen    Reglan [Metoclopramide] Not Reported This Time    Sulfa (Sulfonamide Antibiotics) Not Reported This Time    Wygesic Not Reported This Time         Review of Systems   Review of Systems   Constitutional: Negative for chills, diaphoresis and fever. HENT: Negative for ear pain, rhinorrhea and sore throat. Eyes: Positive for visual disturbance (blurry). Negative for pain and redness. Respiratory: Negative for cough, shortness of breath and wheezing. Cardiovascular: Positive for chest pain. Negative for palpitations and leg swelling. Gastrointestinal: Negative for abdominal pain, diarrhea, nausea and vomiting. Endocrine: Positive for polyuria. Positive for high BGL. Genitourinary: Negative for dysuria, frequency and hematuria. Musculoskeletal: Negative for arthralgias and myalgias. Skin: Negative for color change and rash. Neurological: Negative for weakness, numbness and headaches. Physical Exam     Vitals:    09/21/20 1830 09/21/20 1900 09/21/20 1930 09/21/20 2000   BP: (!) 157/89 (!) 144/87 135/77 137/79   Pulse: 86 81 83 74   Resp: 17 17 18 16   Temp:       SpO2: 99% 99% 100% 99%   Weight:       Height:         Physical Exam  Vitals signs and nursing note reviewed. Constitutional:       General: She is not in acute distress. Appearance: She is overweight. She is not diaphoretic. HENT:      Head: Normocephalic and atraumatic. Right Ear: External ear normal.      Left Ear: External ear normal.      Mouth/Throat:      Pharynx: No oropharyngeal exudate. Eyes:      General: No scleral icterus. Conjunctiva/sclera: Conjunctivae normal.      Pupils: Pupils are equal, round, and reactive to light. Neck:      Musculoskeletal: Normal range of motion and neck supple. Thyroid: No thyromegaly. Vascular: No JVD. Trachea: No tracheal deviation. Cardiovascular:      Rate and Rhythm: Normal rate and regular rhythm. Heart sounds: Normal heart sounds. Pulmonary:      Effort: Pulmonary effort is normal. No respiratory distress. Breath sounds: Normal breath sounds. No stridor. Abdominal:      General: Bowel sounds are normal. There is no distension. Palpations: Abdomen is soft. Tenderness: There is no abdominal tenderness. There is no guarding or rebound. Musculoskeletal: Normal range of motion. General: No tenderness. Lymphadenopathy:      Cervical: No cervical adenopathy. Skin:     General: Skin is warm and dry. Findings: No erythema or rash. Neurological:      Mental Status: She is alert and oriented to person, place, and time. Cranial Nerves: No cranial nerve deficit. Coordination: Coordination normal.   Psychiatric:         Behavior: Behavior normal.         Thought Content:  Thought content normal.         Judgment: Judgment normal.         Diagnostic Study Results     Labs -     Recent Results (from the past 12 hour(s))   URINALYSIS W/ RFLX MICROSCOPIC    Collection Time: 09/21/20  6:30 PM   Result Value Ref Range    Color Yellow/Straw      Appearance Clear Clear      Specific gravity 1.010 1.003 - 1.035      pH (UA) 6.0 5.0 - 9.0      Protein 15 (A) Negative mg/dL    Glucose >1,000 (A) Negative mg/dL    Ketone Negative Negative mg/dL    Bilirubin Negative Negative      Blood Trace (A) Negative Urobilinogen 0.2 0.2 - 1.0 EU/dL    Nitrites Negative Negative      Leukocyte Esterase Negative Negative     URINE MICROSCOPIC    Collection Time: 09/21/20  6:30 PM   Result Value Ref Range    WBC 0-4 0 - 4 /hpf    RBC 0-5 0 - 2 /hpf    Epithelial cells Few 0 - 20 /lpf    Bacteria Negative (A) None /hpf   GLUCOSE, POC    Collection Time: 09/21/20  6:33 PM   Result Value Ref Range    Glucose (POC) >600 (HH) 70 - 110 mg/dL    Performed by [facility]    CBC WITH AUTOMATED DIFF    Collection Time: 09/21/20  7:15 PM   Result Value Ref Range    WBC 7.0 4.6 - 13.2 K/uL    RBC 5.37 (H) 4.20 - 5.30 M/uL    HGB 15.0 12.0 - 16.0 %    HCT 44.2 35.0 - 45.0 %    MCV 82.3 74.0 - 97.0 FL    MCH 27.9 24.0 - 34.0 PG    MCHC 33.9 31.0 - 37.0 g/dL    RDW 13.4 11.6 - 14.5 %    PLATELET 899 172 - 565 K/uL    MPV 12.1 FL    NEUTROPHILS 59 40 - 73 %    LYMPHOCYTES 34 21 - 52 %    MONOCYTES 6 3 - 10 %    EOSINOPHILS 1 0 - 5 %    BASOPHILS 0 0 - 2 %    IMMATURE GRANULOCYTES 0 %    ABS. NEUTROPHILS 4.1 1.8 - 8.0 K/UL    ABS. LYMPHOCYTES 2.4 0.9 - 3.6 K/UL    ABS. MONOCYTES 0.4 0.05 - 1.2 K/UL    ABS. EOSINOPHILS 0.1 0.0 - 0.4 K/UL    ABS. BASOPHILS 0.0 0.0 - 0.1 K/UL    ABS. IMM. GRANS. 0.0 K/UL   METABOLIC PANEL, COMPREHENSIVE    Collection Time: 09/21/20  7:15 PM   Result Value Ref Range    Sodium 133 (L) 135 - 145 mmol/L    Potassium 4.2 3.2 - 5.1 mmol/L    Chloride 96 94 - 111 mmol/L    CO2 23 21 - 33 mmol/L    Anion gap 14 mmol/L    Glucose 607 (HH) 70 - 110 mg/dL    BUN 12 9 - 21 mg/dL    Creatinine 0.90 0.70 - 1.20 mg/dL    BUN/Creatinine ratio 13      GFR est AA >60 ml/min/1.73m2    GFR est non-AA >60 ml/min/1.73m2    Calcium 10.5 8.5 - 10.5 mg/dL    Bilirubin, total 0.6 0.2 - 1.0 mg/dL    AST (SGOT) 22 14 - 74 U/L    ALT (SGPT) 26 3 - 52 U/L    Alk.  phosphatase 153 (H) 38 - 126 U/L    Protein, total 9.8 (H) 6.1 - 8.4 g/dL    Albumin 5.4 (H) 3.5 - 4.7 g/dL    Globulin 4.4 g/dL    A-G Ratio 1.2     MAGNESIUM    Collection Time: 09/21/20  7:15 PM   Result Value Ref Range    Magnesium 2.3 1.7 - 2.8 mg/dL   TROPONIN I    Collection Time: 09/21/20  7:15 PM   Result Value Ref Range    Troponin-I, Qt. <0.02 (L) 0.02 - 0.05 ng/mL   GLUCOSE, POC    Collection Time: 09/21/20  9:25 PM   Result Value Ref Range    Glucose (POC) 381 (H) 70 - 110 mg/dL    Performed by [facility], [facility]        Radiologic Studies -   XR CHEST PORT    (Results Pending)     CT Results  (Last 48 hours)    None        CXR Results  (Last 48 hours)    None            Medical Decision Making   I am the first provider for this patient. I reviewed the vital signs, available nursing notes, past medical history, past surgical history, family history and social history. Vital Signs-Reviewed the patient's vital signs. EKG interpretation: (Preliminary)  NSR. Rate 79 bpm. Borderline T abnormalities, inferior leads. QTc 416 ms. EKG read by Evans Lawson MD at 6:13 PM     Records Reviewed: Nursing Notes, Old Medical Records, Previous electrocardiograms and Previous Laboratory Studies    Provider Notes (Medical Decision Making): PROCEDURES:  Procedures    MEDICATIONS GIVEN IN THE ED:  Medications   0.9% sodium chloride infusion (has no administration in time range)   insulin regular (NOVOLIN R, HUMULIN R) injection 10 Units (10 Units IntraVENous Given 9/21/20 2036)   sodium chloride 0.9 % bolus infusion 1,000 mL (1,000 mL IntraVENous New Bag 9/21/20 2036)   diazePAM (VALIUM) injection 2 mg (2 mg IntraVENous Given 9/21/20 2122)   ketorolac (TORADOL) injection 30 mg (30 mg IntraVENous Given 9/21/20 2122)        ED COURSE:   6:22 PM   Initial assessment performed. Diagnosis and Disposition       . CLINICAL IMPRESSION:  1. Chest pain, unspecified type    2. Acute DM type 2 causing complication (HCC)        PLAN: Admit          _____________    Attestations:   This note is prepared by Jaren Slater, acting as Scribe for Evans Lawson MD.    Evans Lawson MD:  The doug's documentation has been prepared under my direction and personally reviewed by me in its entirety.  I confirm that the note above accurately reflects all work, treatment, procedures, and medical decision making performed by me.  _______________________________

## 2020-09-22 VITALS
WEIGHT: 161 LBS | RESPIRATION RATE: 18 BRPM | HEART RATE: 86 BPM | TEMPERATURE: 97.9 F | BODY MASS INDEX: 32.46 KG/M2 | OXYGEN SATURATION: 98 % | DIASTOLIC BLOOD PRESSURE: 86 MMHG | SYSTOLIC BLOOD PRESSURE: 134 MMHG | HEIGHT: 59 IN

## 2020-09-22 PROBLEM — R07.89 COSTOCHONDRAL CHEST PAIN: Status: ACTIVE | Noted: 2020-09-22

## 2020-09-22 LAB
ANION GAP SERPL CALC-SCNC: 9 MMOL/L
ATRIAL RATE: 79 BPM
BUN SERPL-MCNC: 13 MG/DL (ref 9–21)
BUN/CREAT SERPL: 16
CA-I BLD-MCNC: 8.9 MG/DL (ref 8.5–10.5)
CALCULATED P AXIS, ECG09: 71 DEGREES
CALCULATED R AXIS, ECG10: 2 DEGREES
CALCULATED T AXIS, ECG11: -15 DEGREES
CHLORIDE SERPL-SCNC: 105 MMOL/L (ref 94–111)
CO2 SERPL-SCNC: 20 MMOL/L (ref 21–33)
CREAT SERPL-MCNC: 0.8 MG/DL (ref 0.7–1.2)
DIAGNOSIS, 93000: NORMAL
GLUCOSE BLD STRIP.AUTO-MCNC: 294 MG/DL (ref 70–110)
GLUCOSE BLD STRIP.AUTO-MCNC: 378 MG/DL (ref 70–110)
GLUCOSE BLD STRIP.AUTO-MCNC: 385 MG/DL (ref 70–110)
GLUCOSE SERPL-MCNC: 396 MG/DL (ref 70–110)
LIPASE SERPL-CCNC: 24 U/L (ref 10–57)
P-R INTERVAL, ECG05: 165 MS
PERFORMED BY, TECHID: ABNORMAL
POTASSIUM SERPL-SCNC: 4 MMOL/L (ref 3.2–5.1)
Q-T INTERVAL, ECG07: 362 MS
QRS DURATION, ECG06: 82 MS
QTC CALCULATION (BEZET), ECG08: 416 MS
SODIUM SERPL-SCNC: 134 MMOL/L (ref 135–145)
TROPONIN I SERPL-MCNC: <0.02 NG/ML (ref 0.02–0.05)
TROPONIN I SERPL-MCNC: <0.02 NG/ML (ref 0.02–0.05)
VENTRICULAR RATE, ECG03: 79 BPM

## 2020-09-22 PROCEDURE — 82962 GLUCOSE BLOOD TEST: CPT

## 2020-09-22 PROCEDURE — 74011250637 HC RX REV CODE- 250/637: Performed by: NURSE PRACTITIONER

## 2020-09-22 PROCEDURE — 84484 ASSAY OF TROPONIN QUANT: CPT

## 2020-09-22 PROCEDURE — 96372 THER/PROPH/DIAG INJ SC/IM: CPT

## 2020-09-22 PROCEDURE — 74011250636 HC RX REV CODE- 250/636: Performed by: NURSE PRACTITIONER

## 2020-09-22 PROCEDURE — 80061 LIPID PANEL: CPT

## 2020-09-22 PROCEDURE — 83690 ASSAY OF LIPASE: CPT

## 2020-09-22 PROCEDURE — 36415 COLL VENOUS BLD VENIPUNCTURE: CPT

## 2020-09-22 PROCEDURE — 99218 HC RM OBSERVATION: CPT

## 2020-09-22 PROCEDURE — 83036 HEMOGLOBIN GLYCOSYLATED A1C: CPT

## 2020-09-22 PROCEDURE — 74011636637 HC RX REV CODE- 636/637: Performed by: NURSE PRACTITIONER

## 2020-09-22 PROCEDURE — 80048 BASIC METABOLIC PNL TOTAL CA: CPT

## 2020-09-22 RX ORDER — METFORMIN HYDROCHLORIDE 500 MG/1
500 TABLET ORAL 2 TIMES DAILY WITH MEALS
Qty: 60 TAB | Refills: 0 | Status: SHIPPED | OUTPATIENT
Start: 2020-09-22 | End: 2020-09-24 | Stop reason: ALTCHOICE

## 2020-09-22 RX ORDER — ESOMEPRAZOLE MAGNESIUM 40 MG/1
40 CAPSULE, DELAYED RELEASE ORAL
Status: DISCONTINUED | OUTPATIENT
Start: 2020-09-23 | End: 2020-09-22 | Stop reason: HOSPADM

## 2020-09-22 RX ORDER — GUAIFENESIN 100 MG/5ML
81 LIQUID (ML) ORAL DAILY
Qty: 30 TAB | Refills: 0 | Status: SHIPPED | OUTPATIENT
Start: 2020-09-23 | End: 2020-10-13

## 2020-09-22 RX ORDER — GABAPENTIN 100 MG/1
100 CAPSULE ORAL 3 TIMES DAILY
Status: DISCONTINUED | OUTPATIENT
Start: 2020-09-22 | End: 2020-09-22 | Stop reason: HOSPADM

## 2020-09-22 RX ORDER — GABAPENTIN 100 MG/1
100 CAPSULE ORAL 3 TIMES DAILY
Qty: 90 CAP | Refills: 0 | Status: SHIPPED | OUTPATIENT
Start: 2020-09-22 | End: 2020-11-02

## 2020-09-22 RX ORDER — CELECOXIB 200 MG/1
200 CAPSULE ORAL 2 TIMES DAILY WITH MEALS
Status: DISCONTINUED | OUTPATIENT
Start: 2020-09-22 | End: 2020-09-22 | Stop reason: HOSPADM

## 2020-09-22 RX ADMIN — INSULIN LISPRO 10 UNITS: 100 INJECTION, SOLUTION INTRAVENOUS; SUBCUTANEOUS at 08:10

## 2020-09-22 RX ADMIN — INSULIN LISPRO 10 UNITS: 100 INJECTION, SOLUTION INTRAVENOUS; SUBCUTANEOUS at 12:07

## 2020-09-22 RX ADMIN — POTASSIUM CHLORIDE AND SODIUM CHLORIDE: 450; 150 INJECTION, SOLUTION INTRAVENOUS at 02:37

## 2020-09-22 RX ADMIN — GABAPENTIN 100 MG: 100 CAPSULE ORAL at 08:10

## 2020-09-22 RX ADMIN — ASPIRIN 81 MG 81 MG: 81 TABLET ORAL at 08:10

## 2020-09-22 RX ADMIN — CELECOXIB 200 MG: 200 CAPSULE ORAL at 08:10

## 2020-09-22 RX ADMIN — METFORMIN HYDROCHLORIDE 500 MG: 500 TABLET ORAL at 08:10

## 2020-09-22 RX ADMIN — NITROGLYCERIN 0.4 MG: 0.4 TABLET, ORALLY DISINTEGRATING SUBLINGUAL at 02:38

## 2020-09-22 RX ADMIN — ENOXAPARIN SODIUM 40 MG: 40 INJECTION SUBCUTANEOUS at 02:37

## 2020-09-22 RX ADMIN — IBUPROFEN 400 MG: 400 TABLET, FILM COATED ORAL at 02:39

## 2020-09-22 RX ADMIN — INSULIN GLARGINE 10 UNITS: 100 INJECTION, SOLUTION SUBCUTANEOUS at 02:38

## 2020-09-22 NOTE — ASSESSMENT & PLAN NOTE
Pt reports anterior bilateral chest wall pain  Pain is reproducible  Most likely related to brassiere  We will start patient on Celebrex 200 mg twice daily with meals  We will start patient on gabapentin 100 mg 3 times daily x2 days  Patient does have a history of fibromyalgia  Cardiology consult placed  Cardiac troponins x3 negative

## 2020-09-22 NOTE — H&P
History and Physical    Patient: Abigail Copeland MRN: [id number]  SSN: [id number]    YOB: 1985  Age: 28 y.o. Sex: female      Subjective:      Abigail Copeland is a 28 y.o. female who presents ambulatory to the ED c/o non-radiating chest pain x 2-3 days. She describes her chest pain as constant, worsening pressure with occasional sharp pains, exacerbated with movement. Pain is primarily mid-sternal and under the both breast, currently rated 8/10. Pt reports taking ASA 81 mg 2-3 hours prior to arrival to the ER without improvement of pain. States she was seen by cardiology twice previously for similar pain, with no acute findings. Pt also notes that her blood glucose levels have been elevated for the past 2-3 days, states her glucometer reads \"high\". She notes associated increased urine output beginning 3 days ago and blurry vision beginning yesterday. Denies SOB. In emergency department patient was noted to have an elevated glucose. She was treated with 10 units of insulin. Originally patient was going to be discharged home. However, she continued to complain of chest pain. Therefore we will bring her in on observational for chest pain and hyperglycemia. Patient reports in the past 2 weeks no complaints of fever, chills, headache, dizziness, shortness of breath, coughing, and/or myalgias.   Therefore, no need for COVID-19 testing.     PCP: Niya Lott MD         Past Medical History:   Diagnosis Date    Asthma     Chronic constipation     Diabetes ([address]     Fibromyalgia     GERD (gastroesophageal reflux disease)     Hypertension     IBS (irritable bowel syndrome)     Migraines     unknown if aura    Psychiatric disorder     she denies any diagnosis despite being on antipsychotics, SSRIs, etc in the past    Scoliosis     Urinary incontinence     mixed     Past Surgical History:   Procedure Laterality Date    HX  SECTION  ,     HX COLONOSCOPY      HX DILATION AND CURETTAGE      HX ENDOSCOPY      HX PELVIC LAPAROSCOPY        Family History   Problem Relation Age of Onset    Kidney Disease Father     Other Father         Pancreatic Disease    Cancer Father     Heart Disease Father     Heart Attack Father     Thyroid Disease Other         Grandparent     Social History     Tobacco Use    Smoking status: Never Smoker    Smokeless tobacco: Never Used   Substance Use Topics    Alcohol use: No      Allergies   Allergen Reactions    Iodine Hives and Nausea and Vomiting    Iodinated Contrast Media Hives    Oxycodone-Acetaminophen Other (comments)     Hallucinations     Prochlorperazine Other (comments)    Propoxyphene-Acetaminophen Unknown (comments)     Allergy to propoxyphene only. Has previously tolerated acetaminophen    Reglan [Metoclopramide] Not Reported This Time    Sulfa (Sulfonamide Antibiotics) Not Reported This Time    Wygesic Not Reported This Time     Immunizations are UTD per pt. Patient will be admitted for Chest pain [R07.9]  Diabetes mellitus type 2 in obese ([facility] [address] [E11.69, E66.9]  Chest pain [R07.9]  New onset type 2 diabetes mellitus ([facility] [address] [E11.9]  to hospitalist service as Observation and evaluated daily via physical assessment, diagnostic testing, and laboratory assessment. Review of Systems:  - CONSTITUTIONAL: Denies  fatigue, weight loss, fever and chills. - HEENT: Denies changes in vision and hearing.    - RESPIRATORY: Denies SOB and cough. - CV: Reports chest pain.    - GI: Denies abdominal pain, nausea, vomiting, diarrhea and constipation.    - : Denies dysuria and urinary frequency. - MSK: Denies myalgia and joint pain. - SKIN: Denies rash and pruritus.    - NEUROLOGICAL: Denies dizziness, weakness, headache and syncope. - PSYCHIATRIC: Denies recent changes in mood. Denies anxiety and depression.       Objective:     Vitals:    09/22/20 0131 09/22/20 0200 09/22/20 0228 09/22/20 0400   BP: 128/82 127/80 127/80    Pulse: 69 71 71 81   Resp: 17  16    Temp:   97.3 °F (36.3 °C)    SpO2: 98%      Weight:       Height:            Physical Exam:  - GENERAL: Alert and oriented x 3. No acute distress. Well-nourished.      - HEENT: EOMI. Anicteric. PERRLA,Moist mucous membranes. No scleral icterus. No cervical lymphadenopathy. Oropharynx moist without any lesions    -NECK: Supple, no tracheal deviation, no JVD, no significant lymphadenopathy, no thyromegaly noted. - LUNGS: Clear to auscultation bilaterally. No accessory muscle use. Chest symmetrical, No wheezing, rales, rhonchi noted. Appropriate respiratory effort.     - CARDIOVASCULAR: Regular rate and rhythm. No murmur, rubs, gallops, No edema appreciated. S1 & S2 audible. Reproducible chest wall pain midsternal and under both breasts. - ABDOMEN: Soft, non-tender and non-distended. No palpable masses. , lesions, hepatomegaly. Bowels active X4 quadrants. - SKIN: Warm, dry, intact, no bruising, lesions, or rashes noted. Color appropriate for ethnicity.     - MUSCULOSKELETAL: Ambulates independently, no deformities, Full ROM     - NEUROLOGIC: No focal neurological deficits. CN II-XII grossly intact, Muscle strength 5/5, both U & L bilateral DTR in lower extremities 2+. - PSYCHIATRIC: Calm & Cooperative. Appropriate mood and affect. Recent Results (from the past 12 hour(s))   CBC WITH AUTOMATED DIFF    Collection Time: 09/21/20  7:15 PM   Result Value Ref Range    WBC 7.0 4.6 - 13.2 K/uL    RBC 5.37 (H) 4.20 - 5.30 M/uL    HGB 15.0 12.0 - 16.0 %    HCT 44.2 35.0 - 45.0 %    MCV 82.3 74.0 - 97.0 FL    MCH 27.9 24.0 - 34.0 PG    MCHC 33.9 31.0 - 37.0 g/dL    RDW 13.4 11.6 - 14.5 %    PLATELET 182 839 - 793 K/uL    MPV 12.1 FL    NEUTROPHILS 59 40 - 73 %    LYMPHOCYTES 34 21 - 52 %    MONOCYTES 6 3 - 10 %    EOSINOPHILS 1 0 - 5 %    BASOPHILS 0 0 - 2 %    IMMATURE GRANULOCYTES 0 %    ABS. NEUTROPHILS 4.1 1.8 - 8.0 K/UL    ABS. LYMPHOCYTES 2.4 0.9 - 3.6 K/UL    ABS. MONOCYTES 0.4 0.05 - 1.2 K/UL    ABS. EOSINOPHILS 0.1 0.0 - 0.4 K/UL    ABS. BASOPHILS 0.0 0.0 - 0.1 K/UL    ABS. IMM. GRANS. 0.0 K/UL   METABOLIC PANEL, COMPREHENSIVE    Collection Time: 09/21/20  7:15 PM   Result Value Ref Range    Sodium 133 (L) 135 - 145 mmol/L    Potassium 4.2 3.2 - 5.1 mmol/L    Chloride 96 94 - 111 mmol/L    CO2 23 21 - 33 mmol/L    Anion gap 14 mmol/L    Glucose 607 (HH) 70 - 110 mg/dL    BUN 12 9 - 21 mg/dL    Creatinine 0.90 0.70 - 1.20 mg/dL    BUN/Creatinine ratio 13      GFR est AA >60 ml/min/1.73m2    GFR est non-AA >60 ml/min/1.73m2    Calcium 10.5 8.5 - 10.5 mg/dL    Bilirubin, total 0.6 0.2 - 1.0 mg/dL    AST (SGOT) 22 14 - 74 U/L    ALT (SGPT) 26 3 - 52 U/L    Alk.  phosphatase 153 (H) 38 - 126 U/L    Protein, total 9.8 (H) 6.1 - 8.4 g/dL    Albumin 5.4 (H) 3.5 - 4.7 g/dL    Globulin 4.4 g/dL    A-G Ratio 1.2     MAGNESIUM    Collection Time: 09/21/20  7:15 PM   Result Value Ref Range    Magnesium 2.3 1.7 - 2.8 mg/dL   TROPONIN I    Collection Time: 09/21/20  7:15 PM   Result Value Ref Range    Troponin-I, Qt. <0.02 (L) 0.02 - 0.05 ng/mL   GLUCOSE, POC    Collection Time: 09/21/20  9:25 PM   Result Value Ref Range    Glucose (POC) 381 (H) 70 - 110 mg/dL    Performed by [facility]    TROPONIN I    Collection Time: 09/22/20  3:13 AM   Result Value Ref Range    Troponin-I, Qt. <0.02 (L) 0.02 - 0.05 ng/mL            Assessment/Plan:   Costochondral chest pain  Pt reports anterior bilateral chest wall pain  Pain is reproducible  Most likely related to brassiere  We will start patient on Celebrex 200 mg twice daily with meals  We will start patient on gabapentin 100 mg 3 times daily x2 days  Patient does have a history of fibromyalgia  Cardiology consult placed  Cardiac troponins x3 negative    Diabetes mellitus type 2 in [facility])  Newly diagnosed diabetes  She received 10 units of regular insulin in the emergency department  We will start patient on metformin 500 mg twice daily and Lantus 10 units at bedtime  Fingerstick blood sugars AC at bedtime  Sliding scale insulin  ADA/cardiac diet  Daily BMP  Patient need to follow-up with PCP and/or endocrinology on outpatient basis    Chest pain  Patient reports her chest pain  Cardiology consult in the a.m. Continuous telemetry monitoring  Baby aspirin daily  Cardiac troponin [id number]; prior troponin every 6 hours x3  Echocardiogram the a.m. Morphine and nitro PRN for chest pain  Vital signs according to protocol  Daily BMP  ADA/cardiac diet        Total time spent with patient 35 minutes more than 50% time dedicated coronation care educated patient.     Signed By: Alley Felix NP     September 22, 2020

## 2020-09-22 NOTE — ROUTINE PROCESS
TRANSFER - OUT REPORT:    Verbal report given to Debby(name) on Sharri Gregg  being transferred to [address] [place] [address] ([facility]) for routine progression of care       Report consisted of patients Situation, Background, Assessment and   Recommendations(SBAR). Information from the following report(s) SBAR, ED Summary and MAR was reviewed with the receiving nurse. Lines:   Peripheral IV 09/21/20 Right Forearm (Active)   Site Assessment Clean, dry, & intact 09/21/20 2031   Phlebitis Assessment 0 09/21/20 2031   Infiltration Assessment 0 09/21/20 2031   Dressing Status Clean, dry, & intact 09/21/20 2031   Dressing Type Transparent 09/21/20 2031   Hub Color/Line Status Patent; Flushed 09/21/20 2031   Action Taken Blood drawn 09/21/20 2031        Opportunity for questions and clarification was provided.       Patient transported with:   Monitor  Registered Nurse

## 2020-09-22 NOTE — DISCHARGE INSTRUCTIONS
Patient Education     Nutrition Tips for Diabetes: After Your Visit  Your Care Instructions  A healthy diet is important to manage diabetes. It helps you lose weight (if you need to) and keep it off. It gives you the nutrition and energy your body needs and helps prevent heart disease. But a diet for diabetes does not mean that you have to eat special foods. You can eat what your family eats, including occasional sweets and other favorites. But you do have to pay attention to how often you eat and how much you eat of certain foods. The right plan for you will give you meals that help you keep your blood sugar at healthy levels. Try to eat a variety of foods and to spread carbohydrate throughout the day. Carbohydrate raises blood sugar higher and more quickly than any other nutrient does. Carbohydrate is found in sugar, breads and cereals, fruit, starchy vegetables such as potatoes and corn, and milk and yogurt. You may want to work with a dietitian or diabetes educator to help you plan meals and snacks. A dietitian or diabetes educator also can help you lose weight if that is one of your goals. The following tips can help you enjoy your meals and stay healthy. Follow-up care is a key part of your treatment and safety. Be sure to make and go to all appointments, and call your doctor if you are having problems. Its also a good idea to know your test results and keep a list of the medicines you take. How can you care for yourself at home? · Learn which foods have carbohydrate and how much carbohydrate to eat. A dietitian or diabetes educator can help you learn to keep track of how much carbohydrate you eat. · Spread carbohydrate throughout the day. Eat some carbohydrate at all meals, but do not eat too much at any one time. · Plan meals to include food from all the food groups.  These are the food groups and some example portion sizes:  ¨ Grains: 1 slice of bread (1 ounce), ½ cup of cooked cereal, and 1/3 cup of cooked pasta or rice. These have about 15 grams of carbohydrate in a serving. Choose whole grains such as whole wheat bread or crackers, oatmeal, and brown rice more often than refined grains. ¨ Fruit: 1 small fresh fruit, such as an apple or orange; ½ of a banana; ½ cup of chopped, cooked, or canned fruit; ½ cup of fruit juice; 1 cup of melon or raspberries; and 2 tablespoons of dried fruit. These have about 15 grams of carbohydrate in a serving. ¨ Dairy: 1 cup of nonfat or low-fat milk and 2/3 cup of plain yogurt. These have about 15 grams of carbohydrate in a serving. ¨ Protein foods: Beef, chicken, turkey, fish, eggs, tofu, cheese, cottage cheese, and peanut butter. A serving size of meat is 3 ounces, which is about the size of a deck of cards. Examples of meat substitute serving sizes (equal to 1 ounce of meat) are 1/4 cup of cottage cheese, 1 egg, 1 tablespoon of peanut butter, and ½ cup of tofu. These have very little or no carbohydrate per serving. ¨ Vegetables: Starchy vegetables such as ½ cup of cooked dried beans, peas, potatoes, or corn have about 15 grams of carbohydrate. Nonstarchy vegetables have very little carbohydrate, such as 1 cup of raw leafy vegetables (such as spinach), ½ cup of other vegetables (cooked or chopped), and 3/4 cup of vegetable juice. · Use the plate format to plan meals. It is a good, quick way to make sure that you have a balanced meal. It also helps you spread carbohydrate throughout the day. You divide your plate by types of foods. Put vegetables on half the plate, meat or meat substitutes on one-quarter of the plate, and a grain or starchy vegetable (such as brown rice or a potato) in the final quarter of the plate. To this you can add a small piece of fruit and 1 cup of milk or yogurt, depending on how much carbohydrate you are supposed to eat at a meal.  · Talk to your dietitian or diabetes educator about ways to add limited amounts of sweets into your meal plan.  You can eat these foods now and then, as long as you include the amount of carbohydrate they have in your daily carbohydrate allowance. · If you drink alcohol, limit it to no more than 1 drink a day for women and 2 drinks a day for men. If you are pregnant, no amount of alcohol is known to be safe. · Protein, fat, and fiber do not raise blood sugar as much as carbohydrate does. If you eat a lot of these nutrients in a meal, your blood sugar will rise more slowly than it would otherwise. · Limit saturated fats, such as those from meat and dairy products. Try to replace it with monounsaturated fat, such as olive oil. This is a healthier choice because people who have diabetes are at higher-than-average risk of heart disease. But use a modest amount of olive oil. A tablespoon of olive oil has 14 grams of fat and 120 calories. · Exercise lowers blood sugar. If you take insulin by shots or pump, you can use less than you would if you were not exercising. Keep in mind that timing matters. If you exercise within 1 hour after a meal, your body may need less insulin for that meal than it would if you exercised 3 hours after the meal. Test your blood sugar to find out how exercise affects your need for insulin. · Exercise on most days of the week. Aim for at least 30 minutes. Exercise helps you stay at a healthy weight and helps your body use insulin. Walking is an easy way to get exercise. Gradually increase the amount you walk every day. You also may want to swim, bike, or do other activities. When you eat out  · Learn to estimate the serving sizes of foods that have carbohydrate. If you measure food at home, it will be easier to estimate the amount in a serving of restaurant food. · If the meal you order has too much carbohydrate (such as potatoes, corn, or baked beans), ask to have a low-carbohydrate food instead. Ask for a salad or green vegetables.   · If you use insulin, check your blood sugar before and after eating out to help you plan how much to eat in the future. · If you eat more carbohydrate at a meal than you had planned, take a walk or do other exercise. This will help lower your blood sugar. Where can you learn more? Go to [employer]  Enter K407 in the search box to learn more about \"Nutrition Tips for Diabetes: After Your Visit. \"   © 4765-3050 [employer], [employer]. Care instructions adapted under license by [facility] [employer] (which disclaims liability or warranty for this information). This care instruction is for use with your licensed healthcare professional. If you have questions about a medical condition or this instruction, always ask your healthcare professional. [address] any warranty or liability for your use of this information. Content Version: 87.1.585575; Current as of: June 4, 2014                 Patient Education        Learning About Diabetes Food Guidelines  Your Care Instructions     Meal planning is important to manage diabetes. It helps keep your blood sugar at a target level (which you set with your doctor). You don't have to eat special foods. You can eat what your family eats, including sweets once in a while. But you do have to pay attention to how often you eat and how much you eat of certain foods. You may want to work with a dietitian or a certified diabetes educator (CDE) to help you plan meals and snacks. A dietitian or CDE can also help you lose weight if that is one of your goals. What should you know about eating carbs? Managing the amount of carbohydrate (carbs) you eat is an important part of healthy meals when you have diabetes. Carbohydrate is found in many foods. · Learn which foods have carbs. And learn the amounts of carbs in different foods. ? Bread, cereal, pasta, and rice have about 15 grams of carbs in a serving.  A serving is 1 slice of bread (1 ounce), ½ cup of cooked cereal, or 1/3 cup of cooked pasta or rice.  ? Fruits have 15 grams of carbs in a serving. A serving is 1 small fresh fruit, such as an apple or orange; ½ of a banana; ½ cup of cooked or canned fruit; ½ cup of fruit juice; 1 cup of melon or raspberries; or 2 tablespoons of dried fruit. ? Milk and no-sugar-added yogurt have 15 grams of carbs in a serving. A serving is 1 cup of milk or 2/3 cup of no-sugar-added yogurt. ? Starchy vegetables have 15 grams of carbs in a serving. A serving is ½ cup of mashed potatoes or sweet potato; 1 cup winter squash; ½ of a small baked potato; ½ cup of cooked beans; or ½ cup cooked corn or green peas. · Learn how much carbs to eat each day and at each meal. A dietitian or CDE can teach you how to keep track of the amount of carbs you eat. This is called carbohydrate counting. · If you are not sure how to count carbohydrate grams, use the Plate Method to plan meals. It is a good, quick way to make sure that you have a balanced meal. It also helps you spread carbs throughout the day. ? Divide your plate by types of foods. Put non-starchy vegetables on half the plate, meat or other protein food on one-quarter of the plate, and a grain or starchy vegetable in the final quarter of the plate. To this you can add a small piece of fruit and 1 cup of milk or yogurt, depending on how many carbs you are supposed to eat at a meal.  · Try to eat about the same amount of carbs at each meal. Do not \"save up\" your daily allowance of carbs to eat at one meal.  · Proteins have very little or no carbs per serving. Examples of proteins are beef, chicken, turkey, fish, eggs, tofu, cheese, cottage cheese, and peanut butter. A serving size of meat is 3 ounces, which is about the size of a deck of cards. Examples of meat substitute serving sizes (equal to 1 ounce of meat) are 1/4 cup of cottage cheese, 1 egg, 1 tablespoon of peanut butter, and ½ cup of tofu. How can you eat out and still eat healthy?   · Learn to estimate the serving sizes of foods that have carbohydrate. If you measure food at home, it will be easier to estimate the amount in a serving of restaurant food. · If the meal you order has too much carbohydrate (such as potatoes, corn, or baked beans), ask to have a low-carbohydrate food instead. Ask for a salad or green vegetables. · If you use insulin, check your blood sugar before and after eating out to help you plan how much to eat in the future. · If you eat more carbohydrate at a meal than you had planned, take a walk or do other exercise. This will help lower your blood sugar. What else should you know? · Limit saturated fat, such as the fat from meat and dairy products. This is a healthy choice because people who have diabetes are at higher risk of heart disease. So choose lean cuts of meat and nonfat or low-fat dairy products. Use olive or canola oil instead of butter or shortening when cooking. · Don't skip meals. Your blood sugar may drop too low if you skip meals and take insulin or certain medicines for diabetes. · Check with your doctor before you drink alcohol. Alcohol can cause your blood sugar to drop too low. Alcohol can also cause a bad reaction if you take certain diabetes medicines. Follow-up care is a key part of your treatment and safety. Be sure to make and go to all appointments, and call your doctor if you are having problems. It's also a good idea to know your test results and keep a list of the medicines you take. Where can you learn more? Go to http://yuri-danilo.info/  Enter I147 in the search box to learn more about \"Learning About Diabetes Food Guidelines. \"  Current as of: December 20, 2019               Content Version: 12.6  © 1978-2820 [employer]. Care instructions adapted under license by [employer] (which disclaims liability or warranty for this information).  If you have questions about a medical condition or this instruction, always ask your healthcare professional. [address] any warranty or liability for your use of this information. Patient Education        Learning About Meal Planning for Diabetes  Why plan your meals? Meal planning can be a key part of managing diabetes. Planning meals and snacks with the right balance of carbohydrate, protein, and fat can help you keep your blood sugar at the target level you set with your doctor. You don't have to eat special foods. You can eat what your family eats, including sweets once in a while. But you do have to pay attention to how often you eat and how much you eat of certain foods. You may want to work with a dietitian or a certified diabetes educator. He or she can give you tips and meal ideas and can answer your questions about meal planning. This health professional can also help you reach a healthy weight if that is one of your goals. What plan is right for you? Your dietitian or diabetes educator may suggest that you start with the plate format or carbohydrate counting. The plate format  The plate format is a simple way to help you manage how you eat. You plan meals by learning how much space each food should take on a plate. Using the plate format helps you spread carbohydrate throughout the day. It can make it easier to keep your blood sugar level within your target range. It also helps you see if you're eating healthy portion sizes. To use the plate format, you put non-starchy vegetables on half your plate. Add meat or meat substitutes on one-quarter of the plate. Put a grain or starchy vegetable (such as brown rice or a potato) on the final quarter of the plate. You can add a small piece of fruit and some low-fat or fat-free milk or yogurt, depending on your carbohydrate goal for each meal.  Here are some tips for using the plate format:  · Make sure that you are not using an oversized plate.  A 9-inch plate is best. Many restaurants use larger plates. · Get used to using the plate format at home. Then you can use it when you eat out. · Write down your questions about using the plate format. Talk to your doctor, a dietitian, or a diabetes educator about your concerns. Carbohydrate counting  With carbohydrate counting, you plan meals based on the amount of carbohydrate in each food. Carbohydrate raises blood sugar higher and more quickly than any other nutrient. It is found in desserts, breads and cereals, and fruit. It's also found in starchy vegetables such as potatoes and corn, grains such as rice and pasta, and milk and yogurt. Spreading carbohydrate throughout the day helps keep your blood sugar levels within your target range. Your daily amount depends on several things, including your weight, how active you are, which diabetes medicines you take, and what your goals are for your blood sugar levels. A registered dietitian or diabetes educator can help you plan how much carbohydrate to include in each meal and snack. A guideline for your daily amount of carbohydrate is:  · 45 to 60 grams at each meal. That's about the same as 3 to 4 carbohydrate servings. · 15 to 20 grams at each snack. That's about the same as 1 carbohydrate serving. The Nutrition Facts label on packaged foods tells you how much carbohydrate is in a serving of the food. First, look at the serving size on the food label. Is that the amount you eat in a serving? All of the nutrition information on a food label is based on that serving size. So if you eat more or less than that, you'll need to adjust the other numbers. Total carbohydrate is the next thing you need to look for on the label. If you count carbohydrate servings, one serving of carbohydrate is 15 grams. For foods that don't come with labels, such as fresh fruits and vegetables, you'll need a guide that lists carbohydrate in these foods.  Ask your doctor, dietitian, or diabetes educator about books or other nutrition guides you can use. If you take insulin, you need to know how many grams of carbohydrate are in a meal. This lets you know how much rapid-acting insulin to take before you eat. If you use an insulin pump, you get a constant rate of insulin during the day. So the pump must be programmed at meals to give you extra insulin to cover the rise in blood sugar after meals. When you know how much carbohydrate you will eat, you can take the right amount of insulin. Or, if you always use the same amount of insulin, you need to make sure that you eat the same amount of carbohydrate at meals. If you need more help to understand carbohydrate counting and food labels, ask your doctor, dietitian, or diabetes educator. How do you get started with meal planning? Here are some tips to get started:  · Plan your meals a week at a time. Don't forget to include snacks too. · Use cookbooks or online recipes to plan several main meals. Plan some quick meals for busy nights. You also can double some recipes that freeze well. Then you can save half for other busy nights when you don't have time to cook. · Make sure you have the ingredients you need for your recipes. If you're running low on basic items, put these items on your shopping list too. · List foods that you use to make breakfasts, lunches, and snacks. List plenty of fruits and vegetables. · Post this list on the refrigerator. Add to it as you think of more things you need. · Take the list to the store to do your weekly shopping. Follow-up care is a key part of your treatment and safety. Be sure to make and go to all appointments, and call your doctor if you are having problems. It's also a good idea to know your test results and keep a list of the medicines you take. Where can you learn more? Go to http://yuri-danilo.info/  Enter A236 in the search box to learn more about \"Learning About Meal Planning for Diabetes. \"  Current as of: December 20, 1125               JSMSJYB Version: 12.6  © 1044-5893 [employer]. Care instructions adapted under license by [employer] (which disclaims liability or warranty for this information). If you have questions about a medical condition or this instruction, always ask your healthcare professional. [address] any warranty or liability for your use of this information.

## 2020-09-22 NOTE — ASSESSMENT & PLAN NOTE
Patient reports her chest pain  Cardiology consult in the a.m. Continuous telemetry monitoring  Baby aspirin daily  Cardiac troponin [id number]; prior troponin every 6 hours x3  Echocardiogram the a.m.   Morphine and nitro PRN for chest pain  Vital signs according to protocol  Daily BMP  ADA/cardiac diet

## 2020-09-22 NOTE — PROGRESS NOTES
Nutrition Assessment     Type and Reason for Visit: Initial, Consult    Nutrition Recommendations/Plan: Diabetic CCHO 1800 kcal diet     Nutrition Assessment:  27 yo female admitted with chest pain and glucose > 600. Pt reports all of a sudden elevated glucose meter reading high starting 2 weeks ago prior was keeping less than 150. Pt reports has never been on DM medications. Pt reports limits pasta, rice, bread juices and drinks diet sodas. Pt reports only thing she can think of that is causing elevated glucose is stress from online schooling. Pt reports she had made an appointment with her PCP for Monday next week already. Reviewed Diabetic diet provided handout via nutrition care manual. Do believe pt had glucose under control prior to this recent episode. Pt will have to monitor glucose moving forward and may be started on medication by PCP on Monday    Malnutrition Assessment:  Malnutrition Status: No malnutrition     BMP:   Lab Results   Component Value Date/Time     (L) 09/22/2020 06:30 AM    K 4.0 09/22/2020 06:30 AM     09/22/2020 06:30 AM    CO2 20 (L) 09/22/2020 06:30 AM    AGAP 9 09/22/2020 06:30 AM     (H) 09/22/2020 06:30 AM    BUN 13 09/22/2020 06:30 AM    CREA 0.80 09/22/2020 06:30 AM    GFRAA >60 09/22/2020 06:30 AM    GFRNA >60 09/22/2020 06:30 AM        Estimated Daily Nutrient Needs:  Energy (kcal):  8601-4624 kcal/day  Protein (g):  58-73 g/day       Fluid (ml/day):  2190 mL/day    Nutrition Related Findings:  glucose > 600 started 2 weeks ago pt attributes to stress from online schooling. Pt reports has appointement with PCP on Monday.  Pt able to identify CHO sources in a diabetic diet      Current Nutrition Therapies:  DIET DIABETIC CONSISTENT CARB Regular    Anthropometric Measures:  · Height:  4' 11\" (149.9 cm)  · Current Body Wt:  73 kg (161 lb)  · BMI: 32.5    Nutrition Diagnosis:   · Altered nutrition-related lab values(hyperglycemia) related to psychological cause or life stress, endocrine dysfunction as evidenced by lab values(glucose > 600 started 2 weeks ago pt attributes to stress from online schooling)      Nutrition Intervention:  Food and/or Nutrient Delivery: Continue current diet(Diabetic CCHO)  Nutrition Education and Counseling: Education completed  Coordination of Nutrition Care: No recommendation at this time(Pt being D/C'd)    Goals:  glucose , Hgb A1c < 7, Pt to eat > 75% of meals       Nutrition Monitoring and Evaluation:   Physical Signs/Symptoms Outcomes: Biochemical data    F/U: 9/29/2020    Discharge Planning:    Continue current diet     Electronically signed by Evangelist Car on 9/22/2020 at 3:32 PM    Contact Number: [phone]

## 2020-09-22 NOTE — DISCHARGE SUMMARY
Discharge Summary    Patient: Meagan Betancur               Sex: female          DOA: 9/21/2020         YOB: 1985      Age:  28 y.o.        LOS:  LOS: 0 days                Admit Date: 9/21/2020    Discharge Date: 9/22/2020    Admission Diagnoses: Chest pain [R07.9]  Diabetes mellitus type 2 in obese ([facility] [E11.69, E66.9]  Chest pain [R07.9]  New onset type 2 diabetes mellitus ([facility] [E11.9]    Discharge Diagnoses:    Problem List as of 9/22/2020 Date Reviewed: 9/21/2020          Codes Class Noted - Resolved    Costochondral chest pain ICD-10-CM: R07.89  ICD-9-CM: 786.59  9/22/2020 - Present        Diabetes mellitus type 2 in obese [facility]) ICD-10-CM: E11.69, E66.9  ICD-9-CM: 250.00, 278.00  9/21/2020 - Present        New onset type 2 diabetes mellitus ([facility] ICD-10-CM: E11.9  ICD-9-CM: 250.00  9/21/2020 - Present        Hypertension ICD-10-CM: I10  ICD-9-CM: 401.9  9/21/2020 - Present        Neck pain ICD-10-CM: M54.2  ICD-9-CM: 723.1  9/21/2020 - Present        Vitamin D deficiency ICD-10-CM: E55.9  ICD-9-CM: 268.9  9/21/2020 - Present        Diabetes mellitus without complication ([facility] IBQ-89-CK: E11.9  ICD-9-CM: 250.00  9/21/2020 - Present        Low back pain ICD-10-CM: M54.5  ICD-9-CM: 724.2  9/21/2020 - Present        Weakness ICD-10-CM: R53.1  ICD-9-CM: 780.79  4/24/2019 - Present        Uncomplicated asthma PGK-24-XP: J45.909  ICD-9-CM: 493.90  1/4/2019 - Present    Overview Signed 1/4/2019  9:03 AM by Torsten Munoz MD     Patient on multiple medication relatively stable             SOB (shortness of breath) on exertion ICD-10-CM: R06.02  ICD-9-CM: 786.05  1/4/2019 - Present    Overview Signed 1/4/2019  9:03 AM by Torsten Munoz MD     New onset. Unclear etiology.   Rule out pulmonary hypertension and LV dysfunction             Family history of chronic heart failure ICD-10-CM: Z82.49  ICD-9-CM: V17.49  1/4/2019 - Present    Overview Signed 1/4/2019  9:03 AM by Torsten Munoz MD Family history of cardiomyopathy with transplant in father. Chest pain ICD-10-CM: R07.9  ICD-9-CM: 786.50  1/4/2019 - Present    Overview Signed 1/4/2019  9:03 AM by Raúl Olivares MD     Atypical chest pain. Possible angina possible spasm. Cannot rule out chest wall pain or GERD. Less likely pericarditis             Acute bilateral thoracic back pain ICD-10-CM: M54.6  ICD-9-CM: 724.1  5/10/2017 - Present        Chronic bilateral low back pain with sciatica ICD-10-CM: M54.40, G89.29  ICD-9-CM: 724.2, 724.3, 338.29  5/10/2017 - Present        Low back pain without sciatica ICD-10-CM: M54.5  ICD-9-CM: 724.2  1/27/2017 - Present        Chronic constipation ICD-10-CM: K59.09  ICD-9-CM: 564.00  Unknown - Present        Urinary incontinence ICD-10-CM: R32  ICD-9-CM: 788.30  11/10/2010 - Present              Discharge Condition: Stable    Hospital Course: This is a 28year old AA female with a pmhx of type 2 dm, fibromyalgia, GERD, HTN, and asthma who was admitted for observation after she presented to the ED with complaints of chest pain. She had negative troponins and nonischemic EKGs. He is seen and examined by me this morning in no acute distress. She states that her chest pain is resolved but she does point to some abdominal pain in the epigastric region. She states that she has a long history of GERD and takes Nexium at home for this. Advised patient that she likely needs to follow-up with GI if her symptoms are not resolved with a prescription PPI. Patient is otherwise stable for discharge at this time. Blood sugars have remained elevated and patient states that she takes nothing at home for these. Repeated hemoglobin A1c which is currently pending, will also add lipid panel and lipase. Her initial hemoglobin A1c done in August was 6.5, however based on blood sugars of 600 as well as repeated blood sugars in the 2-3 100s while hospitalized, will send patient home with metformin.   Dietitian consult was also ordered. She is strongly advised to follow-up with her PCP within 1 week. While hospitalized, patient was evaluated and treated for the following:     Costochondral chest pain  Pt reports anterior bilateral chest wall pain  Pain is reproducible  Most likely related to brassiere  We will start patient on Celebrex 200 mg twice daily with meals  We will start patient on gabapentin 100 mg 3 times daily x2 days  Patient does have a history of fibromyalgia  Cardiology consult placed  Cardiac troponins x3 negative    Diabetes mellitus type 2 in [facility])  Newly diagnosed diabetes  She received 10 units of regular insulin in the emergency department  We will start patient on metformin 500 mg twice daily and Lantus 10 units at bedtime  Fingerstick blood sugars AC at bedtime  Sliding scale insulin  ADA/cardiac diet  Daily BMP  Patient need to follow-up with PCP and/or endocrinology on outpatient basis    Chest pain  Patient reports her chest pain  Cardiology consult in the a.m. Continuous telemetry monitoring  Baby aspirin daily  Cardiac troponin [id number]; prior troponin every 6 hours x3  Echocardiogram the a.m.   Morphine and nitro PRN for chest pain  Vital signs according to protocol  Daily BMP  ADA/cardiac diet        Consults: Cardiology    Significant Diagnostic Studies: labs:   Recent Results (from the past 24 hour(s))   EKG, 12 LEAD, INITIAL    Collection Time: 09/21/20  6:13 PM   Result Value Ref Range    Ventricular Rate 79 BPM    Atrial Rate 79 BPM    P-R Interval 165 ms    QRS Duration 82 ms    Q-T Interval 362 ms    QTC Calculation (Bezet) 416 ms    Calculated P Axis 71 degrees    Calculated R Axis 2 degrees    Calculated T Axis -15 degrees    Diagnosis       Sinus rhythm  Borderline T abnormalities, inferior leads     URINALYSIS W/ RFLX MICROSCOPIC    Collection Time: 09/21/20  6:30 PM   Result Value Ref Range    Color Yellow/Straw      Appearance Clear Clear      Specific gravity 1.010 1.003 - 1.035      pH (UA) 6.0 5.0 - 9.0      Protein 15 (A) Negative mg/dL    Glucose >1,000 (A) Negative mg/dL    Ketone Negative Negative mg/dL    Bilirubin Negative Negative      Blood Trace (A) Negative      Urobilinogen 0.2 0.2 - 1.0 EU/dL    Nitrites Negative Negative      Leukocyte Esterase Negative Negative     URINE MICROSCOPIC    Collection Time: 09/21/20  6:30 PM   Result Value Ref Range    WBC 0-4 0 - 4 /hpf    RBC 0-5 0 - 2 /hpf    Epithelial cells Few 0 - 20 /lpf    Bacteria Negative (A) None /hpf   GLUCOSE, POC    Collection Time: 09/21/20  6:33 PM   Result Value Ref Range    Glucose (POC) >600 (HH) 70 - 110 mg/dL    Performed by [facility]    CBC WITH AUTOMATED DIFF    Collection Time: 09/21/20  7:15 PM   Result Value Ref Range    WBC 7.0 4.6 - 13.2 K/uL    RBC 5.37 (H) 4.20 - 5.30 M/uL    HGB 15.0 12.0 - 16.0 %    HCT 44.2 35.0 - 45.0 %    MCV 82.3 74.0 - 97.0 FL    MCH 27.9 24.0 - 34.0 PG    MCHC 33.9 31.0 - 37.0 g/dL    RDW 13.4 11.6 - 14.5 %    PLATELET 263 675 - 066 K/uL    MPV 12.1 FL    NEUTROPHILS 59 40 - 73 %    LYMPHOCYTES 34 21 - 52 %    MONOCYTES 6 3 - 10 %    EOSINOPHILS 1 0 - 5 %    BASOPHILS 0 0 - 2 %    IMMATURE GRANULOCYTES 0 %    ABS. NEUTROPHILS 4.1 1.8 - 8.0 K/UL    ABS. LYMPHOCYTES 2.4 0.9 - 3.6 K/UL    ABS. MONOCYTES 0.4 0.05 - 1.2 K/UL    ABS. EOSINOPHILS 0.1 0.0 - 0.4 K/UL    ABS. BASOPHILS 0.0 0.0 - 0.1 K/UL    ABS. IMM.  GRANS. 0.0 K/UL   METABOLIC PANEL, COMPREHENSIVE    Collection Time: 09/21/20  7:15 PM   Result Value Ref Range    Sodium 133 (L) 135 - 145 mmol/L    Potassium 4.2 3.2 - 5.1 mmol/L    Chloride 96 94 - 111 mmol/L    CO2 23 21 - 33 mmol/L    Anion gap 14 mmol/L    Glucose 607 (HH) 70 - 110 mg/dL    BUN 12 9 - 21 mg/dL    Creatinine 0.90 0.70 - 1.20 mg/dL    BUN/Creatinine ratio 13      GFR est AA >60 ml/min/1.73m2    GFR est non-AA >60 ml/min/1.73m2    Calcium 10.5 8.5 - 10.5 mg/dL    Bilirubin, total 0.6 0.2 - 1.0 mg/dL    AST (SGOT) 22 14 - 74 U/L ALT (SGPT) 26 3 - 52 U/L    Alk. phosphatase 153 (H) 38 - 126 U/L    Protein, total 9.8 (H) 6.1 - 8.4 g/dL    Albumin 5.4 (H) 3.5 - 4.7 g/dL    Globulin 4.4 g/dL    A-G Ratio 1.2     MAGNESIUM    Collection Time: 09/21/20  7:15 PM   Result Value Ref Range    Magnesium 2.3 1.7 - 2.8 mg/dL   TROPONIN I    Collection Time: 09/21/20  7:15 PM   Result Value Ref Range    Troponin-I, Qt. <0.02 (L) 0.02 - 0.05 ng/mL   GLUCOSE, POC    Collection Time: 09/21/20  9:25 PM   Result Value Ref Range    Glucose (POC) 381 (H) 70 - 110 mg/dL    Performed by Lulu Lomeli    GLUCOSE, POC    Collection Time: 09/22/20  2:03 AM   Result Value Ref Range    Glucose (POC) 294 (H) 70 - 110 mg/dL    Performed by Ravi Cox    TROPONIN I    Collection Time: 09/22/20  3:13 AM   Result Value Ref Range    Troponin-I, Qt. <0.02 (L) 0.02 - 4.89 ng/mL   METABOLIC PANEL, BASIC    Collection Time: 09/22/20  6:30 AM   Result Value Ref Range    Sodium 134 (L) 135 - 145 mmol/L    Potassium 4.0 3.2 - 5.1 mmol/L    Chloride 105 94 - 111 mmol/L    CO2 20 (L) 21 - 33 mmol/L    Anion gap 9 mmol/L    Glucose 396 (H) 70 - 110 mg/dL    BUN 13 9 - 21 mg/dL    Creatinine 0.80 0.70 - 1.20 mg/dL    BUN/Creatinine ratio 16      GFR est AA >60 ml/min/1.73m2    GFR est non-AA >60 ml/min/1.73m2    Calcium 8.9 8.5 - 10.5 mg/dL   TROPONIN I    Collection Time: 09/22/20  6:30 AM   Result Value Ref Range    Troponin-I, Qt. <0.02 (L) 0.02 - 0.05 ng/mL   GLUCOSE, POC    Collection Time: 09/22/20  8:00 AM   Result Value Ref Range    Glucose (POC) 385 (H) 70 - 110 mg/dL    Performed by Yen Pennington    GLUCOSE, POC    Collection Time: 09/22/20 11:08 AM   Result Value Ref Range    Glucose (POC) 378 (H) 70 - 110 mg/dL    Performed by Yen Pennington        Discharge Medications:     Current Discharge Medication List      START taking these medications    Details   aspirin 81 mg chewable tablet Take 1 Tab by mouth daily.   Qty: 30 Tab, Refills: 0      metFORMIN (GLUCOPHAGE) 500 mg tablet Take 1 Tab by mouth two (2) times daily (with meals). Qty: 60 Tab, Refills: 0      gabapentin (NEURONTIN) 100 mg capsule Take 1 Cap by mouth three (3) times daily. Max Daily Amount: 300 mg. Qty: 90 Cap, Refills: 0    Associated Diagnoses: Costochondral chest pain; Chronic bilateral low back pain with left-sided sciatica; Neck pain         CONTINUE these medications which have NOT CHANGED    Details   montelukast (SINGULAIR) 10 mg tablet Take 1 Tab by mouth nightly. olmesartan (BENICAR) 40 mg tablet Take 1 Tab by mouth daily. valACYclovir (VALTREX) 500 mg tablet TAKE 1 TABLET BY MOUTH TWICE A DAY FOR 5 DAYS      indomethacin (INDOCIN) 50 mg capsule Take 1 Cap by mouth two (2) times daily as needed for Pain. Qty: 60 Cap, Refills: 2    Associated Diagnoses: Pain in both feet; Plantar fasciitis      atenoloL (TENORMIN) 25 mg tablet TAKE 1 TABLET BY MOUTH EVERY DAY  Qty: 90 Tab, Refills: 1      lancets misc Check blood glucose daily  Qty: 100 Each, Refills: 3    Associated Diagnoses: Type 2 diabetes mellitus with hyperglycemia, without long-term current use of insulin (formerly [facility])      glucose blood VI test strips (ASCENSIA AUTODISC VI, ONE TOUCH ULTRA TEST VI) strip Check blood glucose daily  Qty: 100 Strip, Refills: 3    Associated Diagnoses: Type 2 diabetes mellitus with hyperglycemia, without long-term current use of insulin (formerly [facility])      ANORO ELLIPTA 62.5-25 mcg/actuation inhaler 1 INH DAILY - USE EVERY DAY  Refills: 6      AIMOVIG AUTOINJECTOR 70 mg/mL injection AS DIRECTED - 1 INJECTION SUBQ ONCE MONTHLY  Refills: 3      cetirizine (ZYRTEC) 10 mg tablet TAKE 1 TABLET EVERY DAY  Refills: 0      rizatriptan (MAXALT-MLT) 10 mg disintegrating tablet PLEASE SEE ATTACHED FOR DETAILED DIRECTIONS  Refills: 0      mometasone (NASONEX) 50 mcg/actuation nasal spray USE 1-2 SPRAYS INTO EACH NOSTRIL EVERY DAY AS NEEDED  Refills: 0      FERRETTS IPS 40 mg/15 mL liqd 15 ML ORALLY 2 TIMES PER DAY.   Refills: 2 EPINEPHrine (EPIPEN) 0.3 mg/0.3 mL injection AS DIRECTED AS NEEDED INTRAMUSCULAR 1 DAYS  Refills: 5      albuterol (PROVENTIL HFA, VENTOLIN HFA, PROAIR HFA) 90 mcg/actuation inhaler Take 2 Puffs by inhalation every four (4) hours as needed for Wheezing or Shortness of Breath. Qty: 1 Inhaler, Refills: 0    Associated Diagnoses: Chest pain, unspecified type; SOB (shortness of breath)      esomeprazole (NEXIUM) 40 mg capsule Take  by mouth daily. POLYETHYLENE GLYCOL 3350 (MIRALAX PO) Take  by mouth. CALCIUM CARBONATE/MAG HYDROX (MYLANTA PO) Take  by mouth. Discharge Exam:   GENERAL: Alert and oriented x 3. No acute distress. Well-nourished. HEENT: EOMI. Anicteric. PERRLA,Moist mucous membranes. No scleral icterus. No cervical lymphadenopathy. Oropharynx moist without any lesions   NECK: Supple, no tracheal deviation, no JVD, no significant lymphadenopathy, no thyromegaly noted. LUNGS: Clear to auscultation bilaterally. No accessory muscle use. Chest symmetrical, No wheezing, rales, rhonchi noted. Appropriate respiratory effort. CARDIOVASCULAR: Regular rate and rhythm. No murmur, rubs, gallops, No edema appreciated. S1 & S2 audible. Reproducible chest wall pain midsternal and under both breasts. ABDOMEN: Soft, mildly tender to palp in the epigastria, non-distended. No palpable masses. , lesions, hepatomegaly. Bowels active X4 quadrants.    SKIN: Warm, dry, intact, no bruising, lesions, or rashes noted. Color appropriate for ethnicity. MUSCULOSKELETAL: Ambulates independently, no deformities, Full ROM   NEUROLOGIC: No focal neurological deficits. CN II-XII grossly intact, Muscle strength 5/5, both U & L bilateral DTR in lower extremities 2+. PSYCHIATRIC: Calm & Cooperative. Appropriate mood and affect.     Activity: Activity as tolerated    Diet: Diabetic Diet    Wound Care: None needed    Follow-up: 1. PCP within one week. 2. GI within 2 weeks for continued epigastric pain.

## 2020-09-22 NOTE — ASSESSMENT & PLAN NOTE
Newly diagnosed diabetes  She received 10 units of regular insulin in the emergency department  We will start patient on metformin 500 mg twice daily and Lantus 10 units at bedtime  Fingerstick blood sugars AC at bedtime  Sliding scale insulin  ADA/cardiac diet  Daily BMP  Patient need to follow-up with PCP and/or endocrinology on outpatient basis

## 2020-09-22 NOTE — PROGRESS NOTES
Nutrition Assessment     Type and Reason for Visit: Initial, Consult    Nutrition Recommendations/Plan: Diabetic CCHO 1800 kcal diet     Nutrition Assessment:  29 yo female admitted with chest pain and glucose > 600. Pt reports all of a sudden elevated glucose meter reading high starting 2 weeks ago prior was keeping less than 150. Pt reports has never been on DM medications. Pt reports limits pasta, rice, bread juices and drinks diet sodas. Pt reports only thing she can think of that is causing elevated glucose is stress from online schooling. Pt reports she had made an appointment with her PCP for Monday next week already. Reviewed Diabetic diet provided handout via nutrition care manual. Do believe pt had glucose under control prior to this recent episode. Pt will have to monitor glucose moving forward and may be started on medication by PCP on Monday    Malnutrition Assessment:  Malnutrition Status: No malnutrition     BMP:   Lab Results   Component Value Date/Time     (L) 09/22/2020 06:30 AM    K 4.0 09/22/2020 06:30 AM     09/22/2020 06:30 AM    CO2 20 (L) 09/22/2020 06:30 AM    AGAP 9 09/22/2020 06:30 AM     (H) 09/22/2020 06:30 AM    BUN 13 09/22/2020 06:30 AM    CREA 0.80 09/22/2020 06:30 AM    GFRAA >60 09/22/2020 06:30 AM    GFRNA >60 09/22/2020 06:30 AM        Estimated Daily Nutrient Needs:  Energy (kcal):  9971-6541 kcal/day  Protein (g):  58-73 g/day       Fluid (ml/day):  2190 mL/day    Nutrition Related Findings:  glucose > 600 started 2 weeks ago pt attributes to stress from online schooling. Pt reports has appointement with PCP on Monday.  Pt able to identify CHO sources in a diabetic diet      Current Nutrition Therapies:  DIET DIABETIC CONSISTENT CARB Regular    Anthropometric Measures:  · Height:  4' 11\" (149.9 cm)  · Current Body Wt:  73 kg (161 lb)  · BMI: 32.5    Nutrition Diagnosis:   · Altered nutrition-related lab values(hyperglycemia) related to psychological cause or life stress, endocrine dysfunction as evidenced by lab values(glucose > 600 started 2 weeks ago pt attributes to stress from online schooling)      Nutrition Intervention:  Food and/or Nutrient Delivery: Continue current diet(Diabetic CCHO)  Nutrition Education and Counseling: Education completed  Coordination of Nutrition Care: No recommendation at this time(Pt being D/C'd)    Goals:  glucose , Hgb A1c < 7, Pt to eat > 75% of meals       Nutrition Monitoring and Evaluation:   Physical Signs/Symptoms Outcomes: Biochemical data    F/U: 9/29/2020    Discharge Planning:    Continue current diet     Electronically signed by Starr Petersen on 9/22/2020 at 3:32 PM    Contact Number: [phone]

## 2020-09-22 NOTE — PROGRESS NOTES
IVF infusing without difficulty. 400 mg Ibuprofen administered for 4/10 h/a. Nitro tab given for 7/10 CP. Lantus and Lovenox administered as scheduled. Pt tolerated well. Diabetic education/DVT prevention education provided. Pt verbalized understanding. Resting w/o compliants. CBWR.

## 2020-09-22 NOTE — PROGRESS NOTES
Pt arrived to floor via stretcher alert and oriented and in NAD. Ambulatory and self transferred from stretcher to bed unassisted. VS and admission history done. Endorses CP 7/10 stating \"it feels like pressure\". Also c/o H/A 4/10 and slight intermittent nausea. Pt gait steady. . VSS. Oriented to room and call bell system. Connected to tele box [facility] #2 and is NSR at this time. Bed locked and low. CBWR.

## 2020-09-22 NOTE — CONSULTS
[facility] CARDIOLOGY  CARDIOLOGY CONSULTATION    REASON FOR CONSULT: Chest pain    REQUESTING PROVIDER: Hali Carrizales NP    CHIEF COMPLAINT:  Chest pain    HISTORY OF PRESENT ILLNESS:  Mey Mclain is a 28y.o. year-old female with past medical history significant for asthma, diabetes, GERD, and hypertension who was evaluated today due to chest pain. She presented to the [facility] ER yesterday for evaluation of chest pain. She states the pain had been ongoing for 3 days, and she was unable to eat or drink. She describes the pain as pressure and sharp, rated as 10/10. The pain worsened with any movement. The pain was located in the lower chest and under both breasts. She tried Prilosec, ginger ale, and aspirin at home with no relief, so she presented to the ER. She states the pain is now 6/10. Records from hospital admission course thus far reviewed. Telemetry reviewed. No events overnight. She is in sinus rhythm. INPATIENT MEDICATIONS:  Home medications reviewed.     Current Facility-Administered Medications:     celecoxib (CELEBREX) capsule 200 mg, 200 mg, Oral, BID WITH MEALS, Johana Marques NP, 200 mg at 09/22/20 0810    gabapentin (NEURONTIN) capsule 100 mg, 100 mg, Oral, TID, Johana Chaudhary NP, 100 mg at 09/22/20 0810    [START ON 9/23/2020] esomeprazole (NEXIUM) capsule 40 mg (Patient Supplied), 40 mg, Oral, ACB, Speedy Forrest MD    insulin lispro (HUMALOG) injection, , SubCUTAneous, AC&HS, Lucio RIVERA NP, 10 Units at 09/22/20 1207    glucose chewable tablet 16 g, 4 Tab, Oral, PRN, Lucio RIVERA NP    glucagon (GLUCAGEN) injection 1 mg, 1 mg, IntraMUSCular, PRN, Johana Chaudhary NP    dextrose (D50W) injection syrg 12.5-25 g, 25-50 mL, IntraVENous, PRN, Johana Chaudhary NP    metFORMIN (GLUCOPHAGE) tablet 500 mg, 500 mg, Oral, BID WITH MEALS, Johana Marques NP, 500 mg at 09/22/20 0810    aspirin chewable tablet 81 mg, 81 mg, Oral, DAILY, Jerald Johana RIVERA NP, 81 mg at 09/22/20 0810    enoxaparin (LOVENOX) injection 40 mg, 40 mg, SubCUTAneous, Q24H, Johana Marqeus NP, 40 mg at 09/22/20 0237    insulin glargine (LANTUS) injection 10 Units, 10 Units, SubCUTAneous, QHS, Johana Marques NP, 10 Units at 09/22/20 0238    0.45% sodium chloride with KCl 20 mEq/L infusion, , IntraVENous, CONTINUOUS, Johana Marques NP, Stopped at 09/22/20 1431    nitroglycerin (NITROSTAT) tablet 0.4 mg, 0.4 mg, SubLINGual, PRN, Earna Aram RIVERA NP, 0.4 mg at 09/22/20 4105     ALLERGIES:  Allergies reviewed with the patient,  Allergies   Allergen Reactions    Iodine Hives and Nausea and Vomiting    Iodinated Contrast Media Hives    Oxycodone-Acetaminophen Other (comments)     Hallucinations     Prochlorperazine Other (comments)    Propoxyphene-Acetaminophen Unknown (comments)     Allergy to propoxyphene only. Has previously tolerated acetaminophen    Reglan [Metoclopramide] Not Reported This Time    Sulfa (Sulfonamide Antibiotics) Not Reported This Time    Wygesic Not Reported This Time    . FAMILY HISTORY:  Family history reviewed. Father has a history of premature cardiac disease. SOCIAL HISTORY:  Notable for no tobacco use, no heavy alcohol or illicit drug use. REVIEW OF SYSTEMS:  Complete review of systems performed, pertinents noted above, all other systems are negative. PHYSICAL EXAMINATION:  Vital sign assessment reveal a blood pressure of  134/86 and pulse rate of 75. Cardiovascular exam has a heart with a regular rate and rhythm, normal S1 and S2. No murmur present. There are no rubs or gallops. She has reproducible substernal pain with palpation. Good peripheral pulses. No jugular venous distension. Respiratory exam reveals clear lung fields, no rales or rhonchi. Gastrointestinal exam has a soft abdomen with normal bowel sounds. Epigastric and LUQ regions are tender to palpation.  Lymphatic exam reveals no edema and no varicosities. No notable skin changes. Neurologic exam is nonfocal.  Musculoskeletal exam is notable for a normal gait. Recent labs results and imaging reviewed.   Notable findings include:   Recent Results (from the past 24 hour(s))   EKG, 12 LEAD, INITIAL    Collection Time: 09/21/20  6:13 PM   Result Value Ref Range    Ventricular Rate 79 BPM    Atrial Rate 79 BPM    P-R Interval 165 ms    QRS Duration 82 ms    Q-T Interval 362 ms    QTC Calculation (Bezet) 416 ms    Calculated P Axis 71 degrees    Calculated R Axis 2 degrees    Calculated T Axis -15 degrees    Diagnosis       Sinus rhythm  Borderline T abnormalities, inferior leads     URINALYSIS W/ RFLX MICROSCOPIC    Collection Time: 09/21/20  6:30 PM   Result Value Ref Range    Color Yellow/Straw      Appearance Clear Clear      Specific gravity 1.010 1.003 - 1.035      pH (UA) 6.0 5.0 - 9.0      Protein 15 (A) Negative mg/dL    Glucose >1,000 (A) Negative mg/dL    Ketone Negative Negative mg/dL    Bilirubin Negative Negative      Blood Trace (A) Negative      Urobilinogen 0.2 0.2 - 1.0 EU/dL    Nitrites Negative Negative      Leukocyte Esterase Negative Negative     URINE MICROSCOPIC    Collection Time: 09/21/20  6:30 PM   Result Value Ref Range    WBC 0-4 0 - 4 /hpf    RBC 0-5 0 - 2 /hpf    Epithelial cells Few 0 - 20 /lpf    Bacteria Negative (A) None /hpf   GLUCOSE, POC    Collection Time: 09/21/20  6:33 PM   Result Value Ref Range    Glucose (POC) >600 (HH) 70 - 110 mg/dL    Performed by [facility]    CBC WITH AUTOMATED DIFF    Collection Time: 09/21/20  7:15 PM   Result Value Ref Range    WBC 7.0 4.6 - 13.2 K/uL    RBC 5.37 (H) 4.20 - 5.30 M/uL    HGB 15.0 12.0 - 16.0 %    HCT 44.2 35.0 - 45.0 %    MCV 82.3 74.0 - 97.0 FL    MCH 27.9 24.0 - 34.0 PG    MCHC 33.9 31.0 - 37.0 g/dL    RDW 13.4 11.6 - 14.5 %    PLATELET 168 353 - 870 K/uL    MPV 12.1 FL    NEUTROPHILS 59 40 - 73 %    LYMPHOCYTES 34 21 - 52 %    MONOCYTES 6 3 - 10 % EOSINOPHILS 1 0 - 5 %    BASOPHILS 0 0 - 2 %    IMMATURE GRANULOCYTES 0 %    ABS. NEUTROPHILS 4.1 1.8 - 8.0 K/UL    ABS. LYMPHOCYTES 2.4 0.9 - 3.6 K/UL    ABS. MONOCYTES 0.4 0.05 - 1.2 K/UL    ABS. EOSINOPHILS 0.1 0.0 - 0.4 K/UL    ABS. BASOPHILS 0.0 0.0 - 0.1 K/UL    ABS. IMM. GRANS. 0.0 K/UL   METABOLIC PANEL, COMPREHENSIVE    Collection Time: 09/21/20  7:15 PM   Result Value Ref Range    Sodium 133 (L) 135 - 145 mmol/L    Potassium 4.2 3.2 - 5.1 mmol/L    Chloride 96 94 - 111 mmol/L    CO2 23 21 - 33 mmol/L    Anion gap 14 mmol/L    Glucose 607 (HH) 70 - 110 mg/dL    BUN 12 9 - 21 mg/dL    Creatinine 0.90 0.70 - 1.20 mg/dL    BUN/Creatinine ratio 13      GFR est AA >60 ml/min/1.73m2    GFR est non-AA >60 ml/min/1.73m2    Calcium 10.5 8.5 - 10.5 mg/dL    Bilirubin, total 0.6 0.2 - 1.0 mg/dL    AST (SGOT) 22 14 - 74 U/L    ALT (SGPT) 26 3 - 52 U/L    Alk.  phosphatase 153 (H) 38 - 126 U/L    Protein, total 9.8 (H) 6.1 - 8.4 g/dL    Albumin 5.4 (H) 3.5 - 4.7 g/dL    Globulin 4.4 g/dL    A-G Ratio 1.2     MAGNESIUM    Collection Time: 09/21/20  7:15 PM   Result Value Ref Range    Magnesium 2.3 1.7 - 2.8 mg/dL   TROPONIN I    Collection Time: 09/21/20  7:15 PM   Result Value Ref Range    Troponin-I, Qt. <0.02 (L) 0.02 - 0.05 ng/mL   GLUCOSE, POC    Collection Time: 09/21/20  9:25 PM   Result Value Ref Range    Glucose (POC) 381 (H) 70 - 110 mg/dL    Performed by Thea Zuluaga    GLUCOSE, POC    Collection Time: 09/22/20  2:03 AM   Result Value Ref Range    Glucose (POC) 294 (H) 70 - 110 mg/dL    Performed by Milton Rosales    TROPONIN I    Collection Time: 09/22/20  3:13 AM   Result Value Ref Range    Troponin-I, Qt. <0.02 (L) 0.02 - 0.03 ng/mL   METABOLIC PANEL, BASIC    Collection Time: 09/22/20  6:30 AM   Result Value Ref Range    Sodium 134 (L) 135 - 145 mmol/L    Potassium 4.0 3.2 - 5.1 mmol/L    Chloride 105 94 - 111 mmol/L    CO2 20 (L) 21 - 33 mmol/L    Anion gap 9 mmol/L    Glucose 396 (H) 70 - 110 mg/dL    BUN 13 9 - 21 mg/dL    Creatinine 0.80 0.70 - 1.20 mg/dL    BUN/Creatinine ratio 16      GFR est AA >60 ml/min/1.73m2    GFR est non-AA >60 ml/min/1.73m2    Calcium 8.9 8.5 - 10.5 mg/dL   TROPONIN I    Collection Time: 09/22/20  6:30 AM   Result Value Ref Range    Troponin-I, Qt. <0.02 (L) 0.02 - 0.05 ng/mL   LIPASE    Collection Time: 09/22/20  6:30 AM   Result Value Ref Range    Lipase 24 10 - 57 U/L   GLUCOSE, POC    Collection Time: 09/22/20  8:00 AM   Result Value Ref Range    Glucose (POC) 385 (H) 70 - 110 mg/dL    Performed by Kaveh Marte, POC    Collection Time: 09/22/20 11:08 AM   Result Value Ref Range    Glucose (POC) 378 (H) 70 - 110 mg/dL    Performed by Mari Gutierrez          Discussed case with Dr. Berna Ariza and our impression and recommendations are as follows:  1. Chest pain: Troponins are negative x 3. 4th troponin is pending. EKG is nonischemic. If 4th troponin is negative, ACS will be ruled out. Echocardiogram is pending to assess overall cardiac structure and function. Continue telemetry monitoring. Her symptoms are atypical, and consistent with possible GI etiology. She previously had a normal stress echo in March 2020. She will need follow up with her cardiologist at [facility] in [place]. If cardiac workup is negative, would recommend GI referral.  2. Hypertension: Blood pressures are at goal. Continue current medications. Thank you for involving us in the care of this patient. Please do not hesitate to call me or Dr. Berna Ariza if additional questions arise. Christy Morales Addendum:  Agree with findings and plan noted above.

## 2020-09-23 LAB
CHOLEST SERPL-MCNC: 141 MG/DL
EST. AVERAGE GLUCOSE BLD GHB EST-MCNC: 212 MG/DL
HBA1C MFR BLD: 9 % (ref 4–5.6)
HDLC SERPL-MCNC: 44 MG/DL
HDLC SERPL: 3.2 {RATIO} (ref 0–5)
LDLC SERPL CALC-MCNC: 66.6 MG/DL (ref 0–100)
LIPID PROFILE,FLP: ABNORMAL
TRIGL SERPL-MCNC: 152 MG/DL (ref ?–150)
VLDLC SERPL CALC-MCNC: 30.4 MG/DL

## 2020-09-24 ENCOUNTER — VIRTUAL VISIT (OUTPATIENT)
Dept: FAMILY MEDICINE CLINIC | Age: 35
End: 2020-09-24
Payer: MEDICARE

## 2020-09-24 ENCOUNTER — TELEPHONE (OUTPATIENT)
Dept: FAMILY MEDICINE CLINIC | Age: 35
End: 2020-09-24

## 2020-09-24 DIAGNOSIS — E11.65 TYPE 2 DIABETES MELLITUS WITH HYPERGLYCEMIA, WITHOUT LONG-TERM CURRENT USE OF INSULIN (HCC): ICD-10-CM

## 2020-09-24 DIAGNOSIS — E11.9 DIABETES MELLITUS TYPE 2, DIET-CONTROLLED (HCC): Primary | ICD-10-CM

## 2020-09-24 PROCEDURE — 99213 OFFICE O/P EST LOW 20 MIN: CPT | Performed by: FAMILY MEDICINE

## 2020-09-24 PROCEDURE — 3052F HG A1C>EQUAL 8.0%<EQUAL 9.0%: CPT | Performed by: FAMILY MEDICINE

## 2020-09-24 PROCEDURE — 2022F DILAT RTA XM EVC RTNOPTHY: CPT | Performed by: FAMILY MEDICINE

## 2020-09-24 PROCEDURE — G8427 DOCREV CUR MEDS BY ELIG CLIN: HCPCS | Performed by: FAMILY MEDICINE

## 2020-09-24 PROCEDURE — G8510 SCR DEP NEG, NO PLAN REQD: HCPCS | Performed by: FAMILY MEDICINE

## 2020-09-24 PROCEDURE — G8756 NO BP MEASURE DOC: HCPCS | Performed by: FAMILY MEDICINE

## 2020-09-24 RX ORDER — INSULIN GLARGINE 100 [IU]/ML
15 INJECTION, SOLUTION SUBCUTANEOUS
Qty: 30 ML | Refills: 1 | Status: SHIPPED | OUTPATIENT
Start: 2020-09-24 | End: 2020-09-28 | Stop reason: SDUPTHER

## 2020-09-24 RX ORDER — GLIPIZIDE 10 MG/1
10 TABLET ORAL 2 TIMES DAILY WITH MEALS
Qty: 60 TAB | Refills: 2 | Status: SHIPPED | OUTPATIENT
Start: 2020-09-24 | End: 2020-11-02

## 2020-09-24 NOTE — TELEPHONE ENCOUNTER
Spoke with patient at time and patient was diagnosed with DM at the ER. Please reference ER note. Patient blood glucose was 600. Patient was sent home on metformin but patient states he blood sugars are still high. Patient has an appointment on Monday but patient states she call the ER physician and they informed her to contact PCP to see if insulin can be prescribed until she come in on Monday. Patient states her blood sugar around 3am was 400 she has not check it this morning. Patient would like to know what could be down before her appointment. Patient was given lantus in the ER. Please Advise. Do you want to do a virtual today or bring the patient in today. Please inform

## 2020-09-25 ENCOUNTER — TELEPHONE (OUTPATIENT)
Dept: FAMILY MEDICINE CLINIC | Age: 35
End: 2020-09-25

## 2020-09-25 RX ORDER — PEN NEEDLE, DIABETIC 30 GX3/16"
NEEDLE, DISPOSABLE MISCELLANEOUS
Qty: 1 PACKAGE | Refills: 11 | Status: SHIPPED | OUTPATIENT
Start: 2020-09-25

## 2020-09-25 NOTE — TELEPHONE ENCOUNTER
Provider out of office today- will send insulin needles now so patient can begin medication previously called in by provider.

## 2020-09-25 NOTE — TELEPHONE ENCOUNTER
Can you help this patient out-she needs needles for her insulin that Dr. Yamile Flores prescribed.       Thank you

## 2020-09-27 NOTE — PROGRESS NOTES
Maxwell Muñoz is a 28 y.o. female who was seen by synchronous (real-time) audio-video technology on 9/24/2020 for Diabetes        Assessment & Plan:       ICD-10-CM ICD-9-CM    1. Diabetes mellitus type 2, diet-controlled (HCC)  E11.9 250.00    2. Type 2 diabetes mellitus with hyperglycemia, without long-term current use of insulin (HCC)  E11.65 250.00 insulin glargine (LANTUS,BASAGLAR) 100 unit/mL (3 mL) inpn     790.29 glipiZIDE (GLUCOTROL) 10 mg tablet       Subjective:   Sharri Begum is seen for f/u care. Diabetes mellitus type 2: Patient has type 2 diabetes mellitus. It has been uncontrolled. Her blood glucose numbers have been elevated. She is on metformin but cannot tolerate the medication. She has abdominal pain, nausea, diarrhea with the medication. No fever or chills. She would like to try different medication. She was seen in the emergency room for elevated blood glucose. Then she was given some insulin but she no longer is on this. I will have her stop the metformin. I will send in glipizide 10 mg twice daily. I will have her start Lantus. She will keep a blood glucose log and I will follow-up next week in the clinic. Patient will intensify lifestyle and dietary modification. She may need to be seen by the endocrinologist.  Will place a referral.  Her last HbA1c was 9.0. Prior to Admission medications    Medication Sig Start Date End Date Taking? Authorizing Provider   insulin glargine (LANTUS,BASAGLAR) 100 unit/mL (3 mL) inpn 15 Units by SubCUTAneous route nightly. 9/24/20  Yes Chelsy Amanda MD   glipiZIDE (GLUCOTROL) 10 mg tablet Take 1 Tab by mouth two (2) times daily (with meals). 9/24/20  Yes Chelsy Amanda MD   Insulin Needles, Disposable, 31 gauge x 5/16\" ndle To use daily with insulin injection subcutaneously 9/25/20   Tucker William NP   aspirin 81 mg chewable tablet Take 1 Tab by mouth daily.  9/23/20   Tiffany Miller NP   gabapentin (NEURONTIN) 100 mg capsule Take 1 Cap by mouth three (3) times daily. Max Daily Amount: 300 mg. 9/22/20   Colby RIVERA NP   montelukast (SINGULAIR) 10 mg tablet Take 1 Tab by mouth nightly. 7/2/20   Chacho Canales MD   olmesartan (BENICAR) 40 mg tablet Take 1 Tab by mouth daily. 8/16/20   Chacho Canales MD   valACYclovir (VALTREX) 500 mg tablet TAKE 1 TABLET BY MOUTH TWICE A DAY FOR 5 DAYS 8/12/20   Provider, Historical   indomethacin (INDOCIN) 50 mg capsule Take 1 Cap by mouth two (2) times daily as needed for Pain. 8/17/20   Chelsy Amanda MD   atenoloL (TENORMIN) 25 mg tablet TAKE 1 TABLET BY MOUTH EVERY DAY 5/11/20   Chelsy Amanda MD   lancets misc Check blood glucose daily 1/23/20   Chelsy Amanda MD   glucose blood VI test strips (ASCENSIA AUTODISC VI, ONE TOUCH ULTRA TEST VI) strip Check blood glucose daily 1/23/20   Chelsy Amanda MD   ANORO ELLIPTA 62.5-25 mcg/actuation inhaler 1 INH DAILY - USE EVERY DAY 7/24/19   Provider, Historical   AIMOVIG AUTOINJECTOR 70 mg/mL injection AS DIRECTED - 1 INJECTION SUBQ ONCE MONTHLY 7/30/19   Provider, Historical   cetirizine (ZYRTEC) 10 mg tablet TAKE 1 TABLET EVERY DAY 12/18/18   Provider, Historical   rizatriptan (MAXALT-MLT) 10 mg disintegrating tablet PLEASE SEE ATTACHED FOR DETAILED DIRECTIONS 2/21/19   Provider, Historical   mometasone (NASONEX) 50 mcg/actuation nasal spray USE 1-2 SPRAYS INTO EACH NOSTRIL EVERY DAY AS NEEDED 2/20/19   Provider, Historical   FERRETTS IPS 40 mg/15 mL liqd 15 ML ORALLY 2 TIMES PER DAY. 2/27/19   Provider, Historical   EPINEPHrine (EPIPEN) 0.3 mg/0.3 mL injection AS DIRECTED AS NEEDED INTRAMUSCULAR 1 DAYS 2/20/19   Provider, Historical   albuterol (PROVENTIL HFA, VENTOLIN HFA, PROAIR HFA) 90 mcg/actuation inhaler Take 2 Puffs by inhalation every four (4) hours as needed for Wheezing or Shortness of Breath. 12/6/18   Digna PETERSON, NP   esomeprazole (NEXIUM) 40 mg capsule Take  by mouth daily.     Provider, Historical POLYETHYLENE GLYCOL 3350 (MIRALAX PO) Take  by mouth. Provider, Historical   CALCIUM CARBONATE/MAG HYDROX (MYLANTA PO) Take  by mouth. Provider, Historical     ROS Review of all systems is negative except as noted above in the HPI. Objective:   No flowsheet data found. Constitutional: [x]  No apparent distress     Mental status: [x] Alert and awake  [x] Oriented to person/place/time [x] Able to follow commands       HENT: [x] Normocephalic, atraumatic  [x] Mouth/Throat: Mucous membranes are moist    Pulmonary/Chest: [x] Respiratory effort normal   [x] No visualized signs of difficulty breathing or respiratory distress         Neurological:        [x] No Facial Asymmetry (Cranial nerve 7 motor function) (limited exam due to video visit)                   Psychiatric:       [x] Normal Affect    We discussed the expected course, resolution and complications of the diagnosis(es) in detail. Medication risks, benefits, costs, interactions, and alternatives were discussed as indicated. I advised her to contact the office if her condition worsens, changes or fails to improve as anticipated. She expressed understanding with the diagnosis(es) and plan. Sharri Lamar, who was evaluated through a patient-initiated, synchronous (real-time) audio-video encounter, and/or her healthcare decision maker, is aware that it is a billable service, with coverage as determined by her insurance carrier. She provided verbal consent to proceed: Yes, and patient identification was verified. It was conducted pursuant to the emergency declaration under the [facility], [address] [place] [address] authority and the [employer] Act. A caregiver was present when appropriate. Ability to conduct physical exam was limited. I was in the office. The patient was at home.       Eliot Horn MD

## 2020-09-28 ENCOUNTER — OFFICE VISIT (OUTPATIENT)
Dept: FAMILY MEDICINE CLINIC | Age: 35
End: 2020-09-28
Payer: MEDICARE

## 2020-09-28 VITALS
HEART RATE: 87 BPM | TEMPERATURE: 98 F | OXYGEN SATURATION: 96 % | BODY MASS INDEX: 28.83 KG/M2 | WEIGHT: 143 LBS | DIASTOLIC BLOOD PRESSURE: 89 MMHG | RESPIRATION RATE: 18 BRPM | HEIGHT: 59 IN | SYSTOLIC BLOOD PRESSURE: 139 MMHG

## 2020-09-28 DIAGNOSIS — G43.909 MIGRAINE WITHOUT STATUS MIGRAINOSUS, NOT INTRACTABLE, UNSPECIFIED MIGRAINE TYPE: ICD-10-CM

## 2020-09-28 DIAGNOSIS — I10 ESSENTIAL (PRIMARY) HYPERTENSION: ICD-10-CM

## 2020-09-28 DIAGNOSIS — E11.65 TYPE 2 DIABETES MELLITUS WITH HYPERGLYCEMIA, WITHOUT LONG-TERM CURRENT USE OF INSULIN (HCC): Primary | ICD-10-CM

## 2020-09-28 DIAGNOSIS — E11.21 TYPE 2 DIABETES WITH NEPHROPATHY (HCC): ICD-10-CM

## 2020-09-28 DIAGNOSIS — H54.7 DECREASED VISUAL ACUITY: ICD-10-CM

## 2020-09-28 DIAGNOSIS — D64.9 ANEMIA, UNSPECIFIED TYPE: ICD-10-CM

## 2020-09-28 DIAGNOSIS — E11.40 TYPE 2 DIABETES MELLITUS WITH DIABETIC NEUROPATHY, WITHOUT LONG-TERM CURRENT USE OF INSULIN (HCC): ICD-10-CM

## 2020-09-28 LAB
GLUCOSE POC: NORMAL MG/DL
HBA1C MFR BLD HPLC: 9.9 %

## 2020-09-28 PROCEDURE — G8417 CALC BMI ABV UP PARAM F/U: HCPCS | Performed by: FAMILY MEDICINE

## 2020-09-28 PROCEDURE — 3046F HEMOGLOBIN A1C LEVEL >9.0%: CPT | Performed by: FAMILY MEDICINE

## 2020-09-28 PROCEDURE — G8427 DOCREV CUR MEDS BY ELIG CLIN: HCPCS | Performed by: FAMILY MEDICINE

## 2020-09-28 PROCEDURE — G8510 SCR DEP NEG, NO PLAN REQD: HCPCS | Performed by: FAMILY MEDICINE

## 2020-09-28 PROCEDURE — 83036 HEMOGLOBIN GLYCOSYLATED A1C: CPT | Performed by: FAMILY MEDICINE

## 2020-09-28 PROCEDURE — 2022F DILAT RTA XM EVC RTNOPTHY: CPT | Performed by: FAMILY MEDICINE

## 2020-09-28 PROCEDURE — 82962 GLUCOSE BLOOD TEST: CPT | Performed by: FAMILY MEDICINE

## 2020-09-28 PROCEDURE — 99214 OFFICE O/P EST MOD 30 MIN: CPT | Performed by: FAMILY MEDICINE

## 2020-09-28 PROCEDURE — G8754 DIAS BP LESS 90: HCPCS | Performed by: FAMILY MEDICINE

## 2020-09-28 PROCEDURE — G8752 SYS BP LESS 140: HCPCS | Performed by: FAMILY MEDICINE

## 2020-09-28 RX ORDER — INSULIN GLARGINE 100 [IU]/ML
22 INJECTION, SOLUTION SUBCUTANEOUS
Qty: 30 ML | Refills: 1 | Status: SHIPPED | OUTPATIENT
Start: 2020-09-28 | End: 2021-11-01

## 2020-09-28 NOTE — PROGRESS NOTES
Chief Complaint   Patient presents with    Diabetes     ER follow up      1. Have you been to the ER, urgent care clinic since your last visit? Hospitalized since your last visit? Yes When: 09/2020 Where: er  Reason for visit: chest pain, bs elevated      2. Have you seen or consulted any other health care providers outside of the [address] [place] [address] since your last visit? Include any pap smears or colon screening. No     HPI  Sharri Sarkar comes in for follow-up care. Diabetes mellitus type 2: Patient's blood glucose has been uncontrolled. her HbA1c is elevated at 9.9. Blood glucose reading is high. I did talk to her a few days back and that she has started taking glargine insulin at 15 units daily. She also started on glipizide 10 mg twice a day with meals. She continues to feel malaise and fatigue. Blood glucose numbers remain elevated. I will increase the Lantus to 22 units daily. I will add Jardiance 10 mg daily. She will continue with the glipizide 10 mg twice a day with meals. I discussed lifestyle and dietary modification. She states that she has poor appetite and has not been eating much. Her blood glucose numbers however have remained elevated. Given the elevated HbA1c I will also place a referral for to be seen by the endocrinologist.  Patient is agreeable to this. She will keep a blood glucose log and I will follow-up at next visit. She has blurry vision and I will place a referral for to be seen by the ophthalmologist.  Discussed the need to go to the emergency room if symptoms get worse. Hypertension: Patient's blood pressure is stable. She is on Benicar and Tenormin. We will continue with the current treatment plan. Asthma: Patient has asthma. She has albuterol inhaler that she takes as needed. She also has Anoro Ellipta. We will continue with the current treatment plan. She takes Singulair daily.   Headache: Patient has a history of migraine headaches and has been followed up by the neurologist.  She is on Aimovig. She also takes Maxalt. She gets headache on and off. Denies focal weakness. Does have some changes in vision but this is likely due to elevated blood glucose. Neuropathy: Patient has neuropathy. She takes gabapentin. We will continue with the current treatment plan. She does get numbness and tingling of the legs and the hands. This is worse due to uncontrolled blood sugar and we did discuss this. Past Medical History  Past Medical History:   Diagnosis Date    Asthma     Chronic constipation     Diabetes ([address]     Fibromyalgia     GERD (gastroesophageal reflux disease)     Hypertension     IBS (irritable bowel syndrome)     Migraines     unknown if aura    Psychiatric disorder     she denies any diagnosis despite being on antipsychotics, SSRIs, etc in the past    Scoliosis     Urinary incontinence     mixed       Surgical History  Past Surgical History:   Procedure Laterality Date    HX  SECTION  ,     HX COLONOSCOPY      HX DILATION AND CURETTAGE      HX ENDOSCOPY      HX PELVIC LAPAROSCOPY          Medications  Current Outpatient Medications   Medication Sig Dispense Refill    empagliflozin (JARDIANCE) 10 mg tablet Take 1 Tab by mouth daily. 30 Tab 2    Insulin Needles, Disposable, 31 gauge x 5/16\" ndle To use daily with insulin injection subcutaneously 1 Package 11    insulin glargine (LANTUS,BASAGLAR) 100 unit/mL (3 mL) inpn 15 Units by SubCUTAneous route nightly. 30 mL 1    glipiZIDE (GLUCOTROL) 10 mg tablet Take 1 Tab by mouth two (2) times daily (with meals). 60 Tab 2    aspirin 81 mg chewable tablet Take 1 Tab by mouth daily. 30 Tab 0    gabapentin (NEURONTIN) 100 mg capsule Take 1 Cap by mouth three (3) times daily. Max Daily Amount: 300 mg. 90 Cap 0    montelukast (SINGULAIR) 10 mg tablet Take 1 Tab by mouth nightly.  olmesartan (BENICAR) 40 mg tablet Take 1 Tab by mouth daily.       valACYclovir (VALTREX) 500 mg tablet TAKE 1 TABLET BY MOUTH TWICE A DAY FOR 5 DAYS      indomethacin (INDOCIN) 50 mg capsule Take 1 Cap by mouth two (2) times daily as needed for Pain. 60 Cap 2    atenoloL (TENORMIN) 25 mg tablet TAKE 1 TABLET BY MOUTH EVERY DAY 90 Tab 1    lancets misc Check blood glucose daily 100 Each 3    glucose blood VI test strips (ASCENSIA AUTODISC VI, ONE TOUCH ULTRA TEST VI) strip Check blood glucose daily 100 Strip 3    ANORO ELLIPTA 62.5-25 mcg/actuation inhaler 1 INH DAILY - USE EVERY DAY  6    AIMOVIG AUTOINJECTOR 70 mg/mL injection AS DIRECTED - 1 INJECTION SUBQ ONCE MONTHLY  3    cetirizine (ZYRTEC) 10 mg tablet TAKE 1 TABLET EVERY DAY  0    rizatriptan (MAXALT-MLT) 10 mg disintegrating tablet PLEASE SEE ATTACHED FOR DETAILED DIRECTIONS  0    mometasone (NASONEX) 50 mcg/actuation nasal spray USE 1-2 SPRAYS INTO EACH NOSTRIL EVERY DAY AS NEEDED  0    FERRETTS IPS 40 mg/15 mL liqd 15 ML ORALLY 2 TIMES PER DAY. 2    EPINEPHrine (EPIPEN) 0.3 mg/0.3 mL injection AS DIRECTED AS NEEDED INTRAMUSCULAR 1 DAYS  5    albuterol (PROVENTIL HFA, VENTOLIN HFA, PROAIR HFA) 90 mcg/actuation inhaler Take 2 Puffs by inhalation every four (4) hours as needed for Wheezing or Shortness of Breath. 1 Inhaler 0    esomeprazole (NEXIUM) 40 mg capsule Take  by mouth daily.  POLYETHYLENE GLYCOL 3350 (MIRALAX PO) Take  by mouth.  CALCIUM CARBONATE/MAG HYDROX (MYLANTA PO) Take  by mouth. Allergies  Allergies   Allergen Reactions    Iodine Hives and Nausea and Vomiting    Iodinated Contrast Media Hives    Oxycodone-Acetaminophen Other (comments)     Hallucinations     Prochlorperazine Other (comments)    Propoxyphene-Acetaminophen Unknown (comments)     Allergy to propoxyphene only.   Has previously tolerated acetaminophen    Reglan [Metoclopramide] Not Reported This Time    Sulfa (Sulfonamide Antibiotics) Not Reported This Time    Wygesic Not Reported This Time       Family History  Family History   Problem Relation Age of Onset    Kidney Disease Father     Other Father         Pancreatic Disease    Cancer Father     Heart Disease Father     Heart Attack Father     Thyroid Disease Other         Grandparent       Social History  Social History     Socioeconomic History    Marital status: SINGLE     Spouse name: Not on file    Number of children: Not on file    Years of education: Not on file    Highest education level: Not on file   Occupational History    Not on file   Social Needs    Financial resource strain: Not on file    Food insecurity     Worry: Not on file     Inability: Not on file    Transportation needs     Medical: Not on file     Non-medical: Not on file   Tobacco Use    Smoking status: Never Smoker    Smokeless tobacco: Never Used   Substance and Sexual Activity    Alcohol use: No    Drug use: No    Sexual activity: Not on file   Lifestyle    Physical activity     Days per week: Not on file     Minutes per session: Not on file    Stress: Not on file   Relationships    Social connections     Talks on phone: Not on file     Gets together: Not on file     Attends Buddhism service: Not on file     Active member of club or organization: Not on file     Attends meetings of clubs or organizations: Not on file     Relationship status: Not on file    Intimate partner violence     Fear of current or ex partner: Not on file     Emotionally abused: Not on file     Physically abused: Not on file     Forced sexual activity: Not on file   Other Topics Concern    Not on file   Social History Narrative    Not on file       Review of Systems  Review of Systems - Review of all systems is negative except as noted above in the HPI.     Vital Signs  Visit Vitals  /89   Pulse 87   Temp 98 °F (36.7 °C) (Oral)   Resp 18   Ht 4' 11\" (1.499 m)   Wt 143 lb (64.9 kg)   SpO2 96%   BMI 28.88 kg/m²         Physical Exam  Physical Examination: General appearance - oriented to person, place, and time and ill-appearing  Mental status - affect appropriate to mood  Chest - clear to auscultation, no wheezes, rales or rhonchi, symmetric air entry  Heart - S1 and S2 normal  Abdomen - no rebound tenderness noted  Neurological - motor and sensory grossly normal bilaterally  Musculoskeletal - no muscular tenderness noted  Extremities - no pedal edema noted, intact peripheral pulses        Results  Results for orders placed or performed during the hospital encounter of 09/21/20   CBC WITH AUTOMATED DIFF   Result Value Ref Range    WBC 7.0 4.6 - 13.2 K/uL    RBC 5.37 (H) 4.20 - 5.30 M/uL    HGB 15.0 12.0 - 16.0 %    HCT 44.2 35.0 - 45.0 %    MCV 82.3 74.0 - 97.0 FL    MCH 27.9 24.0 - 34.0 PG    MCHC 33.9 31.0 - 37.0 g/dL    RDW 13.4 11.6 - 14.5 %    PLATELET 362 216 - 702 K/uL    MPV 12.1 FL    NEUTROPHILS 59 40 - 73 %    LYMPHOCYTES 34 21 - 52 %    MONOCYTES 6 3 - 10 %    EOSINOPHILS 1 0 - 5 %    BASOPHILS 0 0 - 2 %    IMMATURE GRANULOCYTES 0 %    ABS. NEUTROPHILS 4.1 1.8 - 8.0 K/UL    ABS. LYMPHOCYTES 2.4 0.9 - 3.6 K/UL    ABS. MONOCYTES 0.4 0.05 - 1.2 K/UL    ABS. EOSINOPHILS 0.1 0.0 - 0.4 K/UL    ABS. BASOPHILS 0.0 0.0 - 0.1 K/UL    ABS. IMM. GRANS. 0.0 K/UL   METABOLIC PANEL, COMPREHENSIVE   Result Value Ref Range    Sodium 133 (L) 135 - 145 mmol/L    Potassium 4.2 3.2 - 5.1 mmol/L    Chloride 96 94 - 111 mmol/L    CO2 23 21 - 33 mmol/L    Anion gap 14 mmol/L    Glucose 607 (HH) 70 - 110 mg/dL    BUN 12 9 - 21 mg/dL    Creatinine 0.90 0.70 - 1.20 mg/dL    BUN/Creatinine ratio 13      GFR est AA >60 ml/min/1.73m2    GFR est non-AA >60 ml/min/1.73m2    Calcium 10.5 8.5 - 10.5 mg/dL    Bilirubin, total 0.6 0.2 - 1.0 mg/dL    AST (SGOT) 22 14 - 74 U/L    ALT (SGPT) 26 3 - 52 U/L    Alk.  phosphatase 153 (H) 38 - 126 U/L    Protein, total 9.8 (H) 6.1 - 8.4 g/dL    Albumin 5.4 (H) 3.5 - 4.7 g/dL    Globulin 4.4 g/dL    A-G Ratio 1.2     MAGNESIUM   Result Value Ref Range    Magnesium 2.3 1.7 - 2.8 mg/dL   URINALYSIS W/ RFLX MICROSCOPIC   Result Value Ref Range    Color Yellow/Straw      Appearance Clear Clear      Specific gravity 1.010 1.003 - 1.035      pH (UA) 6.0 5.0 - 9.0      Protein 15 (A) Negative mg/dL    Glucose >1,000 (A) Negative mg/dL    Ketone Negative Negative mg/dL    Bilirubin Negative Negative      Blood Trace (A) Negative      Urobilinogen 0.2 0.2 - 1.0 EU/dL    Nitrites Negative Negative      Leukocyte Esterase Negative Negative     TROPONIN I   Result Value Ref Range    Troponin-I, Qt. <0.02 (L) 0.02 - 0.05 ng/mL   URINE MICROSCOPIC   Result Value Ref Range    WBC 0-4 0 - 4 /hpf    RBC 0-5 0 - 2 /hpf    Epithelial cells Few 0 - 20 /lpf    Bacteria Negative (A) None /hpf   HEMOGLOBIN A1C WITH EAG   Result Value Ref Range    Hemoglobin A1c 9.0 (H) 4.0 - 5.6 %    Est. average glucose 212 mg/dL   LIPID PANEL   Result Value Ref Range    LIPID PROFILE        Cholesterol, total 141 <200 mg/dL    Triglyceride 152 (H) <150 mg/dL    HDL Cholesterol 44 mg/dL    LDL, calculated 66.6 0 - 100 mg/dL    VLDL, calculated 30.4 mg/dL    CHOL/HDL Ratio 3.2 0.0 - 5.0     METABOLIC PANEL, BASIC   Result Value Ref Range    Sodium 134 (L) 135 - 145 mmol/L    Potassium 4.0 3.2 - 5.1 mmol/L    Chloride 105 94 - 111 mmol/L    CO2 20 (L) 21 - 33 mmol/L    Anion gap 9 mmol/L    Glucose 396 (H) 70 - 110 mg/dL    BUN 13 9 - 21 mg/dL    Creatinine 0.80 0.70 - 1.20 mg/dL    BUN/Creatinine ratio 16      GFR est AA >60 ml/min/1.73m2    GFR est non-AA >60 ml/min/1.73m2    Calcium 8.9 8.5 - 10.5 mg/dL   TROPONIN I   Result Value Ref Range    Troponin-I, Qt. <0.02 (L) 0.02 - 0.05 ng/mL   TROPONIN I   Result Value Ref Range    Troponin-I, Qt. <0.02 (L) 0.02 - 0.05 ng/mL   LIPASE   Result Value Ref Range    Lipase 24 10 - 57 U/L   GLUCOSE, POC   Result Value Ref Range    Glucose (POC) >600 (HH) 70 - 110 mg/dL    Performed by VIDYA HOOD    GLUCOSE, POC   Result Value Ref Range    Glucose (POC) 381 (H) 70 - 110 mg/dL Performed by [facility] RAMEZ    GLUCOSE, POC   Result Value Ref Range    Glucose (POC) 385 (H) 70 - 110 mg/dL    Performed by [facility] KEELEY    GLUCOSE, POC   Result Value Ref Range    Glucose (POC) 378 (H) 70 - 110 mg/dL    Performed by [facility] KEELEY    GLUCOSE, POC   Result Value Ref Range    Glucose (POC) 294 (H) 70 - 110 mg/dL    Performed by ELLIE VALENZUELA    EKG, 12 LEAD, INITIAL   Result Value Ref Range    Ventricular Rate 79 BPM    Atrial Rate 79 BPM    P-R Interval 165 ms    QRS Duration 82 ms    Q-T Interval 362 ms    QTC Calculation (Bezet) 416 ms    Calculated P Axis 71 degrees    Calculated R Axis 2 degrees    Calculated T Axis -15 degrees    Diagnosis       Sinus rhythm  Borderline T abnormalities, inferior leads    Confirmed by Hawa Gamino ([id number]) on 9/22/2020 5:07:50 PM         ASSESSMENT and PLAN    ICD-10-CM ICD-9-CM    1. Type 2 diabetes mellitus with hyperglycemia, without long-term current use of insulin ([facility])  E11.65 250.00 AMB POC HEMOGLOBIN A1C     790.29 REFERRAL TO ENDOCRINOLOGY      empagliflozin (JARDIANCE) 10 mg tablet      insulin glargine (LANTUS,BASAGLAR) 100 unit/mL (3 mL) inpn      REFERRAL TO OPHTHALMOLOGY      AMB POC GLUCOSE BLOOD, BY GLUCOSE MONITORING DEVICE      CANCELED: GLUCOSE, RANDOM   2. Type 2 diabetes with nephropathy ([facility])  E11.21 250.40      583.81    3. Type 2 diabetes mellitus with diabetic neuropathy, without long-term current use of insulin ([facility])  E11.40 250.60      357.2    4. Essential (primary) hypertension  I10 401.9    5. Decreased visual acuity  H54.7 369.9    6. Migraine without status migrainosus, not intractable, unspecified migraine type  G43.909 346.90    7.  Anemia, unspecified type  D64.9 285.9      lab results and schedule of future lab studies reviewed with patient  reviewed diet, exercise and weight control  cardiovascular risk and specific lipid/LDL goals reviewed  reviewed medications and side effects in detail  specific diabetic recommendations: low cholesterol diet, weight control and daily exercise discussed, home glucose monitoring emphasized, all medications, side effects and compliance discussed carefully, annual eye examinations at Ophthalmology discussed, glycohemoglobin and other lab monitoring discussed and long term diabetic complications discussed      I have discussed the diagnosis with the patient and the intended plan of care as seen in the above orders. The patient has received an after-visit summary and questions were answered concerning future plans. I have discussed medication, side effects, and warnings with the patient in detail. The patient verbalized understanding and is in agreement with the plan of care. The patient will contact the office with any additional concerns. Clara Estes MD    PLEASE NOTE:   This document has been produced using voice recognition software.  Unrecognized errors in transcription may be present

## 2020-09-30 ENCOUNTER — OFFICE VISIT (OUTPATIENT)
Dept: CARDIOLOGY CLINIC | Age: 35
End: 2020-09-30
Payer: MEDICARE

## 2020-09-30 VITALS
DIASTOLIC BLOOD PRESSURE: 81 MMHG | HEART RATE: 92 BPM | HEIGHT: 59 IN | SYSTOLIC BLOOD PRESSURE: 138 MMHG | WEIGHT: 144 LBS | BODY MASS INDEX: 29.03 KG/M2 | TEMPERATURE: 97.7 F

## 2020-09-30 DIAGNOSIS — E11.9 TYPE 2 DIABETES MELLITUS WITHOUT COMPLICATION, UNSPECIFIED WHETHER LONG TERM INSULIN USE (HCC): ICD-10-CM

## 2020-09-30 DIAGNOSIS — J45.909 UNCOMPLICATED ASTHMA, UNSPECIFIED ASTHMA SEVERITY, UNSPECIFIED WHETHER PERSISTENT: ICD-10-CM

## 2020-09-30 DIAGNOSIS — R07.9 CHEST PAIN, UNSPECIFIED TYPE: Primary | ICD-10-CM

## 2020-09-30 DIAGNOSIS — I10 ESSENTIAL HYPERTENSION: ICD-10-CM

## 2020-09-30 PROCEDURE — G8754 DIAS BP LESS 90: HCPCS | Performed by: INTERNAL MEDICINE

## 2020-09-30 PROCEDURE — 3046F HEMOGLOBIN A1C LEVEL >9.0%: CPT | Performed by: INTERNAL MEDICINE

## 2020-09-30 PROCEDURE — G8417 CALC BMI ABV UP PARAM F/U: HCPCS | Performed by: INTERNAL MEDICINE

## 2020-09-30 PROCEDURE — G8428 CUR MEDS NOT DOCUMENT: HCPCS | Performed by: INTERNAL MEDICINE

## 2020-09-30 PROCEDURE — G8432 DEP SCR NOT DOC, RNG: HCPCS | Performed by: INTERNAL MEDICINE

## 2020-09-30 PROCEDURE — G8752 SYS BP LESS 140: HCPCS | Performed by: INTERNAL MEDICINE

## 2020-09-30 PROCEDURE — 99214 OFFICE O/P EST MOD 30 MIN: CPT | Performed by: INTERNAL MEDICINE

## 2020-09-30 PROCEDURE — 2022F DILAT RTA XM EVC RTNOPTHY: CPT | Performed by: INTERNAL MEDICINE

## 2020-09-30 NOTE — PROGRESS NOTES
1. Have you been to the ER, urgent care clinic since your last visit? Hospitalized since your last visit? Yes [place]  2. Have you seen or consulted any other health care providers outside of the [address] [place] [address] since your last visit? Include any pap smears or colon screening.       No

## 2020-09-30 NOTE — PROGRESS NOTES
HISTORY OF PRESENT ILLNESS  Sharri Saab is a 28 y.o. female. Patient is here for follow up of diagnostic tests. Results will be discussed. Chest Pain (Angina)    The history is provided by the patient. The current episode started more than 1 week ago. The problem has not changed since onset. The problem occurs rarely. The pain is associated with rest and exertion. The pain is present in the lateral region and substernal region. The quality of the pain is described as pressure-like and sharp. The pain does not radiate. Pertinent negatives include no dizziness, no nausea, no palpitations and no weakness. Hypertension   Associated symptoms include chest pain. Review of Systems   Constitutional: Negative for chills. HENT: Negative for nosebleeds. Eyes: Negative for blurred vision and double vision. Cardiovascular: Positive for chest pain. Negative for palpitations. Gastrointestinal: Negative for heartburn and nausea. Musculoskeletal: Negative for myalgias. Neurological: Negative for dizziness and weakness. Endo/Heme/Allergies: Does not bruise/bleed easily.      Family History   Problem Relation Age of Onset    Kidney Disease Father     Other Father         Pancreatic Disease    Cancer Father     Heart Disease Father     Heart Attack Father     Thyroid Disease Other         Grandparent       Past Medical History:   Diagnosis Date    Asthma     Chronic constipation     Diabetes ([address]     Fibromyalgia     GERD (gastroesophageal reflux disease)     Hypertension     IBS (irritable bowel syndrome)     Migraines     unknown if aura    Psychiatric disorder     she denies any diagnosis despite being on antipsychotics, SSRIs, etc in the past    Scoliosis     Urinary incontinence     mixed       Past Surgical History:   Procedure Laterality Date    HX  SECTION  ,     HX COLONOSCOPY      HX DILATION AND CURETTAGE      HX ENDOSCOPY      HX PELVIC LAPAROSCOPY         Social History     Tobacco Use    Smoking status: Never Smoker    Smokeless tobacco: Never Used   Substance Use Topics    Alcohol use: No       Allergies   Allergen Reactions    Iodine Hives and Nausea and Vomiting    Iodinated Contrast Media Hives    Oxycodone-Acetaminophen Other (comments)     Hallucinations     Prochlorperazine Other (comments)    Propoxyphene-Acetaminophen Unknown (comments)     Allergy to propoxyphene only. Has previously tolerated acetaminophen    Reglan [Metoclopramide] Not Reported This Time    Sulfa (Sulfonamide Antibiotics) Not Reported This Time    Wygesic Not Reported This Time       Prior to Admission medications    Medication Sig Start Date End Date Taking? Authorizing Provider   empagliflozin (JARDIANCE) 10 mg tablet Take 1 Tab by mouth daily. 9/28/20  Yes Fawad Mckinney MD   insulin glargine (LANTUS,BASAGLAR) 100 unit/mL (3 mL) inpn 22 Units by SubCUTAneous route nightly. 9/28/20  Yes Fawad Mckinney MD   Insulin Needles, Disposable, 31 gauge x 5/16\" ndle To use daily with insulin injection subcutaneously 9/25/20  Yes Drea Mitchell NP   glipiZIDE (GLUCOTROL) 10 mg tablet Take 1 Tab by mouth two (2) times daily (with meals). 9/24/20  Yes Fawad Mckinney MD   aspirin 81 mg chewable tablet Take 1 Tab by mouth daily. 9/23/20  Yes Ana Luisa Baugh NP   gabapentin (NEURONTIN) 100 mg capsule Take 1 Cap by mouth three (3) times daily. Max Daily Amount: 300 mg. 9/22/20  Yes Ana Luisa Baugh NP   montelukast (SINGULAIR) 10 mg tablet Take 1 Tab by mouth nightly. 7/2/20  Yes Chacho Canales MD   olmesartan (BENICAR) 40 mg tablet Take 1 Tab by mouth daily. 8/16/20  Yes Chacho Canales MD   valACYclovir (VALTREX) 500 mg tablet TAKE 1 TABLET BY MOUTH TWICE A DAY FOR 5 DAYS 8/12/20  Yes Provider, Historical   indomethacin (INDOCIN) 50 mg capsule Take 1 Cap by mouth two (2) times daily as needed for Pain.  8/17/20  Yes Fawad Mckinney MD   atenoloL (TENORMIN) 25 mg tablet TAKE 1 TABLET BY MOUTH EVERY DAY 5/11/20  Yes Chelsy Cormier MD   lancets misc Check blood glucose daily 1/23/20  Yes Chelsy Cormier MD   glucose blood VI test strips (ASCENSIA AUTODISC VI, ONE TOUCH ULTRA TEST VI) strip Check blood glucose daily 1/23/20  Yes Chelsy Amanda MD   ANORO ELLIPTA 62.5-25 mcg/actuation inhaler 1 INH DAILY - USE EVERY DAY 7/24/19  Yes Provider, Historical   AIMOVIG AUTOINJECTOR 70 mg/mL injection AS DIRECTED - 1 INJECTION SUBQ ONCE MONTHLY 7/30/19  Yes Provider, Historical   cetirizine (ZYRTEC) 10 mg tablet TAKE 1 TABLET EVERY DAY 12/18/18  Yes Provider, Historical   rizatriptan (MAXALT-MLT) 10 mg disintegrating tablet PLEASE SEE ATTACHED FOR DETAILED DIRECTIONS 2/21/19  Yes Provider, Historical   mometasone (NASONEX) 50 mcg/actuation nasal spray USE 1-2 SPRAYS INTO EACH NOSTRIL EVERY DAY AS NEEDED 2/20/19  Yes Provider, Historical   FERRETTS IPS 40 mg/15 mL liqd 15 ML ORALLY 2 TIMES PER DAY. 2/27/19  Yes Provider, Historical   EPINEPHrine (EPIPEN) 0.3 mg/0.3 mL injection AS DIRECTED AS NEEDED INTRAMUSCULAR 1 DAYS 2/20/19  Yes Provider, Historical   albuterol (PROVENTIL HFA, VENTOLIN HFA, PROAIR HFA) 90 mcg/actuation inhaler Take 2 Puffs by inhalation every four (4) hours as needed for Wheezing or Shortness of Breath. 12/6/18  Yes Yani PETERSON, NP   esomeprazole (NEXIUM) 40 mg capsule Take  by mouth daily. Yes Provider, Historical   POLYETHYLENE GLYCOL 3350 (MIRALAX PO) Take  by mouth. Yes Provider, Historical   CALCIUM CARBONATE/MAG HYDROX (MYLANTA PO) Take  by mouth. Yes Provider, Historical         Visit Vitals  /81   Pulse 92   Temp 97.7 °F (36.5 °C) (Temporal)   Ht 4' 11\" (1.499 m)   Wt 65.3 kg (144 lb)   BMI 29.08 kg/m²         Physical Exam   Constitutional: She is oriented to person, place, and time. She appears well-developed and well-nourished. HENT:   Head: Normocephalic and atraumatic. Eyes: Conjunctivae are normal.   Neck: Neck supple.  No JVD present. No tracheal deviation present. No thyromegaly present. Cardiovascular: Normal rate and regular rhythm. PMI is not displaced. Exam reveals no gallop, no S3 and no decreased pulses. No murmur heard. Pulmonary/Chest: No respiratory distress. She has no wheezes. She has no rales. She exhibits no tenderness. Abdominal: Soft. There is no abdominal tenderness. Musculoskeletal:         General: No edema. Neurological: She is alert and oriented to person, place, and time. Skin: Skin is warm. Psychiatric: She has a normal mood and affect. ECHO CARDIOGRAM [id number]/28/2015  [employer]  Component Name Value Ref Range   EF Echo 60     Result Impression   :   NORMAL LEFT VENTRICULAR CAVITY SIZE AND SYSTOLIC FUNCTION WITH AN EJECTION FRACTION OF  60  %. NORMAL DIASTOLIC FUNCTION. NORMAL RIGHT HEART FUNCTION AND SIZE. NO HEMODYNAMICALLY SIGNIFICANT VALVULAR PATHOLOGY. NORMAL PULMONARY ARTERY PRESSURE OF 15 MM/HG. NO PREVIOUS REPORT FOR COMPARISON. I have personally reviewed patient's records available from hospital and other providers and incorporated findings in patient care. 12/2018- ER notes, labs, EKG, chest x-ray and prior records  Ms. Jose Sherman has a reminder for a \"due or due soon\" health maintenance. I have asked that she contact her primary care provider for follow-up on this health maintenance. Interpretation Summary 1/2019       · Normal cavity size, wall thickness, systolic function (ejection fraction normal) and diastolic function. The estimated ejection fraction is 56 - 60%. No regional wall motion abnormality noted. · Normal right ventricular size and function. · No hemodynamically significant valvular pathology. Interpretation Summary 1/2019    · Baseline ECG: Normal sinus rhythm. · Low risk Duke treadmill score.   · Negative stress electrocardiogram.        03/10/20   ECHO STRESS 03/11/2020 3/12/2020    Narrative · Baseline ECG: Normal sinus rhythm, non-specific ST-T wave abnormalities. · Moderate risk Duke treadmill score. · Negative stress test.  · Normal stress echocardiogram. Low risk study. Signed by: Ion Giraldo MD       Assessment         ICD-10-CM ICD-9-CM    1. Chest pain, unspecified type  R07.9 786.50    2. Essential hypertension  I10 401.9    3. Type 2 diabetes mellitus without complication, unspecified whether long term insulin use (HCC)  E11.9 250.00    4. Uncomplicated asthma, unspecified asthma severity, unspecified whether persistent  J45.909 493.90        There are no discontinued medications. No orders of the defined types were placed in this encounter. Follow-up and Dispositions    · Return in about 3 months (around 12/30/2020). Patient is 70-year-old female with history of hypertension seen for episodes of sharp chest pain with some tightness. Was recently hospitalized at [facility]- serial cardiac enzymes negative for MI. Chest pain-- likely costochondritis-- reproducible.   Stress echo in march 2020 - negative for ischemia  Recommend NSAIDS for 1-2 weeks  F/u in 3 months

## 2020-09-30 NOTE — LETTER
[address] [facility] [place] 1985 
 
9/30/2020 Dear MD Crystal Coronado MD 
 
I had the pleasure of evaluating  Ms. Candice Gonzalez in office today. Below are the relevant portions of my assessment and plan of care. ICD-10-CM ICD-9-CM 1. Chest pain, unspecified type  R07.9 786.50 2. Essential hypertension  I10 401.9 3. Type 2 diabetes mellitus without complication, unspecified whether long term insulin use (HCC)  E11.9 250.00   
4. Uncomplicated asthma, unspecified asthma severity, unspecified whether persistent  J45.909 493.90 Current Outpatient Medications Medication Sig Dispense Refill  empagliflozin (JARDIANCE) 10 mg tablet Take 1 Tab by mouth daily. 30 Tab 2  
 insulin glargine (LANTUS,BASAGLAR) 100 unit/mL (3 mL) inpn 22 Units by SubCUTAneous route nightly. 30 mL 1  
 Insulin Needles, Disposable, 31 gauge x 5/16\" ndle To use daily with insulin injection subcutaneously 1 Package 11  
 glipiZIDE (GLUCOTROL) 10 mg tablet Take 1 Tab by mouth two (2) times daily (with meals). 60 Tab 2  
 aspirin 81 mg chewable tablet Take 1 Tab by mouth daily. 30 Tab 0  
 gabapentin (NEURONTIN) 100 mg capsule Take 1 Cap by mouth three (3) times daily. Max Daily Amount: 300 mg. 90 Cap 0  
 montelukast (SINGULAIR) 10 mg tablet Take 1 Tab by mouth nightly.  olmesartan (BENICAR) 40 mg tablet Take 1 Tab by mouth daily.  valACYclovir (VALTREX) 500 mg tablet TAKE 1 TABLET BY MOUTH TWICE A DAY FOR 5 DAYS  indomethacin (INDOCIN) 50 mg capsule Take 1 Cap by mouth two (2) times daily as needed for Pain.  60 Cap 2  
 atenoloL (TENORMIN) 25 mg tablet TAKE 1 TABLET BY MOUTH EVERY DAY 90 Tab 1  
 lancets misc Check blood glucose daily 100 Each 3  
 glucose blood VI test strips (ASCENSIA AUTODISC VI, ONE TOUCH ULTRA TEST VI) strip Check blood glucose daily 100 Strip 3  
 ANORO ELLIPTA 62.5-25 mcg/actuation inhaler 1 INH DAILY - USE EVERY DAY  6  
  AIMOVIG AUTOINJECTOR 70 mg/mL injection AS DIRECTED - 1 INJECTION SUBQ ONCE MONTHLY  3  
 cetirizine (ZYRTEC) 10 mg tablet TAKE 1 TABLET EVERY DAY  0  
 rizatriptan (MAXALT-MLT) 10 mg disintegrating tablet PLEASE SEE ATTACHED FOR DETAILED DIRECTIONS  0  
 mometasone (NASONEX) 50 mcg/actuation nasal spray USE 1-2 SPRAYS INTO EACH NOSTRIL EVERY DAY AS NEEDED  0  
 FERRETTS IPS 40 mg/15 mL liqd 15 ML ORALLY 2 TIMES PER DAY. 2  
 EPINEPHrine (EPIPEN) 0.3 mg/0.3 mL injection AS DIRECTED AS NEEDED INTRAMUSCULAR 1 DAYS  5  
 albuterol (PROVENTIL HFA, VENTOLIN HFA, PROAIR HFA) 90 mcg/actuation inhaler Take 2 Puffs by inhalation every four (4) hours as needed for Wheezing or Shortness of Breath. 1 Inhaler 0  
 esomeprazole (NEXIUM) 40 mg capsule Take  by mouth daily.  POLYETHYLENE GLYCOL 3350 (MIRALAX PO) Take  by mouth.  CALCIUM CARBONATE/MAG HYDROX (MYLANTA PO) Take  by mouth. No orders of the defined types were placed in this encounter. If you have questions, please do not hesitate to call me. I look forward to following MsGiselle Brandi Huddleston along with you.  
 
Sincerely, 
Tomas Haddad MD

## 2020-10-05 RX ORDER — ATENOLOL 25 MG/1
TABLET ORAL
Qty: 90 TAB | Refills: 1 | Status: SHIPPED | OUTPATIENT
Start: 2020-10-05 | End: 2021-05-18

## 2020-10-13 ENCOUNTER — VIRTUAL VISIT (OUTPATIENT)
Dept: FAMILY MEDICINE CLINIC | Age: 35
End: 2020-10-13
Payer: MEDICARE

## 2020-10-13 DIAGNOSIS — R09.81 SINUS CONGESTION: ICD-10-CM

## 2020-10-13 DIAGNOSIS — I10 ESSENTIAL (PRIMARY) HYPERTENSION: ICD-10-CM

## 2020-10-13 DIAGNOSIS — E11.65 TYPE 2 DIABETES MELLITUS WITH HYPERGLYCEMIA, WITHOUT LONG-TERM CURRENT USE OF INSULIN (HCC): Primary | ICD-10-CM

## 2020-10-13 DIAGNOSIS — K21.9 GASTROESOPHAGEAL REFLUX DISEASE, UNSPECIFIED WHETHER ESOPHAGITIS PRESENT: ICD-10-CM

## 2020-10-13 DIAGNOSIS — G43.909 MIGRAINE WITHOUT STATUS MIGRAINOSUS, NOT INTRACTABLE, UNSPECIFIED MIGRAINE TYPE: ICD-10-CM

## 2020-10-13 PROCEDURE — G8510 SCR DEP NEG, NO PLAN REQD: HCPCS | Performed by: FAMILY MEDICINE

## 2020-10-13 PROCEDURE — G8756 NO BP MEASURE DOC: HCPCS | Performed by: FAMILY MEDICINE

## 2020-10-13 PROCEDURE — 2022F DILAT RTA XM EVC RTNOPTHY: CPT | Performed by: FAMILY MEDICINE

## 2020-10-13 PROCEDURE — 99214 OFFICE O/P EST MOD 30 MIN: CPT | Performed by: FAMILY MEDICINE

## 2020-10-13 PROCEDURE — G8427 DOCREV CUR MEDS BY ELIG CLIN: HCPCS | Performed by: FAMILY MEDICINE

## 2020-10-13 PROCEDURE — 3046F HEMOGLOBIN A1C LEVEL >9.0%: CPT | Performed by: FAMILY MEDICINE

## 2020-10-13 RX ORDER — ASPIRIN 81 MG/1
81 TABLET ORAL DAILY
Qty: 90 TAB | Refills: 1 | Status: SHIPPED | OUTPATIENT
Start: 2020-10-13 | End: 2021-05-18

## 2020-10-13 RX ORDER — LANCETS
EACH MISCELLANEOUS
Qty: 400 EACH | Refills: 3 | Status: SHIPPED | OUTPATIENT
Start: 2020-10-13 | End: 2022-01-26

## 2020-10-13 NOTE — PROGRESS NOTES
Jessica Olivares is a 28 y.o. female who was seen by synchronous (real-time) audio-video technology on 10/13/2020 for Diabetes and Hypertension        Assessment & Plan:       ICD-10-CM ICD-9-CM    1. Type 2 diabetes mellitus with hyperglycemia, without long-term current use of insulin ([facility])  E11.65 250.00 glucose blood VI test strips (ASCENSIA AUTODISC VI, ONE TOUCH ULTRA TEST VI) strip     790.29 lancets misc      aspirin delayed-release 81 mg tablet   2. Essential (primary) hypertension  I10 401.9    3. Migraine without status migrainosus, not intractable, unspecified migraine type  G43.909 346.90    4. Gastroesophageal reflux disease, unspecified whether esophagitis present  K21.9 530.81    5. Sinus congestion  R09.81 478.19          I spent at least 40 minutes on this visit with this established patient. Subjective:   Jessica Olivares comes in for f/u care. DM2: Patient has type 2 diabetes mellitus. Recently had medications adjusted. She is now on Lantus, Jardiance and glipizide. Blood glucose numbers have come down and are usually in the 150s though at times they have been low in the 60s. During this time she has hypoglycemic symptoms with sweating and shaking. She has adjusted some of her medications at this time. She is here to see the endocrinologist.  I did give a number to call to set up an appointment to see the endocrinologist.  We discussed hypoglycemia. If this persists she cut back her Lantus to 20 units daily and we may need to cut back on the glipizide. She is doing lifestyle and dietary modification. She should keep a blood glucose log. States that she has been checking her blood glucose up to 4 times a day. I will send in a prescription for test strips and lancets. I will follow-up at next visit. I will also send in a prescription for aspirin daily. HTN; patient has hypertension. Blood pressure has been stable. She is on atenolol.   She has not been taking olmesartan. I will follow-up at next visit. She should keep a blood pressure log. Headache: Patient has a history of migraine headaches. She has been getting headaches on and off. She is seen by the neurologist and currently on [address]. She also has Maxalt. We will continue with the current treatment plan. She should follow-up with the neurologist.  GERD: Patient has gastroesophageal reflux disease. She is on esomeprazole. Denies dark stools or hematemesis. Continue current treatment plan. Sinus congestion: Patient has a history of sinus congestion with allergies. She uses Nasonex nasal spray. She is on Zyrtec and Singulair. She will continue with these medications. Prior to Admission medications    Medication Sig Start Date End Date Taking? Authorizing Provider   atenoloL (TENORMIN) 25 mg tablet TAKE 1 TABLET BY MOUTH EVERY DAY 10/5/20   Chelsy Amanda MD   empagliflozin (JARDIANCE) 10 mg tablet Take 1 Tab by mouth daily. 9/28/20   Chelsy Amanda MD   insulin glargine (LANTUS,BASAGLAR) 100 unit/mL (3 mL) inpn 22 Units by SubCUTAneous route nightly. 9/28/20   Chelsy Amanda MD   Insulin Needles, Disposable, 31 gauge x 5/16\" ndle To use daily with insulin injection subcutaneously 9/25/20   Clotilde Ryan NP   glipiZIDE (GLUCOTROL) 10 mg tablet Take 1 Tab by mouth two (2) times daily (with meals). 9/24/20   Chelsy Amanda MD   aspirin 81 mg chewable tablet Take 1 Tab by mouth daily. 9/23/20   Wyatt Morales NP   gabapentin (NEURONTIN) 100 mg capsule Take 1 Cap by mouth three (3) times daily. Max Daily Amount: 300 mg. 9/22/20   Jere RIVERA NP   montelukast (SINGULAIR) 10 mg tablet Take 1 Tab by mouth nightly. 7/2/20   Chacho Canales MD   olmesartan (BENICAR) 40 mg tablet Take 1 Tab by mouth daily.  8/16/20   Chacho Canales MD   valACYclovir (VALTREX) 500 mg tablet TAKE 1 TABLET BY MOUTH TWICE A DAY FOR 5 DAYS 8/12/20   Provider, Historical   indomethacin (INDOCIN) 50 mg capsule Take 1 Cap by mouth two (2) times daily as needed for Pain. 8/17/20   Chelsy Amanda MD   lancets misc Check blood glucose daily 1/23/20   Chelsy Amanda MD   glucose blood VI test strips (ASCENSIA AUTODISC VI, ONE TOUCH ULTRA TEST VI) strip Check blood glucose daily 1/23/20   Chelsy Amanda MD   ANORO ELLIPTA 62.5-25 mcg/actuation inhaler 1 INH DAILY - USE EVERY DAY 7/24/19   Provider, Historical   AIMOVIG AUTOINJECTOR 70 mg/mL injection AS DIRECTED - 1 INJECTION SUBQ ONCE MONTHLY 7/30/19   Provider, Historical   cetirizine (ZYRTEC) 10 mg tablet TAKE 1 TABLET EVERY DAY 12/18/18   Provider, Historical   rizatriptan (MAXALT-MLT) 10 mg disintegrating tablet PLEASE SEE ATTACHED FOR DETAILED DIRECTIONS 2/21/19   Provider, Historical   mometasone (NASONEX) 50 mcg/actuation nasal spray USE 1-2 SPRAYS INTO EACH NOSTRIL EVERY DAY AS NEEDED 2/20/19   Provider, Historical   FERRETTS IPS 40 mg/15 mL liqd 15 ML ORALLY 2 TIMES PER DAY. 2/27/19   Provider, Historical   EPINEPHrine (EPIPEN) 0.3 mg/0.3 mL injection AS DIRECTED AS NEEDED INTRAMUSCULAR 1 DAYS 2/20/19   Provider, Historical   albuterol (PROVENTIL HFA, VENTOLIN HFA, PROAIR HFA) 90 mcg/actuation inhaler Take 2 Puffs by inhalation every four (4) hours as needed for Wheezing or Shortness of Breath. 12/6/18   Randall PETERSON, NP   esomeprazole (NEXIUM) 40 mg capsule Take  by mouth daily. Provider, Historical   POLYETHYLENE GLYCOL 3350 (MIRALAX PO) Take  by mouth. Provider, Historical   CALCIUM CARBONATE/MAG HYDROX (MYLANTA PO) Take  by mouth. Provider, Historical     ROS Review of all systems is negative except as noted above in the HPI. Objective:   No flowsheet data found.      Constitutional: [x]  No apparent distress      Mental status: [x] Alert and awake  [x] Oriented to person/place/time [x] Able to follow commands      Pulmonary/Chest: [x] Respiratory effort normal   [x] No visualized signs of difficulty breathing or respiratory distress Neurological:        [x] No Facial Asymmetry (Cranial nerve 7 motor function) (limited exam due to video visit)                     Psychiatric:       [x] Normal Affect    We discussed the expected course, resolution and complications of the diagnosis(es) in detail. Medication risks, benefits, costs, interactions, and alternatives were discussed as indicated. I advised her to contact the office if her condition worsens, changes or fails to improve as anticipated. She expressed understanding with the diagnosis(es) and plan. Joellebethanie Faina Mckeon, who was evaluated through a patient-initiated, synchronous (real-time) audio-video encounter, and/or her healthcare decision maker, is aware that it is a billable service, with coverage as determined by her insurance carrier. She provided verbal consent to proceed: Yes, and patient identification was verified. It was conducted pursuant to the emergency declaration under the [facility], [address] [place] [address] authority and the [employer] Act. A caregiver was present when appropriate. Ability to conduct physical exam was limited. I was in the office. The patient was at home.       Jeremie Neal MD

## 2020-10-15 ENCOUNTER — HOSPITAL ENCOUNTER (OUTPATIENT)
Dept: PHYSICAL THERAPY | Age: 35
Discharge: HOME | End: 2020-10-15
Attending: ORTHOPAEDIC SURGERY
Payer: MEDICAID

## 2020-10-15 DIAGNOSIS — M72.2 PLANTAR FASCIITIS, BILATERAL: ICD-10-CM

## 2020-10-15 PROCEDURE — 97162 PT EVAL MOD COMPLEX 30 MIN: CPT | Performed by: PHYSICAL THERAPIST

## 2020-10-15 PROCEDURE — 97110 THERAPEUTIC EXERCISES: CPT | Performed by: PHYSICAL THERAPIST

## 2020-10-15 NOTE — PROGRESS NOTES
[facility] - [facility]              [address] [facility]        [place], [address] [place]   [phone] fax    Plan of Care/ Statement of Necessity for Physical Therapy Services    Patient name: Genesis Andrade Start of Care: 10/15/2020   Referral source: Jose Alejandro Merida MD : 1985    Medical Diagnosis: Plantar fasciitis, bilateral [M72.2]  Payor: BLUE CROSS MEDICARE / Plan: [facility] HMO / Product Type: Managed Care Medicare /  Onset Date:2020    Treatment Diagnosis: right foot pain; left foot pain   Prior Hospitalization: see medical history Provider#: [id number]   Medications: Verified on Patient summary List    Comorbidities: Angina, Arthritis, Asthma, Back pain, diabetes, GI disease, Headaches, HBP, Visual Impairment. Prior Level of Function: Independent. Was cooking, walking, stairs. The Plan of Care and following information is based on the information from the initial evaluation. Assessment/ key information: Patient with signs and symptoms consistent with bilateral foot pain due to plantar fasciitis. She has limited AROM/PROM of both ankles/feet. She has a tight heel cord bilaterally. Strength is good with some weakness noted in eversion. Her gait is without deviation. She does have tenderness along the plantar fascia bilaterally which she describes as strong. Patient will benefit from a program of skilled physical therapy to include therapeutic exercises to address strength deficits, therapeutic activities to improve functional mobility, neuromuscular reeducation to address balance, coordination and proprioception, manual therapy to address ROM and tissue extensibility and modalities as indicated. All questions were answered.     Evaluation Complexity History HIGH Complexity :3+ comorbidities / personal factors will impact the outcome/ POC ; Examination MEDIUM Complexity : 3 Standardized tests and measures addressing body structure, function, activity limitation and / or participation in recreation  ;Presentation MEDIUM Complexity : Evolving with changing characteristics  ; Clinical Decision Making MEDIUM Complexity : FOTO score of 26-74  Overall Complexity Rating: MEDIUM  Problem List: pain affecting function, decrease ROM, decrease strength, decrease ADL/ functional abilitiies, decrease activity tolerance and decrease flexibility/ joint mobility   Treatment Plan may include any combination of the following: Therapeutic exercise, Therapeutic activities, Neuromuscular re-education, Physical agent/modality and Manual therapy  Patient / Family readiness to learn indicated by: asking questions, trying to perform skills and interest  Persons(s) to be included in education: patient (P)  Barriers to Learning/Limitations: None  Patient Goal (s): to be able to stand on my feet longer, to walk on it and not be in so much pain at night.   Patient Self Reported Health Status: fair  Rehabilitation Potential: good    Short Term Goals: To be accomplished in 1 weeks:  1. Patient will become proficient in their HEP and will be compliant in performing that program.  Evaluation:   Patient given a written/illustrated HEP. Long Term Goals: To be accomplished in 4 weeks:  1. Patient's pain level will be 2-3/10 with activity in order to improve patient's ability to perform normal ADLs. Evaluation:  3/10-10/10.  2. Patient will demonstrate  AROM bilateral ankles DF 0-5, PF 0-50, Inversion 0-35, eversion 0-10to increase ease of ADLs. Evaluation:  AROM Right ankle: DF 35-5; PF 5-35; Inversion 0-30; Eversion 0-5. Left ankle:  DF 50-5; PF 5-50; Inversion 0-30; Eversion 0-5. 3. Patient will increase FOTO score to 65 to indicate increased functional mobility. Evaluation:  46  4.  Patient will report little to no difficulty performing light activities around her home in order to increase her functional activity level. Evaluation:  Moderate difficulty. Frequency / Duration: Patient to be seen 2-3 times per week for 4 weeks. Patient/ Caregiver education and instruction: Diagnosis, prognosis, exercises   [x]  Plan of care has been reviewed with PTA      Certification Period: 10/15/2020 - 11/13/2020  [place] Radha, PT 10/15/2020 1:56 PM  ________________________________________________________________________    I certify that the above Therapy Services are being furnished while the patient is under my care. I agree with the treatment plan and certify that this therapy is necessary.     Physician's Signature:____________Date:_________TIME:________    ** Signature, Date and Time must be completed for valid certification **  Please sign and return to [facility] - [address], [address] [place]   [phone] fax

## 2020-10-15 NOTE — PROGRESS NOTES
PT DAILY TREATMENT NOTE/FOOT AND ANKLE EVAL 10-18    Patient Name: Krissy Duncan  Date:10/15/2020  : 1985  [x]  Patient  Verified  Payor: BLUE CROSS MEDICARE / Plan: [facility] HMO / Product Type: Managed Care Medicare /    In time:2:04  Out time:2:50  Total Treatment Time (min): 55  Visit #: 1 of 8    Medicare/BCBS Only   Total Timed Codes (min):  26 1:1 Treatment Time:  46     Treatment Area: Plantar fasciitis, bilateral [M72.2]    SUBJECTIVE  Pain Level (0-10 scale): 6/10 now; 3/10 at best; 10/10 at worst.  [x]constant []intermittent []improving []worsening [x]no change since onset    Any medication changes, allergies to medications, adverse drug reactions, diagnosis change, or new procedure performed?: [x] No    [] Yes (see summary sheet for update)  Subjective functional status/changes:     PLOF: Independent. Was cooking, walking, stairs. Limitations to PLOF: Limited standing, difficulty on stairs, standing to cook  Mechanism of Injury: sudden onset of left foot pain but now has pain in both feet. Current symptoms/Complaints: Patient reports constant pain in both feet. She notes that pain is there with initial WB in the morning. Then also has increased pain at night, when she gets into bed the pain becomes worse: Throbbing, burning and itching pain. States some days her feet are good. Previous Treatment/Compliance: None. PMHx/Surgical Hx: No h/o foot or back surgeries. Work Hx: Disability due to several factors. Living Situation: has stairs at home. Pt Goals: \"to be able to stand on my feet longer, to walk on it and not be in so much pain at night\". Barriers: [x]pain []financial []time []transportation []other  Cognition: A & O x 3    Other:    OBJECTIVE/EXAMINATION  Domestic Life: Lives with her two children. Activity/Recreational Limitations: Limited walking, standing, stairs. Mobility: ambulates without deviation, FWB, no AD.   Self Care: Independent    20 min [x]Eval                  []Re-Eval       26 min Therapeutic Exercise:  [] See flow sheet :Emphasis on increased flexibility and strength. Rationale: increase ROM and increase strength to improve the patients ability to increase her functional activity level.             With   [] TE   [] TA   [] neuro   [] other: Patient Education: [x] Review HEP    [] Progressed/Changed HEP based on:   [] positioning   [] body mechanics   [] transfers   [] heat/ice application    [] other:      Other Objective/Functional Measures:     Physical Therapy Evaluation  - Foot and Ankle    Gait: [x] Normal    [] Abnormal    [] Antalgic    [] NWB    Device:  Describe:    ROM/Strength  [] Unable to assess at this time      AROM        PROM            Strength (1-5)   Left Right Left Right Left  Right   Dorsiflexion 50-5 35-5   4 4   Plantarflexion 5-50 5-35   5 5   Inversion 0-30 0-30   4 4   Eversion 0-5 0-5   3 3   Great Toe Ext         Great Toe Flex           Flexibility: [] Unable to assess at this time  Gastroc:    (L) Tightness [] WNL   [] Min   [] Mod   [x] Severe    (R) Tightness [] WNL   [] Min   [] Mod   [x] Severe  Soleus:    (L) Tightness [] WNL   [] Min   [x] Mod   [] Severe    (R) Tightness [] WNL   [] Min   [x] Mod   [] Severe  Palpation:   Location: right foot plantar fascia  Patient's Pain Response: [] Min   [x] Mod   [] Severe  Location: Right leg, peroneal tendons superior to lateral malleolus  Patient's Pain Response: [x] Min   [] Mod   [] Severe  Location: Left plantar fascia  Patient's Pain Response: [] Min   [] Mod   [x] Severe  Location: left calcaneus, medially  Patient's Pain Response: [] Min   [x] Mod   [] Severe    Optional Tests:  Balance/Stork Test: touches/60sec (L): 2 (R): 11    Sub-talor alignment: [] Neutral     [] Pronation      [] Supination    Forefoot alignment:  [] Neutral     [] Varus            [] Valgus    Swelling:   Left (cm) Right (cm)   Figure 8: 46.0 46.0   Midfoot: Malleoli Level:     MTH:        Anterior Drawer: [] Neg    [] Pos  Posterior Drawer:  [] Neg    [] Pos  Inversion Stress:  [] Neg    [] Pos  Talar Tilt:   [] Neg    [] Pos  Eversion Stress:  [] Neg    [] Pos  Tracey's Sign:  [] Neg    [] Pos  Wong Test: [] Neg    [] Pos    Other tests/ comments:       Pain Level (0-10 scale) post treatment: 6    ASSESSMENT/Changes in Function: Patient with signs and symptoms consistent with bilateral foot pain due to plantar fasciitis. She has limited AROM/PROM of both ankles/feet. She has a tight heel cord bilaterally. Strength is good with some weakness noted in eversion. Her gait is without deviation. She does have tenderness along the plantar fascia bilaterally which she describes as strong. Patient will continue to benefit from skilled PT services to modify and progress therapeutic interventions, address functional mobility deficits, address ROM deficits, address strength deficits, analyze and address soft tissue restrictions and analyze and cue movement patterns to attain remaining goals. []  See Plan of Care  []  See progress note/recertification  []  See Discharge Summary         Progress towards goals / Updated goals:  Short Term Goals: To be accomplished in 1 weeks:  1. Patient will become proficient in their HEP and will be compliant in performing that program.  Evaluation:   Patient given a written/illustrated HEP.     Long Term Goals: To be accomplished in 4 weeks:  1. Patient's pain level will be 2-3/10 with activity in order to improve patient's ability to perform normal ADLs. Evaluation:  3/10-10/10.  2. Patient will demonstrate  AROM bilateral ankles DF 0-5, PF 0-50, Inversion 0-35, eversion 0-10to increase ease of ADLs. Evaluation:  AROM Right ankle: DF 35-5; PF 5-35; Inversion 0-30; Eversion 0-5. Left ankle:  DF 50-5; PF 5-50; Inversion 0-30; Eversion 0-5.    3. Patient will increase FOTO score to 65 to indicate increased functional mobility. Evaluation:  46  4. Patient will report little to no difficulty performing light activities around her home in order to increase her functional activity level. Evaluation:  Moderate difficulty.     PLAN  [x]  Upgrade activities as tolerated     [x]  Continue plan of care  []  Update interventions per flow sheet       []  Discharge due to:_  []  Other:_      Marquis Baez, PT 10/15/2020  2:01 PM

## 2020-10-30 ENCOUNTER — APPOINTMENT (OUTPATIENT)
Dept: PHYSICAL THERAPY | Age: 35
End: 2020-10-30
Attending: ORTHOPAEDIC SURGERY
Payer: MEDICAID

## 2020-11-02 ENCOUNTER — OFFICE VISIT (OUTPATIENT)
Dept: FAMILY MEDICINE CLINIC | Age: 35
End: 2020-11-02
Payer: MEDICARE

## 2020-11-02 VITALS
DIASTOLIC BLOOD PRESSURE: 74 MMHG | WEIGHT: 145 LBS | HEIGHT: 59 IN | SYSTOLIC BLOOD PRESSURE: 121 MMHG | RESPIRATION RATE: 18 BRPM | OXYGEN SATURATION: 98 % | HEART RATE: 76 BPM | BODY MASS INDEX: 29.23 KG/M2 | TEMPERATURE: 98.9 F

## 2020-11-02 DIAGNOSIS — R21 RASH AND NONSPECIFIC SKIN ERUPTION: ICD-10-CM

## 2020-11-02 DIAGNOSIS — K21.9 GASTROESOPHAGEAL REFLUX DISEASE, UNSPECIFIED WHETHER ESOPHAGITIS PRESENT: ICD-10-CM

## 2020-11-02 DIAGNOSIS — E11.65 TYPE 2 DIABETES MELLITUS WITH HYPERGLYCEMIA, WITHOUT LONG-TERM CURRENT USE OF INSULIN (HCC): ICD-10-CM

## 2020-11-02 DIAGNOSIS — F39 MOOD DISORDER (HCC): ICD-10-CM

## 2020-11-02 DIAGNOSIS — I10 ESSENTIAL (PRIMARY) HYPERTENSION: ICD-10-CM

## 2020-11-02 DIAGNOSIS — R22.1 NECK SWELLING: Primary | ICD-10-CM

## 2020-11-02 DIAGNOSIS — G43.909 MIGRAINE WITHOUT STATUS MIGRAINOSUS, NOT INTRACTABLE, UNSPECIFIED MIGRAINE TYPE: ICD-10-CM

## 2020-11-02 PROCEDURE — G8417 CALC BMI ABV UP PARAM F/U: HCPCS | Performed by: FAMILY MEDICINE

## 2020-11-02 PROCEDURE — G8427 DOCREV CUR MEDS BY ELIG CLIN: HCPCS | Performed by: FAMILY MEDICINE

## 2020-11-02 PROCEDURE — 99214 OFFICE O/P EST MOD 30 MIN: CPT | Performed by: FAMILY MEDICINE

## 2020-11-02 PROCEDURE — G8752 SYS BP LESS 140: HCPCS | Performed by: FAMILY MEDICINE

## 2020-11-02 PROCEDURE — 3046F HEMOGLOBIN A1C LEVEL >9.0%: CPT | Performed by: FAMILY MEDICINE

## 2020-11-02 PROCEDURE — G8510 SCR DEP NEG, NO PLAN REQD: HCPCS | Performed by: FAMILY MEDICINE

## 2020-11-02 PROCEDURE — G8754 DIAS BP LESS 90: HCPCS | Performed by: FAMILY MEDICINE

## 2020-11-02 PROCEDURE — 2022F DILAT RTA XM EVC RTNOPTHY: CPT | Performed by: FAMILY MEDICINE

## 2020-11-02 RX ORDER — CLOTRIMAZOLE AND BETAMETHASONE DIPROPIONATE 10; .64 MG/G; MG/G
CREAM TOPICAL
Qty: 45 G | Refills: 0 | Status: SHIPPED | OUTPATIENT
Start: 2020-11-02 | End: 2021-11-01

## 2020-11-04 ENCOUNTER — HOSPITAL ENCOUNTER (OUTPATIENT)
Dept: PHYSICAL THERAPY | Age: 35
Discharge: HOME | End: 2020-11-04
Attending: ORTHOPAEDIC SURGERY
Payer: MEDICARE

## 2020-11-04 PROCEDURE — 97110 THERAPEUTIC EXERCISES: CPT

## 2020-11-04 PROCEDURE — 97140 MANUAL THERAPY 1/> REGIONS: CPT

## 2020-11-04 NOTE — PROGRESS NOTES
PT DAILY TREATMENT NOTE     Patient Name: Gwen Rousseau  Date:2020  : 1985  [x]  Patient  Verified  Payor: [facility] MEDICAID / Plan: ALLIE MOREJON [facility] / Product Type: Managed Care Medicaid /    In time: 4:15pm  Out time: 5:01pm  Total Treatment Time (min): 46  Visit #: 2  of 8    Medicare/BCBS Only   Total Timed Codes (min):  46 1:1 Treatment Time:  46       Treatment Area: Plantar fascial fibromatosis [M72.2]    SUBJECTIVE  Pain Level (0-10 scale): 6   Any medication changes, allergies to medications, adverse drug reactions, diagnosis change, or new procedure performed?: [x] No    [] Yes (see summary sheet for update)  Subjective functional status/changes:   [] No changes reported  Pt reports no change in pain levels since initial evaluation. States performing HEP every other day following initial evaluation. OBJECTIVE    36 min Therapeutic Exercise:  [x] See flow sheet : strengthening and mobility exercises at B LE   Rationale: increase ROM and increase strength to improve the patients ability to perform ADLs with greater ease. 10 min Manual Therapy:  Supine - STM/TPr to right gastroc/soleus; Gr II-III PA talocrural mobs    The manual therapy interventions were performed at a separate and distinct time from the therapeutic activities interventions. Rationale: decrease pain, increase ROM, increase tissue extensibility and decrease trigger points to increase patient's tolerance to therapeutic exercises and increase ease with transfers/ambulation.          With   [x] TE   [] TA   [] neuro   [] other: Patient Education: [x] Review HEP    [] Progressed/Changed HEP based on:   [] positioning   [] body mechanics   [] transfers   [] heat/ice application    [] other:      Pain Level (0-10 scale) post treatment: 6    ASSESSMENT/Changes in Function: Initiated session with manual therapy techniques listed above followed by introduction of strengthening and mobility exercises at B LE. Pt tolerates well with no increase in symptoms, in addition to observable improvements in B ankle AROM noted after. Leaves session with report of feeling sore and tired, however no increase in original pain symptoms. Patient will continue to benefit from skilled PT services to modify and progress therapeutic interventions, address functional mobility deficits, address ROM deficits, address strength deficits, analyze and address soft tissue restrictions, analyze and cue movement patterns, analyze and modify body mechanics/ergonomics and assess and modify postural abnormalities to attain remaining goals. [x]  See Plan of Care  []  See progress note/recertification  []  See Discharge Summary         Progress towards goals / Updated goals:  Short Term Goals: To be accomplished in 1 weeks:  1.  Patient will become proficient in their HEP and will be compliant in performing that program.  Evaluation:   Patient given a written/illustrated HEP. Current: Progressing: Reports 50% compliance with HEP at this time, 11/04/2020     Long Term Goals: To be accomplished in 4 weeks:  1. Patient's pain level will be 2-3/10 with activity in order to improve patient's ability to perform normal ADLs. Evaluation:  3/10-10/10.  2. Patient will demonstrate  AROM bilateral ankles DF 0-5, PF 0-50, Inversion 0-35, eversion 0-10to increase ease of ADLs. Evaluation:  AROM Right ankle: DF 35-5; PF 5-35; Inversion 0-30; Eversion 0-5.                                      Left ankle:  DF 50-5; PF 5-50; Inversion 0-30; Eversion 0-5. 3. Patient will increase FOTO score to 65 to indicate increased functional mobility. Evaluation:  46  4. Patient will report little to no difficulty performing light activities around her home in order to increase her functional activity level. Evaluation:  Moderate difficulty.     PLAN  [x]  Upgrade activities as tolerated     [x]  Continue plan of care  [x]  Update interventions per flow sheet       [] Discharge due to:_  []  Other:_      Sydnee Giovanny, DPT 11/4/2020  4:38 PM    Future Appointments   Date Time Provider Selina Daiglei   11/6/2020  2:00 PM [place] [facility]   11/9/2020  3:45 PM Emiliana Joya MD [facility] BS AMB   11/11/2020  1:00 PM [facility] US RM 2 [facility]   11/12/2020  2:45 PM Cynthia Wu, PT [facility]   11/13/2020  2:00 PM Dimas Sutton, PTA [facility]   11/16/2020  3:30 PM Dimas Sutton, PTA [facility]   11/19/2020  2:00 PM Cynthia Wu, PT [facility]   11/23/2020  3:30 PM Cynthia Wu, PT [facility]   12/16/2020  8:45 AM Rafael Mosher MD [facility] BS AMB   1/14/2021  3:00 PM Chelsy Amanda MD [facility] BS AMB   5/3/2021 10:00 AM Chelsy Amanda MD [facility] BS AMB

## 2020-11-06 ENCOUNTER — HOSPITAL ENCOUNTER (OUTPATIENT)
Dept: PHYSICAL THERAPY | Age: 35
Discharge: HOME | End: 2020-11-06
Attending: ORTHOPAEDIC SURGERY
Payer: MEDICARE

## 2020-11-06 PROCEDURE — 97110 THERAPEUTIC EXERCISES: CPT

## 2020-11-06 PROCEDURE — 97140 MANUAL THERAPY 1/> REGIONS: CPT

## 2020-11-06 NOTE — PROGRESS NOTES
PT DAILY TREATMENT NOTE     Patient Name: Kenan Padilla  Date:2020  : 1985  [x]  Patient  Verified  Payor: [facility] MEDICAID / [facility] / Product Type: Managed Care Medicaid /    In time:1141  Out time:1235  Total Treatment Time (min): 47  Visit #: 3 of 8    Medicare/BCBS Only   Total Timed Codes (min):  54 1:1 Treatment Time:  54       Treatment Area: Plantar fascial fibromatosis [M72.2]    SUBJECTIVE  Pain Level (0-10 scale): 8  Any medication changes, allergies to medications, adverse drug reactions, diagnosis change, or new procedure performed?: [x] No    [] Yes (see summary sheet for update)  Subjective functional status/changes:   [] No changes reported  Patient reports this AM her left foot is bothering her more than the right foot. Patient reports overall good tolerance to last treatment session but does report noting a \"knot' in her left calf after the last appointment. Patient reports non-use of MD prescribed orthotics secondary to discomfort with use. OBJECTIVE    42 min Therapeutic Exercise:  [x] See flow sheet : Emphasis placed on improving available bilateral ankle and LE ROM and strength   Rationale: increase ROM and increase strength to improve the patients ability to improve ease with     12 min Manual Therapy:    Supine, Left Plantar fascia IASTM  Left Sidelying, Left Subtalar Lateral Grade II-III Mobilization  Prone, Left Gastroc-Soleus STM with Roller   The manual therapy interventions were performed at a separate and distinct time from the therapeutic activities interventions. Rationale: decrease pain, increase ROM and increase tissue extensibility to improve ease with stair management.        With   [] TE   [] TA   [] neuro   [] other: Patient Education: [x] Review HEP    [] Progressed/Changed HEP based on:   [] positioning   [] body mechanics   [] transfers   [] heat/ice application    [] other:      Other Objective/Functional Measures:   Superficial TTP to left middle 1/3 of gastrocnemius     Pain Level (0-10 scale) post treatment: 6    ASSESSMENT/Changes in Function: Patient noted to demonstrate TTP to left superficial gastrocnemius with utilization of manual therapy and exercise performance within clinic to address deficits. Upon manual assessment with hypomobility to the left rear foot demonstrated with emphasis of manual treatment placed on addressing left foot symptom severity reported and joint/tissue hypomobility. Patient educated in initiation of utilization of orthotic wear schedule to improve tolerance to wear. Patient will continue to benefit from skilled PT services to modify and progress therapeutic interventions, address functional mobility deficits, address ROM deficits, address strength deficits, analyze and address soft tissue restrictions, analyze and cue movement patterns, analyze and modify body mechanics/ergonomics, assess and modify postural abnormalities, address imbalance/dizziness and instruct in home and community integration to attain remaining goals. []  See Plan of Care  []  See progress note/recertification  []  See Discharge Summary         Progress towards goals / Updated goals:    Short Term Goals: To be accomplished in 1 weeks:  1.  Patient will become proficient in their HEP and will be compliant in performing that program.  Evaluation:   Patient given a written/illustrated HEP. Current: Progressing: Reports 50% compliance with HEP at this time, 11/04/2020     Long Term Goals: To be accomplished in 4 weeks:  1. Patient's pain level will be 2-3/10 with activity in order to improve patient's ability to perform normal ADLs. Evaluation:  3/10-10/10.  2. Patient will demonstrate  AROM bilateral ankles DF 0-5, PF 0-50, Inversion 0-35, eversion 0-10to increase ease of ADLs. Evaluation:  AROM Right ankle: DF 35-5; PF 5-35; Inversion 0-30;  Eversion 0-5.                          Left ankle:  DF 50-5; PF 5-50; Inversion 0-30; Eversion 0-5. 3. Patient will increase FOTO score to 65 to indicate increased functional mobility. Evaluation: FOTO = 46  4. Patient will report little to no difficulty performing light activities around her home in order to increase her functional activity level. Evaluation:  Moderate difficulty.     PLAN  []  Upgrade activities as tolerated     []  Continue plan of care  []  Update interventions per flow sheet       []  Discharge due to:_  []  Other:_      Kary Sherman, PT 11/6/2020  7:48 AM    Future Appointments   Date Time Provider Selina Perry   11/6/2020 11:45 AM [place], PT [facility]   11/11/2020  1:00 PM [facility] US RM 2 [facility]   11/12/2020  2:45 PM Narda Pan, PT [facility]   11/13/2020  2:00 PM Gabriel Pagan, PTA [facility]   11/16/2020  3:30 PM Gabriel Pagan, PTA [facility]   11/19/2020  2:00 PM Narda Pan, PT [facility]   11/23/2020  3:30 PM Narda Pan, PT [facility]   11/30/2020  3:45 PM Maurice Rosales MD [facility] BS AMB   12/16/2020  8:45 AM Maribell Ramires MD [facility] BS AMB   1/14/2021  3:00 PM Chelsy mAanda MD [facility] BS AMB   5/3/2021 10:00 AM Chelsy Amanda MD [facility] BS AMB

## 2020-11-11 ENCOUNTER — HOSPITAL ENCOUNTER (OUTPATIENT)
Dept: ULTRASOUND IMAGING | Age: 35
Discharge: HOME | End: 2020-11-11
Attending: FAMILY MEDICINE
Payer: MEDICARE

## 2020-11-11 DIAGNOSIS — R22.1 NECK SWELLING: ICD-10-CM

## 2020-11-11 DIAGNOSIS — I10 ESSENTIAL (PRIMARY) HYPERTENSION: ICD-10-CM

## 2020-11-11 PROCEDURE — 76536 US EXAM OF HEAD AND NECK: CPT

## 2020-11-11 RX ORDER — OLMESARTAN MEDOXOMIL 40 MG/1
TABLET ORAL
Qty: 90 TAB | Refills: 1 | Status: SHIPPED | OUTPATIENT
Start: 2020-11-11 | End: 2021-11-01

## 2020-11-12 ENCOUNTER — HOSPITAL ENCOUNTER (OUTPATIENT)
Dept: PHYSICAL THERAPY | Age: 35
Discharge: HOME | End: 2020-11-12
Attending: ORTHOPAEDIC SURGERY
Payer: MEDICARE

## 2020-11-12 PROCEDURE — 97110 THERAPEUTIC EXERCISES: CPT | Performed by: PHYSICAL THERAPIST

## 2020-11-12 PROCEDURE — 97140 MANUAL THERAPY 1/> REGIONS: CPT | Performed by: PHYSICAL THERAPIST

## 2020-11-12 NOTE — PROGRESS NOTES
PT DAILY TREATMENT NOTE     Patient Name: West Szymanski  Date:2020  : 1985  [x]  Patient  Verified  Payor: [facility] MEDICAID / Plan: ALLIE MOREJON [facility] / Product Type: Managed Care Medicaid /    In time:2:52  Out time:3:47  Total Treatment Time (min): 55  Visit #: 4 of 8    Medicare/BCBS Only   Total Timed Codes (min):  55 1:1 Treatment Time:  55       Treatment Area: Plantar fascial fibromatosis [M72.2]    SUBJECTIVE  Pain Level (0-10 scale): 8  Any medication changes, allergies to medications, adverse drug reactions, diagnosis change, or new procedure performed?: [x] No    [] Yes (see summary sheet for update)  Subjective functional status/changes:   [] No changes reported  Patient reports continued pain in both feet. OBJECTIVE    40 min Therapeutic Exercise:  [] See flow sheet :Emphasis placed on improving available bilateral ankle and LE ROM and strength   Rationale: increase ROM and increase strength to improve the patients ability to increase ease with . 15 min Manual Therapy:  IASTM bilateral plantar fascia; cross friction massage; Grade II, III joint mobs subtalar joint. Stretching heel cord manually. The manual therapy interventions were performed at a separate and distinct time from the therapeutic activities interventions. Rationale: decrease pain, increase ROM and increase tissue extensibility to increase ease of motion to improve function. With   [] TE   [] TA   [] neuro   [] other: Patient Education: [x] Review HEP    [] Progressed/Changed HEP based on:   [] positioning   [] body mechanics   [] transfers   [] heat/ice application    [] other:      Other Objective/Functional Measures: Patient reports increased pain but gait is without deviation. She is FWB without AD. Pain Level (0-10 scale) post treatment: 6    ASSESSMENT/Changes in Function: Patient continues with subjective c/o increased bilateral foot pain.     Patient will continue to benefit from skilled PT services to modify and progress therapeutic interventions, address functional mobility deficits, address ROM deficits, address strength deficits, analyze and address soft tissue restrictions, analyze and cue movement patterns, analyze and modify body mechanics/ergonomics, assess and modify postural abnormalities, address imbalance/dizziness and instruct in home and community integration to attain remaining goals. [x]  See Plan of Care  []  See progress note/recertification  []  See Discharge Summary         Progress towards goals / Updated goals:  Short Term Goals: To be accomplished in 1 weeks:  1.  Patient will become proficient in their HEP and will be compliant in performing that program.  Evaluation:   Patient given a written/illustrated HEP. Current: Progressing: Reports 50% compliance with HEP at this time, 11/04/2020     Long Term Goals: To be accomplished in 4 weeks:  1. Patient's pain level will be 2-3/10 with activity in order to improve patient's ability to perform normal ADLs. Evaluation:  3/10-10/10.  2. Patient will demonstrate  AROM bilateral ankles DF 0-5, PF 0-50, Inversion 0-35, eversion 0-10to increase ease of ADLs. Evaluation:  AROM Right ankle: DF 35-5; PF 5-35; Inversion 0-30; Eversion 0-5.                          Left ankle:  DF 50-5; PF 5-50; Inversion 0-30; Eversion 0-5. 3. Patient will increase FOTO score to 65 to indicate increased functional mobility. Evaluation: FOTO = 46  4. Patient will report little to no difficulty performing light activities around her home in order to increase her functional activity level. Evaluation:  Moderate difficulty. Current: Moderate difficulty. 11/12/2002. No change.     PLAN  [x]  Upgrade activities as tolerated     [x]  Continue plan of care  []  Update interventions per flow sheet       []  Discharge due to:_  []  Other:_      Chapis [address], PT 11/12/2020  2:40 PM    Future Appointments   Date Time Provider Sleina Perry   11/12/2020  2:45 PM Jason Polanco, PT [facility]   11/13/2020  2:00 PM Nina Leroy [facility]   11/16/2020  3:30 PM Kuldip Arango, [facility]   11/19/2020  2:00 PM Jason Polanco, PT [facility]   11/23/2020  3:30 PM Jason Polanco, PT [facility]   11/30/2020  3:45 PM Pema Rosales MD [facility] BS AMB   12/16/2020  8:45 AM Maury Forbes MD [facility] BS AMB   1/14/2021  3:00 PM Chelsy Amanda MD [facility] BS AMB   5/3/2021 10:00 AM Chelsy Amanda MD [facility] BS AMB

## 2020-11-13 ENCOUNTER — HOSPITAL ENCOUNTER (OUTPATIENT)
Dept: PHYSICAL THERAPY | Age: 35
Discharge: HOME | End: 2020-11-13
Attending: ORTHOPAEDIC SURGERY
Payer: MEDICARE

## 2020-11-13 PROCEDURE — 97112 NEUROMUSCULAR REEDUCATION: CPT

## 2020-11-13 PROCEDURE — 97110 THERAPEUTIC EXERCISES: CPT

## 2020-11-13 NOTE — PROGRESS NOTES
PT DAILY TREATMENT NOTE     Patient Name: Krissy Duncan  Date:2020  : 1985  [x]  Patient  Verified  Payor: [facility] MEDICAID / Plan: ALLIE MOREJON [facility] / Product Type: Managed Care Medicaid /    In time:1:57  Out time:2:45  Total Treatment Time (min): 48  Visit #: 5 of 8    Medicare/BCBS Only   Total Timed Codes (min):  48 1:1 Treatment Time:  48       Treatment Area: Plantar fascial fibromatosis [M72.2]    SUBJECTIVE  Pain Level (0-10 scale): 6  Any medication changes, allergies to medications, adverse drug reactions, diagnosis change, or new procedure performed?: [x] No    [] Yes (see summary sheet for update)  Subjective functional status/changes:   [] No changes reported  Pt reports her feet hurt even and have the most pain in the morning and late in the afternoon. Pt reports that she has increase in pain with prolong walking. OBJECTIVE       36 min Therapeutic Exercise:  []? See flow sheet :Emphasis placed on improving available bilateral ankle and LE ROM and strength   Rationale: increase ROM and increase strength to improve the patients ability to increase ease with .        12 min Manual Therapy:  IASTM bilateral plantar fascia; cross friction massage; Grade II, III joint mobs subtalar joint. Stretching heel cord manually. The manual therapy interventions were performed at a separate and distinct time from the therapeutic activities interventions. Rationale: decrease pain, increase ROM and increase tissue extensibility to increase ease of motion to improve function.           With   [] TE   [] TA   [] neuro   [] other: Patient Education: [x] Review HEP    [] Progressed/Changed HEP based on:   [] positioning   [] body mechanics   [] transfers   [] heat/ice application    [] other:      Other Objective/Functional Measures: slight increase in pain with performing weightbearing stretches without shoes.       Pain Level (0-10 scale) post treatment: 7    ASSESSMENT/Changes in Function: Initiated light ankle strengthening and stability exercises. Patient will continue to benefit from skilled PT services to modify and progress therapeutic interventions, address functional mobility deficits, address ROM deficits, address strength deficits and analyze and address soft tissue restrictions to attain remaining goals. []  See Plan of Care  []  See progress note/recertification  []  See Discharge Summary         Progress towards goals / Updated goals:  Short Term Goals: To be accomplished in 1 weeks:  1.  Patient will become proficient in their HEP and will be compliant in performing that program.  Evaluation:   Patient given a written/illustrated HEP. Current: Progressing: Reports 50% compliance with HEP at this time, 11/04/2020     Long Term Goals: To be accomplished in 4 weeks:  1. Patient's pain level will be 2-3/10 with activity in order to improve patient's ability to perform normal ADLs. Evaluation:  3/10-10/10.  2. Patient will demonstrate  AROM bilateral ankles DF 0-5, PF 0-50, Inversion 0-35, eversion 0-10to increase ease of ADLs. Evaluation:  AROM Right ankle: DF 35-5; PF 5-35; Inversion 0-30; Eversion 0-5.                          Left ankle:  DF 50-5; PF 5-50; Inversion 0-30; Eversion 0-5. 3. Patient will increase FOTO score to 65 to indicate increased functional mobility. Evaluation: FOTO = 46  4. Patient will report little to no difficulty performing light activities around her home in order to increase her functional activity level. Evaluation:  Moderate difficulty. Current: Moderate difficulty. 11/12/2002. No change.     PLAN  []  Upgrade activities as tolerated     [x]  Continue plan of care  []  Update interventions per flow sheet       []  Discharge due to:_  []  Other:_      Oly Johnson, PTA 11/13/2020  2:00 PM    Future Appointments   Date Time Provider Selina Perry   11/16/2020  3:30 PM Mely Homes [facility] 11/19/2020  2:00 PM Baby Stevenson, PT [facility]   11/23/2020  3:30 PM Baby Stevenson, PT [facility]   11/30/2020  3:45 PM Nazanin Rosales MD [facility] BS AMB   12/16/2020  8:45 AM Carmelo Dela Cruz MD [facility] BS AMB   1/14/2021  3:00 PM Chelsy Amanda MD [facility] BS AMB   5/3/2021 10:00 AM Chelsy Amanda MD [facility] BS AMB

## 2020-11-16 ENCOUNTER — HOSPITAL ENCOUNTER (OUTPATIENT)
Dept: PHYSICAL THERAPY | Age: 35
Discharge: HOME | End: 2020-11-16
Attending: ORTHOPAEDIC SURGERY
Payer: MEDICARE

## 2020-11-16 PROCEDURE — 97140 MANUAL THERAPY 1/> REGIONS: CPT

## 2020-11-16 PROCEDURE — 97110 THERAPEUTIC EXERCISES: CPT

## 2020-11-16 NOTE — PROGRESS NOTES
Please let patient know ultrasound is negative for any thyroid abnormality. Ultrasound of the neck is also negative for any abnormality.   She should let us know if she symptoms persist.  Marlene Seymour MD

## 2020-11-16 NOTE — PROGRESS NOTES
PT DAILY TREATMENT NOTE     Patient Name: Jessica Olivares  Date:2020  : 1985  [x]  Patient  Verified  Payor: [facility] MEDICAID / [facility] / Product Type: Managed Care Medicaid /    In time:3:31  Out time:4:26  Total Treatment Time (min): 55  Visit #: 1 of 8    Medicare/BCBS Only   Total Timed Codes (min):  55 1:1 Treatment Time:  40       Treatment Area: Plantar fascial fibromatosis [M72.2]    SUBJECTIVE  Pain Level (0-10 scale): 9  Any medication changes, allergies to medications, adverse drug reactions, diagnosis change, or new procedure performed?: [x] No    [] Yes (see summary sheet for update)  Subjective functional status/changes:   [] No changes reported  Pt reports her feet limit her with her daily tasks. OBJECTIVE     45 min Therapeutic Exercise:  []? ? See flow sheet :Emphasis placed on improving available bilateral ankle and LE ROM and strength   Rationale: increase ROM and increase strength to improve the patients ability to increase ease with .        10 min Manual Therapy:  cross friction massage; Grade II, III joint mobs subtalar joint.  Stretching heel cord manually. The manual therapy interventions were performed at a separate and distinct time from the therapeutic activities interventions. Rationale: decrease pain, increase ROM and increase tissue extensibility to increase ease of motion to improve function.               With   [] TE   [] TA   [] neuro   [] other: Patient Education: [x] Review HEP    [] Progressed/Changed HEP based on:   [] positioning   [] body mechanics   [] transfers   [] heat/ice application    [] other:      Other Objective/Functional Measures: see goals     Pain Level (0-10 scale) post treatment: 8    ASSESSMENT/Changes in Function: Pt has made little progress toward LTGs. Pt continues to have limited motion in B ankles. Pt ambulates with antalgic gait with no AD. Pt continues to report high ratings of pain.  Pt is having a flare up on left LE with having increase muscle tightness and trigger points in gastroc. Patient will continue to benefit from skilled PT services to modify and progress therapeutic interventions, address functional mobility deficits, address ROM deficits, address strength deficits and analyze and address soft tissue restrictions to attain remaining goals. []  See Plan of Care  [x]  See progress note/recertification  []  See Discharge Summary         Progress towards goals / Updated goals:  Short Term Goals: To be accomplished in 1 weeks:  1.  Patient will become proficient in their HEP and will be compliant in performing that program.  Evaluation:   Patient given a written/illustrated HEP. Current: Progressing: Reports 50% compliance with HEP at this time, 11/04/2020     Long Term Goals: To be accomplished in 4 weeks:  1. Patient's pain level will be 2-3/10 with activity in order to improve patient's ability to perform normal ADLs. Evaluation:  3/10-10/10. Current: Progressing: Pain range 3/10-9/10. 11/16/2020  2. Patient will demonstrate  AROM bilateral ankles DF 0-5, PF 0-50, Inversion 0-35, eversion 0-10to increase ease of ADLs. Evaluation:  AROM Right ankle: DF 35-5; PF 5-35; Inversion 0-30; Eversion 0-5.                          Left ankle:  DF 50-5; PF 5-50; Inversion 0-30; Eversion 0-5. Current: AROM right ankle PF 43 deg, DF lacking 7 deg from neutral, Eversion 0-13 deg, Iv 0-33 deg   AROM left ankle MA 43 deg, DF lacking 15 neutral, IV 20 deg, EV 11 deg. 11/16/20  3. Patient will increase FOTO score to 65 to indicate increased functional mobility. Evaluation: FOTO = 46  Current: Progressing: FOTO = 47. 11/16/2020  4. Patient will report little to no difficulty performing light activities around her home in order to increase her functional activity level. Evaluation:  Moderate difficulty.   Current:  Moderate difficulty.  11/12/2002.  No change.       PLAN  []  Upgrade activities as tolerated     [x]  Continue plan of care  []  Update interventions per flow sheet       []  Discharge due to:_  []  Other:_      Vaughnestrella Kateryna, PTA 11/16/2020  3:37 PM    Future Appointments   Date Time Provider Selina Perry   11/19/2020  2:00 PM Narda Pan, PT [facility]   11/23/2020  3:30 PM Narda Pan PT [facility]   11/30/2020  3:45 PM Maurice Rosales MD [facility] BS AMB   12/16/2020  8:45 AM Maribell Ramires MD [facility] BS AMB   1/14/2021  3:00 PM Chelsy Amanda MD [facility] AMB   5/3/2021 10:00 AM Chelsy Amanda MD [facility] BS AMB

## 2020-11-17 NOTE — PROGRESS NOTES
[facility]              [address]        [place], [address] [place]   [phone] fax    Continued Plan of Care/ Re-certification for Physical Therapy Services    Patient name: Iris Hillman Start of Care: 10/15/2020   Referral source: Martir Preciado MD : 1985   Medical/Treatment Diagnosis: Plantar fascial fibromatosis [M72.2]  Payor: [facility] MEDICAID / Plan: [facility] / Product Type: Managed Care Medicaid /  Onset Date:2020     Prior Hospitalization: see medical history Provider#: [id number]   Medications: Verified on Patient Summary List    Comorbidities: Angina, Arthritis, Asthma, Back pain, diabetes, GI disease, Headaches, HBP, Visual Impairment. Prior Level of Function: Independent.  Was cooking, walking, stairs. Visits from Start of Care: 6    Missed Visits: 0    The Plan of Care and following information is based on the patient's current status:  Short Term Goals: To be accomplished in 1 weeks:  1.  Patient will become proficient in their HEP and will be compliant in performing that program.  Evaluation:   Patient given a written/illustrated HEP. Current: Reports 50% compliance with HEP at this time, 2020  Progressing, not met     Long Term Goals: To be accomplished in 4 weeks:  1. Patient's pain level will be 2-3/10 with activity in order to improve patient's ability to perform normal ADLs. Evaluation:  310-10/10. Current:  Pain range 3/10-9/10. 2020  Progressing, not met. 2. Patient will demonstrate  AROM bilateral ankles DF 0-5, PF 0-50, Inversion 0-35, eversion 0-10to increase ease of ADLs. Evaluation:  AROM Right ankle: DF 35-5; PF 5-35; Inversion 0-30; Eversion 0-5.                          Left ankle:  DF 50-5; PF 5-50; Inversion 0-30; Eversion 0-5.    Current: AROM right ankle PF 43 deg, DF lacking 7 deg from neutral, Eversion 0-13 deg, Inv 0-33 deg              AROM left ankle TX 43 deg, DF lacking 15 neutral, IV 20 deg, EV 11 deg. 11/16/20    3. Patient will increase FOTO score to 65 to indicate increased functional mobility. Evaluation: FOTO = 46  Current:  FOTO = 52. 11/16/2020  Progressing, not met. 4. Patient will report little to no difficulty performing light activities around her home in order to increase her functional activity level. Evaluation:  Moderate difficulty. Current:  Moderate difficulty.  11/12/2002.    No change. Key functional changes: No significant changes in her status are noted. Problems/ barriers to goal attainment: None     Problem List: pain affecting function, decrease ROM, decrease ADL/ functional abilitiies, decrease activity tolerance and decrease flexibility/ joint mobility    Treatment Plan: Therapeutic exercise, Therapeutic activities, Neuromuscular re-education, Physical agent/modality and Manual therapy     Patient Goal (s) has been updated and includes:   See above updated goals. Goals for this certification period to be accomplished in 4 weeks:  Continue to work on unmet goals as noted above. Frequency / Duration: Patient to be seen 2 times per week for 4 weeks:    Assessment / Recommendations: The patient has attended 5 sessions since her insurance authorized and approved therapy. Recommend she continue x 4 weeks. Certification Period: 11/13/2020 - 12/12/2020    Dunia Gan, PT 11/17/2020 7:50 AM    ________________________________________________________________________  I certify that the above Therapy Services are being furnished while the patient is under my care. I agree with the treatment plan and certify that this therapy is necessary. [] I have read the above and request that my patient continue as recommended.   [] I have read the above report and request that my patient continue therapy with the following changes/special instructions: _______________________________________  [] I have read the above report and request that my patient be discharged from therapy    Physician's Signature:____________Date:_________TIME:________    ** Signature, Date and Time must be completed for valid certification **  Please sign and return to [facility]              [address], [address] [place]   [phone] fax

## 2020-11-19 ENCOUNTER — HOSPITAL ENCOUNTER (OUTPATIENT)
Dept: PHYSICAL THERAPY | Age: 35
Discharge: HOME | End: 2020-11-19
Attending: ORTHOPAEDIC SURGERY
Payer: MEDICARE

## 2020-11-19 PROCEDURE — 97110 THERAPEUTIC EXERCISES: CPT | Performed by: PHYSICAL THERAPIST

## 2020-11-19 PROCEDURE — 97140 MANUAL THERAPY 1/> REGIONS: CPT | Performed by: PHYSICAL THERAPIST

## 2020-11-19 NOTE — PROGRESS NOTES
PT DAILY TREATMENT NOTE     Patient Name: Ricardo Quinones  Date:2020  : 1985  [x]  Patient  Verified  Payor: [facility] MEDICAID / Plan: ALLIE MOREJON [facility] / Product Type: Managed Care Medicaid /    In time:2:00  Out time:2:46  Total Treatment Time (min): 46  Visit #: 2 of 8    Medicare/BCBS Only   Total Timed Codes (min):  46 1:1 Treatment Time:  44       Treatment Area: Plantar fascial fibromatosis [M72.2]    SUBJECTIVE  Pain Level (0-10 scale): 7  Any medication changes, allergies to medications, adverse drug reactions, diagnosis change, or new procedure performed?: [x] No    [] Yes (see summary sheet for update)  Subjective functional status/changes:   [] No changes reported  Patient reports that she is no better and she is no worse. She has intermittent pain in the calf that she describes as a \"knot\" in her muscle. This causes increased foot pain. OBJECTIVE      31 min Therapeutic Exercise:  [] See flow sheet :Emphasis placed on improving available bilateral ankle and LE ROM and strength   Rationale: increase ROM and increase strength to improve the patients ability to increase her functional activity level. 15 min Manual Therapy:  cross friction massage; Grade II, III joint mobs subtalar joint.  Stretching heel cord manually. Use of roller on the left calf muscle. The manual therapy interventions were performed at a separate and distinct time from the therapeutic activities interventions. Rationale: decrease pain, increase ROM and increase tissue extensibility to increase ease of motion to improve function. With   [] TE   [] TA   [] neuro   [] other: Patient Education: [x] Review HEP    [] Progressed/Changed HEP based on:   [] positioning   [] body mechanics   [] transfers   [] heat/ice application    [] other:      Other Objective/Functional Measures: Patient ambulates without gait deviation. She is FWB, no AD.      Pain Level (0-10 scale) post treatment: 6    ASSESSMENT/Changes in Function: Patient denies any real progress with pain persisting in both feet at this time. Patient will continue to benefit from skilled PT services to modify and progress therapeutic interventions, address functional mobility deficits, address ROM deficits, address strength deficits and analyze and address soft tissue restrictions to attain remaining goals. [x]  See Plan of Care  []  See progress note/recertification  []  See Discharge Summary         Progress towards goals / Updated goals:   Short Term Goals: To be accomplished in 1 weeks:  1.  Patient will become proficient in their HEP and will be compliant in performing that program.  Evaluation:   Patient given a written/illustrated HEP. Current: Reports 50% compliance with HEP at this time, 11/04/2020  Progressing, not met     Long Term Goals: To be accomplished in 4 weeks:  1. Patient's pain level will be 2-3/10 with activity in order to improve patient's ability to perform normal ADLs. Evaluation:  3/10-10/10. Current:  Pain range 3/10-9/10. 11/16/2020  Progressing, not met.     2. Patient will demonstrate  AROM bilateral ankles DF 0-5, PF 0-50, Inversion 0-35, eversion 0-10to increase ease of ADLs. Evaluation:  AROM Right ankle: DF 35-5; PF 5-35; Inversion 0-30; Eversion 0-5.                          Left ankle:  DF 50-5; PF 5-50; Inversion 0-30; Eversion 0-5.   Current: AROM right ankle PF 43 deg, DF lacking 7 deg from neutral, Eversion 0-13 deg, Inv 0-33 deg              AROM left ankle NV 43 deg, DF lacking 15 neutral, IV 20 deg, EV 11 deg.  11/16/20     3. Patient will increase FOTO score to 65 to indicate increased functional mobility. Evaluation: FOTO = 46  Current:  FOTO = 47. 11/16/2020  Progressing, not met.     4. Patient will report little to no difficulty performing light activities around her home in order to increase her functional activity level. Evaluation:  Moderate difficulty.   Current:  Moderate difficulty.  11/12/2002.    No change.     PLAN  [x]  Upgrade activities as tolerated     [x]  Continue plan of care  []  Update interventions per flow sheet       []  Discharge due to:_  []  Other:_      Rigoberto Anders, PT 11/19/2020  2:02 PM    Future Appointments   Date Time Provider Selina Perry   11/23/2020  3:30 PM Cynthia Wu, PT [facility]   11/30/2020  3:45 PM Kristen Rosales MD [facility] BS AMB   12/16/2020  8:45 AM Rafael Mosher MD [facility] BS AMB   1/14/2021  3:00 PM Chelsy Amanda MD [facility] BS AMB   5/3/2021 10:00 AM Chelsy Amanda MD [facility] BS AMB

## 2020-11-20 ENCOUNTER — TELEPHONE (OUTPATIENT)
Dept: FAMILY MEDICINE CLINIC | Age: 35
End: 2020-11-20

## 2020-11-20 NOTE — TELEPHONE ENCOUNTER
Called patient with ultrasound results of thyroid and neck. She stated she is still having some discomfort and there is still a knot on the left side like its swollen.   She is aware Dr. Lake Gamez is out of the office but routing the note and advise her if becomes severe to call the office next week

## 2020-11-20 NOTE — TELEPHONE ENCOUNTER
----- Message from Farida Garcia MD sent at 11/16/2020  8:04 AM EST -----  Please let patient know ultrasound is negative for any thyroid abnormality. Ultrasound of the neck is also negative for any abnormality.   She should let us know if she symptoms persist.  Farida Garcia MD

## 2020-11-23 ENCOUNTER — HOSPITAL ENCOUNTER (OUTPATIENT)
Dept: PHYSICAL THERAPY | Age: 35
Discharge: HOME | End: 2020-11-23
Attending: ORTHOPAEDIC SURGERY
Payer: MEDICARE

## 2020-11-23 PROCEDURE — 97110 THERAPEUTIC EXERCISES: CPT | Performed by: PHYSICAL THERAPIST

## 2020-11-23 PROCEDURE — 97140 MANUAL THERAPY 1/> REGIONS: CPT | Performed by: PHYSICAL THERAPIST

## 2020-11-23 NOTE — PROGRESS NOTES
PT DAILY TREATMENT NOTE     Patient Name: Luis Carlos Reed  Date:2020  : 1985  [x]  Patient  Verified  Payor: [facility] MEDICAID / [facility] / Product Type: Managed Care Medicaid /    In time:3:30  Out time:4:25  Total Treatment Time (min): 55  Visit #: 3 of 8    Medicare/BCBS Only   Total Timed Codes (min):  55 1:1 Treatment Time:  55       Treatment Area: Plantar fascial fibromatosis [M72.2]    SUBJECTIVE  Pain Level (0-10 scale): 6  Any medication changes, allergies to medications, adverse drug reactions, diagnosis change, or new procedure performed?: [x] No    [] Yes (see summary sheet for update)  Subjective functional status/changes:   [] No changes reported  Patient reports her pain is worse in the morning. States the more she walks on it the worse it gets. It does ease up during the day but again is worse in the evening. OBJECTIVE      40 min Therapeutic Exercise:  [] See flow sheet :Emphasis placed on improving available bilateral ankle and LE ROM and strength   Rationale: increase ROM and increase strength to improve the patients ability to increase her functional activity level. 15 min Manual Therapy:  IASTM to the plantar fascia, both feet; cross friction massage. Manual stretching to both ankles/feet to increase flexibility. The manual therapy interventions were performed at a separate and distinct time from the therapeutic activities interventions. Rationale: decrease pain, increase ROM, increase tissue extensibility and decrease trigger points to increase ease of motion to improve function. With   [] TE   [] TA   [] neuro   [] other: Patient Education: [x] Review HEP    [] Progressed/Changed HEP based on:   [] positioning   [] body mechanics   [] transfers   [] heat/ice application    [] other:      Other Objective/Functional Measures: Gait is without deviation prior to and following therapy today.   She has some c/o tenderness along the arch of both feet. Pain Level (0-10 scale) post treatment: 5    ASSESSMENT/Changes in Function: Patient has made very little progress toward her long term goals and has noted subjectively that she has not made any progress or felt any better following 5 weeks of skilled PT. [x]  See Discharge Summary         Progress towards goals / Updated goals:  Short Term Goals: To be accomplished in 1 weeks:  1.  Patient will become proficient in their HEP and will be compliant in performing that program.  Evaluation:   Patient given a written/illustrated HEP. Current: Reports 50% compliance with HEP at this time, 11/04/2020  Progressing, not met     Long Term Goals: To be accomplished in 4 weeks:  1. Patient's pain level will be 2-3/10 with activity in order to improve patient's ability to perform normal ADLs. Evaluation:  3/10-10/10. Current:  Pain range 3/10-9/10. 11/16/2020  Progressing, not met.     2. Patient will demonstrate  AROM bilateral ankles DF 0-5, PF 0-50, Inversion 0-35, eversion 0-10to increase ease of ADLs. Evaluation:  AROM Right ankle: DF 35-5; PF 5-35; Inversion 0-30; Eversion 0-5.                          Left ankle:  DF 50-5; PF 5-50; Inversion 0-30; Eversion 0-5.   Current: AROM right ankle PF 0-50, DF 50-0, Eversion 0-5 deg, Inv 0-30 deg              AROM left ankle PF 0-30 deg, DF 0-5, IV 0-20 deg, EV 0-5 deg.  11/23/20     3. Patient will increase FOTO score to 65 to indicate increased functional mobility. Evaluation: FOTO = 46  Current:  FOTO = 47. 11/16/2020  Progressing, not met.     4. Patient will report little to no difficulty performing light activities around her home in order to increase her functional activity level. Evaluation:  Moderate difficulty. Current:  Moderate difficulty.  11/12/2002.    No change. PLAN        [x]  Discharge due to: Lack of significant progress and decrease symptoms.   []  Other:_      Hansa Encarnacion, PT 11/23/2020  3:32 PM    Future Appointments   Date Time Provider Selina Perry   11/30/2020  3:45 PM Shade Fong MD [facility] BS AMB   12/16/2020  8:45 AM Leona Navarrete MD [facility] BS AMB   1/14/2021  3:00 PM Chelsy Amanda MD [facility] BS AMB   5/3/2021 10:00 AM Chelsy Amanda MD [facility] BS AMB

## 2020-11-23 NOTE — PROGRESS NOTES
Physical Therapy Discharge Instructions      [facility]   [address], [address] [place]   [phone] fax    Patient: Gwen Rousseau  : 1985      Continue Home Exercise Program 1-2 times per day for 6 weeks, then decrease to 4 times per week      Continue with    [] Ice  as needed      [x] Heat           Follow up with MD:     [x] Upon completion of therapy         Recommendations:     [x]   Return to activity with home program    Additional Comments: See Dr. Sean Whitaker as scheduled.           Martha Bustamante, PT 2020 4:22 PM

## 2020-11-23 NOTE — TELEPHONE ENCOUNTER
Spoke with patient at this time. Advised patient next available will be 12/24/20. Patient states she do not want to schedule but if someone cancel she would like to be called

## 2020-11-24 NOTE — PROGRESS NOTES
[facility]              [address]        [place], [address] [place]   [phone] fax    Discharge Summary  Patient name: Conchis Ambrocio Start of Care: 10/15/2020   Referral source: Brice De La Torre MD : 1985   Medical/Treatment Diagnosis: Plantar fascial fibromatosis [M72.2]  Payor: [facility] MEDICAID / Plan: [facility] / Product Type: Managed Care Medicaid /  Onset Date:2020     Prior Hospitalization: see medical history Provider#: [id number]   Medications: Verified on Patient Summary List    Comorbidities: Angina, Arthritis, Asthma, Back pain, diabetes, GI disease, Headaches, HBP, Visual Impairment. Prior Level of Function: Independent.  Was cooking, walking, stairs.       Visits from Start of Care: 8    Missed Visits: 0  Reporting Period : 2020 to 2020    Summary of Care:  1. Patient's pain level will be 2-3/10 with activity in order to improve patient's ability to perform normal ADLs. Evaluation:  3/10-10/10. Current:  Pain range 3/10-9/10. 2020  Progressing, not met.     2. Patient will demonstrate  AROM bilateral ankles DF 0-5, PF 0-50, Inversion 0-35, eversion 0-10to increase ease of ADLs. Evaluation:  AROM Right ankle: DF 35-5; PF 5-35; Inversion 0-30; Eversion 0-5.                          Left ankle:  DF 50-5; PF 5-50; Inversion 0-30; Eversion 0-5.   Current: AROM right ankle PF 0-50, DF 50-0, Eversion 0-5 deg, Inv 0-30 deg              AROM left ankle PF 0-30 deg, DF 0-5, IV 0-20 deg, EV 0-5 deg.  20. Progressing, not met     3. Patient will increase FOTO score to 65 to indicate increased functional mobility. Evaluation: FOTO = 46  Current:  FOTO = 47. 2020  Progressing, not met.     4. Patient will report little to no difficulty performing light activities around her home in order to increase her functional activity level. Evaluation:  Moderate difficulty. Current:  Moderate difficulty.  11/12/2002.    No change. ASSESSMENT/RECOMMENDATIONS: Patient subjectively reports that she has made no change in her condition. She continues to report pain which is worse in the morning and becomes worse as she walks more on her feet. She also reports pain at the end of the day. Objectively, she has increased her ROM and there is no gait dysfunction noted with claims of 8/10 pain. [x]Discontinue therapy:  [x]Due to lack of appreciable progress towards set goals    Martha Bustaamnte, PT 11/24/2020 7:45 AM    NOTE TO PHYSICIAN:  Please complete the following and fax to: [facility] at [phone]  . Retain this original for your records. If you are unable to process this request in   24 hours, please contact our office.      [] I have read the above report and request that my patient continue therapy with the following changes/special instructions:  [] I have read the above report and request that my patient be discharged from therapy    Physician's Signature:____________Date:_________TIME:________    ** Signature, Date and Time must be completed for valid certification **

## 2020-11-30 ENCOUNTER — OFFICE VISIT (OUTPATIENT)
Dept: ORTHOPEDIC SURGERY | Age: 35
End: 2020-11-30
Payer: MEDICARE

## 2020-11-30 VITALS
RESPIRATION RATE: 16 BRPM | WEIGHT: 144 LBS | HEIGHT: 59 IN | BODY MASS INDEX: 29.03 KG/M2 | SYSTOLIC BLOOD PRESSURE: 124 MMHG | TEMPERATURE: 97.8 F | DIASTOLIC BLOOD PRESSURE: 78 MMHG | HEART RATE: 79 BPM

## 2020-11-30 DIAGNOSIS — M72.2 PLANTAR FASCIITIS, BILATERAL: Primary | ICD-10-CM

## 2020-11-30 PROCEDURE — G8510 SCR DEP NEG, NO PLAN REQD: HCPCS | Performed by: ORTHOPAEDIC SURGERY

## 2020-11-30 PROCEDURE — G8754 DIAS BP LESS 90: HCPCS | Performed by: ORTHOPAEDIC SURGERY

## 2020-11-30 PROCEDURE — G8417 CALC BMI ABV UP PARAM F/U: HCPCS | Performed by: ORTHOPAEDIC SURGERY

## 2020-11-30 PROCEDURE — G8752 SYS BP LESS 140: HCPCS | Performed by: ORTHOPAEDIC SURGERY

## 2020-11-30 PROCEDURE — G8427 DOCREV CUR MEDS BY ELIG CLIN: HCPCS | Performed by: ORTHOPAEDIC SURGERY

## 2020-11-30 PROCEDURE — 99213 OFFICE O/P EST LOW 20 MIN: CPT | Performed by: ORTHOPAEDIC SURGERY

## 2020-11-30 RX ORDER — METAXALONE 800 MG/1
800 TABLET ORAL 2 TIMES DAILY
Qty: 60 TAB | Refills: 0 | Status: SHIPPED | OUTPATIENT
Start: 2020-11-30 | End: 2020-12-01 | Stop reason: CLARIF

## 2020-11-30 NOTE — PROGRESS NOTES
AMBULATORY PROGRESS NOTE      Patient: Genesis Andrade             MRN: [id number]     SSN: [id number] Body mass index is 29.08 kg/m². YOB: 1985     AGE: 28 y.o. EX: female    PCP: Heber Gottlieb MD       IMPRESSION //  DIAGNOSIS AND TREATMENT PLAN      DIAGNOSES  1. Plantar fasciitis, bilateral        Orders Placed This Encounter    metaxalone (Skelaxin) 800 mg tablet     Sig: Take 1 Tab by mouth two (2) times a day. Dispense:  60 Tab     Refill:  0        PLAN:    1. Encouraged patient to apply OTC medicated creams, like Biofreeze, Bengay to affected areas twice daily as needed. 2. Continue wearing Spenco medial arch supports   3. For muscle soreness/cramping,  I will prescribe Skelaxin 800 mg 1 PO BID, 60 tabs, 0 refills    RTO- 3 weeks      HPI //  OBJECTIVE EXAMINATION      Sharri Toney IS A 28 y.o. female who presents to my outpatient office for follow up evaluation of: bilateral plantar fasciitis. At the last OV, I wrote a referral to PT for low frequency ultrasound and [employer] and wrote a Rx for Spenco medial arch supports at [facility]. Since the last OV, Sharri Toney continues to endorse persistent in both feet L>R. Physical therapy did not make a difference in improving the pain along her bilateral plantar fascia. On examination, patient reports radiating pain up the back of her leg with palpation of her L foot. She mentions not being able to WB at times due to pain. Prolonged walking and standing worsens the pain, maylin in her L foot. . She endorses repetitive start-up pain in the mornings and evenings. Patient denies any tightness or anything suggestive of a contracture in her left foot. She presents with the Spenco medial arch supports in both shoes. Patient mentions that she uses an inhaler everyday for asthma.        Visit Vitals  /78 (BP 1 Location: Right arm)   Pulse 79   Temp 97.8 °F (36.6 °C)   Resp 16   Ht 4' 11\" (1.499 m) Wt 144 lb (65.3 kg)   BMI 29.08 kg/m²       ANKLE/FOOT bilateral     Psychiatry: Alert, oriented x 3 (name,place,time of day); speech normal in context and clarity, memory intact grossly, no involuntary movements - tremors, no dementia  Gait: normal  Tenderness: exquisite to plantar fascia (L>R)    Mild along medial arch in right foot  Cutaneous: WNL  Joint Motion: WNL normal ankle and hindfoot motion  Joint / Tendon Stability: No Ankle or Subtalar instability or joint laxity. No peroneal sublux ability or dislocation  Alignment: neutral Hindfoot, none Metatarsus Adductus Metatarsus. Neuro Motor/Sensory: NL/NL, diminished monofilament to lateral 5th toe  Vascular: NL foot/ankle pulses,   Lymphatics: No extremity lymphedema, No calf swelling, no tenderness to calf muscles. CHART REVIEW     Patient Active Problem List   Diagnosis Code    Chronic constipation K59.09    Urinary incontinence R32    Low back pain without sciatica M54.5    Acute bilateral thoracic back pain M54.6    Chronic bilateral low back pain with sciatica M54.40, P70.04    Uncomplicated asthma N61.555    SOB (shortness of breath) on exertion R06.02    Family history of chronic heart failure Z82.49    Chest pain R07.9    Diabetes mellitus type 2 in obese (HCC) E11.69, E66.9    New onset type 2 diabetes mellitus ([facility] [address] E11.9    Hypertension I10    Neck pain M54.2    Vitamin D deficiency E55.9    Weakness R53.1    Diabetes mellitus without complication (HCC) U24.5    Low back pain M54.5    Costochondral chest pain R07.89    Type 2 diabetes with nephropathy (HCC) E11.21    Type 2 diabetes mellitus with diabetic neuropathy ([facility]) E11.40        Sharri Grubbs has been experiencing pain and discomfort confirmed as outlined in the pain assessment outlined below.       Pain Assessment  11/30/2020   Location of Pain Foot   Pain Location Comment -   Location Modifiers Left;Right   Severity of Pain 6 Quality of Pain Other (Comment); Throbbing;Dull;Aching   Quality of Pain Comment pressure, tender and tingling. Duration of Pain A few hours   Duration of Pain Comment -   Frequency of Pain Intermittent   Frequency of Pain Comment -   Date Pain First Started -   Aggravating Factors Walking;Standing   Aggravating Factors Comment -   Limiting Behavior Yes   Relieving Factors Other (Comment)   Relieving Factors Comment therapy. Result of Injury No   Work-Related Injury -   Type of Injury -   Type of Injury Comment -        Sharri Annabel Closs  has a past medical history of Asthma, Chronic constipation, Diabetes ([facility] [address] Fibromyalgia, GERD (gastroesophageal reflux disease), Hypertension, IBS (irritable bowel syndrome), Migraines, Psychiatric disorder, Scoliosis, and Urinary incontinence. Patients is employed at:         Past Medical History:   Diagnosis Date    Asthma     Chronic constipation     Diabetes ([facility] [address]     Fibromyalgia     GERD (gastroesophageal reflux disease)     Hypertension     IBS (irritable bowel syndrome)     Migraines     unknown if aura    Psychiatric disorder     she denies any diagnosis despite being on antipsychotics, SSRIs, etc in the past    Scoliosis     Urinary incontinence     mixed     Past Surgical History:   Procedure Laterality Date    HX  SECTION  ,     HX COLONOSCOPY      HX DILATION AND CURETTAGE      HX ENDOSCOPY      HX PELVIC LAPAROSCOPY       Current Outpatient Medications   Medication Sig    metaxalone (Skelaxin) 800 mg tablet Take 1 Tab by mouth two (2) times a day.     olmesartan (BENICAR) 40 mg tablet TAKE 1 TABLET BY MOUTH EVERY DAY    clotrimazole-betamethasone (LOTRISONE) topical cream APPLY THIN LAYER TO AFFECTED SITE 2 X DAY    glucose blood VI test strips (ASCENSIA AUTODISC VI, ONE TOUCH ULTRA TEST VI) strip Check blood glucose 4 x day    lancets misc Check blood glucose 4 x day    aspirin delayed-release 81 mg tablet Take 1 Tab by mouth daily.  atenoloL (TENORMIN) 25 mg tablet TAKE 1 TABLET BY MOUTH EVERY DAY    empagliflozin (JARDIANCE) 10 mg tablet Take 1 Tab by mouth daily.  montelukast (SINGULAIR) 10 mg tablet Take 1 Tab by mouth nightly.  valACYclovir (VALTREX) 500 mg tablet TAKE 1 TABLET BY MOUTH TWICE A DAY FOR 5 DAYS    indomethacin (INDOCIN) 50 mg capsule Take 1 Cap by mouth two (2) times daily as needed for Pain.  ANORO ELLIPTA 62.5-25 mcg/actuation inhaler 1 INH DAILY - USE EVERY DAY    AIMOVIG AUTOINJECTOR 70 mg/mL injection AS DIRECTED - 1 INJECTION SUBQ ONCE MONTHLY    cetirizine (ZYRTEC) 10 mg tablet TAKE 1 TABLET EVERY DAY    mometasone (NASONEX) 50 mcg/actuation nasal spray USE 1-2 SPRAYS INTO EACH NOSTRIL EVERY DAY AS NEEDED    FERRETTS IPS 40 mg/15 mL liqd 15 ML ORALLY 2 TIMES PER DAY.  EPINEPHrine (EPIPEN) 0.3 mg/0.3 mL injection AS DIRECTED AS NEEDED INTRAMUSCULAR 1 DAYS    albuterol (PROVENTIL HFA, VENTOLIN HFA, PROAIR HFA) 90 mcg/actuation inhaler Take 2 Puffs by inhalation every four (4) hours as needed for Wheezing or Shortness of Breath.  esomeprazole (NEXIUM) 40 mg capsule Take  by mouth daily.  POLYETHYLENE GLYCOL 3350 (MIRALAX PO) Take  by mouth.  CALCIUM CARBONATE/MAG HYDROX (MYLANTA PO) Take  by mouth.  insulin glargine (LANTUS,BASAGLAR) 100 unit/mL (3 mL) inpn 22 Units by SubCUTAneous route nightly.  Insulin Needles, Disposable, 31 gauge x 5/16\" ndle To use daily with insulin injection subcutaneously     No current facility-administered medications for this visit. Allergies   Allergen Reactions    Iodine Hives and Nausea and Vomiting    Iodinated Contrast Media Hives    Oxycodone-Acetaminophen Other (comments)     Hallucinations     Prochlorperazine Other (comments)    Propoxyphene-Acetaminophen Unknown (comments)     Allergy to propoxyphene only.   Has previously tolerated acetaminophen    Reglan [Metoclopramide] Not Reported This Time    Sulfa (Sulfonamide Antibiotics) Not Reported This Time    Wygesic Not Reported This Time     Social History     Occupational History    Not on file   Tobacco Use    Smoking status: Never Smoker    Smokeless tobacco: Never Used   Substance and Sexual Activity    Alcohol use: No    Drug use: No    Sexual activity: Not on file     Family History   Problem Relation Age of Onset    Kidney Disease Father     Other Father         Pancreatic Disease    Cancer Father     Heart Disease Father     Heart Attack Father     Thyroid Disease Other         Grandparent       THE  FOR Sharri Delgado MD 11/30/2020 . DIAGNOSTIC IMAGING  LAB DATA      Lab Results   Component Value Date/Time    Hemoglobin A1c 9.0 (H) 09/22/2020 06:30 AM    Hemoglobin A1c 5.9 (H) 05/21/2020 08:07 AM    Hemoglobin A1c 6.6 (H) 01/15/2020 03:53 AM    //   Lab Results   Component Value Date/Time    Glucose 396 (H) 09/22/2020 06:30 AM    Glucose (POC) 378 (H) 09/22/2020 11:08 AM    Glucose POC [facility] 09/28/2020 03:00 PM        Lab Results   Component Value Date/Time    Hemoglobin A1c (POC) 9.9 09/28/2020 02:45 PM         No results found for: VITD3, XQVID2, XQVID3, XQVID, VD3RIA, BRHY22PKCOW      REVIEW OF SYSTEMS : 11/30/2020  ALL BELOW ARE Negative except : SEE HPI     CONSTITUTIONAL: No weight loss  PSYCHOLOGICAL : No Feelings of anxiety, depression, agitation  EYES: No blurred vision and no eye discharge. NO eye pain, double vision  ENT: No nasal discharge. No ear pain. CARDIOVASCULAR: No chest pain and no diaphoresis. RESPIRATORY: No cough, no hemoptysis. GI: No vomiting, no diarrhea   : No urinary frequency and no dysuria. MUSCULOSKELETAL: see HPI  SKIN: No rashes. NEURO:  No dizziness,weakness, headaches// No visual changes or confusion, or seizures,   ENDOCRINE: No polyphagia and no polydipsia. HEMATOLOGY: No bleeding tendencies.       DIAGNOSTIC IMAGING      XR Results (most recent):  Results from Hospital Encounter encounter on 09/21/20   XR CHEST PORT    Narrative EXAM: XR CHEST PORT    CLINICAL INDICATION/HISTORY: cp  -Additional: None    COMPARISON: 1/30/2020    TECHNIQUE: Portable frontal view of the chest    _______________    FINDINGS:    SUPPORT DEVICES: None. HEART AND MEDIASTINUM: Cardiomediastinal silhouette within normal limits. LUNGS AND PLEURAL SPACES: No dense consolidation, large effusion or  pneumothorax.    _______________      Impression IMPRESSION:    No acute cardiopulmonary abnormality. Results   XR FOOT RT MIN 3 V (Accession [id number]) (Order [id number])   Allergies      High: Iodine    Not Specified: Iodinated Contrast Media; Oxycodone-acetaminophen;  Prochlorperazine;  Propoxyphene-acetaminophen;  Reglan [Metoclopramide];  Sulfa (Sulfonamide Antibiotics); Wygesic    Exam Information     Status  Exam Begun   Exam Ended     Final [99]  8/17/2020  17:31  8/17/2020  17:33    Result Information     Status: Final result (Exam End: 8/17/2020 17:33)  Provider Status: Reviewed    Study Result     Three view foot series.     CPT XMQU:08889     CLINICAL HISTORY: Pain for 3 to 4 weeks. No injury.     TECHNIQUE: Three views of the foot.     COMPARISON: None     FINDINGS: There are no fractures. Joint relationships are normal and bony  structures are normally mineralized. No ankle effusion is seen. No erosions or  soft tissue calcifications. No soft tissue swelling.     IMPRESSION  IMPRESSION:     Normal foot. Study Result     Lower leg series.     CPT code: 36764      CLINICAL HISTORY: Left tib-fib pain for 3 to 4 weeks.     TECHNIQUE: Lateral and AP views of the lower leg.     COMPARISON: No priors.     FINDINGS: There are no fractures. Bones are normally mineralized.   Joint  relationships are normal. Soft tissues normal.     IMPRESSION  IMPRESSION:     Normal lower leg       Result Information     Status: Final result (Exam End: 8/17/2020 17:31) Provider Status: Reviewed    Study Result     Three view foot series.     CPT OJUM:13614     CLINICAL HISTORY: Bilateral pain for 3 to 4 weeks without injury     TECHNIQUE: Three views of the foot.     COMPARISON: None     FINDINGS: There are no fractures. Joint relationships are normal and bony  structures are normally mineralized. No ankle effusion is seen. No erosions or  soft tissue calcifications. No soft tissue swelling.     IMPRESSION  IMPRESSION:     Normal foot.          Angeline Evans MD  11/30/2020  2:43 PM

## 2020-12-01 ENCOUNTER — TELEPHONE (OUTPATIENT)
Dept: ORTHOPEDIC SURGERY | Age: 35
End: 2020-12-01

## 2020-12-01 RX ORDER — METHOCARBAMOL 500 MG/1
500 TABLET, FILM COATED ORAL 3 TIMES DAILY
Qty: 20 TAB | Refills: 0 | Status: SHIPPED | OUTPATIENT
Start: 2020-12-01 | End: 2020-12-16 | Stop reason: ALTCHOICE

## 2020-12-01 NOTE — TELEPHONE ENCOUNTER
Metaxalone is non-formulary. The preferred alternatives are Tizanidine, Methocarbamol and Carisoprodol.

## 2020-12-01 NOTE — TELEPHONE ENCOUNTER
The following  prescriptions have been e-prescribed to the patients pharmacy:    Orders Placed This Encounter    methocarbamoL (ROBAXIN) 500 mg tablet     Sig: Take 1 Tab by mouth three (3) times daily. Dispense:  20 Tab     Refill:  0           Nancy A. Marylen [facility], MAREK, PA-C  12/1/2020  2:53 PM

## 2020-12-16 ENCOUNTER — OFFICE VISIT (OUTPATIENT)
Dept: CARDIOLOGY CLINIC | Age: 35
End: 2020-12-16
Payer: MEDICARE

## 2020-12-16 VITALS
DIASTOLIC BLOOD PRESSURE: 79 MMHG | BODY MASS INDEX: 28.02 KG/M2 | HEART RATE: 73 BPM | SYSTOLIC BLOOD PRESSURE: 135 MMHG | TEMPERATURE: 97.6 F | HEIGHT: 59 IN | WEIGHT: 139 LBS

## 2020-12-16 DIAGNOSIS — I10 ESSENTIAL HYPERTENSION: ICD-10-CM

## 2020-12-16 DIAGNOSIS — E11.9 TYPE 2 DIABETES MELLITUS WITHOUT COMPLICATION, UNSPECIFIED WHETHER LONG TERM INSULIN USE (HCC): ICD-10-CM

## 2020-12-16 DIAGNOSIS — R07.9 CHEST PAIN, UNSPECIFIED TYPE: Primary | ICD-10-CM

## 2020-12-16 PROCEDURE — G8754 DIAS BP LESS 90: HCPCS | Performed by: INTERNAL MEDICINE

## 2020-12-16 PROCEDURE — G8752 SYS BP LESS 140: HCPCS | Performed by: INTERNAL MEDICINE

## 2020-12-16 PROCEDURE — 99214 OFFICE O/P EST MOD 30 MIN: CPT | Performed by: INTERNAL MEDICINE

## 2020-12-16 PROCEDURE — G8417 CALC BMI ABV UP PARAM F/U: HCPCS | Performed by: INTERNAL MEDICINE

## 2020-12-16 PROCEDURE — G8428 CUR MEDS NOT DOCUMENT: HCPCS | Performed by: INTERNAL MEDICINE

## 2020-12-16 PROCEDURE — 2022F DILAT RTA XM EVC RTNOPTHY: CPT | Performed by: INTERNAL MEDICINE

## 2020-12-16 PROCEDURE — G8432 DEP SCR NOT DOC, RNG: HCPCS | Performed by: INTERNAL MEDICINE

## 2020-12-16 PROCEDURE — 3046F HEMOGLOBIN A1C LEVEL >9.0%: CPT | Performed by: INTERNAL MEDICINE

## 2020-12-16 NOTE — PROGRESS NOTES
HISTORY OF PRESENT ILLNESS  Sharri Rene is a 28 y.o. female. Patient is here for follow up of diagnostic tests. Results will be discussed. Hypertension  The history is provided by the patient. This is a chronic problem. Associated symptoms include chest pain. Chest Pain (Angina)   The history is provided by the patient. The current episode started more than 1 week ago. The problem has not changed since onset. The problem occurs rarely. The pain is associated with rest and exertion. The pain is present in the lateral region and substernal region. The quality of the pain is described as sharp. The pain does not radiate. Pertinent negatives include no dizziness, no nausea, no palpitations and no weakness. Review of Systems   Constitutional: Negative for chills. HENT: Negative for nosebleeds. Eyes: Negative for blurred vision and double vision. Cardiovascular: Positive for chest pain. Negative for palpitations. Gastrointestinal: Negative for heartburn and nausea. Musculoskeletal: Negative for myalgias. Neurological: Negative for dizziness and weakness. Endo/Heme/Allergies: Does not bruise/bleed easily.      Family History   Problem Relation Age of Onset    Kidney Disease Father     Other Father         Pancreatic Disease    Cancer Father     Heart Disease Father     Heart Attack Father     Thyroid Disease Other         Grandparent       Past Medical History:   Diagnosis Date    Asthma     Chronic constipation     Diabetes ([address]     Fibromyalgia     GERD (gastroesophageal reflux disease)     Hypertension     IBS (irritable bowel syndrome)     Migraines     unknown if aura    Psychiatric disorder     she denies any diagnosis despite being on antipsychotics, SSRIs, etc in the past    Scoliosis     Urinary incontinence     mixed       Past Surgical History:   Procedure Laterality Date    HX  SECTION  ,     HX COLONOSCOPY      HX DILATION AND CURETTAGE      HX ENDOSCOPY      HX PELVIC LAPAROSCOPY         Social History     Tobacco Use    Smoking status: Never Smoker    Smokeless tobacco: Never Used   Substance Use Topics    Alcohol use: No       Allergies   Allergen Reactions    Iodine Hives and Nausea and Vomiting    Iodinated Contrast Media Hives    Oxycodone-Acetaminophen Other (comments)     Hallucinations     Prochlorperazine Other (comments)    Propoxyphene-Acetaminophen Unknown (comments)     Allergy to propoxyphene only. Has previously tolerated acetaminophen    Reglan [Metoclopramide] Not Reported This Time    Sulfa (Sulfonamide Antibiotics) Not Reported This Time    Wygesic Not Reported This Time       Prior to Admission medications    Medication Sig Start Date End Date Taking? Authorizing Provider   clotrimazole-betamethasone (LOTRISONE) topical cream APPLY THIN LAYER TO AFFECTED SITE 2 X DAY 11/2/20  Yes Arabella Ren MD   glucose blood VI test strips (ASCENSIA AUTODISC VI, ONE TOUCH ULTRA TEST VI) strip Check blood glucose 4 x day 10/13/20  Yes Chelsy Marshall MD   lancets misc Check blood glucose 4 x day 10/13/20  Yes Arabella Ren MD   aspirin delayed-release 81 mg tablet Take 1 Tab by mouth daily. 10/13/20  Yes Arabella Ren MD   atenoloL (TENORMIN) 25 mg tablet TAKE 1 TABLET BY MOUTH EVERY DAY 10/5/20  Yes Chelsy Amanda MD   empagliflozin (JARDIANCE) 10 mg tablet Take 1 Tab by mouth daily. 9/28/20  Yes Arabella Ren MD   insulin glargine (LANTUS,BASAGLAR) 100 unit/mL (3 mL) inpn 22 Units by SubCUTAneous route nightly. 9/28/20  Yes Arabella Ren MD   Insulin Needles, Disposable, 31 gauge x 5/16\" ndle To use daily with insulin injection subcutaneously 9/25/20  Yes Celia Boston NP   montelukast (SINGULAIR) 10 mg tablet Take 1 Tab by mouth nightly. 7/2/20  Yes Chacho Canales MD   valACYclovir (VALTREX) 500 mg tablet daily.  8/12/20  Yes Provider, Historical   ANORO ELLIPTA 62.5-25 mcg/actuation inhaler 1 INH DAILY - USE EVERY DAY 7/24/19  Yes Provider, Historical   AIMOVIG AUTOINJECTOR 70 mg/mL injection AS DIRECTED - 1 INJECTION SUBQ ONCE MONTHLY 7/30/19  Yes Provider, Historical   cetirizine (ZYRTEC) 10 mg tablet TAKE 1 TABLET EVERY DAY 12/18/18  Yes Provider, Historical   mometasone (NASONEX) 50 mcg/actuation nasal spray USE 1-2 SPRAYS INTO EACH NOSTRIL EVERY DAY AS NEEDED 2/20/19  Yes Provider, Historical   FERRETTS IPS 40 mg/15 mL liqd 15 ML ORALLY 2 TIMES PER DAY. 2/27/19  Yes Provider, Historical   EPINEPHrine (EPIPEN) 0.3 mg/0.3 mL injection AS DIRECTED AS NEEDED INTRAMUSCULAR 1 DAYS 2/20/19  Yes Provider, Historical   albuterol (PROVENTIL HFA, VENTOLIN HFA, PROAIR HFA) 90 mcg/actuation inhaler Take 2 Puffs by inhalation every four (4) hours as needed for Wheezing or Shortness of Breath. 12/6/18  Yes Caryle Coon D, NP   esomeprazole (NEXIUM) 40 mg capsule Take  by mouth daily. Yes Provider, Historical   POLYETHYLENE GLYCOL 3350 (MIRALAX PO) Take  by mouth. Yes Provider, Historical   CALCIUM CARBONATE/MAG HYDROX (MYLANTA PO) Take  by mouth as needed. Yes Provider, Historical   olmesartan (BENICAR) 40 mg tablet TAKE 1 TABLET BY MOUTH EVERY DAY 11/11/20   Chelsy Amanda MD   indomethacin (INDOCIN) 50 mg capsule Take 1 Cap by mouth two (2) times daily as needed for Pain. 8/17/20   Chelsy Amanda MD         Visit Vitals  /79   Pulse 73   Temp 97.6 °F (36.4 °C) (Temporal)   Ht 4' 11\" (1.499 m)   Wt 63 kg (139 lb)   BMI 28.07 kg/m²         Physical Exam   Constitutional: She is oriented to person, place, and time. She appears well-developed and well-nourished. HENT:   Head: Normocephalic and atraumatic. Eyes: Conjunctivae are normal.   Neck: Neck supple. No JVD present. No tracheal deviation present. No thyromegaly present. Cardiovascular: Normal rate and regular rhythm. PMI is not displaced. Exam reveals no gallop, no S3 and no decreased pulses.    No murmur heard.  Pulmonary/Chest: No respiratory distress. She has no wheezes. She has no rales. She exhibits no tenderness. Abdominal: Soft. There is no abdominal tenderness. Musculoskeletal:         General: No edema. Neurological: She is alert and oriented to person, place, and time. Skin: Skin is warm. Psychiatric: She has a normal mood and affect. ECHO CARDIOGRAM [id number]/28/2015  [facility] [place]  Component Name Value Ref Range   EF Echo 60     Result Impression   :   NORMAL LEFT VENTRICULAR CAVITY SIZE AND SYSTOLIC FUNCTION WITH AN EJECTION FRACTION OF  60  %. NORMAL DIASTOLIC FUNCTION. NORMAL RIGHT HEART FUNCTION AND SIZE. NO HEMODYNAMICALLY SIGNIFICANT VALVULAR PATHOLOGY. NORMAL PULMONARY ARTERY PRESSURE OF 15 MM/HG. NO PREVIOUS REPORT FOR COMPARISON. I have personally reviewed patient's records available from hospital and other providers and incorporated findings in patient care. 12/2018- ER notes, labs, EKG, chest x-ray and prior records  Ms. Chiara Ray has a reminder for a \"due or due soon\" health maintenance. I have asked that she contact her primary care provider for follow-up on this health maintenance. Interpretation Summary 1/2019       · Normal cavity size, wall thickness, systolic function (ejection fraction normal) and diastolic function. The estimated ejection fraction is 56 - 60%. No regional wall motion abnormality noted. · Normal right ventricular size and function. · No hemodynamically significant valvular pathology. Interpretation Summary 1/2019    · Baseline ECG: Normal sinus rhythm. · Low risk Duke treadmill score. · Negative stress electrocardiogram.        03/10/20   ECHO STRESS 03/11/2020 3/12/2020    Narrative · Baseline ECG: Normal sinus rhythm, non-specific ST-T wave abnormalities. · Moderate risk Duke treadmill score. · Negative stress test.  · Normal stress echocardiogram. Low risk study.         Signed by: Azul Perez MD       Assessment ICD-10-CM ICD-9-CM    1. Chest pain, unspecified type  R07.9 786.50    2. Essential hypertension  I10 401.9    3. Type 2 diabetes mellitus without complication, unspecified whether long term insulin use (HCC)  E11.9 250.00        Medications Discontinued During This Encounter   Medication Reason    methocarbamoL (ROBAXIN) 500 mg tablet Therapy Completed       No orders of the defined types were placed in this encounter. Follow-up and Dispositions    · Return in about 4 months (around 4/16/2021). Patient is 68-year-old female with history of hypertension seen for episodes of sharp chest pain with some tightness. Was recently hospitalized at [facility]- serial cardiac enzymes negative for MI. Chest pain-- likely costochondritis-- reproducible. Stress echo in march 2020 - negative for ischemia    Seen for follow-up. Continues to have intermittent chest pain symptoms lasting several hours, worse with certain movements. Effort tolerance generally excellent. No shortness of breath. Likely musculoskeletal versus costochondritis. We will reevaluate symptoms in 4 to 6 months.

## 2020-12-16 NOTE — PROGRESS NOTES
1. Have you been to the ER, urgent care clinic since your last visit? Hospitalized since your last visit?     no  2. Have you seen or consulted any other health care providers outside of the [address] [place] [address] since your last visit? Include any pap smears or colon screening. No     3. Since your last visit, have you had any of the following symptoms? shortness of breath.

## 2020-12-21 ENCOUNTER — VIRTUAL VISIT (OUTPATIENT)
Dept: FAMILY MEDICINE CLINIC | Age: 35
End: 2020-12-21
Payer: MEDICARE

## 2020-12-21 DIAGNOSIS — I10 ESSENTIAL (PRIMARY) HYPERTENSION: ICD-10-CM

## 2020-12-21 DIAGNOSIS — M54.2 NECK PAIN: ICD-10-CM

## 2020-12-21 DIAGNOSIS — R09.89 BILATERAL CAROTID BRUITS: Primary | ICD-10-CM

## 2020-12-21 DIAGNOSIS — E11.65 TYPE 2 DIABETES MELLITUS WITH HYPERGLYCEMIA, WITHOUT LONG-TERM CURRENT USE OF INSULIN (HCC): ICD-10-CM

## 2020-12-21 DIAGNOSIS — K21.9 GASTROESOPHAGEAL REFLUX DISEASE, UNSPECIFIED WHETHER ESOPHAGITIS PRESENT: ICD-10-CM

## 2020-12-21 DIAGNOSIS — G43.909 MIGRAINE WITHOUT STATUS MIGRAINOSUS, NOT INTRACTABLE, UNSPECIFIED MIGRAINE TYPE: ICD-10-CM

## 2020-12-21 PROCEDURE — G8427 DOCREV CUR MEDS BY ELIG CLIN: HCPCS | Performed by: FAMILY MEDICINE

## 2020-12-21 PROCEDURE — G8756 NO BP MEASURE DOC: HCPCS | Performed by: FAMILY MEDICINE

## 2020-12-21 PROCEDURE — 3046F HEMOGLOBIN A1C LEVEL >9.0%: CPT | Performed by: FAMILY MEDICINE

## 2020-12-21 PROCEDURE — G8510 SCR DEP NEG, NO PLAN REQD: HCPCS | Performed by: FAMILY MEDICINE

## 2020-12-21 PROCEDURE — 99214 OFFICE O/P EST MOD 30 MIN: CPT | Performed by: FAMILY MEDICINE

## 2020-12-21 PROCEDURE — 2022F DILAT RTA XM EVC RTNOPTHY: CPT | Performed by: FAMILY MEDICINE

## 2020-12-21 NOTE — PROGRESS NOTES
Stacia Mcgovern is a 28 y.o. female who was seen by synchronous (real-time) audio-video technology on 12/21/2020 for Neck Pain, Neck Swelling, and Diabetes        Assessment & Plan:       ICD-10-CM ICD-9-CM    1. Bilateral carotid bruits  R09.89 785.9 DUPLEX CAROTID BILATERAL   2. Neck pain  M54.2 723.1 REFERRAL TO SPINE SURGERY   3. Type 2 diabetes mellitus with hyperglycemia, without long-term current use of insulin (HCC)  E11.65 250.00      790.29    4. Migraine without status migrainosus, not intractable, unspecified migraine type  G43.909 346.90    5. Gastroesophageal reflux disease, unspecified whether esophagitis present  K21.9 530.81    6. Essential (primary) hypertension  I10 401.9        Subjective:   Sharri White is seen for follow-up care. Neck pain and swelling: Patient has neck pain and neck tightness. This mainly affects the left side of her neck. At times she feels the swelling left lateral aspect of the neck and the discomfort. I had ordered a neck ultrasound which evaluated soft tissues. These were negative for acute abnormality. Patient gets occasional headaches and dizziness. I will order carotid ultrasounds to evaluate for any abnormality. Given the persistent neck pain however I will also refer her to the spine specialist for an evaluation and opinion. May need to have an MRI done of the cervical spine. She denies any numbness or tingling of the hands. Denies weakness of the upper extremities. Hypertension: Patient has hypertension. She takes atenolol and Benicar denies changes in vision or focal weakness. We will continue with the current treatment plan. I will have her keep a blood pressure log and we will follow-up at next visit. Diabetes mellitus type 2: Patient has type 2 diabetes mellitus. States that her blood glucose numbers have been stable. She is on glargine and Jardiance.   She is due to be seen by the endocrinologist.  She is doing lifestyle and dietary modification. Headache: Patient has a history of migraine headaches. These come on and off. Lately has been getting more of the left-sided headaches. We had done a head CT scan last year and this was negative. If headaches persist may need to recheck a head CT scan. Currently she can take Tylenol with good result. She has also been seen by the neurologist and is on [address]. This helps with the symptoms. She takes atenolol daily. This is a beta-blocker and helps with headache for migraine prophylaxis. GERD: Patient has gastroesophageal reflux disease. Gets heartburn on and off. She is on Nexium. Denies hematemesis or dark stools. We will continue with current treatment plan. Prior to Admission medications    Medication Sig Start Date End Date Taking? Authorizing Provider   olmesartan (BENICAR) 40 mg tablet TAKE 1 TABLET BY MOUTH EVERY DAY 11/11/20   Chelsy Amanda MD   clotrimazole-betamethasone (LOTRISONE) topical cream APPLY THIN LAYER TO AFFECTED SITE 2 X DAY 11/2/20   Chelsy Marshall MD   glucose blood VI test strips (ASCENSIA AUTODISC VI, ONE TOUCH ULTRA TEST VI) strip Check blood glucose 4 x day 10/13/20   Arabella Ren MD   lancets misc Check blood glucose 4 x day 10/13/20   Chelsy Amanda MD   aspirin delayed-release 81 mg tablet Take 1 Tab by mouth daily. 10/13/20   Chelsy Amanda MD   atenoloL (TENORMIN) 25 mg tablet TAKE 1 TABLET BY MOUTH EVERY DAY 10/5/20   Chelsy Amanda MD   empagliflozin (JARDIANCE) 10 mg tablet Take 1 Tab by mouth daily. 9/28/20   Chelsy Amanda MD   insulin glargine (LANTUS,BASAGLAR) 100 unit/mL (3 mL) inpn 22 Units by SubCUTAneous route nightly. 9/28/20   Chelsy Amanda MD   Insulin Needles, Disposable, 31 gauge x 5/16\" ndle To use daily with insulin injection subcutaneously 9/25/20   Celia Boston NP   montelukast (SINGULAIR) 10 mg tablet Take 1 Tab by mouth nightly.  7/2/20   Chacho Canales MD   valACYclovir (VALTREX) 500 mg tablet daily. 8/12/20   Provider, Historical   indomethacin (INDOCIN) 50 mg capsule Take 1 Cap by mouth two (2) times daily as needed for Pain. 8/17/20   Chelsy Amanda MD   ANORO ELLIPTA 62.5-25 mcg/actuation inhaler 1 INH DAILY - USE EVERY DAY 7/24/19   Provider, Historical   AIMOVIG AUTOINJECTOR 70 mg/mL injection AS DIRECTED - 1 INJECTION SUBQ ONCE MONTHLY 7/30/19   Provider, Historical   cetirizine (ZYRTEC) 10 mg tablet TAKE 1 TABLET EVERY DAY 12/18/18   Provider, Historical   mometasone (NASONEX) 50 mcg/actuation nasal spray USE 1-2 SPRAYS INTO EACH NOSTRIL EVERY DAY AS NEEDED 2/20/19   Provider, Historical   FERRETTS IPS 40 mg/15 mL liqd 15 ML ORALLY 2 TIMES PER DAY. 2/27/19   Provider, Historical   EPINEPHrine (EPIPEN) 0.3 mg/0.3 mL injection AS DIRECTED AS NEEDED INTRAMUSCULAR 1 DAYS 2/20/19   Provider, Historical   albuterol (PROVENTIL HFA, VENTOLIN HFA, PROAIR HFA) 90 mcg/actuation inhaler Take 2 Puffs by inhalation every four (4) hours as needed for Wheezing or Shortness of Breath. 12/6/18   Caryle Coon D, NP   esomeprazole (NEXIUM) 40 mg capsule Take  by mouth daily. Provider, Historical   POLYETHYLENE GLYCOL 3350 (MIRALAX PO) Take  by mouth. Provider, Historical   CALCIUM CARBONATE/MAG HYDROX (MYLANTA PO) Take  by mouth as needed. Provider, Historical     ROS Review of all systems is negative except as noted above in the HPI. Objective:   No flowsheet data found.      Constitutional: [x] Appears well-developed and well-nourished [x] No apparent distress      Mental status: [x] Alert and awake  [x] Oriented to person/place/time [x] Able to follow commands    HENT: [x] Normocephalic, atraumatic     Neck: [x] No visualized mass    Pulmonary/Chest: [x] Respiratory effort normal   [x] No visualized signs of difficulty breathing or respiratory distress           Neurological:        [x] No Facial Asymmetry (Cranial nerve 7 motor function) (limited exam due to video visit) Psychiatric:       [x] Normal Affect    We discussed the expected course, resolution and complications of the diagnosis(es) in detail. Medication risks, benefits, costs, interactions, and alternatives were discussed as indicated. I advised her to contact the office if her condition worsens, changes or fails to improve as anticipated. She expressed understanding with the diagnosis(es) and plan. Sharri Jason, who was evaluated through a patient-initiated, synchronous (real-time) audio-video encounter, and/or her healthcare decision maker, is aware that it is a billable service, with coverage as determined by her insurance carrier. She provided verbal consent to proceed: Yes, and patient identification was verified. It was conducted pursuant to the emergency declaration under the [address] [place] [address] authority and the [employer] Act. A caregiver was present when appropriate. Ability to conduct physical exam was limited. I was in the office. The patient was at home.       Bridgette Yusuf MD

## 2020-12-26 DIAGNOSIS — E11.65 TYPE 2 DIABETES MELLITUS WITH HYPERGLYCEMIA, WITHOUT LONG-TERM CURRENT USE OF INSULIN (HCC): ICD-10-CM

## 2020-12-28 RX ORDER — EMPAGLIFLOZIN 10 MG/1
TABLET, FILM COATED ORAL
Qty: 30 TAB | Refills: 2 | Status: SHIPPED | OUTPATIENT
Start: 2020-12-28 | End: 2021-04-08

## 2020-12-29 ENCOUNTER — HOSPITAL ENCOUNTER (OUTPATIENT)
Dept: VASCULAR SURGERY | Age: 35
Discharge: HOME | End: 2020-12-29
Attending: FAMILY MEDICINE
Payer: MEDICARE

## 2020-12-29 DIAGNOSIS — R09.89 BILATERAL CAROTID BRUITS: ICD-10-CM

## 2020-12-29 PROCEDURE — 93880 EXTRACRANIAL BILAT STUDY: CPT

## 2020-12-30 LAB
LEFT BULB EDV: 12.8 CM/S
LEFT BULB PSV: 82.2 CM/S
LEFT CCA DIST DIAS: 25.2 CM/S
LEFT CCA DIST SYS: 99.5 CM/S
LEFT CCA MID DIAS: 26.81 CM/S
LEFT CCA MID SYS: 107.57 CM/S
LEFT CCA PROX DIAS: 23.6 CM/S
LEFT CCA PROX SYS: 110.8 CM/S
LEFT ECA DIAS: 15.55 CM/S
LEFT ECA SYS: 119.9 CM/S
LEFT ICA DIST DIAS: 26.4 CM/S
LEFT ICA DIST SYS: 64.1 CM/S
LEFT ICA MID DIAS: 17.9 CM/S
LEFT ICA MID SYS: 54.4 CM/S
LEFT ICA PROX DIAS: 12.8 CM/S
LEFT ICA PROX SYS: 69.3 CM/S
LEFT ICA/CCA SYS: 0.83
LEFT SUBCLAVIAN DIAS: 0 CM/S
LEFT SUBCLAVIAN SYS: 89.8 CM/S
LEFT VERTEBRAL DIAS: 13.08 CM/S
LEFT VERTEBRAL SYS: 34 CM/S
RIGHT BULB EDV: 11.2 CM/S
RIGHT BULB PSV: 43.5 CM/S
RIGHT CCA DIST DIAS: 22 CM/S
RIGHT CCA DIST SYS: 93 CM/S
RIGHT CCA MID DIAS: 30.04 CM/S
RIGHT CCA MID SYS: 114.03 CM/S
RIGHT CCA PROX DIAS: 23.6 CM/S
RIGHT CCA PROX SYS: 114 CM/S
RIGHT ECA DIAS: 12.42 CM/S
RIGHT ECA SYS: 78.4 CM/S
RIGHT ICA DIST DIAS: 31.6 CM/S
RIGHT ICA DIST SYS: 101.3 CM/S
RIGHT ICA MID DIAS: 33.6 CM/S
RIGHT ICA MID SYS: 87.8 CM/S
RIGHT ICA PROX DIAS: 23.9 CM/S
RIGHT ICA PROX SYS: 94.3 CM/S
RIGHT ICA/CCA SYS: 1.1
RIGHT SUBCLAVIAN DIAS: 0 CM/S
RIGHT SUBCLAVIAN SYS: 108.1 CM/S
RIGHT VERTEBRAL DIAS: 9.25 CM/S
RIGHT VERTEBRAL SYS: 31.1 CM/S

## 2021-01-14 ENCOUNTER — OFFICE VISIT (OUTPATIENT)
Dept: FAMILY MEDICINE CLINIC | Age: 36
End: 2021-01-14
Payer: MEDICARE

## 2021-01-14 VITALS
HEART RATE: 77 BPM | DIASTOLIC BLOOD PRESSURE: 82 MMHG | SYSTOLIC BLOOD PRESSURE: 136 MMHG | RESPIRATION RATE: 18 BRPM | BODY MASS INDEX: 28.02 KG/M2 | HEIGHT: 59 IN | WEIGHT: 139 LBS | OXYGEN SATURATION: 99 % | TEMPERATURE: 99.5 F

## 2021-01-14 DIAGNOSIS — R10.9 FLANK PAIN: Primary | ICD-10-CM

## 2021-01-14 DIAGNOSIS — K21.9 GASTROESOPHAGEAL REFLUX DISEASE, UNSPECIFIED WHETHER ESOPHAGITIS PRESENT: ICD-10-CM

## 2021-01-14 DIAGNOSIS — G43.909 MIGRAINE WITHOUT STATUS MIGRAINOSUS, NOT INTRACTABLE, UNSPECIFIED MIGRAINE TYPE: ICD-10-CM

## 2021-01-14 DIAGNOSIS — E11.65 TYPE 2 DIABETES MELLITUS WITH HYPERGLYCEMIA, WITHOUT LONG-TERM CURRENT USE OF INSULIN (HCC): ICD-10-CM

## 2021-01-14 DIAGNOSIS — R31.9 HEMATURIA, UNSPECIFIED TYPE: ICD-10-CM

## 2021-01-14 DIAGNOSIS — I10 ESSENTIAL (PRIMARY) HYPERTENSION: ICD-10-CM

## 2021-01-14 LAB
BILIRUB UR QL STRIP: NEGATIVE
GLUCOSE UR-MCNC: NORMAL MG/DL
HBA1C MFR BLD HPLC: 5.2 %
KETONES P FAST UR STRIP-MCNC: NEGATIVE MG/DL
MICROALBUMIN UR TEST STR-MCNC: 30 MG/L
MICROALBUMIN/CREAT RATIO POC: <30 MG/G
PH UR STRIP: 6 [PH] (ref 4.6–8)
PROT UR QL STRIP: NORMAL
SP GR UR STRIP: 1.02 (ref 1–1.03)
UA UROBILINOGEN AMB POC: NORMAL (ref 0.2–1)
URINALYSIS CLARITY POC: CLEAR
URINALYSIS COLOR POC: YELLOW
URINE BLOOD POC: NORMAL
URINE LEUKOCYTES POC: NEGATIVE
URINE NITRITES POC: NEGATIVE

## 2021-01-14 PROCEDURE — G8754 DIAS BP LESS 90: HCPCS | Performed by: FAMILY MEDICINE

## 2021-01-14 PROCEDURE — G8427 DOCREV CUR MEDS BY ELIG CLIN: HCPCS | Performed by: FAMILY MEDICINE

## 2021-01-14 PROCEDURE — G8510 SCR DEP NEG, NO PLAN REQD: HCPCS | Performed by: FAMILY MEDICINE

## 2021-01-14 PROCEDURE — 83036 HEMOGLOBIN GLYCOSYLATED A1C: CPT | Performed by: FAMILY MEDICINE

## 2021-01-14 PROCEDURE — G8417 CALC BMI ABV UP PARAM F/U: HCPCS | Performed by: FAMILY MEDICINE

## 2021-01-14 PROCEDURE — 99214 OFFICE O/P EST MOD 30 MIN: CPT | Performed by: FAMILY MEDICINE

## 2021-01-14 PROCEDURE — 3044F HG A1C LEVEL LT 7.0%: CPT | Performed by: FAMILY MEDICINE

## 2021-01-14 PROCEDURE — 82044 UR ALBUMIN SEMIQUANTITATIVE: CPT | Performed by: FAMILY MEDICINE

## 2021-01-14 PROCEDURE — G8752 SYS BP LESS 140: HCPCS | Performed by: FAMILY MEDICINE

## 2021-01-14 PROCEDURE — 2022F DILAT RTA XM EVC RTNOPTHY: CPT | Performed by: FAMILY MEDICINE

## 2021-01-14 PROCEDURE — 81003 URINALYSIS AUTO W/O SCOPE: CPT | Performed by: FAMILY MEDICINE

## 2021-01-14 NOTE — PROGRESS NOTES
Chief Complaint   Patient presents with    Follow Up Chronic Condition    Neck Pain     headaches      1. Have you been to the ER, urgent care clinic since your last visit? Hospitalized since your last visit? No    2. Have you seen or consulted any other health care providers outside of the [address] [place] [address] since your last visit? Include any pap smears or colon screening. No     HPI  Sonido Vogel comes in for f/u care. Headache: patient has chronic headache. This is triggered by neck pain. She has a h/o recurrent neck pain with a knot in the neck left side. I will refer to the spine specialist for evaluation and opinion. Low back pain: patient has low back pain and flank discomfort. Did UA that showed trace hematuria  Dysuria: patient has urinary frequency and burning sensation. Ongoing for weeks. UA shows trace blood, will order CT abd/pelv to check for renal stone. DM2: Her blood glucose numbers have been stable, she is on jardiance. She is followed up by the endocrinologist.  I will check her HbA1c today. She states that her blood glucose numbers range in the 150s. Migraine headache: she is on aimovig, f/u by the neurologist. Has appt in February. HTN: Patient is on atenolol. She denies focal weakness or changes in vision. We will recheck labs at next visit. Asthma: Patient has asthma. She gets wheezing and nocturnal cough. She takes albuterol as needed for wheeze. She is also on Singulair and Anoro Ellipta. Allergy: Patient has allergies. She gets nasal congestion and sneezing. She uses Nasonex nasal spray. She also takes Zyrtec. Has been stable on medication. GERD: Patient has gastroesophageal reflux disease. She is on esomeprazole. Denies dark stools or hematemesis. She will continue with the current treatment plan.       Past Medical History  Past Medical History:   Diagnosis Date    Asthma     Chronic constipation     Diabetes (HCC)     Fibromyalgia     GERD (gastroesophageal reflux disease)     Hypertension     IBS (irritable bowel syndrome)     Migraines     unknown if aura    Psychiatric disorder     she denies any diagnosis despite being on antipsychotics, SSRIs, etc in the past    Scoliosis     Urinary incontinence     mixed       Surgical History  Past Surgical History:   Procedure Laterality Date    HX  SECTION  ,     HX COLONOSCOPY      HX DILATION AND CURETTAGE      HX ENDOSCOPY      HX PELVIC LAPAROSCOPY          Medications  Current Outpatient Medications   Medication Sig Dispense Refill    Jardiance 10 mg tablet TAKE 1 TABLET BY MOUTH EVERY DAY 30 Tab 2    clotrimazole-betamethasone (LOTRISONE) topical cream APPLY THIN LAYER TO AFFECTED SITE 2 X DAY 45 g 0    glucose blood VI test strips (ASCENSIA AUTODISC VI, ONE TOUCH ULTRA TEST VI) strip Check blood glucose 4 x day 400 Strip 3    lancets misc Check blood glucose 4 x day 400 Each 3    aspirin delayed-release 81 mg tablet Take 1 Tab by mouth daily. 90 Tab 1    atenoloL (TENORMIN) 25 mg tablet TAKE 1 TABLET BY MOUTH EVERY DAY 90 Tab 1    insulin glargine (LANTUS,BASAGLAR) 100 unit/mL (3 mL) inpn 22 Units by SubCUTAneous route nightly. 30 mL 1    Insulin Needles, Disposable, 31 gauge x 5/16\" ndle To use daily with insulin injection subcutaneously 1 Package 11    montelukast (SINGULAIR) 10 mg tablet Take 1 Tab by mouth nightly.  valACYclovir (VALTREX) 500 mg tablet daily.  ANORO ELLIPTA 62.5-25 mcg/actuation inhaler 1 INH DAILY - USE EVERY DAY  6    AIMOVIG AUTOINJECTOR 70 mg/mL injection AS DIRECTED - 1 INJECTION SUBQ ONCE MONTHLY  3    cetirizine (ZYRTEC) 10 mg tablet TAKE 1 TABLET EVERY DAY  0    mometasone (NASONEX) 50 mcg/actuation nasal spray USE 1-2 SPRAYS INTO EACH NOSTRIL EVERY DAY AS NEEDED  0    FERRETTS IPS 40 mg/15 mL liqd 15 ML ORALLY 2 TIMES PER DAY.   2    EPINEPHrine (EPIPEN) 0.3 mg/0.3 mL injection AS DIRECTED AS NEEDED INTRAMUSCULAR 1 DAYS  5  albuterol (PROVENTIL HFA, VENTOLIN HFA, PROAIR HFA) 90 mcg/actuation inhaler Take 2 Puffs by inhalation every four (4) hours as needed for Wheezing or Shortness of Breath. 1 Inhaler 0    esomeprazole (NEXIUM) 40 mg capsule Take  by mouth daily.  POLYETHYLENE GLYCOL 3350 (MIRALAX PO) Take  by mouth.  CALCIUM CARBONATE/MAG HYDROX (MYLANTA PO) Take  by mouth as needed.  olmesartan (BENICAR) 40 mg tablet TAKE 1 TABLET BY MOUTH EVERY DAY 90 Tab 1    indomethacin (INDOCIN) 50 mg capsule Take 1 Cap by mouth two (2) times daily as needed for Pain. 60 Cap 2       Allergies  Allergies   Allergen Reactions    Iodine Hives and Nausea and Vomiting    Iodinated Contrast Media Hives    Oxycodone-Acetaminophen Other (comments)     Hallucinations     Prochlorperazine Other (comments)    Propoxyphene-Acetaminophen Unknown (comments)     Allergy to propoxyphene only.   Has previously tolerated acetaminophen    Reglan [Metoclopramide] Not Reported This Time    Sulfa (Sulfonamide Antibiotics) Not Reported This Time    Wygesic Not Reported This Time       Family History  Family History   Problem Relation Age of Onset    Kidney Disease Father     Other Father         Pancreatic Disease    Cancer Father     Heart Disease Father     Heart Attack Father     Thyroid Disease Other         Grandparent       Social History  Social History     Socioeconomic History    Marital status: SINGLE     Spouse name: Not on file    Number of children: Not on file    Years of education: Not on file    Highest education level: Not on file   Occupational History    Not on file   Social Needs    Financial resource strain: Not on file    Food insecurity     Worry: Not on file     Inability: Not on file    Transportation needs     Medical: Not on file     Non-medical: Not on file   Tobacco Use    Smoking status: Never Smoker    Smokeless tobacco: Never Used   Substance and Sexual Activity    Alcohol use: No    Drug use: No    Sexual activity: Not on file   Lifestyle    Physical activity     Days per week: Not on file     Minutes per session: Not on file    Stress: Not on file   Relationships    Social connections     Talks on phone: Not on file     Gets together: Not on file     Attends Ten[employer] service: Not on file     Active member of club or organization: Not on file     Attends meetings of clubs or organizations: Not on file     Relationship status: Not on file    Intimate partner violence     Fear of current or ex partner: Not on file     Emotionally abused: Not on file     Physically abused: Not on file     Forced sexual activity: Not on file   Other Topics Concern    Not on file   Social History Narrative    Not on file       Review of Systems  Review of Systems - Review of all systems is negative except as noted above in the HPI.     Vital Signs  Visit Vitals  /82 (BP 1 Location: Left arm, BP Patient Position: Sitting)   Pulse 77   Temp 99.5 °F (37.5 °C) (Oral)   Resp 18   Ht 4' 11\" (1.499 m)   Wt 139 lb (63 kg)   SpO2 99%   BMI 28.07 kg/m²         Physical Exam  Physical Examination: General appearance - oriented to person, place, and time, acyanotic, in no respiratory distress and well hydrated  Mental status - alert, oriented to person, place, and time, affect appropriate to mood  Neck - discomfort midline cervical spine and paracervical muscles to palpation  Lymphatics - no palpable lymphadenopathy, no hepatosplenomegaly  Chest - clear to auscultation, no wheezes, rales or rhonchi, symmetric air entry, no tachypnea, retractions or cyanosis  Heart - S1 and S2 normal  Abdomen - no rebound tenderness noted  Back exam - limited range of motion, pain with motion noted during exam, tenderness noted paralumbar muscles  Neurological - abnormal neurological exam unchanged from prior examinations  Extremities - no pedal edema noted, intact peripheral pulses      Results  Results for orders placed or performed during the hospital encounter of 12/29/20   DUPLEX CAROTID BILATERAL   Result Value Ref Range    Right eca sys 78.4 cm/s    RIGHT EXTERNAL CAROTID ARTERY D 12.42 cm/s    Right vertebral sys 31.1 cm/s    RIGHT VERTEBRAL ARTERY D 9.25 cm/s    Right cca dist sys 93.0 cm/s    Right CCA dist jade 22.0 cm/s    RIGHT COMMON CAROTID ARTERY MID S 114.03 cm/s    RIGHT COMMON CAROTID ARTERY MID D 30.04 cm/s    Right CCA prox sys 114.0 cm/s    Right CCA prox jade 23.6 cm/s    Right Bulb PSV 43.5 cm/s    Right Bulb EDV 11.2 cm/s    Right ICA dist sys 101.3 cm/s    Right ICA dist jade 31.6 cm/s    Right ICA mid sys 87.8 cm/s    Right ICA mid jade 33.6 cm/s    Right ICA prox sys 94.3 cm/s    Right ICA prox jade 23.9 cm/s    Right subclavian sys 108.1 cm/s    RIGHT SUBCLAVIAN ARTERY D 0.00 cm/s    Right ICA/CCA sys 1.1     Left ECA sys 119.9 cm/s    LEFT EXTERNAL CAROTID ARTERY D 15.55 cm/s    Left vertebral sys 34.0 cm/s    LEFT VERTEBRAL ARTERY D 13.08 cm/s    Left CCA dist sys 99.5 cm/s    Left CCA dist jade 25.2 cm/s    LEFT COMMON CAROTID ARTERY MID S 107.57 cm/s    LEFT COMMON CAROTID ARTERY MID D 26.81 cm/s    Left CCA prox sys 110.8 cm/s    Left CCA prox jade 23.6 cm/s    Left Bulb PSV 82.2 cm/s    Left Bulb EDV 12.8 cm/s    Left ICA dist sys 64.1 cm/s    Left ICA dist jade 26.4 cm/s    Left ICA mid sys 54.4 cm/s    Left ICA mid jade 17.9 cm/s    Left ICA prox sys 69.3 cm/s    Left ICA prox jade 12.8 cm/s    Left subclavian sys 89.8 cm/s    LEFT SUBCLAVIAN ARTERY D 0.00 cm/s    Left ICA/CCA sys 0.83        ASSESSMENT and PLAN    ICD-10-CM ICD-9-CM    1. Flank pain  R10.9 789.09 CT ABD PELV WO CONT      AMB POC URINALYSIS DIP STICK AUTO W/O MICRO   2. Hematuria, unspecified type  R31.9 599.70 CT ABD PELV WO CONT      AMB POC URINALYSIS DIP STICK AUTO W/O MICRO   3.  Type 2 diabetes mellitus with hyperglycemia, without long-term current use of insulin (HCC)  E11.65 250.00 AMB POC HEMOGLOBIN A1C     790.29 AMB POC URINE, MICROALBUMIN, SEMIQUANTITATIVE   4. Migraine without status migrainosus, not intractable, unspecified migraine type  G43.909 346.90    5. Gastroesophageal reflux disease, unspecified whether esophagitis present  K21.9 530.81    6. Essential (primary) hypertension  I10 401.9      lab results and schedule of future lab studies reviewed with patient  reviewed diet, exercise and weight control  cardiovascular risk and specific lipid/LDL goals reviewed  reviewed medications and side effects in detail  specific diabetic recommendations: low cholesterol diet, weight control and daily exercise discussed, home glucose monitoring emphasized, all medications, side effects and compliance discussed carefully, annual eye examinations at Ophthalmology discussed, glycohemoglobin and other lab monitoring discussed and long term diabetic complications discussed      I have discussed the diagnosis with the patient and the intended plan of care as seen in the above orders. The patient has received an after-visit summary and questions were answered concerning future plans. I have discussed medication, side effects, and warnings with the patient in detail. The patient verbalized understanding and is in agreement with the plan of care. The patient will contact the office with any additional concerns. Maria Bennett MD    PLEASE NOTE:   This document has been produced using voice recognition software.  Unrecognized errors in transcription may be present

## 2021-02-18 ENCOUNTER — HOSPITAL ENCOUNTER (OUTPATIENT)
Dept: CT IMAGING | Age: 36
Discharge: HOME | End: 2021-02-18
Attending: FAMILY MEDICINE
Payer: MEDICARE

## 2021-02-18 DIAGNOSIS — R10.9 FLANK PAIN: ICD-10-CM

## 2021-02-18 DIAGNOSIS — R31.9 HEMATURIA, UNSPECIFIED TYPE: ICD-10-CM

## 2021-02-18 PROCEDURE — 74176 CT ABD & PELVIS W/O CONTRAST: CPT

## 2021-03-02 ENCOUNTER — VIRTUAL VISIT (OUTPATIENT)
Dept: FAMILY MEDICINE CLINIC | Age: 36
End: 2021-03-02
Payer: MEDICARE

## 2021-03-02 DIAGNOSIS — G89.29 CHRONIC MIDLINE LOW BACK PAIN, UNSPECIFIED WHETHER SCIATICA PRESENT: ICD-10-CM

## 2021-03-02 DIAGNOSIS — M54.50 CHRONIC MIDLINE LOW BACK PAIN, UNSPECIFIED WHETHER SCIATICA PRESENT: ICD-10-CM

## 2021-03-02 DIAGNOSIS — K21.9 GASTROESOPHAGEAL REFLUX DISEASE, UNSPECIFIED WHETHER ESOPHAGITIS PRESENT: ICD-10-CM

## 2021-03-02 DIAGNOSIS — R30.0 DYSURIA: Primary | ICD-10-CM

## 2021-03-02 DIAGNOSIS — G43.909 MIGRAINE WITHOUT STATUS MIGRAINOSUS, NOT INTRACTABLE, UNSPECIFIED MIGRAINE TYPE: ICD-10-CM

## 2021-03-02 DIAGNOSIS — R35.0 URINARY FREQUENCY: ICD-10-CM

## 2021-03-02 DIAGNOSIS — E11.65 TYPE 2 DIABETES MELLITUS WITH HYPERGLYCEMIA, WITHOUT LONG-TERM CURRENT USE OF INSULIN (HCC): ICD-10-CM

## 2021-03-02 DIAGNOSIS — I10 ESSENTIAL (PRIMARY) HYPERTENSION: ICD-10-CM

## 2021-03-02 PROCEDURE — G8756 NO BP MEASURE DOC: HCPCS | Performed by: FAMILY MEDICINE

## 2021-03-02 PROCEDURE — G8510 SCR DEP NEG, NO PLAN REQD: HCPCS | Performed by: FAMILY MEDICINE

## 2021-03-02 PROCEDURE — G8428 CUR MEDS NOT DOCUMENT: HCPCS | Performed by: FAMILY MEDICINE

## 2021-03-02 PROCEDURE — 3044F HG A1C LEVEL LT 7.0%: CPT | Performed by: FAMILY MEDICINE

## 2021-03-02 PROCEDURE — 99214 OFFICE O/P EST MOD 30 MIN: CPT | Performed by: FAMILY MEDICINE

## 2021-03-02 PROCEDURE — 2022F DILAT RTA XM EVC RTNOPTHY: CPT | Performed by: FAMILY MEDICINE

## 2021-03-02 NOTE — PROGRESS NOTES
Chief Complaint   Patient presents with    Back Pain     abdomial pain x2-3 months      1. Have you been to the ER, urgent care clinic since your last visit? Hospitalized since your last visit? No    2. Have you seen or consulted any other health care providers outside of the [address] [place] [address] since your last visit? Include any pap smears or colon screening. Marilee Hirsch is a 39 y.o. female who was seen by synchronous (real-time) audio-video technology on 3/2/2021 for Back Pain (abdomial pain x2-3 months )        Assessment & Plan:       ICD-10-CM ICD-9-CM    1. Dysuria  R30.0 788. 1 URINALYSIS W/MICROSCOPIC      CULTURE, URINE   2. Urinary frequency  R35.0 788.41 URINALYSIS W/MICROSCOPIC      CULTURE, URINE   3. Chronic midline low back pain, unspecified whether sciatica present  M54.5 724.2     G89.29 338.29    4. Type 2 diabetes mellitus with hyperglycemia, without long-term current use of insulin ([facility])  E11.65 250.00      790.29    5. Migraine without status migrainosus, not intractable, unspecified migraine type  G43.909 346.90    6. Gastroesophageal reflux disease, unspecified whether esophagitis present  K21.9 530.81    7. Essential (primary) hypertension  I10 401.9      Subjective:   Patrizia Hirsch is seen for follow-up care. Dysuria: Patient has dysuria and flank pain and lower abdominal pain. She also has frequency of urination. We did a urinalysis in the past that showed some blood in the urine. There was negative for leukocyte Estrace and the nitrites. We did CT scan abdomen pelvis without contrast to evaluate for renal stone. This was unremarkable. Given persistence of the dysuria we will recheck urinalysis and do urine culture. Patient states that symptoms have become worse. I will await the results. We will consider referral to see the urologist.  Diabetes mellitus type 2: Patient has type 2 diabetes mellitus. Blood glucose numbers have been stable.   She is seen by the endocrinologist.  States that her blood glucose numbers were stable. Last HbA1c was 5.2. She is on insulin. She is also on Jardiance. This may result in urinary frequency and UTIs. May need to consider discontinuing the Jardiance if symptoms persist.  Hypertension: Patient has hypertension. Blood pressure has been stable. She is on Tenormin and Benicar. She denies headache, changes in vision or focal weakness. We will continue with the current treatment plan. Headache: Patient has a history of migraine headaches. She is followed up with a neurologist.  She is on Aimovig. We will continue with this medication. GERD: Patient has gastroesophageal reflux disease. She takes Nexium. She occasionally has epigastric pain and discomfort. Has nausea but no vomiting. No hematemesis. No dark stools. She has been seen by the gastroenterologist in the past.  Chronic back pain: Patient has a history of chronic back pain and has seen the spine specialist in the past.  She should continue to do supportive care and can take Tylenol as needed for the back pain. Respiratory: Patient has episodes of wheeze and shortness of breath. She is on Singulair and takes albuterol as needed. Continue current treatment plan. Prior to Admission medications    Medication Sig Start Date End Date Taking? Authorizing Provider   Jardiance 10 mg tablet TAKE 1 TABLET BY MOUTH EVERY DAY 12/28/20  Yes Chelsy Keller MD   clotrimazole-betamethasone (LOTRISONE) topical cream APPLY THIN LAYER TO AFFECTED SITE 2 X DAY 11/2/20  Yes Kandice Edmond MD   glucose blood VI test strips (ASCENSIA AUTODISC VI, ONE TOUCH ULTRA TEST VI) strip Check blood glucose 4 x day 10/13/20  Yes Kandice Edmond MD   lancets misc Check blood glucose 4 x day 10/13/20  Yes Kandice Edmond MD   aspirin delayed-release 81 mg tablet Take 1 Tab by mouth daily.  10/13/20  Yes Chelsy Amanda MD   atenoloL (TENORMIN) 25 mg tablet TAKE 1 TABLET BY MOUTH EVERY DAY 10/5/20  Yes Chelsy Garcia MD   Insulin Needles, Disposable, 31 gauge x 5/16\" ndle To use daily with insulin injection subcutaneously 9/25/20  Yes Jyoti Nino NP   montelukast (SINGULAIR) 10 mg tablet Take 1 Tab by mouth nightly. 7/2/20  Yes Other, MD Chacho   valACYclovir (VALTREX) 500 mg tablet daily. 8/12/20  Yes Provider, Historical   indomethacin (INDOCIN) 50 mg capsule Take 1 Cap by mouth two (2) times daily as needed for Pain. 8/17/20  Yes Chelsy Amanda MD   ANORO ELLIPTA 62.5-25 mcg/actuation inhaler 1 INH DAILY - USE EVERY DAY 7/24/19  Yes Provider, Historical   AIMOVIG AUTOINJECTOR 70 mg/mL injection AS DIRECTED - 1 INJECTION SUBQ ONCE MONTHLY 7/30/19  Yes Provider, Historical   cetirizine (ZYRTEC) 10 mg tablet TAKE 1 TABLET EVERY DAY 12/18/18  Yes Provider, Historical   mometasone (NASONEX) 50 mcg/actuation nasal spray USE 1-2 SPRAYS INTO EACH NOSTRIL EVERY DAY AS NEEDED 2/20/19  Yes Provider, Historical   FERRETTS IPS 40 mg/15 mL liqd 15 ML ORALLY 2 TIMES PER DAY. 2/27/19  Yes Provider, Historical   EPINEPHrine (EPIPEN) 0.3 mg/0.3 mL injection AS DIRECTED AS NEEDED INTRAMUSCULAR 1 DAYS 2/20/19  Yes Provider, Historical   albuterol (PROVENTIL HFA, VENTOLIN HFA, PROAIR HFA) 90 mcg/actuation inhaler Take 2 Puffs by inhalation every four (4) hours as needed for Wheezing or Shortness of Breath. 12/6/18  Yes Americo PETERSON NP   esomeprazole (NEXIUM) 40 mg capsule Take  by mouth daily. Yes Provider, Historical   POLYETHYLENE GLYCOL 3350 (MIRALAX PO) Take  by mouth. Yes Provider, Historical   CALCIUM CARBONATE/MAG HYDROX (MYLANTA PO) Take  by mouth as needed. Yes Provider, Historical   olmesartan (BENICAR) 40 mg tablet TAKE 1 TABLET BY MOUTH EVERY DAY  Patient taking differently: Not taking 11/11/20   Letitia Brewster MD   insulin glargine (LANTUS,BASAGLAR) 100 unit/mL (3 mL) inpn 22 Units by SubCUTAneous route nightly.   Patient taking differently: 22 Units by SubCUTAneous route nightly. Not taking 9/28/20   Chelsy Amanda MD ROS Review of all systems is negative except as noted above in the HPI. Objective:   No flowsheet data found. Constitutional: [x]  No apparent distress      Mental status: [x] Alert and awake  [x] Oriented to person/place/time [x] Able to follow commands      Eyes:   EOM    [x]  Normal        Pulmonary/Chest: [x] Respiratory effort normal   [x] No visualized signs of difficulty breathing or respiratory distress           Neurological:        [x] No Facial Asymmetry (Cranial nerve 7 motor function) (limited exam due to video visit)                     Psychiatric:       [x] Normal Affect    We discussed the expected course, resolution and complications of the diagnosis(es) in detail. Medication risks, benefits, costs, interactions, and alternatives were discussed as indicated. I advised her to contact the office if her condition worsens, changes or fails to improve as anticipated. She expressed understanding with the diagnosis(es) and plan. Zeny Melgoza, was evaluated through a synchronous (real-time) audio-video encounter. The patient (or guardian if applicable) is aware that this is a billable service. Verbal consent to proceed has been obtained within the past 12 months. The visit was conducted pursuant to the emergency declaration under the [address] [place] [address] authority and the [employer] General Act. Patient identification was verified, and a caregiver was present when appropriate. The patient was located in a state where the provider was credentialed to provide care.       Rosy Lang MD

## 2021-03-04 ENCOUNTER — CLINICAL SUPPORT (OUTPATIENT)
Dept: FAMILY MEDICINE CLINIC | Age: 36
End: 2021-03-04

## 2021-03-04 DIAGNOSIS — R30.0 DYSURIA: ICD-10-CM

## 2021-03-04 DIAGNOSIS — R35.0 URINARY FREQUENCY: Primary | ICD-10-CM

## 2021-03-04 NOTE — PROGRESS NOTES
Patient in today for nurse visit from virtual visit on 03/03/21. Provider informed patient to leave specimen . Urine specimen obtained at this time. Sample will be sent to lab at this time. No other concerns noted

## 2021-03-06 LAB
APPEARANCE UR: CLEAR
BACTERIA #/AREA URNS HPF: NORMAL /[HPF]
BACTERIA UR CULT: NORMAL
BILIRUB UR QL STRIP: NEGATIVE
CASTS URNS QL MICRO: NORMAL /LPF
COLOR UR: YELLOW
EPI CELLS #/AREA URNS HPF: NORMAL /HPF (ref 0–10)
GLUCOSE UR QL: NEGATIVE
HGB UR QL STRIP: NEGATIVE
KETONES UR QL STRIP: NEGATIVE
LEUKOCYTE ESTERASE UR QL STRIP: NEGATIVE
MICRO URNS: NORMAL
MICRO URNS: NORMAL
MUCOUS THREADS URNS QL MICRO: PRESENT
NITRITE UR QL STRIP: NEGATIVE
PH UR STRIP: 5.5 [PH] (ref 5–7.5)
PROT UR QL STRIP: NEGATIVE
RBC #/AREA URNS HPF: NORMAL /HPF (ref 0–2)
SP GR UR: 1.01 (ref 1–1.03)
UROBILINOGEN UR STRIP-MCNC: 0.2 MG/DL (ref 0.2–1)
WBC #/AREA URNS HPF: NORMAL /HPF (ref 0–5)

## 2021-03-09 ENCOUNTER — OFFICE VISIT (OUTPATIENT)
Dept: ORTHOPEDIC SURGERY | Age: 36
End: 2021-03-09
Payer: MEDICARE

## 2021-03-09 VITALS
DIASTOLIC BLOOD PRESSURE: 71 MMHG | TEMPERATURE: 97.8 F | RESPIRATION RATE: 16 BRPM | WEIGHT: 141 LBS | BODY MASS INDEX: 28.43 KG/M2 | HEIGHT: 59 IN | OXYGEN SATURATION: 100 % | HEART RATE: 83 BPM | SYSTOLIC BLOOD PRESSURE: 123 MMHG

## 2021-03-09 DIAGNOSIS — M72.2 PLANTAR FASCIITIS, BILATERAL: Primary | ICD-10-CM

## 2021-03-09 PROCEDURE — G8754 DIAS BP LESS 90: HCPCS | Performed by: ORTHOPAEDIC SURGERY

## 2021-03-09 PROCEDURE — G8417 CALC BMI ABV UP PARAM F/U: HCPCS | Performed by: ORTHOPAEDIC SURGERY

## 2021-03-09 PROCEDURE — 99213 OFFICE O/P EST LOW 20 MIN: CPT | Performed by: ORTHOPAEDIC SURGERY

## 2021-03-09 PROCEDURE — G8510 SCR DEP NEG, NO PLAN REQD: HCPCS | Performed by: ORTHOPAEDIC SURGERY

## 2021-03-09 PROCEDURE — G8427 DOCREV CUR MEDS BY ELIG CLIN: HCPCS | Performed by: ORTHOPAEDIC SURGERY

## 2021-03-09 PROCEDURE — G8752 SYS BP LESS 140: HCPCS | Performed by: ORTHOPAEDIC SURGERY

## 2021-03-09 NOTE — PROGRESS NOTES
AMBULATORY PROGRESS NOTE      Patient: Aishwarya Abdul             MRN: [id number]     SSN: [id number] Body mass index is 28.48 kg/m². YOB: 1985     AGE: 39 y.o. EX: female    PCP: Miranda Santa MD       IMPRESSION //  DIAGNOSIS AND TREATMENT PLAN        Sharri Dempsey has plantar fasciitis in both of her feet     DIAGNOSES  1. Plantar fasciitis, bilateral        No orders of the defined types were placed in this encounter. PLAN:    1. I will advise her on the use of lotion, stretches, physical therapy, and injections for her right foot pain  2. I anticipate a prescription for her Mobic and Pepcid for her pain  3. I recommended Voltaren gel to manage her pain      RTO-  6 weeks    Sharri Dempsey  expresses understanding of the diagnosis, treatment plan, and all of their proposed questions were answered to their satisfaction. Patient education has been provided re the diagnoses. Aishwarya Abdul may have a reminder for a \"due or due soon\" health maintenance. I have asked that she contact her primary care provider for follow-up on this health maintenance. HPI //  OBJECTIVE EXAMINATION        Sharri Chiu IS A 39 y.o. female who is a/an  established patient , presenting to my outpatient office for evaluation of  the following chief complaint(s):     Chief Complaint   Patient presents with    Foot Pain     both     In the past I wrote a referral to PT for low frequency ultrasound and [employer] and wrote a Rx for Spenco medial arch supports at [facility].    At the last OV, Sharri Patel continued to endorse persistent in both feet L>R. Physical therapy did not make a difference in improving the pain along her bilateral plantar fascia. On examination, patient reported radiating pain up the back of her leg with palpation of her L foot. She mentions not being able to WB at times due to pain.  Prolonged walking and standing worsened the pain, maylin in her L foot. She endorsed repetitive start-up pain in the mornings and evenings. Patient denied any tightness or anything suggestive of a contracture in her left foot. She presents with the Spenco medial arch supports in both shoes. Patient mentioned that she uses an inhaler every day for asthma. I encouraged patient to apply OTC medicated creams, like Biofreeze, Bengay to affected areas twice daily as needed, I advised her to continue wearing Spenco medial arch supports, and I prescribed Skelaxin 800 mg 1 PO BID, 60 tabs, 0 refills. Since LOV the Pt states her insurance would not cover the Skelaxin so she took a different medicine. Pt states she feels pain in her left heel and diffusely in her right foot and she feels more pain standing and walking for a long time. She notes pain improvement with biofreeze. She presents today with orthopedic insoles in her shoes. Pt notes the use of Ibuprofen, but she tries not to take too much because it upsets her stomach. Pt denies the use of Indocin. Visit Vitals  /71 (BP 1 Location: Left upper arm, BP Patient Position: Sitting)   Pulse 83   Temp 97.8 °F (36.6 °C) (Temporal)   Resp 16   Ht 4' 11\" (1.499 m)   Wt 141 lb (64 kg)   SpO2 100%   BMI 28.48 kg/m²       Appearance: Alert, well appearing and pleasant patient who is in no distress, oriented to person, place/time, and who follows commands. This patient is accompanied in the examination room by her  self. There is signs of: no dementia  Psychiatric: Affect/mood are appropriate. Speech normal in context and clarity, memory intact grossly, no involuntary movements - tremors. Patient arrives to office via: without assistive device. H EENT (2): Head normocephalic & atraumatic.  Both pupils are round     Eye: EOM are intact // Neck: ROM WNL  //  Hearings Intact   Respiratory: Breathing non labored     ANKLE/FOOT bilateral     Gait: normal  Tenderness: moderate 5th metatarsal head of the left forefoot. Plantar fascial arch tenderness on the right. Cutaneous: WNL  Joint Motion: WNL  Joint / Tendon Stability: No Ankle or Subtalar instability or joint laxity. No peroneal sublux ability or dislocation  Alignment: neutral Hindfoot,    Neuro Motor/Sensory: NL/NL  Vascular: NL foot/ankle pulses,   Lymphatics: No extremity lymphedema, No calf swelling, no tenderness to calf muscles. CHART REVIEW     Leo Perez has been experiencing pain and discomfort confirmed as outlined in the pain assessment outlined below.  was reviewed by Liliane Raya MD on 3/9/2021. Pain Assessment  3/9/2021   Location of Pain Foot   Pain Location Comment -   Location Modifiers Right;Left   Severity of Pain 7   Quality of Pain Throbbing; Sharp;Dull;Aching;Burning   Quality of Pain Comment -   Duration of Pain Persistent   Duration of Pain Comment -   Frequency of Pain Constant   Frequency of Pain Comment -   Date Pain First Started -   Aggravating Factors Standing;Walking   Aggravating Factors Comment -   Limiting Behavior Yes   Relieving Factors Ice   Relieving Factors Comment -   Result of Injury -   Work-Related Injury -   Type of Injury -   Type of Injury Comment -        Sharri PETERSON Kayren Kawasaki  has a past medical history of Asthma, Chronic constipation, Diabetes ([address] Fibromyalgia, GERD (gastroesophageal reflux disease), Hypertension, IBS (irritable bowel syndrome), Migraines, Psychiatric disorder, Scoliosis, and Urinary incontinence.      Patients is employed at:         Past Medical History:   Diagnosis Date    Asthma     Chronic constipation     Diabetes ([address]     Fibromyalgia     GERD (gastroesophageal reflux disease)     Hypertension     IBS (irritable bowel syndrome)     Migraines     unknown if aura    Psychiatric disorder     she denies any diagnosis despite being on antipsychotics, SSRIs, etc in the past    Scoliosis     Urinary incontinence     mixed     Past Surgical History:   Procedure Laterality Date    HX  SECTION  ,     HX COLONOSCOPY      HX DILATION AND CURETTAGE      HX ENDOSCOPY      HX PELVIC LAPAROSCOPY       Current Outpatient Medications   Medication Sig    Jardiance 10 mg tablet TAKE 1 TABLET BY MOUTH EVERY DAY    olmesartan (BENICAR) 40 mg tablet TAKE 1 TABLET BY MOUTH EVERY DAY (Patient taking differently: Not taking)    clotrimazole-betamethasone (LOTRISONE) topical cream APPLY THIN LAYER TO AFFECTED SITE 2 X DAY    glucose blood VI test strips (ASCENSIA AUTODISC VI, ONE TOUCH ULTRA TEST VI) strip Check blood glucose 4 x day    lancets misc Check blood glucose 4 x day    aspirin delayed-release 81 mg tablet Take 1 Tab by mouth daily.  atenoloL (TENORMIN) 25 mg tablet TAKE 1 TABLET BY MOUTH EVERY DAY    insulin glargine (LANTUS,BASAGLAR) 100 unit/mL (3 mL) inpn 22 Units by SubCUTAneous route nightly. (Patient taking differently: 22 Units by SubCUTAneous route nightly. Not taking)    Insulin Needles, Disposable, 31 gauge x 5/16\" ndle To use daily with insulin injection subcutaneously    montelukast (SINGULAIR) 10 mg tablet Take 1 Tab by mouth nightly.  valACYclovir (VALTREX) 500 mg tablet daily.  indomethacin (INDOCIN) 50 mg capsule Take 1 Cap by mouth two (2) times daily as needed for Pain.  ANORO ELLIPTA 62.5-25 mcg/actuation inhaler 1 INH DAILY - USE EVERY DAY    AIMOVIG AUTOINJECTOR 70 mg/mL injection AS DIRECTED - 1 INJECTION SUBQ ONCE MONTHLY    cetirizine (ZYRTEC) 10 mg tablet TAKE 1 TABLET EVERY DAY    mometasone (NASONEX) 50 mcg/actuation nasal spray USE 1-2 SPRAYS INTO EACH NOSTRIL EVERY DAY AS NEEDED    FERRETTS IPS 40 mg/15 mL liqd 15 ML ORALLY 2 TIMES PER DAY.     EPINEPHrine (EPIPEN) 0.3 mg/0.3 mL injection AS DIRECTED AS NEEDED INTRAMUSCULAR 1 DAYS    albuterol (PROVENTIL HFA, VENTOLIN HFA, PROAIR HFA) 90 mcg/actuation inhaler Take 2 Puffs by inhalation every four (4) hours as needed for Wheezing or Shortness of Breath.  esomeprazole (NEXIUM) 40 mg capsule Take  by mouth daily.  POLYETHYLENE GLYCOL 3350 (MIRALAX PO) Take  by mouth.  CALCIUM CARBONATE/MAG HYDROX (MYLANTA PO) Take  by mouth as needed. No current facility-administered medications for this visit. Allergies   Allergen Reactions    Iodine Hives and Nausea and Vomiting    Iodinated Contrast Media Hives    Oxycodone-Acetaminophen Other (comments)     Hallucinations     Prochlorperazine Other (comments)    Propoxyphene-Acetaminophen Unknown (comments)     Allergy to propoxyphene only. Has previously tolerated acetaminophen    Reglan [Metoclopramide] Not Reported This Time    Sulfa (Sulfonamide Antibiotics) Not Reported This Time    Wygesic Not Reported This Time     Social History     Occupational History    Not on file   Tobacco Use    Smoking status: Never Smoker    Smokeless tobacco: Never Used   Substance and Sexual Activity    Alcohol use: No    Drug use: No    Sexual activity: Not on file     Family History   Problem Relation Age of Onset    Kidney Disease Father     Other Father         Pancreatic Disease    Cancer Father     Heart Disease Father     Heart Attack Father     Thyroid Disease Other         Grandparent        DIAGNOSTIC LAB DATA      Lab Results   Component Value Date/Time    Hemoglobin A1c 9.0 (H) 09/22/2020 06:30 AM    Hemoglobin A1c 5.9 (H) 05/21/2020 08:07 AM    Hemoglobin A1c 6.6 (H) 01/15/2020 03:53 AM    //   Lab Results   Component Value Date/Time    Glucose 396 (H) 09/22/2020 06:30 AM    Glucose (POC) 378 (H) 09/22/2020 11:08 AM    Glucose POC [facility] 09/28/2020 03:00 PM        Lab Results   Component Value Date/Time    Hemoglobin A1c (POC) 5.2 01/14/2021 03:43 PM         No results found for: VITD3, XQVID2, XQVID3, XQVID, VD3RIA, [id number]      REVIEW OF SYSTEMS : 3/9/2021  ALL BELOW ARE Negative except : SEE HPI     All other systems reviewed and are negative.      12 point review of systems otherwise negative unless noted in HPI. DIAGNOSTIC IMAGING           I have reviewed the results of the above study. The interpretation of this study is my professional opinion. On this date 03/09/2021 I have spent 25 minutes reviewing previous notes, test results and face to face with the patient discussing the diagnosis and importance of compliance with the treatment plan as well as documenting on the day of the visit. An electronic signature was used to authenticate this note. Disclaimer: Sections of this note are dictated using utilizing voice recognition software, which may have resulted in some phonetic based errors in grammar and contents. Even though attempts were made to correct all the mistakes, some may have been missed, and remained in the body of the document. If questions arise, please contact our department.      Joshua Palmer, as dictated by Lexi Skinner MD  3/9/2021  8:00 AM

## 2021-04-01 ENCOUNTER — TELEPHONE (OUTPATIENT)
Dept: FAMILY MEDICINE CLINIC | Age: 36
End: 2021-04-01

## 2021-04-01 NOTE — TELEPHONE ENCOUNTER
Pt is requesting to be contacted by clinical staff for lab results. Stated she hasn't heard anything back since she gave a urine sample.  Please follow up when available

## 2021-04-08 DIAGNOSIS — E11.65 TYPE 2 DIABETES MELLITUS WITH HYPERGLYCEMIA, WITHOUT LONG-TERM CURRENT USE OF INSULIN (HCC): ICD-10-CM

## 2021-04-08 RX ORDER — EMPAGLIFLOZIN 10 MG/1
TABLET, FILM COATED ORAL
Qty: 30 TAB | Refills: 2 | Status: SHIPPED | OUTPATIENT
Start: 2021-04-08 | End: 2022-01-31

## 2021-04-13 ENCOUNTER — OFFICE VISIT (OUTPATIENT)
Dept: FAMILY MEDICINE CLINIC | Age: 36
End: 2021-04-13
Payer: MEDICARE

## 2021-04-13 VITALS
RESPIRATION RATE: 16 BRPM | BODY MASS INDEX: 28.63 KG/M2 | DIASTOLIC BLOOD PRESSURE: 84 MMHG | SYSTOLIC BLOOD PRESSURE: 133 MMHG | OXYGEN SATURATION: 98 % | HEIGHT: 59 IN | HEART RATE: 76 BPM | WEIGHT: 142 LBS | TEMPERATURE: 98 F

## 2021-04-13 DIAGNOSIS — R53.81 MALAISE AND FATIGUE: ICD-10-CM

## 2021-04-13 DIAGNOSIS — E07.9 THYROID DISORDER: ICD-10-CM

## 2021-04-13 DIAGNOSIS — Z11.59 NEED FOR HEPATITIS C SCREENING TEST: ICD-10-CM

## 2021-04-13 DIAGNOSIS — E11.65 TYPE 2 DIABETES MELLITUS WITH HYPERGLYCEMIA, WITHOUT LONG-TERM CURRENT USE OF INSULIN (HCC): Primary | ICD-10-CM

## 2021-04-13 DIAGNOSIS — E55.9 HYPOVITAMINOSIS D: ICD-10-CM

## 2021-04-13 DIAGNOSIS — E11.9 COMPREHENSIVE DIABETIC FOOT EXAMINATION, TYPE 2 DM, ENCOUNTER FOR (HCC): ICD-10-CM

## 2021-04-13 DIAGNOSIS — R53.83 MALAISE AND FATIGUE: ICD-10-CM

## 2021-04-13 DIAGNOSIS — M71.22 POPLITEAL CYST, LEFT: ICD-10-CM

## 2021-04-13 DIAGNOSIS — F39 MOOD DISORDER (HCC): ICD-10-CM

## 2021-04-13 DIAGNOSIS — E78.5 DYSLIPIDEMIA: ICD-10-CM

## 2021-04-13 PROCEDURE — G8427 DOCREV CUR MEDS BY ELIG CLIN: HCPCS | Performed by: FAMILY MEDICINE

## 2021-04-13 PROCEDURE — 99214 OFFICE O/P EST MOD 30 MIN: CPT | Performed by: FAMILY MEDICINE

## 2021-04-13 PROCEDURE — G8754 DIAS BP LESS 90: HCPCS | Performed by: FAMILY MEDICINE

## 2021-04-13 PROCEDURE — 3044F HG A1C LEVEL LT 7.0%: CPT | Performed by: FAMILY MEDICINE

## 2021-04-13 PROCEDURE — 2022F DILAT RTA XM EVC RTNOPTHY: CPT | Performed by: FAMILY MEDICINE

## 2021-04-13 PROCEDURE — G8417 CALC BMI ABV UP PARAM F/U: HCPCS | Performed by: FAMILY MEDICINE

## 2021-04-13 PROCEDURE — G8432 DEP SCR NOT DOC, RNG: HCPCS | Performed by: FAMILY MEDICINE

## 2021-04-13 PROCEDURE — G8752 SYS BP LESS 140: HCPCS | Performed by: FAMILY MEDICINE

## 2021-04-13 RX ORDER — GABAPENTIN 600 MG/1
TABLET ORAL
COMMUNITY
Start: 2021-02-11

## 2021-04-13 NOTE — PROGRESS NOTES
Chief Complaint   Patient presents with    Diabetes    Hypertension    Migraine     allergies      1. Have you been to the ER, urgent care clinic since your last visit? Hospitalized since your last visit? No    2. Have you seen or consulted any other health care providers outside of the [address] [place] [address] since your last visit? Include any pap smears or colon screening. No     HPI  Yasir Sesay comes in for f/u care. Migraine headaches: Patient has a history of migraine headaches. She has been followed up with a neurologist.  Plan to have an MRI given the flareup of the headaches. She has been on various medications and currently is on Aimovig. She denies focal weakness. She can take ibuprofen for the acute headache as needed. She has been using this with some good result. Allergy: Patient has allergies. Currently she has bilateral ear fullness and congestion. She denies fever, chills, cough, wheeze or hemoptysis. Symptoms have been ongoing for weeks. They have been worse with the pollen season. This is due to allergic rhinitis. She is on Zyrtec and also has Nasonex nasal spray. She is also on Singulair. She will continue these medications. DM2: Patient has type 2 diabetes mellitus. She is on insulin. I did a foot exam that is stable. She states that her blood glucose numbers have been stable around 100. Her last HbA1c was 5.2. She is also on Jardiance 10 mg daily. We will continue with the current treatment plan. May need to cut back on some of her medications. HTN: Patient has hypertension. Blood pressure is stable. She denies changes in vision or focal weakness. Patient is on atenolol, olmesartan. We will continue with the current treatment plan. I will recheck labs. Malaise and fatigue: Patient has generalized malaise and fatigue. She denies fever or chills. Denies diarrhea, abdominal pain, nausea or vomiting. She wonders about low vitamin D.   We will recheck her vitamin D level. We will also check a CBC and the electrolytes. I will check TSH level. Leg pain: Patient has pain left lower extremity at the popliteal area. At times has swelling in that area. This is a popliteal cyst.  She is on ibuprofen. Can use this as needed for pain and discomfort. Neuropathy: Patient has neuropathy. She is on Lyrica. Has been stable on the medication. Continue current treatment plan. Asthma: Patient has a history of asthma. She is on inhaler medication. Has Anoro Ellipta and albuterol to use as needed. We will continue these medications. She denies chest tightness, wheeze, hemoptysis. Past Medical History  Past Medical History:   Diagnosis Date    Asthma     Chronic constipation     Diabetes ([facility]     Fibromyalgia     GERD (gastroesophageal reflux disease)     Hypertension     IBS (irritable bowel syndrome)     Migraines     unknown if aura    Psychiatric disorder     she denies any diagnosis despite being on antipsychotics, SSRIs, etc in the past    Scoliosis     Urinary incontinence     mixed       Surgical History  Past Surgical History:   Procedure Laterality Date    HX  SECTION  ,     HX COLONOSCOPY      HX DILATION AND CURETTAGE      HX ENDOSCOPY      HX PELVIC LAPAROSCOPY          Medications  Current Outpatient Medications   Medication Sig Dispense Refill    Jardiance 10 mg tablet TAKE 1 TABLET BY MOUTH EVERY DAY 30 Tab 2    clotrimazole-betamethasone (LOTRISONE) topical cream APPLY THIN LAYER TO AFFECTED SITE 2 X DAY 45 g 0    glucose blood VI test strips (ASCENSIA AUTODISC VI, ONE TOUCH ULTRA TEST VI) strip Check blood glucose 4 x day 400 Strip 3    lancets misc Check blood glucose 4 x day 400 Each 3    aspirin delayed-release 81 mg tablet Take 1 Tab by mouth daily.  90 Tab 1    atenoloL (TENORMIN) 25 mg tablet TAKE 1 TABLET BY MOUTH EVERY DAY 90 Tab 1    Insulin Needles, Disposable, 31 gauge x 5/16\" ndle To use daily with insulin injection subcutaneously 1 Package 11    montelukast (SINGULAIR) 10 mg tablet Take 1 Tab by mouth nightly.  valACYclovir (VALTREX) 500 mg tablet daily.  ANORO ELLIPTA 62.5-25 mcg/actuation inhaler 1 INH DAILY - USE EVERY DAY  6    AIMOVIG AUTOINJECTOR 70 mg/mL injection AS DIRECTED - 1 INJECTION SUBQ ONCE MONTHLY  3    cetirizine (ZYRTEC) 10 mg tablet TAKE 1 TABLET EVERY DAY  0    mometasone (NASONEX) 50 mcg/actuation nasal spray USE 1-2 SPRAYS INTO EACH NOSTRIL EVERY DAY AS NEEDED  0    EPINEPHrine (EPIPEN) 0.3 mg/0.3 mL injection AS DIRECTED AS NEEDED INTRAMUSCULAR 1 DAYS  5    albuterol (PROVENTIL HFA, VENTOLIN HFA, PROAIR HFA) 90 mcg/actuation inhaler Take 2 Puffs by inhalation every four (4) hours as needed for Wheezing or Shortness of Breath. 1 Inhaler 0    esomeprazole (NEXIUM) 40 mg capsule Take  by mouth daily.  POLYETHYLENE GLYCOL 3350 (MIRALAX PO) Take  by mouth.  CALCIUM CARBONATE/MAG HYDROX (MYLANTA PO) Take  by mouth as needed.  gabapentin (NEURONTIN) 600 mg tablet TAKE 1 TABLET BY MOUTH TWICE A DAY      olmesartan (BENICAR) 40 mg tablet TAKE 1 TABLET BY MOUTH EVERY DAY (Patient taking differently: Not taking) 90 Tab 1    insulin glargine (LANTUS,BASAGLAR) 100 unit/mL (3 mL) inpn 22 Units by SubCUTAneous route nightly. (Patient taking differently: 22 Units by SubCUTAneous route nightly. Not taking) 30 mL 1    indomethacin (INDOCIN) 50 mg capsule Take 1 Cap by mouth two (2) times daily as needed for Pain. 60 Cap 2    FERRETTS IPS 40 mg/15 mL liqd 15 ML ORALLY 2 TIMES PER DAY. 2       Allergies  Allergies   Allergen Reactions    Iodine Hives and Nausea and Vomiting    Iodinated Contrast Media Hives    Oxycodone-Acetaminophen Other (comments)     Hallucinations     Prochlorperazine Other (comments)    Propoxyphene-Acetaminophen Unknown (comments)     Allergy to propoxyphene only.   Has previously tolerated acetaminophen    Reglan [Metoclopramide] Not Reported This Time    Sulfa (Sulfonamide Antibiotics) Not Reported This Time    Wygesic Not Reported This Time       Family History  Family History   Problem Relation Age of Onset    Kidney Disease Father     Other Father         Pancreatic Disease    Cancer Father     Heart Disease Father     Heart Attack Father     Thyroid Disease Other         Grandparent       Social History  Social History     Socioeconomic History    Marital status: SINGLE     Spouse name: Not on file    Number of children: Not on file    Years of education: Not on file    Highest education level: Not on file   Occupational History    Not on file   Social Needs    Financial resource strain: Not on file    Food insecurity     Worry: Not on file     Inability: Not on file    Transportation needs     Medical: Not on file     Non-medical: Not on file   Tobacco Use    Smoking status: Never Smoker    Smokeless tobacco: Never Used   Substance and Sexual Activity    Alcohol use: No    Drug use: No    Sexual activity: Not on file   Lifestyle    Physical activity     Days per week: Not on file     Minutes per session: Not on file    Stress: Not on file   Relationships    Social connections     Talks on phone: Not on file     Gets together: Not on file     Attends Yarsanism service: Not on file     Active member of club or organization: Not on file     Attends meetings of clubs or organizations: Not on file     Relationship status: Not on file    Intimate partner violence     Fear of current or ex partner: Not on file     Emotionally abused: Not on file     Physically abused: Not on file     Forced sexual activity: Not on file   Other Topics Concern    Not on file   Social History Narrative    Not on file       Review of Systems  Review of Systems - Review of all systems is negative except as noted above in the HPI.     Vital Signs  Visit Vitals  /84 (BP 1 Location: Left upper arm, BP Patient Position: Sitting, BP Cuff Size: Adult)   Pulse 76   Temp 98 °F (36.7 °C) (Oral)   Resp 16   Ht 4' 11\" (1.499 m)   Wt 142 lb (64.4 kg)   SpO2 98%   BMI 28.68 kg/m²         Physical Exam  Physical Examination: General appearance - alert, well appearing, and in no distress, oriented to person, place, and time, acyanotic, in no respiratory distress and well hydrated  Mental status - alert, oriented to person, place, and time, affect appropriate to mood  Ears - bilateral TM's and external ear canals normal  Nose - mucosal congestion  Mouth - mucous membranes moist, pharynx normal without lesions  Neck - supple, no significant adenopathy  Lymphatics - no palpable lymphadenopathy, no hepatosplenomegaly  Chest - clear to auscultation, no wheezes, rales or rhonchi, symmetric air entry, no tachypnea, retractions or cyanosis  Heart - normal rate, regular rhythm, normal S1, S2, no murmurs, rubs, clicks or gallops  Abdomen - soft, nontender, nondistended, no masses or organomegaly  Back exam - limited range of motion  Neurological - abnormal neurological exam unchanged from prior examinations  Musculoskeletal -discomfort to palpation left knee with the swelling left popliteal area likely popliteal cyst.  Extremities - no pedal edema noted, intact peripheral pulses  Diabetic foot exam:     Left Foot:   Visual Exam: normal    Pulse DP: 2+ (normal)   Filament test: normal sensation        Right Foot:   Visual Exam: normal    Pulse DP: 2+ (normal)   Filament test: normal sensation      Results  Results for orders placed or performed in visit on 03/02/21   CULTURE, URINE    Specimen: Urine   Result Value Ref Range    Urine Culture, Routine       Culture shows less than 10,000 colony forming units of bacteria per  milliliter of urine. This colony count is not generally considered  to be clinically significant.      URINALYSIS W/MICROSCOPIC   Result Value Ref Range    Specific Gravity 1.012 1.005 - 1.030    pH (UA) 5.5 5.0 - 7.5    Color Yellow Yellow Appearance Clear Clear    Leukocyte Esterase Negative Negative    Protein Negative Negative/Trace    Glucose Negative Negative    Ketone Negative Negative    Blood Negative Negative    Bilirubin Negative Negative    Urobilinogen 0.2 0.2 - 1.0 mg/dL    Nitrites Negative Negative    Microscopic Examination Comment     Microscopic exam See additional order    MICROSCOPIC EXAMINATION   Result Value Ref Range    WBC 0-5 0 - 5 /hpf    RBC None seen 0 - 2 /hpf    Epithelial cells 0-10 0 - 10 /hpf    Casts None seen None seen /lpf    Mucus Present Not Estab. Bacteria Few None seen/Few       ASSESSMENT and PLAN    ICD-10-CM ICD-9-CM    1. Type 2 diabetes mellitus with hyperglycemia, without long-term current use of insulin ([facility])  E11.65 250.00 HM DIABETES FOOT EXAM     790.29 COLLECTION VENOUS BLOOD,VENIPUNCTURE   2. Comprehensive diabetic foot examination, type 2 DM, encounter for ([facility]  E11.9 250.00  DIABETES FOOT EXAM      COLLECTION VENOUS BLOOD,VENIPUNCTURE   3. Malaise and fatigue  R53.81 780.79 CBC WITH AUTOMATED DIFF    Y61.33  METABOLIC PANEL, BASIC      METABOLIC PANEL, BASIC      CBC WITH AUTOMATED DIFF      COLLECTION VENOUS BLOOD,VENIPUNCTURE   4. Mood disorder ([facility])  F39 296.90    5. Dyslipidemia  E78.5 272.4 LIPID PANEL      LIPID PANEL      COLLECTION VENOUS BLOOD,VENIPUNCTURE   6. Hypovitaminosis D  E55.9 268.9 VITAMIN D, 25 HYDROXY      VITAMIN D, 25 HYDROXY      COLLECTION VENOUS BLOOD,VENIPUNCTURE   7. Thyroid disorder  E07.9 246.9 TSH 3RD GENERATION      TSH 3RD GENERATION      COLLECTION VENOUS BLOOD,VENIPUNCTURE   8. Need for hepatitis C screening test  Z11.59 V73.89 HEPATITIS C AB      HEPATITIS C AB      COLLECTION VENOUS BLOOD,VENIPUNCTURE   9.  Popliteal cyst, left  M71.22 727.51      lab results and schedule of future lab studies reviewed with patient  reviewed diet, exercise and weight control  cardiovascular risk and specific lipid/LDL goals reviewed  reviewed medications and side effects in detail  specific diabetic recommendations: low cholesterol diet, weight control and daily exercise discussed, home glucose monitoring emphasized, all medications, side effects and compliance discussed carefully, annual eye examinations at Ophthalmology discussed, glycohemoglobin and other lab monitoring discussed and long term diabetic complications discussed      I have discussed the diagnosis with the patient and the intended plan of care as seen in the above orders. The patient has received an after-visit summary and questions were answered concerning future plans. I have discussed medication, side effects, and warnings with the patient in detail. The patient verbalized understanding and is in agreement with the plan of care. The patient will contact the office with any additional concerns. Lyndon Issa MD    PLEASE NOTE:   This document has been produced using voice recognition software.  Unrecognized errors in transcription may be present

## 2021-04-14 ENCOUNTER — OFFICE VISIT (OUTPATIENT)
Dept: CARDIOLOGY CLINIC | Age: 36
End: 2021-04-14
Payer: MEDICARE

## 2021-04-14 VITALS
HEART RATE: 83 BPM | HEIGHT: 59 IN | WEIGHT: 142 LBS | DIASTOLIC BLOOD PRESSURE: 85 MMHG | BODY MASS INDEX: 28.63 KG/M2 | SYSTOLIC BLOOD PRESSURE: 131 MMHG

## 2021-04-14 DIAGNOSIS — R07.9 CHEST PAIN, UNSPECIFIED TYPE: ICD-10-CM

## 2021-04-14 DIAGNOSIS — E11.9 TYPE 2 DIABETES MELLITUS WITHOUT COMPLICATION, UNSPECIFIED WHETHER LONG TERM INSULIN USE (HCC): ICD-10-CM

## 2021-04-14 DIAGNOSIS — I10 ESSENTIAL HYPERTENSION: ICD-10-CM

## 2021-04-14 DIAGNOSIS — R06.02 SOB (SHORTNESS OF BREATH): Primary | ICD-10-CM

## 2021-04-14 LAB
25(OH)D3+25(OH)D2 SERPL-MCNC: 27.6 NG/ML (ref 30–100)
BASOPHILS # BLD AUTO: 0 X10E3/UL (ref 0–0.2)
BASOPHILS NFR BLD AUTO: 1 %
BUN SERPL-MCNC: 13 MG/DL (ref 6–20)
BUN/CREAT SERPL: 15 (ref 9–23)
CALCIUM SERPL-MCNC: 9.4 MG/DL (ref 8.7–10.2)
CHLORIDE SERPL-SCNC: 107 MMOL/L (ref 96–106)
CHOLEST SERPL-MCNC: 159 MG/DL (ref 100–199)
CO2 SERPL-SCNC: 22 MMOL/L (ref 20–29)
CREAT SERPL-MCNC: 0.88 MG/DL (ref 0.57–1)
EOSINOPHIL # BLD AUTO: 0.1 X10E3/UL (ref 0–0.4)
EOSINOPHIL NFR BLD AUTO: 1 %
ERYTHROCYTE [DISTWIDTH] IN BLOOD BY AUTOMATED COUNT: 13.2 % (ref 11.7–15.4)
GLUCOSE SERPL-MCNC: 79 MG/DL (ref 65–99)
HCT VFR BLD AUTO: 36.7 % (ref 34–46.6)
HCV AB S/CO SERPL IA: <0.1 S/CO RATIO (ref 0–0.9)
HDLC SERPL-MCNC: 32 MG/DL
HGB BLD-MCNC: 12.2 G/DL (ref 11.1–15.9)
IMM GRANULOCYTES # BLD AUTO: 0 X10E3/UL (ref 0–0.1)
IMM GRANULOCYTES NFR BLD AUTO: 0 %
IMP & REVIEW OF LAB RESULTS: NORMAL
LDLC SERPL CALC-MCNC: 106 MG/DL (ref 0–99)
LYMPHOCYTES # BLD AUTO: 2.3 X10E3/UL (ref 0.7–3.1)
LYMPHOCYTES NFR BLD AUTO: 35 %
MCH RBC QN AUTO: 28.7 PG (ref 26.6–33)
MCHC RBC AUTO-ENTMCNC: 33.2 G/DL (ref 31.5–35.7)
MCV RBC AUTO: 86 FL (ref 79–97)
MONOCYTES # BLD AUTO: 0.4 X10E3/UL (ref 0.1–0.9)
MONOCYTES NFR BLD AUTO: 5 %
NEUTROPHILS # BLD AUTO: 3.9 X10E3/UL (ref 1.4–7)
NEUTROPHILS NFR BLD AUTO: 58 %
PLATELET # BLD AUTO: 227 X10E3/UL (ref 150–450)
POTASSIUM SERPL-SCNC: 4 MMOL/L (ref 3.5–5.2)
RBC # BLD AUTO: 4.25 X10E6/UL (ref 3.77–5.28)
SODIUM SERPL-SCNC: 144 MMOL/L (ref 134–144)
TRIGL SERPL-MCNC: 116 MG/DL (ref 0–149)
TSH SERPL DL<=0.005 MIU/L-ACNC: 2.45 UIU/ML (ref 0.45–4.5)
VLDLC SERPL CALC-MCNC: 21 MG/DL (ref 5–40)
WBC # BLD AUTO: 6.6 X10E3/UL (ref 3.4–10.8)

## 2021-04-14 PROCEDURE — G8752 SYS BP LESS 140: HCPCS | Performed by: INTERNAL MEDICINE

## 2021-04-14 PROCEDURE — G8417 CALC BMI ABV UP PARAM F/U: HCPCS | Performed by: INTERNAL MEDICINE

## 2021-04-14 PROCEDURE — 2022F DILAT RTA XM EVC RTNOPTHY: CPT | Performed by: INTERNAL MEDICINE

## 2021-04-14 PROCEDURE — 99214 OFFICE O/P EST MOD 30 MIN: CPT | Performed by: INTERNAL MEDICINE

## 2021-04-14 PROCEDURE — G8754 DIAS BP LESS 90: HCPCS | Performed by: INTERNAL MEDICINE

## 2021-04-14 PROCEDURE — G8428 CUR MEDS NOT DOCUMENT: HCPCS | Performed by: INTERNAL MEDICINE

## 2021-04-14 PROCEDURE — G8432 DEP SCR NOT DOC, RNG: HCPCS | Performed by: INTERNAL MEDICINE

## 2021-04-14 PROCEDURE — 3044F HG A1C LEVEL LT 7.0%: CPT | Performed by: INTERNAL MEDICINE

## 2021-04-14 NOTE — PROGRESS NOTES
HISTORY OF PRESENT ILLNESS  Antonio Mckeon is a 39 y.o. female. Patient is here for follow up of diagnostic tests. Results will be discussed. Hypertension  The history is provided by the patient. This is a chronic problem. Associated symptoms include chest pain and shortness of breath. Chest Pain (Angina)   The history is provided by the patient. The current episode started more than 1 week ago. The problem has not changed since onset. The problem occurs rarely. The pain is associated with rest and exertion. The pain is present in the lateral region and substernal region. The quality of the pain is described as sharp. The pain does not radiate. Associated symptoms include shortness of breath. Pertinent negatives include no dizziness, no nausea, no palpitations and no weakness. Shortness of Breath  The history is provided by the patient. This is a chronic problem. The problem occurs intermittently. Associated symptoms include chest pain. Review of Systems   Constitutional: Negative for chills. HENT: Negative for nosebleeds. Eyes: Negative for blurred vision and double vision. Respiratory: Positive for shortness of breath. Cardiovascular: Positive for chest pain. Negative for palpitations. Gastrointestinal: Negative for heartburn and nausea. Musculoskeletal: Negative for myalgias. Neurological: Negative for dizziness and weakness. Endo/Heme/Allergies: Does not bruise/bleed easily.      Family History   Problem Relation Age of Onset    Kidney Disease Father     Other Father         Pancreatic Disease    Cancer Father     Heart Disease Father     Heart Attack Father     Thyroid Disease Other         Grandparent       Past Medical History:   Diagnosis Date    Asthma     Chronic constipation     Diabetes ([address]     Fibromyalgia     GERD (gastroesophageal reflux disease)     Hypertension     IBS (irritable bowel syndrome)     Migraines     unknown if aura    Psychiatric disorder     she denies any diagnosis despite being on antipsychotics, SSRIs, etc in the past    Scoliosis     Urinary incontinence     mixed       Past Surgical History:   Procedure Laterality Date    HX  SECTION  , 2016    HX COLONOSCOPY      HX DILATION AND CURETTAGE      HX ENDOSCOPY      HX PELVIC LAPAROSCOPY         Social History     Tobacco Use    Smoking status: Never Smoker    Smokeless tobacco: Never Used   Substance Use Topics    Alcohol use: No       Allergies   Allergen Reactions    Iodine Hives and Nausea and Vomiting    Iodinated Contrast Media Hives    Oxycodone-Acetaminophen Other (comments)     Hallucinations     Prochlorperazine Other (comments)    Propoxyphene-Acetaminophen Unknown (comments)     Allergy to propoxyphene only. Has previously tolerated acetaminophen    Reglan [Metoclopramide] Not Reported This Time    Sulfa (Sulfonamide Antibiotics) Not Reported This Time    Wygesic Not Reported This Time       Prior to Admission medications    Medication Sig Start Date End Date Taking? Authorizing Provider   gabapentin (NEURONTIN) 600 mg tablet TAKE 1 TABLET BY MOUTH TWICE A DAY 21  Yes Provider, Historical   Jardiance 10 mg tablet TAKE 1 TABLET BY MOUTH EVERY DAY 21  Yes Chelsy Schofield MD   clotrimazole-betamethasone (LOTRISONE) topical cream APPLY THIN LAYER TO AFFECTED SITE 2 X DAY 20  Yes John Maxwell MD   glucose blood VI test strips (ASCENSIA AUTODISC VI, ONE TOUCH ULTRA TEST VI) strip Check blood glucose 4 x day 10/13/20  Yes John Maxwell MD   lancets misc Check blood glucose 4 x day 10/13/20  Yes John Maxwell MD   aspirin delayed-release 81 mg tablet Take 1 Tab by mouth daily.  10/13/20  Yes John Maxwell MD   atenoloL (TENORMIN) 25 mg tablet TAKE 1 TABLET BY MOUTH EVERY DAY 10/5/20  Yes Chelsy Schofield MD   Insulin Needles, Disposable, 31 gauge x 516\" ndle To use daily with insulin injection subcutaneously 20  Yes Jacob Rodriguez NP   montelukast (SINGULAIR) 10 mg tablet Take 1 Tab by mouth nightly. 7/2/20  Yes Other, MD Chacho   valACYclovir (VALTREX) 500 mg tablet daily. 8/12/20  Yes Provider, Historical   ANORO ELLIPTA 62.5-25 mcg/actuation inhaler 1 INH DAILY - USE EVERY DAY 7/24/19  Yes Provider, Historical   AIMOVIG AUTOINJECTOR 70 mg/mL injection AS DIRECTED - 1 INJECTION SUBQ ONCE MONTHLY 7/30/19  Yes Provider, Historical   cetirizine (ZYRTEC) 10 mg tablet TAKE 1 TABLET EVERY DAY 12/18/18  Yes Provider, Historical   mometasone (NASONEX) 50 mcg/actuation nasal spray USE 1-2 SPRAYS INTO EACH NOSTRIL EVERY DAY AS NEEDED 2/20/19  Yes Provider, Historical   FERRETTS IPS 40 mg/15 mL liqd 15 ML ORALLY 2 TIMES PER DAY. 2/27/19  Yes Provider, Historical   EPINEPHrine (EPIPEN) 0.3 mg/0.3 mL injection AS DIRECTED AS NEEDED INTRAMUSCULAR 1 DAYS 2/20/19  Yes Provider, Historical   albuterol (PROVENTIL HFA, VENTOLIN HFA, PROAIR HFA) 90 mcg/actuation inhaler Take 2 Puffs by inhalation every four (4) hours as needed for Wheezing or Shortness of Breath. 12/6/18  Yes Melania PETERSON NP   esomeprazole (NEXIUM) 40 mg capsule Take  by mouth daily. Yes Provider, Historical   POLYETHYLENE GLYCOL 3350 (MIRALAX PO) Take  by mouth. Yes Provider, Historical   CALCIUM CARBONATE/MAG HYDROX (MYLANTA PO) Take  by mouth as needed. Yes Provider, Historical   olmesartan (BENICAR) 40 mg tablet TAKE 1 TABLET BY MOUTH EVERY DAY  Patient taking differently: Not taking 11/11/20   John Maxwell MD   insulin glargine (LANTUS,BASAGLAR) 100 unit/mL (3 mL) inpn 22 Units by SubCUTAneous route nightly. Patient taking differently: 22 Units by SubCUTAneous route nightly. Not taking 9/28/20   Chelsy Amanda MD   indomethacin (INDOCIN) 50 mg capsule Take 1 Cap by mouth two (2) times daily as needed for Pain.  8/17/20   Chelsy Amanda MD         Visit Vitals  /85   Pulse 83   Ht 4' 11\" (1.499 m)   Wt 64.4 kg (142 lb)   BMI 28.68 kg/m² Physical Exam   Constitutional: She is oriented to person, place, and time. She appears well-developed and well-nourished. HENT:   Head: Normocephalic and atraumatic. Eyes: Conjunctivae are normal.   Neck: Neck supple. No JVD present. No tracheal deviation present. No thyromegaly present. Cardiovascular: Normal rate and regular rhythm. PMI is not displaced. Exam reveals no gallop, no S3 and no decreased pulses. No murmur heard. Pulmonary/Chest: No respiratory distress. She has no wheezes. She has no rales. She exhibits no tenderness. Abdominal: Soft. There is no abdominal tenderness. Musculoskeletal:         General: No edema. Neurological: She is alert and oriented to person, place, and time. Skin: Skin is warm. Psychiatric: She has a normal mood and affect. ECHO CARDIOGRAM [id number]/28/2015  PlatformQ  Component Name Value Ref Range   EF Echo 60     Result Impression   :   NORMAL LEFT VENTRICULAR CAVITY SIZE AND SYSTOLIC FUNCTION WITH AN EJECTION FRACTION OF  60  %. NORMAL DIASTOLIC FUNCTION. NORMAL RIGHT HEART FUNCTION AND SIZE. NO HEMODYNAMICALLY SIGNIFICANT VALVULAR PATHOLOGY. NORMAL PULMONARY ARTERY PRESSURE OF 15 MM/HG. NO PREVIOUS REPORT FOR COMPARISON. I have personally reviewed patient's records available from hospital and other providers and incorporated findings in patient care. 12/2018- ER notes, labs, EKG, chest x-ray and prior records  Ms. Kinsey Hart has a reminder for a \"due or due soon\" health maintenance. I have asked that she contact her primary care provider for follow-up on this health maintenance. Interpretation Summary 1/2019       · Normal cavity size, wall thickness, systolic function (ejection fraction normal) and diastolic function. The estimated ejection fraction is 56 - 60%. No regional wall motion abnormality noted. · Normal right ventricular size and function. · No hemodynamically significant valvular pathology. Interpretation Summary 1/2019    · Baseline ECG: Normal sinus rhythm. · Low risk Duke treadmill score. · Negative stress electrocardiogram.        03/10/20   ECHO STRESS 03/11/2020 3/12/2020    Narrative · Baseline ECG: Normal sinus rhythm, non-specific ST-T wave abnormalities. · Moderate risk Duke treadmill score. · Negative stress test.  · Normal stress echocardiogram. Low risk study. Signed by: Jeancarlos Pennington MD       Assessment         ICD-10-CM ICD-9-CM    1. SOB (shortness of breath)  R06.02 786.05 ECHO ADULT COMPLETE   2. Chest pain, unspecified type  R07.9 786.50 EXERCISE CARDIAC STRESS TEST   3. Essential hypertension  I10 401.9    4. Type 2 diabetes mellitus without complication, unspecified whether long term insulin use (HCC)  E11.9 250.00        There are no discontinued medications. Orders Placed This Encounter    ECHO ADULT COMPLETE     Standing Status:   Future     Standing Expiration Date:   10/15/2021     Order Specific Question:   Contrast Enhancement (Bubble Study, Definity, Optison) may be used if criteria listed in established evidence-based protocol has been identified. Answer:   Yes     Order Specific Question:   Is Patient Pregnant? Answer:   Unknown       Follow-up and Dispositions    · Return in about 2 months (around 6/14/2021). Patient is 59-year-old female with history of hypertension seen for episodes of sharp chest pain with some tightness. Was recently hospitalized at [facility]- serial cardiac enzymes negative for MI. Chest pain-- likely costochondritis-- reproducible. Stress echo in march 2020 - negative for ischemia    Seen for follow-up. Continues to have intermittent chest pain symptoms lasting several mins to hours  Also c/o worsening exertional dyspnea. Will obtain echo and stress test due to multiple cardiac risk factors. F/u in 2 months.

## 2021-04-14 NOTE — PROGRESS NOTES
1. Have you been to the ER, urgent care clinic since your last visit? Hospitalized since your last visit?     no  2. Have you seen or consulted any other health care providers outside of the [address] [place] [address] since your last visit? Include any pap smears or colon screening.       No

## 2021-04-18 RX ORDER — ATORVASTATIN CALCIUM 10 MG/1
10 TABLET, FILM COATED ORAL DAILY
Qty: 30 TAB | Refills: 2 | Status: SHIPPED | OUTPATIENT
Start: 2021-04-18 | End: 2021-07-28

## 2021-04-18 RX ORDER — ASPIRIN 325 MG
50000 TABLET, DELAYED RELEASE (ENTERIC COATED) ORAL
Qty: 13 CAP | Refills: 3 | Status: SHIPPED | OUTPATIENT
Start: 2021-04-18 | End: 2022-04-28

## 2021-04-18 NOTE — PROGRESS NOTES
Please let patient know lipid panel shows LDL is elevated. This is at 106. She needs to be on a statin medication to help lower her cholesterol given she has diabetes. I will send in Lipitor to take 10 mg daily. Her vitamin D level is low. I will send in vitamin D replacement therapy to take weekly. Recheck labs in 3 to 6 months.   Bakari Garcia MD

## 2021-04-22 ENCOUNTER — TELEPHONE (OUTPATIENT)
Dept: FAMILY MEDICINE CLINIC | Age: 36
End: 2021-04-22

## 2021-04-23 NOTE — TELEPHONE ENCOUNTER
Spoke with patient. Patient was given lab results. Patient stated understanding. Patient didn't have any questions or concerns.

## 2021-05-03 ENCOUNTER — OFFICE VISIT (OUTPATIENT)
Dept: FAMILY MEDICINE CLINIC | Age: 36
End: 2021-05-03

## 2021-05-03 VITALS
HEIGHT: 59 IN | WEIGHT: 144 LBS | HEART RATE: 84 BPM | RESPIRATION RATE: 18 BRPM | TEMPERATURE: 97.6 F | BODY MASS INDEX: 29.03 KG/M2 | SYSTOLIC BLOOD PRESSURE: 138 MMHG | OXYGEN SATURATION: 99 % | DIASTOLIC BLOOD PRESSURE: 89 MMHG

## 2021-05-03 DIAGNOSIS — Z00.00 MEDICARE ANNUAL WELLNESS VISIT, SUBSEQUENT: ICD-10-CM

## 2021-05-03 DIAGNOSIS — K21.9 GASTROESOPHAGEAL REFLUX DISEASE, UNSPECIFIED WHETHER ESOPHAGITIS PRESENT: ICD-10-CM

## 2021-05-03 DIAGNOSIS — Z13.31 SCREENING FOR DEPRESSION: ICD-10-CM

## 2021-05-03 DIAGNOSIS — E11.65 TYPE 2 DIABETES MELLITUS WITH HYPERGLYCEMIA, WITHOUT LONG-TERM CURRENT USE OF INSULIN (HCC): Primary | ICD-10-CM

## 2021-05-03 DIAGNOSIS — E78.5 DYSLIPIDEMIA: ICD-10-CM

## 2021-05-03 DIAGNOSIS — G47.9 SLEEP DISORDER: ICD-10-CM

## 2021-05-03 DIAGNOSIS — G43.909 MIGRAINE WITHOUT STATUS MIGRAINOSUS, NOT INTRACTABLE, UNSPECIFIED MIGRAINE TYPE: ICD-10-CM

## 2021-05-03 DIAGNOSIS — Z71.89 ADVANCED DIRECTIVES, COUNSELING/DISCUSSION: ICD-10-CM

## 2021-05-03 DIAGNOSIS — M25.562 CHRONIC PAIN OF LEFT KNEE: ICD-10-CM

## 2021-05-03 DIAGNOSIS — G89.29 CHRONIC PAIN OF LEFT KNEE: ICD-10-CM

## 2021-05-03 DIAGNOSIS — I10 ESSENTIAL (PRIMARY) HYPERTENSION: ICD-10-CM

## 2021-05-03 DIAGNOSIS — M71.22 POPLITEAL CYST, LEFT: ICD-10-CM

## 2021-05-03 DIAGNOSIS — M79.7 FIBROMYALGIA: ICD-10-CM

## 2021-05-03 PROCEDURE — 3044F HG A1C LEVEL LT 7.0%: CPT | Performed by: FAMILY MEDICINE

## 2021-05-03 PROCEDURE — G8417 CALC BMI ABV UP PARAM F/U: HCPCS | Performed by: FAMILY MEDICINE

## 2021-05-03 PROCEDURE — G8754 DIAS BP LESS 90: HCPCS | Performed by: FAMILY MEDICINE

## 2021-05-03 PROCEDURE — 99214 OFFICE O/P EST MOD 30 MIN: CPT | Performed by: FAMILY MEDICINE

## 2021-05-03 PROCEDURE — G8510 SCR DEP NEG, NO PLAN REQD: HCPCS | Performed by: FAMILY MEDICINE

## 2021-05-03 PROCEDURE — G8752 SYS BP LESS 140: HCPCS | Performed by: FAMILY MEDICINE

## 2021-05-03 PROCEDURE — G8427 DOCREV CUR MEDS BY ELIG CLIN: HCPCS | Performed by: FAMILY MEDICINE

## 2021-05-03 PROCEDURE — G0439 PPPS, SUBSEQ VISIT: HCPCS | Performed by: FAMILY MEDICINE

## 2021-05-03 PROCEDURE — 2022F DILAT RTA XM EVC RTNOPTHY: CPT | Performed by: FAMILY MEDICINE

## 2021-05-03 NOTE — PROGRESS NOTES
HPI  Sandee Jarrell comes in for f/u care. Chronic left knee pain: Patient has a Baker's cyst left knee. There is swelling. Aspect of the knee. We have tried supportive care with NSAIDs and use of a warm pack. She has problems when she walks due to pain and discomfort. We will do an x-ray of the knee. I will refer her to see the orthopedist.  Headache: Patient has chronic headache and is followed up by the neurologist.  She has been on Aimovig. Continues to have the headaches and will have an MRI of the head done today. She denies focal weakness, seizures, tremors, changes in vision, nausea or vomiting. Sleep disorder: Patient has disturbed sleep. She has problems staying asleep or getting to sleep. She is scheduled to be seen in the sleep clinic for sleep study. DM2: Patient has diabetes mellitus type 2 and is followed up by the endocrinologist.  She is on Jardiance and insulin. She states that her blood glucose numbers have been stable. Her last HbA1c was 5.2. We will continue with the current treatment plan. HTN: Patient has hypertension. Blood pressure is stable. She denies changes in vision or focal weakness. She is on olmesartan and atenolol. We will continue current treatment plan. Fibromyalgia: Patient has fibromyalgia. Gets generalized body aches. She is on gabapentin. We will continue current treatment plan. GERD: Patient has gastroesophageal reflux disease. She is on esomeprazole. She denies dark stools or hematemesis. Dyslipidemia: Patient has dyslipidemia. She is on atorvastatin. We started her on this and I did explain to her the advantages of taking statin medication especially in somebody diabetes. She will intensify lifestyle and dietary modification. She will exercise and take a diet low in polysaturated fats. Neuropathy: Patient has neuropathy. She is on gabapentin. She gets numbness and tingling of the extremities.   We will continue with the current treatment plan. Asthma: Patient has a history of asthma. She is on albuterol inhaler and Anoro Ellipta. We will continue with these medications. She denies cough, chest pain, wheeze or shortness of breath. Past Medical History  Past Medical History:   Diagnosis Date    Asthma     Chronic constipation     Diabetes ([address]     Fibromyalgia     GERD (gastroesophageal reflux disease)     Hypertension     IBS (irritable bowel syndrome)     Migraines     unknown if aura    Psychiatric disorder     she denies any diagnosis despite being on antipsychotics, SSRIs, etc in the past    Scoliosis     Urinary incontinence     mixed       Surgical History  Past Surgical History:   Procedure Laterality Date    HX  SECTION  ,     HX COLONOSCOPY      HX DILATION AND CURETTAGE      HX ENDOSCOPY      HX PELVIC LAPAROSCOPY          Medications  Current Outpatient Medications   Medication Sig Dispense Refill    cholecalciferol (VITAMIN D3) (50,000 UNITS /1250 MCG) capsule Take 1 Cap by mouth every seven (7) days. 13 Cap 3    gabapentin (NEURONTIN) 600 mg tablet TAKE 1 TABLET BY MOUTH TWICE A DAY      Jardiance 10 mg tablet TAKE 1 TABLET BY MOUTH EVERY DAY 30 Tab 2    olmesartan (BENICAR) 40 mg tablet TAKE 1 TABLET BY MOUTH EVERY DAY (Patient taking differently: Not taking) 90 Tab 1    clotrimazole-betamethasone (LOTRISONE) topical cream APPLY THIN LAYER TO AFFECTED SITE 2 X DAY 45 g 0    glucose blood VI test strips (ASCENSIA AUTODISC VI, ONE TOUCH ULTRA TEST VI) strip Check blood glucose 4 x day 400 Strip 3    lancets misc Check blood glucose 4 x day 400 Each 3    aspirin delayed-release 81 mg tablet Take 1 Tab by mouth daily. 90 Tab 1    atenoloL (TENORMIN) 25 mg tablet TAKE 1 TABLET BY MOUTH EVERY DAY 90 Tab 1    insulin glargine (LANTUS,BASAGLAR) 100 unit/mL (3 mL) inpn 22 Units by SubCUTAneous route nightly. (Patient taking differently: 22 Units by SubCUTAneous route nightly.  Not taking) 30 mL 1    Insulin Needles, Disposable, 31 gauge x 5/16\" ndle To use daily with insulin injection subcutaneously 1 Package 11    montelukast (SINGULAIR) 10 mg tablet Take 1 Tab by mouth nightly.  valACYclovir (VALTREX) 500 mg tablet daily.  ANORO ELLIPTA 62.5-25 mcg/actuation inhaler 1 INH DAILY - USE EVERY DAY  6    AIMOVIG AUTOINJECTOR 70 mg/mL injection AS DIRECTED - 1 INJECTION SUBQ ONCE MONTHLY  3    cetirizine (ZYRTEC) 10 mg tablet TAKE 1 TABLET EVERY DAY  0    mometasone (NASONEX) 50 mcg/actuation nasal spray USE 1-2 SPRAYS INTO EACH NOSTRIL EVERY DAY AS NEEDED  0    FERRETTS IPS 40 mg/15 mL liqd 15 ML ORALLY 2 TIMES PER DAY. 2    EPINEPHrine (EPIPEN) 0.3 mg/0.3 mL injection AS DIRECTED AS NEEDED INTRAMUSCULAR 1 DAYS  5    albuterol (PROVENTIL HFA, VENTOLIN HFA, PROAIR HFA) 90 mcg/actuation inhaler Take 2 Puffs by inhalation every four (4) hours as needed for Wheezing or Shortness of Breath. 1 Inhaler 0    esomeprazole (NEXIUM) 40 mg capsule Take  by mouth daily.  POLYETHYLENE GLYCOL 3350 (MIRALAX PO) Take  by mouth.  CALCIUM CARBONATE/MAG HYDROX (MYLANTA PO) Take  by mouth as needed.  atorvastatin (LIPITOR) 10 mg tablet Take 1 Tab by mouth daily. 30 Tab 2    indomethacin (INDOCIN) 50 mg capsule Take 1 Cap by mouth two (2) times daily as needed for Pain. 60 Cap 2       Allergies  Allergies   Allergen Reactions    Iodine Hives and Nausea and Vomiting    Iodinated Contrast Media Hives    Oxycodone-Acetaminophen Other (comments)     Hallucinations     Prochlorperazine Other (comments)    Propoxyphene-Acetaminophen Unknown (comments)     Allergy to propoxyphene only.   Has previously tolerated acetaminophen    Reglan [Metoclopramide] Not Reported This Time    Sulfa (Sulfonamide Antibiotics) Not Reported This Time    Wygesic Not Reported This Time       Family History  Family History   Problem Relation Age of Onset    Kidney Disease Father     Other Father Pancreatic Disease    Cancer Father     Heart Disease Father     Heart Attack Father     Thyroid Disease Other         Grandparent       Social History  Social History     Socioeconomic History    Marital status: SINGLE     Spouse name: Not on file    Number of children: Not on file    Years of education: Not on file    Highest education level: Not on file   Occupational History    Not on file   Social Needs    Financial resource strain: Not on file    Food insecurity     Worry: Not on file     Inability: Not on file    Transportation needs     Medical: Not on file     Non-medical: Not on file   Tobacco Use    Smoking status: Never Smoker    Smokeless tobacco: Never Used   Substance and Sexual Activity    Alcohol use: No    Drug use: No    Sexual activity: Not on file   Lifestyle    Physical activity     Days per week: Not on file     Minutes per session: Not on file    Stress: Not on file   Relationships    Social connections     Talks on phone: Not on file     Gets together: Not on file     Attends [employer] service: Not on file     Active member of club or organization: Not on file     Attends meetings of clubs or organizations: Not on file     Relationship status: Not on file    Intimate partner violence     Fear of current or ex partner: Not on file     Emotionally abused: Not on file     Physically abused: Not on file     Forced sexual activity: Not on file   Other Topics Concern    Not on file   Social History Narrative    Not on file       Review of Systems  Review of Systems - Review of all systems is negative except as noted above in the HPI.     Vital Signs  Visit Vitals  /89 (BP 1 Location: Left upper arm, BP Patient Position: Sitting, BP Cuff Size: Adult)   Pulse 84   Temp 97.6 °F (36.4 °C) (Oral)   Resp 18   Ht 4' 11\" (1.499 m)   Wt 144 lb (65.3 kg)   SpO2 99%   BMI 29.08 kg/m²         Physical Exam  Physical Examination: General appearance - oriented to person, place, and time and acyanotic, in no respiratory distress  Mental status - affect appropriate to mood  Lymphatics - no palpable lymphadenopathy  Chest - clear to auscultation, no wheezes, rales or rhonchi, symmetric air entry  Heart - normal rate and regular rhythm, S1 and S2 normal  Abdomen - soft, nontender, nondistended, no masses or organomegaly  Neurological - abnormal neurological exam unchanged from prior examinations  Musculoskeletal -popliteal cyst with tenderness to palpation posterior aspect left knee  Extremities - no pedal edema noted, intact peripheral pulses      Results  Results for orders placed or performed in visit on 04/13/21   HEPATITIS C AB   Result Value Ref Range    Hep C Virus Ab <0.1 0.0 - 0.9 s/co ratio   METABOLIC PANEL, BASIC   Result Value Ref Range    Glucose 79 65 - 99 mg/dL    BUN 13 6 - 20 mg/dL    Creatinine 0.88 0.57 - 1.00 mg/dL    GFR est non-AA 85 >59 mL/min/1.73    GFR est AA 98 >59 mL/min/1.73    BUN/Creatinine ratio 15 9 - 23    Sodium 144 134 - 144 mmol/L    Potassium 4.0 3.5 - 5.2 mmol/L    Chloride 107 (H) 96 - 106 mmol/L    CO2 22 20 - 29 mmol/L    Calcium 9.4 8.7 - 10.2 mg/dL   CBC WITH AUTOMATED DIFF   Result Value Ref Range    WBC 6.6 3.4 - 10.8 x10E3/uL    RBC 4.25 3.77 - 5.28 x10E6/uL    HGB 12.2 11.1 - 15.9 g/dL    HCT 36.7 34.0 - 46.6 %    MCV 86 79 - 97 fL    MCH 28.7 26.6 - 33.0 pg    MCHC 33.2 31.5 - 35.7 g/dL    RDW 13.2 11.7 - 15.4 %    PLATELET 542 733 - 452 x10E3/uL    NEUTROPHILS 58 Not Estab. %    Lymphocytes 35 Not Estab. %    MONOCYTES 5 Not Estab. %    EOSINOPHILS 1 Not Estab. %    BASOPHILS 1 Not Estab. %    ABS. NEUTROPHILS 3.9 1.4 - 7.0 x10E3/uL    Abs Lymphocytes 2.3 0.7 - 3.1 x10E3/uL    ABS. MONOCYTES 0.4 0.1 - 0.9 x10E3/uL    ABS. EOSINOPHILS 0.1 0.0 - 0.4 x10E3/uL    ABS. BASOPHILS 0.0 0.0 - 0.2 x10E3/uL    IMMATURE GRANULOCYTES 0 Not Estab. %    ABS. IMM.  GRANS. 0.0 0.0 - 0.1 x10E3/uL   TSH 3RD GENERATION   Result Value Ref Range    TSH 2.450 0.450 - 4.500 uIU/mL   VITAMIN D, 25 HYDROXY   Result Value Ref Range    VITAMIN D, 25-HYDROXY 27.6 (L) 30.0 - 100.0 ng/mL   LIPID PANEL   Result Value Ref Range    Cholesterol, total 159 100 - 199 mg/dL    Triglyceride 116 0 - 149 mg/dL    HDL Cholesterol 32 (L) >39 mg/dL    VLDL, calculated 21 5 - 40 mg/dL    LDL, calculated 106 (H) 0 - 99 mg/dL   CVD REPORT   Result Value Ref Range    INTERPRETATION Note        ASSESSMENT and PLAN    ICD-10-CM ICD-9-CM    1. Type 2 diabetes mellitus with hyperglycemia, without long-term current use of insulin (HCC)  E11.65 250.00      790.29    2. Popliteal cyst, left  M71.22 727.51    3. Chronic pain of left knee  M25.562 719.46 REFERRAL TO ORTHOPEDICS    G89.29 338.29 XR KNEE LT MIN 4 V   4. Dyslipidemia  E78.5 272.4    5. Migraine without status migrainosus, not intractable, unspecified migraine type  G43.909 346.90    6. Gastroesophageal reflux disease, unspecified whether esophagitis present  K21.9 530.81    7. Essential (primary) hypertension  I10 401.9    8. Sleep disorder  G47.9 780.50    9. Fibromyalgia  M79.7 729.1    10. Medicare annual wellness visit, subsequent  Z00.00 V70.0    11. Advanced directives, counseling/discussion  Z71.89 V65.49 FULL CODE   12.  Screening for depression  Z13.31 V79.0 [address]     lab results and schedule of future lab studies reviewed with patient  reviewed diet, exercise and weight control  cardiovascular risk and specific lipid/LDL goals reviewed  reviewed medications and side effects in detail  specific diabetic recommendations: low cholesterol diet, weight control and daily exercise discussed, home glucose monitoring emphasized, all medications, side effects and compliance discussed carefully, annual eye examinations at Ophthalmology discussed, glycohemoglobin and other lab monitoring discussed and long term diabetic complications discussed  radiology results and schedule of future radiology studies reviewed with patient      I have discussed the diagnosis with the patient and the intended plan of care as seen in the above orders. The patient has received an after-visit summary and questions were answered concerning future plans. I have discussed medication, side effects, and warnings with the patient in detail. The patient verbalized understanding and is in agreement with the plan of care. The patient will contact the office with any additional concerns. Lei Lora MD    PLEASE NOTE:   This document has been produced using voice recognition software.  Unrecognized errors in transcription may be present

## 2021-05-03 NOTE — PROGRESS NOTES
Chief Complaint   Patient presents with   [facility] Annual Wellness Visit     1. Have you been to the ER, urgent care clinic since your last visit? Hospitalized since your last visit? No    2. Have you seen or consulted any other health care providers outside of the [address] [place] [address] since your last visit? Include any pap smears or colon screening. No  This is the Subsequent Medicare Annual Wellness Exam, performed 12 months or more after the Initial AWV or the last Subsequent AWV    I have reviewed the patient's medical history in detail and updated the computerized patient record. Assessment/Plan   Education and counseling provided:  Are appropriate based on today's review and evaluation    1. Medicare annual wellness visit, subsequent    2. Advanced directives, counseling/discussion  - FULL CODE    3. Screening for depression  - DEPRESSION SCREEN ANNUAL  - MD DEPRESSION SCREEN ANNUAL       Depression Risk Factor Screening     3 most recent PHQ Screens 5/3/2021   PHQ Not Done -   Little interest or pleasure in doing things Not at all   Feeling down, depressed, irritable, or hopeless Not at all   Total Score PHQ 2 0   Trouble falling or staying asleep, or sleeping too much Not at all   Feeling tired or having little energy Not at all   Poor appetite, weight loss, or overeating Not at all   Feeling bad about yourself - or that you are a failure or have let yourself or your family down Not at all   Trouble concentrating on things such as school, work, reading, or watching TV Not at all   Moving or speaking so slowly that other people could have noticed; or the opposite being so fidgety that others notice Not at all   Thoughts of being better off dead, or hurting yourself in some way Not at all   PHQ 9 Score 0   How difficult have these problems made it for you to do your work, take care of your home and get along with others Not difficult at all   8 minutes taken on depression screening.     Alcohol Risk Screen Do you average more than 1 drink per night or more than 7 drinks a week:  No    On any one occasion in the past three months have you have had more than 3 drinks containing alcohol:  No        Functional Ability and Level of Safety     1. Was the patient's timed Up and GO test unsteady or longer than 30 seconds? No  2. Does the patient need help with the phone, transportation, shopping, preparing meals, housework, laundry, medications or managing money? No  3. Does the patients' home have rugs in the hallway, lack grab bars in the bathroom, lack handrails on the stairs or have poor lighting? No  4. Have you noticed any hearing difficulties? No  Hearing Evaluation:    Hearing: Hearing is good. Activities of Daily Living: The home contains: no safety equipment. Patient does total self care      Ambulation: with no difficulty     Fall Risk:  Fall Risk Assessment, last 12 mths 5/3/2021   Able to walk? Yes   Fall in past 12 months? 0   Do you feel unsteady?  0   Are you worried about falling 0      Abuse Screen:  Patient is not abused       Cognitive Screening    Has your family/caregiver stated any concerns about your memory: no     Cognitive Screening: Normal - MMSE (Mini Mental Status Exam)    Health Maintenance Due     Health Maintenance Due   Topic Date Due    Pneumococcal 0-64 years (1 of 1 - PPSV23) Never done    COVID-19 Vaccine (1) Never done    DTaP/Tdap/Td series (1 - Tdap) Never done    Medicare Yearly Exam  05/20/2021       Patient Care Team   Patient Care Team:  Josh Lang MD as PCP - General (Family Medicine)  Josh Lang MD as PCP - REHABILITATION [facility] Empaneled Provider  Lawrence Aquino MD (Obstetrics & Gynecology)    History   Eli Tejeda is seen for Medicare wellness exam.    Patient Active Problem List   Diagnosis Code    Chronic constipation K59.09    Urinary incontinence R32    Low back pain without sciatica M54.5    Acute bilateral thoracic back pain M54.6    Chronic bilateral low back pain with sciatica M54.40, B73.32    Uncomplicated asthma H53.994    SOB (shortness of breath) on exertion R06.02    Family history of chronic heart failure Z82.49    Chest pain R07.9    Diabetes mellitus type 2 in obese ([facility]) E11.69, E66.9    New onset type 2 diabetes mellitus ([facility] E11.9    Hypertension I10    Neck pain M54.2    Vitamin D deficiency E55.9    Weakness R53.1    Diabetes mellitus without complication ([facility]) S94.6    Low back pain M54.5    Costochondral chest pain R07.89    Type 2 diabetes with nephropathy ([facility]) E11.21    Type 2 diabetes mellitus with diabetic neuropathy ([facility]) E11.40     Past Medical History:   Diagnosis Date    Asthma     Chronic constipation     Diabetes ([facility])     Fibromyalgia     GERD (gastroesophageal reflux disease)     Hypertension     IBS (irritable bowel syndrome)     Migraines     unknown if aura    Psychiatric disorder     she denies any diagnosis despite being on antipsychotics, SSRIs, etc in the past    Scoliosis     Urinary incontinence     mixed      Past Surgical History:   Procedure Laterality Date    HX  SECTION  ,     HX COLONOSCOPY      HX DILATION AND CURETTAGE      HX ENDOSCOPY      HX PELVIC LAPAROSCOPY       Current Outpatient Medications   Medication Sig Dispense Refill    atorvastatin (LIPITOR) 10 mg tablet Take 1 Tab by mouth daily. 30 Tab 2    cholecalciferol (VITAMIN D3) (50,000 UNITS /1250 MCG) capsule Take 1 Cap by mouth every seven (7) days.  13 Cap 3    gabapentin (NEURONTIN) 600 mg tablet TAKE 1 TABLET BY MOUTH TWICE A DAY      Jardiance 10 mg tablet TAKE 1 TABLET BY MOUTH EVERY DAY 30 Tab 2    olmesartan (BENICAR) 40 mg tablet TAKE 1 TABLET BY MOUTH EVERY DAY (Patient taking differently: Not taking) 90 Tab 1    clotrimazole-betamethasone (LOTRISONE) topical cream APPLY THIN LAYER TO AFFECTED SITE 2 X DAY 45 g 0    glucose blood VI test strips (ASCENSIA AUTODISC VI, ONE TOUCH ULTRA TEST VI) strip Check blood glucose 4 x day 400 Strip 3    lancets misc Check blood glucose 4 x day 400 Each 3    aspirin delayed-release 81 mg tablet Take 1 Tab by mouth daily. 90 Tab 1    atenoloL (TENORMIN) 25 mg tablet TAKE 1 TABLET BY MOUTH EVERY DAY 90 Tab 1    insulin glargine (LANTUS,BASAGLAR) 100 unit/mL (3 mL) inpn 22 Units by SubCUTAneous route nightly. (Patient taking differently: 22 Units by SubCUTAneous route nightly. Not taking) 30 mL 1    Insulin Needles, Disposable, 31 gauge x 5/16\" ndle To use daily with insulin injection subcutaneously 1 Package 11    montelukast (SINGULAIR) 10 mg tablet Take 1 Tab by mouth nightly.  valACYclovir (VALTREX) 500 mg tablet daily.  ANORO ELLIPTA 62.5-25 mcg/actuation inhaler 1 INH DAILY - USE EVERY DAY  6    AIMOVIG AUTOINJECTOR 70 mg/mL injection AS DIRECTED - 1 INJECTION SUBQ ONCE MONTHLY  3    cetirizine (ZYRTEC) 10 mg tablet TAKE 1 TABLET EVERY DAY  0    mometasone (NASONEX) 50 mcg/actuation nasal spray USE 1-2 SPRAYS INTO EACH NOSTRIL EVERY DAY AS NEEDED  0    FERRETTS IPS 40 mg/15 mL liqd 15 ML ORALLY 2 TIMES PER DAY. 2    EPINEPHrine (EPIPEN) 0.3 mg/0.3 mL injection AS DIRECTED AS NEEDED INTRAMUSCULAR 1 DAYS  5    albuterol (PROVENTIL HFA, VENTOLIN HFA, PROAIR HFA) 90 mcg/actuation inhaler Take 2 Puffs by inhalation every four (4) hours as needed for Wheezing or Shortness of Breath. 1 Inhaler 0    esomeprazole (NEXIUM) 40 mg capsule Take  by mouth daily.  POLYETHYLENE GLYCOL 3350 (MIRALAX PO) Take  by mouth.  CALCIUM CARBONATE/MAG HYDROX (MYLANTA PO) Take  by mouth as needed.  indomethacin (INDOCIN) 50 mg capsule Take 1 Cap by mouth two (2) times daily as needed for Pain.  60 Cap 2     Allergies   Allergen Reactions    Iodine Hives and Nausea and Vomiting    Iodinated Contrast Media Hives    Oxycodone-Acetaminophen Other (comments)     Hallucinations     Prochlorperazine Other (comments)    Propoxyphene-Acetaminophen Unknown (comments)     Allergy to propoxyphene only. Has previously tolerated acetaminophen    Reglan [Metoclopramide] Not Reported This Time    Sulfa (Sulfonamide Antibiotics) Not Reported This Time    Wygesic Not Reported This Time       Family History   Problem Relation Age of Onset    Kidney Disease Father     Other Father         Pancreatic Disease    Cancer Father     Heart Disease Father     Heart Attack Father     Thyroid Disease Other         Grandparent     Social History     Tobacco Use    Smoking status: Never Smoker    Smokeless tobacco: Never Used   Substance Use Topics    Alcohol use: No     I have discussed the diagnosis with the patient and the intended plan of care as seen in the above orders. The patient has received an after-visit summary and questions were answered concerning future plans. I have discussed medication, side effects, and warnings with the patient in detail. The patient verbalized understanding and is in agreement with the plan of care. The patient will contact the office with any additional concerns. Personalized preventative plan of care was discussed, printed and given to patient.     Rose Browne MD

## 2021-05-03 NOTE — ACP (ADVANCE CARE PLANNING)
Advance Care Planning     Advance Care Planning (ACP) Physician/NP/PA Conversation      Date of Conversation: 5/3/2021  Conducted with: Patient with Decision Making Capacity    Healthcare Decision Maker:     Primary Decision Maker (Active): Renetta Marques - Other Relative - [phone]  Click here to complete [address] including selection of the Healthcare Decision Maker Relationship (ie \"Primary\")  Today we documented Decision Maker(s). The patient will provide ACP documents. Care Preferences:    Hospitalization: \"If your health worsens and it becomes clear that your chance of recovery is unlikely, what would be your preference regarding hospitalization? \"  The patient would prefer hospitalization. Ventilation: \"If you were unable to breathe on your own and your chance of recovery was unlikely, what would be your preference about the use of a ventilator (breathing machine) if it was available to you? \"   The patient would desire the use of a ventilator. Resuscitation: \"In the event your heart stopped as a result of an underlying serious health condition, would you want attempts to be made to restart your heart, or would you prefer a natural death? \"   Yes, attempt to resuscitate.     Additional topics discussed: treatment goals, ventilation preferences, hospitalization preferences and resuscitation preferences    Conversation Outcomes / Follow-Up Plan:   ACP in process - completing/providing documents   Reviewed DNR/DNI and patient elects Full Code (Attempt Resuscitation)     Length of Voluntary ACP Conversation in minutes:  16 minutes    Chelsy Head MD

## 2021-05-03 NOTE — PATIENT INSTRUCTIONS
Medicare Wellness Visit, Female     The best way to live healthy is to have a lifestyle where you eat a well-balanced diet, exercise regularly, limit alcohol use, and quit all forms of tobacco/nicotine, if applicable. Regular preventive services are another way to keep healthy. Preventive services (vaccines, screening tests, monitoring & exams) can help personalize your care plan, which helps you manage your own care. Screening tests can find health problems at the earliest stages, when they are easiest to treat. [facility] follows the current, evidence-based guidelines published by the [facility]is[facility] Espinoza ([facility]) when recommending preventive services for our patients. Because we follow these guidelines, sometimes recommendations change over time as research supports it. (For example, mammograms used to be recommended annually. Even though Medicare will still pay for an annual mammogram, the newer guidelines recommend a mammogram every two years for women of average risk). Of course, you and your doctor may decide to screen more often for some diseases, based on your risk and your co-morbidities (chronic disease you are already diagnosed with). Preventive services for you include:  - Medicare offers their members a free annual wellness visit, which is time for you and your primary care provider to discuss and plan for your preventive service needs. Take advantage of this benefit every year!  -All adults over the age of 72 should receive the recommended pneumonia vaccines. Current USPSTF guidelines recommend a series of two vaccines for the best pneumonia protection.   -All adults should have a flu vaccine yearly and a tetanus vaccine every 10 years.   -All adults age 48 and older should receive the shingles vaccines (series of two vaccines).       -All adults age 38-68 who are overweight should have a diabetes screening test once every three years.   -All adults born between 80 and 1965 should be screened once for Hepatitis C.  -Other screening tests and preventive services for persons with diabetes include: an eye exam to screen for diabetic retinopathy, a kidney function test, a foot exam, and stricter control over your cholesterol.   -Cardiovascular screening for adults with routine risk involves an electrocardiogram (ECG) at intervals determined by your doctor.   -Colorectal cancer screenings should be done for adults age 54-65 with no increased risk factors for colorectal cancer. There are a number of acceptable methods of screening for this type of cancer. Each test has its own benefits and drawbacks. Discuss with your doctor what is most appropriate for you during your annual wellness visit. The different tests include: colonoscopy (considered the best screening method), a fecal occult blood test, a fecal DNA test, and sigmoidoscopy.    -A bone mass density test is recommended when a woman turns 65 to screen for osteoporosis. This test is only recommended one time, as a screening. Some providers will use this same test as a disease monitoring tool if you already have osteoporosis. -Breast cancer screenings are recommended every other year for women of normal risk, age 54-69.  -Cervical cancer screenings for women over age 72 are only recommended with certain risk factors. Here is a list of your current Health Maintenance items (your personalized list of preventive services) with a due date:  Health Maintenance Due   Topic Date Due    Pneumococcal Vaccine (1 of 1 - PPSV23) Never done    COVID-19 Vaccine (1) Never done    DTaP/Tdap/Td  (1 - Tdap) Never done    Annual Well Visit  05/20/2021         Medicare Wellness Visit, Female     The best way to live healthy is to have a lifestyle where you eat a well-balanced diet, exercise regularly, limit alcohol use, and quit all forms of tobacco/nicotine, if applicable.      Regular preventive services are another way to keep healthy. Preventive services (vaccines, screening tests, monitoring & exams) can help personalize your care plan, which helps you manage your own care. Screening tests can find health problems at the earliest stages, when they are easiest to treat. [facility] follows the current, evidence-based guidelines published by the [facility] Mois[facility] Espinoza ([facility]) when recommending preventive services for our patients. Because we follow these guidelines, sometimes recommendations change over time as research supports it. (For example, mammograms used to be recommended annually. Even though Medicare will still pay for an annual mammogram, the newer guidelines recommend a mammogram every two years for women of average risk). Of course, you and your doctor may decide to screen more often for some diseases, based on your risk and your co-morbidities (chronic disease you are already diagnosed with). Preventive services for you include:  - Medicare offers their members a free annual wellness visit, which is time for you and your primary care provider to discuss and plan for your preventive service needs. Take advantage of this benefit every year!  -All adults over the age of 72 should receive the recommended pneumonia vaccines. Current USPSTF guidelines recommend a series of two vaccines for the best pneumonia protection.   -All adults should have a flu vaccine yearly and a tetanus vaccine every 10 years.   -All adults age 48 and older should receive the shingles vaccines (series of two vaccines).       -All adults age 38-68 who are overweight should have a diabetes screening test once every three years.   -All adults born between 80 and 1965 should be screened once for Hepatitis C.  -Other screening tests and preventive services for persons with diabetes include: an eye exam to screen for diabetic retinopathy, a kidney function test, a foot exam, and stricter control over your cholesterol.   -Cardiovascular screening for adults with routine risk involves an electrocardiogram (ECG) at intervals determined by your doctor.   -Colorectal cancer screenings should be done for adults age 54-65 with no increased risk factors for colorectal cancer. There are a number of acceptable methods of screening for this type of cancer. Each test has its own benefits and drawbacks. Discuss with your doctor what is most appropriate for you during your annual wellness visit. The different tests include: colonoscopy (considered the best screening method), a fecal occult blood test, a fecal DNA test, and sigmoidoscopy.    -A bone mass density test is recommended when a woman turns 65 to screen for osteoporosis. This test is only recommended one time, as a screening. Some providers will use this same test as a disease monitoring tool if you already have osteoporosis. -Breast cancer screenings are recommended every other year for women of normal risk, age 54-69.  -Cervical cancer screenings for women over age 72 are only recommended with certain risk factors.      Here is a list of your current Health Maintenance items (your personalized list of preventive services) with a due date:  Health Maintenance Due   Topic Date Due    COVID-19 Vaccine (1) Never done    DTaP/Tdap/Td  (1 - Tdap) Never done

## 2021-05-11 ENCOUNTER — OFFICE VISIT (OUTPATIENT)
Dept: ORTHOPEDIC SURGERY | Age: 36
End: 2021-05-11
Payer: MEDICARE

## 2021-05-11 VITALS
BODY MASS INDEX: 28.83 KG/M2 | HEIGHT: 59 IN | WEIGHT: 143 LBS | OXYGEN SATURATION: 100 % | TEMPERATURE: 97.4 F | HEART RATE: 88 BPM

## 2021-05-11 DIAGNOSIS — M79.605 LEFT LEG PAIN: ICD-10-CM

## 2021-05-11 DIAGNOSIS — M60.162 INTERSTITIAL MYOSITIS OF LEFT LOWER LEG: Primary | ICD-10-CM

## 2021-05-11 PROCEDURE — G8756 NO BP MEASURE DOC: HCPCS | Performed by: ORTHOPAEDIC SURGERY

## 2021-05-11 PROCEDURE — G8427 DOCREV CUR MEDS BY ELIG CLIN: HCPCS | Performed by: ORTHOPAEDIC SURGERY

## 2021-05-11 PROCEDURE — 99213 OFFICE O/P EST LOW 20 MIN: CPT | Performed by: ORTHOPAEDIC SURGERY

## 2021-05-11 PROCEDURE — G8510 SCR DEP NEG, NO PLAN REQD: HCPCS | Performed by: ORTHOPAEDIC SURGERY

## 2021-05-11 PROCEDURE — G8417 CALC BMI ABV UP PARAM F/U: HCPCS | Performed by: ORTHOPAEDIC SURGERY

## 2021-05-11 RX ORDER — IBUPROFEN 600 MG/1
600 TABLET ORAL AS NEEDED
COMMUNITY
End: 2021-05-17 | Stop reason: ALTCHOICE

## 2021-05-11 RX ORDER — DICLOFENAC SODIUM 10 MG/G
GEL TOPICAL AS NEEDED
COMMUNITY
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
## 2021-05-12 ENCOUNTER — TELEPHONE (OUTPATIENT)
Dept: FAMILY MEDICINE CLINIC | Age: 36
End: 2021-05-12

## 2021-05-12 ENCOUNTER — OFFICE VISIT (OUTPATIENT)
Dept: ORTHOPEDIC SURGERY | Age: 36
End: 2021-05-12
Payer: MEDICARE

## 2021-05-12 VITALS
HEART RATE: 85 BPM | OXYGEN SATURATION: 100 % | BODY MASS INDEX: 28.83 KG/M2 | HEIGHT: 59 IN | TEMPERATURE: 97.5 F | WEIGHT: 143 LBS

## 2021-05-12 DIAGNOSIS — M54.32 LEFT SIDED SCIATICA: ICD-10-CM

## 2021-05-12 DIAGNOSIS — M79.605 LEFT LEG PAIN: Primary | ICD-10-CM

## 2021-05-12 PROCEDURE — G8432 DEP SCR NOT DOC, RNG: HCPCS | Performed by: ORTHOPAEDIC SURGERY

## 2021-05-12 PROCEDURE — G8417 CALC BMI ABV UP PARAM F/U: HCPCS | Performed by: ORTHOPAEDIC SURGERY

## 2021-05-12 PROCEDURE — G8427 DOCREV CUR MEDS BY ELIG CLIN: HCPCS | Performed by: ORTHOPAEDIC SURGERY

## 2021-05-12 PROCEDURE — 99213 OFFICE O/P EST LOW 20 MIN: CPT | Performed by: ORTHOPAEDIC SURGERY

## 2021-05-12 PROCEDURE — G8756 NO BP MEASURE DOC: HCPCS | Performed by: ORTHOPAEDIC SURGERY

## 2021-05-12 NOTE — TELEPHONE ENCOUNTER
Patient called to go over results for x-ray of knee. She saw Dr. Jackelyn Perkins for her knee. Patient states Dr. Jackelyn Perkins doesn't think there's anything he can do for her. She says she was told to call PCP to see if provider could refer her to spine specialist to make sure her pain is not coming from her back. She does not want to be seen at [facility] for this. She is also requesting pain medication.

## 2021-05-12 NOTE — PROGRESS NOTES
Patient: Gokul Duffy                MRN: [id number]       SSN: [id number]  YOB: 1985        AGE: 39 y.o. SEX: female  Body mass index is 28.88 kg/m². PCP: John Maxwell MD  05/12/21    CHIEF COMPLAINT: Left knee pain    HPI: Gokul Duffy is a 39 y.o. female patient who presents to the office today with 7 out of 10 left knee pain. The pain is mostly in the posterior leg. She reports pain that radiates from the posterior knee down her leg including to her toes and she reports numbness and tingling in her toes. She has had multiple problems with both ankles which is been treated by Dr. Jon Amaya. She also reports some pain and a feeling of swelling in the back of the knee. She has had x-rays of this. She has previously been seen by our spine service for problems with her back and says she has a diagnosis of scoliosis as well. Past Medical History:   Diagnosis Date    Asthma     Chronic constipation     Diabetes ([address]     Fibromyalgia     GERD (gastroesophageal reflux disease)     Hypertension     IBS (irritable bowel syndrome)     Migraines     unknown if aura    Psychiatric disorder     she denies any diagnosis despite being on antipsychotics, SSRIs, etc in the past    Scoliosis     Urinary incontinence     mixed       Family History   Problem Relation Age of Onset    Kidney Disease Father     Other Father         Pancreatic Disease    Cancer Father     Heart Disease Father     Heart Attack Father     Thyroid Disease Other         Grandparent       Current Outpatient Medications   Medication Sig Dispense Refill    diclofenac (Voltaren) 1 % gel Apply  to affected area as needed for Pain.  ibuprofen (MOTRIN) 600 mg tablet Take 600 mg by mouth as needed for Pain.  atorvastatin (LIPITOR) 10 mg tablet Take 1 Tab by mouth daily. 30 Tab 2    cholecalciferol (VITAMIN D3) (50,000 UNITS /1250 MCG) capsule Take 1 Cap by mouth every seven (7) days. 13 Cap 3    gabapentin (NEURONTIN) 600 mg tablet TAKE 1 TABLET BY MOUTH TWICE A DAY      Jardiance 10 mg tablet TAKE 1 TABLET BY MOUTH EVERY DAY 30 Tab 2    olmesartan (BENICAR) 40 mg tablet TAKE 1 TABLET BY MOUTH EVERY DAY (Patient taking differently: Not taking) 90 Tab 1    glucose blood VI test strips (ASCENSIA AUTODISC VI, ONE TOUCH ULTRA TEST VI) strip Check blood glucose 4 x day 400 Strip 3    lancets misc Check blood glucose 4 x day 400 Each 3    aspirin delayed-release 81 mg tablet Take 1 Tab by mouth daily. 90 Tab 1    atenoloL (TENORMIN) 25 mg tablet TAKE 1 TABLET BY MOUTH EVERY DAY 90 Tab 1    Insulin Needles, Disposable, 31 gauge x 5/16\" ndle To use daily with insulin injection subcutaneously 1 Package 11    montelukast (SINGULAIR) 10 mg tablet Take 1 Tab by mouth nightly.  valACYclovir (VALTREX) 500 mg tablet daily.  ANORO ELLIPTA 62.5-25 mcg/actuation inhaler 1 INH DAILY - USE EVERY DAY  6    AIMOVIG AUTOINJECTOR 70 mg/mL injection AS DIRECTED - 1 INJECTION SUBQ ONCE MONTHLY  3    cetirizine (ZYRTEC) 10 mg tablet TAKE 1 TABLET EVERY DAY  0    mometasone (NASONEX) 50 mcg/actuation nasal spray USE 1-2 SPRAYS INTO EACH NOSTRIL EVERY DAY AS NEEDED  0    FERRETTS IPS 40 mg/15 mL liqd 15 ML ORALLY 2 TIMES PER DAY. 2    EPINEPHrine (EPIPEN) 0.3 mg/0.3 mL injection AS DIRECTED AS NEEDED INTRAMUSCULAR 1 DAYS  5    albuterol (PROVENTIL HFA, VENTOLIN HFA, PROAIR HFA) 90 mcg/actuation inhaler Take 2 Puffs by inhalation every four (4) hours as needed for Wheezing or Shortness of Breath. 1 Inhaler 0    esomeprazole (NEXIUM) 40 mg capsule Take  by mouth daily.  POLYETHYLENE GLYCOL 3350 (MIRALAX PO) Take  by mouth.  CALCIUM CARBONATE/MAG HYDROX (MYLANTA PO) Take  by mouth as needed.       clotrimazole-betamethasone (LOTRISONE) topical cream APPLY THIN LAYER TO AFFECTED SITE 2 X DAY 45 g 0    insulin glargine (LANTUS,BASAGLAR) 100 unit/mL (3 mL) inpn 22 Units by SubCUTAneous route nightly. (Patient taking differently: 22 Units by SubCUTAneous route nightly. Not taking) 30 mL 1    indomethacin (INDOCIN) 50 mg capsule Take 1 Cap by mouth two (2) times daily as needed for Pain. 60 Cap 2       Allergies   Allergen Reactions    Iodine Hives and Nausea and Vomiting    Iodinated Contrast Media Hives    Oxycodone-Acetaminophen Other (comments)     Hallucinations     Prochlorperazine Other (comments)    Propoxyphene-Acetaminophen Unknown (comments)     Allergy to propoxyphene only.   Has previously tolerated acetaminophen    Reglan [Metoclopramide] Not Reported This Time    Sulfa (Sulfonamide Antibiotics) Not Reported This Time    Wygesic Not Reported This Time       Past Surgical History:   Procedure Laterality Date    HX  SECTION  ,     HX COLONOSCOPY      HX DILATION AND CURETTAGE      HX ENDOSCOPY      HX PELVIC LAPAROSCOPY         Social History     Socioeconomic History    Marital status: SINGLE     Spouse name: Not on file    Number of children: Not on file    Years of education: Not on file    Highest education level: Not on file   Occupational History    Not on file   Social Needs    Financial resource strain: Not on file    Food insecurity     Worry: Not on file     Inability: Not on file    Transportation needs     Medical: Not on file     Non-medical: Not on file   Tobacco Use    Smoking status: Never Smoker    Smokeless tobacco: Never Used   Substance and Sexual Activity    Alcohol use: No    Drug use: No    Sexual activity: Not on file   Lifestyle    Physical activity     Days per week: Not on file     Minutes per session: Not on file    Stress: Not on file   Relationships    Social connections     Talks on phone: Not on file     Gets together: Not on file     Attends Hindu service: Not on file     Active member of club or organization: Not on file     Attends meetings of clubs or organizations: Not on file     Relationship status: Not on file    Intimate partner violence     Fear of current or ex partner: Not on file     Emotionally abused: Not on file     Physically abused: Not on file     Forced sexual activity: Not on file   Other Topics Concern    Not on file   Social History Narrative    Not on file       REVIEW OF SYSTEMS:      CON: negative for recent weight loss/gain, fever, or chills  EYE: negative for double or blurry vision  ENT: negative for hoarseness  RS:   negative for cough, URI, SOB  CV:  negative for chest pain, palpitations  GI:    negative for blood in stool, nausea/vomiting  :  negative for blood in urine  MS: As per HPI  Other systems reviewed and noted below. PHYSICAL EXAMINATION:  Visit Vitals  Pulse 85   Temp 97.5 °F (36.4 °C) (Skin)   Ht 4' 11\" (1.499 m)   Wt 143 lb (64.9 kg)   SpO2 100%   BMI 28.88 kg/m²     Body mass index is 28.88 kg/m². GENERAL: Alert and oriented x3, in no acute distress, well-developed, well-nourished. HEENT: Normocephalic, atraumatic. RESP: Non labored breathing with equal chest rise on inspiration. CV: Well perfused extremities. No cyanosis or clubbing noted. ABDOMEN: Soft, non-tender, non-distended.    Knee Examination      R   L  Effusion   -   -  Warmth   -   -  Erythema   -   -  ROM   Extension  Full   Full   Flexion   Full   Full  Tenderness   Medial Joint  -   -   Lateral Joint  -   -   Posterior knee  -   +  Strength   Quad   5   5   Hamstring  5   5  Crepitus   Tibiofemoral   -   -   Patellofemoral  -   -  Instability   Anterior  -   -   Posterior  -   -   Patellofemoral  -   -  Lachman's   -   -  Anterior Drawer  -   -  Posterior Drawer  -   -  Miryam's   Pain   -   -   Locking  -   -  Calf TTP   -   -  Straight Leg Raise  -   +    Lumbar Spine Examination  ROM   Flexion    Full   Extension   Full   Lateral Rotation  Full  Tenderness L Spine   None  Stepoff L Spine   None  Sensation L2-S1   Intact  Motor L2-S1    5/5  Focal Neuro Deficits   None  Straight Leg Raise Test    Right Negative   Left+  Other Findings:   None        IMAGING:  Previously taken x-rays of the left leg and knee were reviewed in the office multiple views which do not show any acute or chronic bony abnormalities. ASSESSMENT & PLAN  Diagnosis: Left leg hamstring tightness and posterior knee pain, left leg sciatica    I discussed with Sharri today the findings on my examination as well as her history and radiographic findings. I think that primarily her symptoms seem to be coming from a nerve type pain as she has some pain that radiates down the posterior leg and into her toes and reports numbness and tingling in her toes in the left foot as well. She also likely has some hamstring tightness as she has pain and tenderness to palpation of the hamstring tendons in the posterior knee. For the hamstring issues I recommended physical therapy. She declined. For the issues with the pain numbness and tingling down the posterior leg I recommended she be seen by our spine service and work-up for possible sciatica or lumbar spine issues. She says that she has seen our spine service in the past and wishes to see another spine physician. I recommended that she contact her insurance company to find another group or spine physician that takes her insurance to be seen as she declined an in office referral.  No further follow-up necessary.       Electronically signed by: Raquel Garcias MD

## 2021-05-13 NOTE — TELEPHONE ENCOUNTER
Spoke with patient at this time. Patient states she is still in pain with her knee and leg. Patient states she was seen with the specialist and he did nothing for her. Patient states if we can refer her to a different practice she do not want to see any providers at [address] [place] [address]. Patient is also asking for medication to help her with her pain at this time. Patient is expecting a call back today. Please Advise

## 2021-05-17 DIAGNOSIS — G89.29 CHRONIC PAIN OF LEFT KNEE: Primary | ICD-10-CM

## 2021-05-17 DIAGNOSIS — E11.65 TYPE 2 DIABETES MELLITUS WITH HYPERGLYCEMIA, WITHOUT LONG-TERM CURRENT USE OF INSULIN (HCC): ICD-10-CM

## 2021-05-17 DIAGNOSIS — M54.50 CHRONIC MIDLINE LOW BACK PAIN, UNSPECIFIED WHETHER SCIATICA PRESENT: ICD-10-CM

## 2021-05-17 DIAGNOSIS — M25.562 CHRONIC PAIN OF LEFT KNEE: Primary | ICD-10-CM

## 2021-05-17 DIAGNOSIS — G89.29 CHRONIC MIDLINE LOW BACK PAIN, UNSPECIFIED WHETHER SCIATICA PRESENT: ICD-10-CM

## 2021-05-17 RX ORDER — DICLOFENAC SODIUM 50 MG/1
50 TABLET, DELAYED RELEASE ORAL
Qty: 60 TAB | Refills: 1 | Status: SHIPPED | OUTPATIENT
Start: 2021-05-17 | End: 2021-07-28

## 2021-05-18 ENCOUNTER — TELEPHONE (OUTPATIENT)
Dept: ORTHOPEDIC SURGERY | Age: 36
End: 2021-05-18

## 2021-05-18 ENCOUNTER — TELEPHONE (OUTPATIENT)
Dept: FAMILY MEDICINE CLINIC | Age: 36
End: 2021-05-18

## 2021-05-18 DIAGNOSIS — M60.162 INTERSTITIAL MYOSITIS OF LEFT LOWER LEG: Primary | ICD-10-CM

## 2021-05-18 DIAGNOSIS — M79.605 LEFT LEG PAIN: ICD-10-CM

## 2021-05-18 RX ORDER — ATENOLOL 25 MG/1
TABLET ORAL
Qty: 90 TAB | Refills: 1 | Status: SHIPPED | OUTPATIENT
Start: 2021-05-18 | End: 2022-01-31

## 2021-05-18 RX ORDER — ASPIRIN 81 MG/1
TABLET ORAL
Qty: 90 TAB | Refills: 1 | Status: SHIPPED | OUTPATIENT
Start: 2021-05-18

## 2021-05-18 NOTE — TELEPHONE ENCOUNTER
Where was the MRI completed and can we get it on CD? Mariia Almeida [facility], M[facility], [facility]  5/18/2021  12:37 PM

## 2021-05-18 NOTE — TELEPHONE ENCOUNTER
Pt was seen on 5/11/2021 at which time dr Melissa Street ordered and MRI. Per pt [facility] cannot view the MRI. Also this pateint was seen by Dr Love Steward for the same body part the very next day 5/12/2021.     Please contact the patient at N#[phone]

## 2021-05-18 NOTE — TELEPHONE ENCOUNTER
Patient called stating she hasn't heard back from the nurse on what to do. She is requesting a call back.

## 2021-05-19 NOTE — TELEPHONE ENCOUNTER
I see where Dr. Jon Amaya mentioned the MRI in his last note but it was never ordered. Order for MRI has been placed.

## 2021-05-20 DIAGNOSIS — M79.605 LEFT LEG PAIN: ICD-10-CM

## 2021-05-20 DIAGNOSIS — M60.162 INTERSTITIAL MYOSITIS OF LEFT LOWER LEG: ICD-10-CM

## 2021-06-08 ENCOUNTER — HOSPITAL ENCOUNTER (OUTPATIENT)
Age: 36
Discharge: HOME | End: 2021-06-08
Attending: ORTHOPAEDIC SURGERY
Payer: MEDICARE

## 2021-06-08 PROCEDURE — 73718 MRI LOWER EXTREMITY W/O DYE: CPT

## 2021-06-09 ENCOUNTER — OFFICE VISIT (OUTPATIENT)
Dept: CARDIOLOGY CLINIC | Age: 36
End: 2021-06-09
Payer: MEDICARE

## 2021-06-09 VITALS
SYSTOLIC BLOOD PRESSURE: 131 MMHG | WEIGHT: 144 LBS | HEART RATE: 85 BPM | HEIGHT: 59 IN | DIASTOLIC BLOOD PRESSURE: 82 MMHG | BODY MASS INDEX: 29.03 KG/M2

## 2021-06-09 DIAGNOSIS — I10 ESSENTIAL HYPERTENSION: ICD-10-CM

## 2021-06-09 DIAGNOSIS — R06.02 SOB (SHORTNESS OF BREATH): ICD-10-CM

## 2021-06-09 DIAGNOSIS — E11.9 TYPE 2 DIABETES MELLITUS WITHOUT COMPLICATION, UNSPECIFIED WHETHER LONG TERM INSULIN USE (HCC): ICD-10-CM

## 2021-06-09 DIAGNOSIS — R07.9 CHEST PAIN, UNSPECIFIED TYPE: Primary | ICD-10-CM

## 2021-06-09 PROCEDURE — G8432 DEP SCR NOT DOC, RNG: HCPCS | Performed by: INTERNAL MEDICINE

## 2021-06-09 PROCEDURE — 99214 OFFICE O/P EST MOD 30 MIN: CPT | Performed by: INTERNAL MEDICINE

## 2021-06-09 PROCEDURE — G8752 SYS BP LESS 140: HCPCS | Performed by: INTERNAL MEDICINE

## 2021-06-09 PROCEDURE — 2022F DILAT RTA XM EVC RTNOPTHY: CPT | Performed by: INTERNAL MEDICINE

## 2021-06-09 PROCEDURE — 3044F HG A1C LEVEL LT 7.0%: CPT | Performed by: INTERNAL MEDICINE

## 2021-06-09 PROCEDURE — G8754 DIAS BP LESS 90: HCPCS | Performed by: INTERNAL MEDICINE

## 2021-06-09 PROCEDURE — G8428 CUR MEDS NOT DOCUMENT: HCPCS | Performed by: INTERNAL MEDICINE

## 2021-06-09 PROCEDURE — G8417 CALC BMI ABV UP PARAM F/U: HCPCS | Performed by: INTERNAL MEDICINE

## 2021-06-09 NOTE — PROGRESS NOTES
HISTORY OF PRESENT ILLNESS  Karyn Keen is a 39 y.o. female. Patient is here for follow up of diagnostic tests. Results will be discussed. Hypertension  The history is provided by the patient. This is a chronic problem. Associated symptoms include chest pain and shortness of breath. Shortness of Breath  The history is provided by the patient. This is a chronic problem. The problem occurs intermittently. Associated symptoms include chest pain. Chest Pain (Angina)   The history is provided by the patient. This is a chronic problem. The current episode started more than 1 week ago. The problem has not changed since onset. The problem occurs rarely. The pain is associated with rest and exertion. The pain is present in the lateral region and substernal region. The quality of the pain is described as sharp. The pain does not radiate. Associated symptoms include shortness of breath. Pertinent negatives include no dizziness, no nausea, no palpitations and no weakness. Review of Systems   Constitutional: Negative for chills. HENT: Negative for nosebleeds. Eyes: Negative for blurred vision and double vision. Respiratory: Positive for shortness of breath. Cardiovascular: Positive for chest pain. Negative for palpitations. Gastrointestinal: Negative for heartburn and nausea. Musculoskeletal: Negative for myalgias. Neurological: Negative for dizziness and weakness. Endo/Heme/Allergies: Does not bruise/bleed easily.      Family History   Problem Relation Age of Onset    Kidney Disease Father     Other Father         Pancreatic Disease    Cancer Father     Heart Disease Father     Heart Attack Father     Thyroid Disease Other         Grandparent       Past Medical History:   Diagnosis Date    Asthma     Chronic constipation     Diabetes ([address]     Fibromyalgia     GERD (gastroesophageal reflux disease)     Hypertension     IBS (irritable bowel syndrome)     Migraines     unknown if aura    Psychiatric disorder     she denies any diagnosis despite being on antipsychotics, SSRIs, etc in the past    Scoliosis     Urinary incontinence     mixed       Past Surgical History:   Procedure Laterality Date    HX  SECTION  ,     HX COLONOSCOPY      HX DILATION AND CURETTAGE      HX ENDOSCOPY      HX PELVIC LAPAROSCOPY         Social History     Tobacco Use    Smoking status: Never Smoker    Smokeless tobacco: Never Used   Substance Use Topics    Alcohol use: No       Allergies   Allergen Reactions    Iodine Hives and Nausea and Vomiting    Iodinated Contrast Media Hives    Oxycodone-Acetaminophen Other (comments)     Hallucinations     Prochlorperazine Other (comments)    Propoxyphene-Acetaminophen Unknown (comments)     Allergy to propoxyphene only. Has previously tolerated acetaminophen    Reglan [Metoclopramide] Not Reported This Time    Sulfa (Sulfonamide Antibiotics) Not Reported This Time    Wygesic Not Reported This Time       Prior to Admission medications    Medication Sig Start Date End Date Taking? Authorizing Provider   atenoloL (TENORMIN) 25 mg tablet TAKE 1 TABLET BY MOUTH EVERY DAY 21  Yes Chelsy Villafana MD   aspirin delayed-release 81 mg tablet TAKE 1 TABLET BY MOUTH EVERY DAY 21  Yes Chelsy Villafana MD   diclofenac EC (VOLTAREN) 50 mg EC tablet Take 1 Tab by mouth two (2) times daily as needed for Pain. 21  Yes Chelsy Amanda MD   diclofenac (Voltaren) 1 % gel Apply  to affected area as needed for Pain. Yes Provider, Historical   atorvastatin (LIPITOR) 10 mg tablet Take 1 Tab by mouth daily. 21  Yes Francesca Rendon MD   cholecalciferol (VITAMIN D3) (50,000 UNITS /1250 MCG) capsule Take 1 Cap by mouth every seven (7) days.  21  Yes Chelsy Amanda MD   gabapentin (NEURONTIN) 600 mg tablet TAKE 1 TABLET BY MOUTH TWICE A DAY 21  Yes Provider, Historical   Jardiance 10 mg tablet TAKE 1 TABLET BY MOUTH EVERY DAY 4/8/21  Yes Marcus Siddiqi MD   glucose blood VI test strips (ASCENSIA AUTODISC VI, ONE TOUCH ULTRA TEST VI) strip Check blood glucose 4 x day 10/13/20  Yes Chelsy Avila MD   lancets misc Check blood glucose 4 x day 10/13/20  Yes Chelsy Avila MD   Insulin Needles, Disposable, 31 gauge x 5/16\" ndle To use daily with insulin injection subcutaneously 9/25/20  Yes Carolyn Llanos NP   montelukast (SINGULAIR) 10 mg tablet Take 1 Tab by mouth nightly. 7/2/20  Yes Other, MD Chacho   valACYclovir (VALTREX) 500 mg tablet daily. 8/12/20  Yes Provider, Historical   ANORO ELLIPTA 62.5-25 mcg/actuation inhaler 1 INH DAILY - USE EVERY DAY 7/24/19  Yes Provider, Historical   AIMOVIG AUTOINJECTOR 70 mg/mL injection AS DIRECTED - 1 INJECTION SUBQ ONCE MONTHLY 7/30/19  Yes Provider, Historical   cetirizine (ZYRTEC) 10 mg tablet TAKE 1 TABLET EVERY DAY 12/18/18  Yes Provider, Historical   mometasone (NASONEX) 50 mcg/actuation nasal spray USE 1-2 SPRAYS INTO EACH NOSTRIL EVERY DAY AS NEEDED 2/20/19  Yes Provider, Historical   FERRETTS IPS 40 mg/15 mL liqd 15 ML ORALLY 2 TIMES PER DAY. 2/27/19  Yes Provider, Historical   EPINEPHrine (EPIPEN) 0.3 mg/0.3 mL injection AS DIRECTED AS NEEDED INTRAMUSCULAR 1 DAYS 2/20/19  Yes Provider, Historical   albuterol (PROVENTIL HFA, VENTOLIN HFA, PROAIR HFA) 90 mcg/actuation inhaler Take 2 Puffs by inhalation every four (4) hours as needed for Wheezing or Shortness of Breath. 12/6/18  Yes Duke PETERSON NP   esomeprazole (NEXIUM) 40 mg capsule Take  by mouth daily. Yes Provider, Historical   POLYETHYLENE GLYCOL 3350 (MIRALAX PO) Take  by mouth. Yes Provider, Historical   CALCIUM CARBONATE/MAG HYDROX (MYLANTA PO) Take  by mouth as needed.    Yes Provider, Historical   olmesartan (BENICAR) 40 mg tablet TAKE 1 TABLET BY MOUTH EVERY DAY  Patient not taking: Reported on 6/9/2021 11/11/20   Chelsy Amanda MD   clotrimazole-betamethasone (LOTRISONE) topical cream APPLY THIN LAYER TO AFFECTED SITE 2 X DAY  Patient not taking: Reported on 6/9/2021 11/2/20   Chelsy Amanda MD   insulin glargine (LANTUS,BASAGLAR) 100 unit/mL (3 mL) inpn 22 Units by SubCUTAneous route nightly. Patient not taking: Reported on 6/9/2021 9/28/20   Talat Carrero MD         Visit Vitals  /82   Pulse 85   Ht 4' 11\" (1.499 m)   Wt 65.3 kg (144 lb)   BMI 29.08 kg/m²         Physical Exam  Constitutional:       Appearance: She is well-developed. HENT:      Head: Normocephalic and atraumatic. Eyes:      Conjunctiva/sclera: Conjunctivae normal.   Neck:      Thyroid: No thyromegaly. Vascular: No JVD. Trachea: No tracheal deviation. Cardiovascular:      Rate and Rhythm: Normal rate and regular rhythm. Chest Wall: PMI is not displaced. Pulses: No decreased pulses. Heart sounds: No murmur heard. No gallop. No S3 sounds. Pulmonary:      Effort: No respiratory distress. Breath sounds: No wheezing or rales. Chest:      Chest wall: No tenderness. Abdominal:      Palpations: Abdomen is soft. Tenderness: There is no abdominal tenderness. Musculoskeletal:      Cervical back: Neck supple. Skin:     General: Skin is warm. Neurological:      Mental Status: She is alert and oriented to person, place, and time. [id number]  Component Name Value Ref Range   EF Echo 60     Result Impression   :   NORMAL LEFT VENTRICULAR CAVITY SIZE AND SYSTOLIC FUNCTION WITH AN EJECTION FRACTION OF  60  %. NORMAL DIASTOLIC FUNCTION. NORMAL RIGHT HEART FUNCTION AND SIZE. NO HEMODYNAMICALLY SIGNIFICANT VALVULAR PATHOLOGY. NORMAL PULMONARY ARTERY PRESSURE OF 15 MM/HG. NO PREVIOUS REPORT FOR COMPARISON. I have personally reviewed patient's records available from hospital and other providers and incorporated findings in patient care. 12/2018- ER notes, labs, EKG, chest x-ray and prior records  Ms. Stanford Esqueda has a reminder for a \"due or due soon\" health maintenance. I have asked that she contact her primary care provider for follow-up on this health maintenance. Interpretation Summary 1/2019       · Normal cavity size, wall thickness, systolic function (ejection fraction normal) and diastolic function. The estimated ejection fraction is 56 - 60%. No regional wall motion abnormality noted. · Normal right ventricular size and function. · No hemodynamically significant valvular pathology. Interpretation Summary 1/2019    · Baseline ECG: Normal sinus rhythm. · Low risk [facility] treadmill score. · Negative stress electrocardiogram.        03/10/20   ECHO STRESS 03/11/2020 3/12/2020    Narrative · Baseline ECG: Normal sinus rhythm, non-specific ST-T wave abnormalities. · Moderate risk [facility] treadmill score. · Negative stress test.  · Normal stress echocardiogram. Low risk study. Signed by: Flor Mckenzie MD       Assessment         ICD-10-CM ICD-9-CM    1. Chest pain, unspecified type  R07.9 786.50    2. Essential hypertension  I10 401.9    3. Type 2 diabetes mellitus without complication, unspecified whether long term insulin use (HCC)  E11.9 250.00    4. SOB (shortness of breath)  R06.02 786.05        There are no discontinued medications. No orders of the defined types were placed in this encounter. Follow-up and Dispositions    · Return in about 1 year (around 6/9/2022). Patient is 40-year-old female with history of hypertension seen for episodes of sharp chest pain with some tightness. Was recently hospitalized at [facility]- serial cardiac enzymes negative for MI. Chest pain-- likely costochondritis-- reproducible. Stress echo in march 2020 - negative for ischemia    Seen for follow-up. Stress test and echo report reviewed with patient- no ischemia. Normal LV function. Suspect costochondritis  Recommend to continue current meds  F/u in 1 yr or sooner if symptoms recur.

## 2021-06-09 NOTE — PROGRESS NOTES
1. Have you been to the ER, urgent care clinic since your last visit? Hospitalized since your last visit?     no    2. Have you seen or consulted any other health care providers outside of the [address] [place] [address] since your last visit? Include any pap smears or colon screening. No     3. Since your last visit, have you had any of the following symptoms? chest pains.

## 2021-06-15 ENCOUNTER — OFFICE VISIT (OUTPATIENT)
Dept: ORTHOPEDIC SURGERY | Age: 36
End: 2021-06-15
Payer: MEDICARE

## 2021-06-15 VITALS
OXYGEN SATURATION: 99 % | WEIGHT: 146 LBS | HEIGHT: 59 IN | HEART RATE: 97 BPM | BODY MASS INDEX: 29.43 KG/M2 | TEMPERATURE: 98 F

## 2021-06-15 DIAGNOSIS — M79.605 LEFT LEG PAIN: Primary | ICD-10-CM

## 2021-06-15 DIAGNOSIS — M60.162 INTERSTITIAL MYOSITIS OF LEFT LOWER LEG: ICD-10-CM

## 2021-06-15 PROCEDURE — G8417 CALC BMI ABV UP PARAM F/U: HCPCS | Performed by: ORTHOPAEDIC SURGERY

## 2021-06-15 PROCEDURE — G8510 SCR DEP NEG, NO PLAN REQD: HCPCS | Performed by: ORTHOPAEDIC SURGERY

## 2021-06-15 PROCEDURE — G8756 NO BP MEASURE DOC: HCPCS | Performed by: ORTHOPAEDIC SURGERY

## 2021-06-15 PROCEDURE — 99213 OFFICE O/P EST LOW 20 MIN: CPT | Performed by: ORTHOPAEDIC SURGERY

## 2021-06-15 PROCEDURE — G8427 DOCREV CUR MEDS BY ELIG CLIN: HCPCS | Performed by: ORTHOPAEDIC SURGERY

## 2021-06-15 NOTE — PROGRESS NOTES
AMBULATORY PROGRESS NOTE      Patient: Beth Smith             MRN: [id number]     SSN: [id number] Body mass index is 29.49 kg/m². YOB: 1985     AGE: 39 y.o. EX: female    PCP: Sarthak Flores MD       IMPRESSION //  DIAGNOSIS AND TREATMENT PLAN        Sharri Sharma has a diagnosis of:      DIAGNOSES    1. Left leg pain    2. Interstitial myositis of left lower leg        Orders Placed This Encounter    EMPL [facility] [address]     Referral Priority:   Routine     Referral Type:   PT/OT/ST     Referral Reason:   Specialty Services Required     Number of Visits Requested:   1        PLAN:    1. Formal PT: Hamstring stretching, proximal calves stretching  2. RTO-5-week follow-up    Beth Smith  expresses understanding of the diagnosis, treatment plan, and all of their proposed questions were answered to their satisfaction. Patient education has been provided re the diagnoses. HPI //  OBJECTIVE EXAMINATION        Sharri Chiu IS A 39 y.o. female who is a/an  established patient, presenting to my outpatient office for evaluation of  the following chief complaint(s):     Chief Complaint   Patient presents with    Leg Pain     left leg    Foot Pain     bilateral feet       Since I saw her last, she still mentions having some tightness to the posterior proximal portion of her left calf. At times he has what she describes as a spasm, she is to stand up and stretch her calf muscle. She not taking any anti-inflammatories on a routine basis, not taking any blood pressure medications or diuretics. She does take Voltaren cream, heating pad, Biofreeze, all of these 3 things help her    Have less pain to the proximal portion of her left calf. She rates her pain at times 7 out of 10. The pain is intermittent. Unpredictable. The MRI that she did have was on 06/08/2021, I did review the MRI findings with her.     Visit Vitals  Pulse 97 Temp 98 °F (36.7 °C) (Temporal)   Ht 4' 11\" (1.499 m)   Wt 146 lb (66.2 kg)   SpO2 99%   BMI 29.49 kg/m²       Appearance: Alert, well appearing and pleasant patient who is in no distress, oriented to person, place/time, and who follows commands. This patient is accompanied in the examination room by her  self. There is signs of:  no dementia  Psychiatric: Affect/mood are appropriate. Speech normal in context and clarity, memory intact grossly, no involuntary movements - tremors. Patient arrives to office via: without assistive device. H EENT (2): Head normocephalic & atraumatic. Eye: pupils are round// EOM are intact // Neck: ROM WNL  // Hearings Intact   Respiratory: Breathing non labored      ANKLE/FOOT  left side     Gait: normal  Tenderness:  Mild slight to the posterior medial portion of her proximal gastroc muscle  Cutaneous:   WNL left calf left knee left tib-fib. Joint Motion:   WNL knee ankle hindfoot forefoot  Joint / Tendon Stability: No Ankle or Subtalar instability or joint laxity. No peroneal sublux ability or dislocation  Alignment: neutral Hindfoot,    Neuro Motor/Sensory: NL/NL  Vascular: NL foot/ankle pulses,   Lymphatics: No extremity lymphedema, No calf swelling, no tenderness to calf muscles. CHART REVIEW     Jose L Strickland has been experiencing pain and discomfort confirmed as outlined in the pain assessment outlined below.  was reviewed by Patty Minor MD on 6/15/2021. Pain Assessment  6/15/2021   Location of Pain Foot;Leg   Pain Location Comment -   Location Modifiers Right;Left   Severity of Pain 7   Quality of Pain Aching;Locking; Popping   Quality of Pain Comment -   Duration of Pain A few hours   Duration of Pain Comment -   Frequency of Pain Several days a week   Frequency of Pain Comment -   Date Pain First Started -   Aggravating Factors Walking;Stairs;Standing   Aggravating Factors Comment -   Limiting Behavior Some   Relieving Factors Elevation;Heat;Rest;Other (Comment)   Relieving Factors Comment topical biofreeze   Result of Injury No   Work-Related Injury -   Type of Injury -   Type of Injury Comment -        Sharri Britton Beatriz  has a past medical history of Asthma, Chronic constipation, Diabetes ([address] Fibromyalgia, GERD (gastroesophageal reflux disease), Hypertension, IBS (irritable bowel syndrome), Migraines, Psychiatric disorder, Scoliosis, and Urinary incontinence. Patients is employed at:         Past Medical History:   Diagnosis Date    Asthma     Chronic constipation     Diabetes ([address]     Fibromyalgia     GERD (gastroesophageal reflux disease)     Hypertension     IBS (irritable bowel syndrome)     Migraines     unknown if aura    Psychiatric disorder     she denies any diagnosis despite being on antipsychotics, SSRIs, etc in the past    Scoliosis     Urinary incontinence     mixed     Past Surgical History:   Procedure Laterality Date    HX  SECTION  ,     HX COLONOSCOPY      HX DILATION AND CURETTAGE      HX ENDOSCOPY      HX PELVIC LAPAROSCOPY       Current Outpatient Medications   Medication Sig    atenoloL (TENORMIN) 25 mg tablet TAKE 1 TABLET BY MOUTH EVERY DAY    aspirin delayed-release 81 mg tablet TAKE 1 TABLET BY MOUTH EVERY DAY    diclofenac (Voltaren) 1 % gel Apply  to affected area as needed for Pain.  atorvastatin (LIPITOR) 10 mg tablet Take 1 Tab by mouth daily.  cholecalciferol (VITAMIN D3) (50,000 UNITS /1250 MCG) capsule Take 1 Cap by mouth every seven (7) days.     gabapentin (NEURONTIN) 600 mg tablet TAKE 1 TABLET BY MOUTH TWICE A DAY    Jardiance 10 mg tablet TAKE 1 TABLET BY MOUTH EVERY DAY    glucose blood VI test strips (ASCENSIA AUTODISC VI, ONE TOUCH ULTRA TEST VI) strip Check blood glucose 4 x day    lancets misc Check blood glucose 4 x day    insulin glargine (LANTUS,BASAGLAR) 100 unit/mL (3 mL) inpn 22 Units by SubCUTAneous route nightly.  Insulin Needles, Disposable, 31 gauge x 5/16\" ndle To use daily with insulin injection subcutaneously    montelukast (SINGULAIR) 10 mg tablet Take 1 Tab by mouth nightly.  valACYclovir (VALTREX) 500 mg tablet daily.  ANORO ELLIPTA 62.5-25 mcg/actuation inhaler 1 INH DAILY - USE EVERY DAY    AIMOVIG AUTOINJECTOR 70 mg/mL injection AS DIRECTED - 1 INJECTION SUBQ ONCE MONTHLY    cetirizine (ZYRTEC) 10 mg tablet TAKE 1 TABLET EVERY DAY    mometasone (NASONEX) 50 mcg/actuation nasal spray USE 1-2 SPRAYS INTO EACH NOSTRIL EVERY DAY AS NEEDED    FERRETTS IPS 40 mg/15 mL liqd 15 ML ORALLY 2 TIMES PER DAY.  EPINEPHrine (EPIPEN) 0.3 mg/0.3 mL injection AS DIRECTED AS NEEDED INTRAMUSCULAR 1 DAYS    albuterol (PROVENTIL HFA, VENTOLIN HFA, PROAIR HFA) 90 mcg/actuation inhaler Take 2 Puffs by inhalation every four (4) hours as needed for Wheezing or Shortness of Breath.  esomeprazole (NEXIUM) 40 mg capsule Take  by mouth daily.  POLYETHYLENE GLYCOL 3350 (MIRALAX PO) Take  by mouth.  CALCIUM CARBONATE/MAG HYDROX (MYLANTA PO) Take  by mouth as needed.  diclofenac EC (VOLTAREN) 50 mg EC tablet Take 1 Tab by mouth two (2) times daily as needed for Pain. (Patient not taking: Reported on 6/15/2021)    olmesartan (BENICAR) 40 mg tablet TAKE 1 TABLET BY MOUTH EVERY DAY (Patient not taking: Reported on 6/9/2021)    clotrimazole-betamethasone (LOTRISONE) topical cream APPLY THIN LAYER TO AFFECTED SITE 2 X DAY (Patient not taking: Reported on 6/9/2021)     No current facility-administered medications for this visit. Allergies   Allergen Reactions    Iodine Hives and Nausea and Vomiting    Iodinated Contrast Media Hives    Oxycodone-Acetaminophen Other (comments)     Hallucinations     Prochlorperazine Other (comments)    Propoxyphene-Acetaminophen Unknown (comments)     Allergy to propoxyphene only.   Has previously tolerated acetaminophen    Reglan [Metoclopramide] Not Reported This Time    Sulfa (Sulfonamide Antibiotics) Not Reported This Time    Wygesic Not Reported This Time     Social History     Occupational History    Not on file   Tobacco Use    Smoking status: Never Smoker    Smokeless tobacco: Never Used   Substance and Sexual Activity    Alcohol use: No    Drug use: No    Sexual activity: Not on file     Family History   Problem Relation Age of Onset    Kidney Disease Father     Other Father         Pancreatic Disease    Cancer Father     Heart Disease Father     Heart Attack Father     Thyroid Disease Other         Grandparent        DIAGNOSTIC LAB DATA      Lab Results   Component Value Date/Time    Hemoglobin A1c 9.0 (H) 09/22/2020 06:30 AM    Hemoglobin A1c 5.9 (H) 05/21/2020 08:07 AM    Hemoglobin A1c 6.6 (H) 01/15/2020 03:53 AM    //   Lab Results   Component Value Date/Time    Glucose 79 04/13/2021 12:19 PM    Glucose (POC) 378 (H) 09/22/2020 11:08 AM    Glucose POC [facility] 09/28/2020 03:00 PM        Lab Results   Component Value Date/Time    Hemoglobin A1c (POC) 5.2 01/14/2021 03:43 PM         Lab Results   Component Value Date/Time    VITAMIN D, 25-HYDROXY 27.6 (L) 04/13/2021 12:19 PM         REVIEW OF SYSTEMS : 6/15/2021  ALL BELOW ARE Negative except : SEE HPI     All other systems reviewed and are negative. 12 point review of systems otherwise negative unless noted in HPI. DIAGNOSTIC IMAGING /ORDERS       Orders Placed This Encounter    EMPL [facility] [address]     Referral Priority:   Routine     Referral Type:   PT/OT/ST     Referral Reason:   Specialty Services Required     Number of Visits Requested:   1         MRI Results (most recent):  Results from Orders Only encounter on 05/20/21    MRI TIB/FIB LT WO CONT    Narrative  MRI TIB/FIB LT WO CONT: 6/8/2021 12:46 PM    CLINICAL INFORMATION  left mid leg pain. Interstitial myositis.     COMPARISON  Left knee radiographs 3 May 2021, left tibia/fibula radiographs 17 August 2020    TECHNIQUE  Multiplanar MR images obtained of the left tibia/fibula utilizing T1 and  T2-weighted sequences. FINDINGS    Focal 1 cm osteochondral lesion at the posterior margin of the medial femoral  condyle with subchondral edema/cystic change. Osseous structures of the imaged left lower extremity otherwise show normal  marrow signal intensity and alignment. Neurovascular bundles follow normal course and caliber. Muscle bulk is preserved. Impression  No MRI findings of myositis. Incidentally noted 1 cm osteochondral lesion of the posterior margin of the  medial tibial plateau. I have reviewed the results of the above study. The interpretation of this study is my professional opinion. On this date 06/15/2021 I have spent 20 minutes reviewing previous notes, test results and face to face with the patient discussing the diagnosis and importance of compliance with the treatment plan as well as documenting on the day of the visit. An electronic signature was used to authenticate this note. Bianca Grimm MD  6/15/2021  12:25 PM      Disclaimer: Sections of this note are dictated using utilizing voice recognition software, which may have resulted in some phonetic based errors in grammar and contents. Even though attempts were made to correct all the mistakes, some may have been missed, and remained in the body of the document. If questions arise, please contact our department. Eleanora Lesches may have a reminder for a \"due or due soon\" health maintenance. I have asked that she contact her primary care provider for follow-up on this health maintenance.       Bianca Grimm MD  6/15/2021  12:25 PM

## 2021-08-05 ENCOUNTER — OFFICE VISIT (OUTPATIENT)
Dept: FAMILY MEDICINE CLINIC | Age: 36
End: 2021-08-05
Payer: MEDICARE

## 2021-08-05 VITALS
OXYGEN SATURATION: 99 % | BODY MASS INDEX: 29.64 KG/M2 | DIASTOLIC BLOOD PRESSURE: 84 MMHG | RESPIRATION RATE: 16 BRPM | HEIGHT: 59 IN | WEIGHT: 147 LBS | SYSTOLIC BLOOD PRESSURE: 138 MMHG | HEART RATE: 84 BPM | TEMPERATURE: 98 F

## 2021-08-05 DIAGNOSIS — M54.50 CHRONIC MIDLINE LOW BACK PAIN, UNSPECIFIED WHETHER SCIATICA PRESENT: Primary | ICD-10-CM

## 2021-08-05 DIAGNOSIS — I10 ESSENTIAL (PRIMARY) HYPERTENSION: ICD-10-CM

## 2021-08-05 DIAGNOSIS — G43.909 MIGRAINE WITHOUT STATUS MIGRAINOSUS, NOT INTRACTABLE, UNSPECIFIED MIGRAINE TYPE: ICD-10-CM

## 2021-08-05 DIAGNOSIS — E78.5 DYSLIPIDEMIA: ICD-10-CM

## 2021-08-05 DIAGNOSIS — G89.29 CHRONIC MIDLINE LOW BACK PAIN, UNSPECIFIED WHETHER SCIATICA PRESENT: Primary | ICD-10-CM

## 2021-08-05 DIAGNOSIS — H69.82 DYSFUNCTION OF LEFT EUSTACHIAN TUBE: ICD-10-CM

## 2021-08-05 DIAGNOSIS — E11.65 TYPE 2 DIABETES MELLITUS WITH HYPERGLYCEMIA, WITHOUT LONG-TERM CURRENT USE OF INSULIN (HCC): ICD-10-CM

## 2021-08-05 DIAGNOSIS — H92.02 LEFT EAR PAIN: ICD-10-CM

## 2021-08-05 DIAGNOSIS — E55.9 VITAMIN D DEFICIENCY: ICD-10-CM

## 2021-08-05 DIAGNOSIS — G62.9 NEUROPATHY: ICD-10-CM

## 2021-08-05 DIAGNOSIS — F39 MOOD DISORDER (HCC): ICD-10-CM

## 2021-08-05 DIAGNOSIS — J45.909 UNCOMPLICATED ASTHMA, UNSPECIFIED ASTHMA SEVERITY, UNSPECIFIED WHETHER PERSISTENT: ICD-10-CM

## 2021-08-05 PROCEDURE — 3044F HG A1C LEVEL LT 7.0%: CPT | Performed by: FAMILY MEDICINE

## 2021-08-05 PROCEDURE — G8427 DOCREV CUR MEDS BY ELIG CLIN: HCPCS | Performed by: FAMILY MEDICINE

## 2021-08-05 PROCEDURE — G8752 SYS BP LESS 140: HCPCS | Performed by: FAMILY MEDICINE

## 2021-08-05 PROCEDURE — G8417 CALC BMI ABV UP PARAM F/U: HCPCS | Performed by: FAMILY MEDICINE

## 2021-08-05 PROCEDURE — G8754 DIAS BP LESS 90: HCPCS | Performed by: FAMILY MEDICINE

## 2021-08-05 PROCEDURE — 99214 OFFICE O/P EST MOD 30 MIN: CPT | Performed by: FAMILY MEDICINE

## 2021-08-05 PROCEDURE — G8510 SCR DEP NEG, NO PLAN REQD: HCPCS | Performed by: FAMILY MEDICINE

## 2021-08-05 PROCEDURE — 2022F DILAT RTA XM EVC RTNOPTHY: CPT | Performed by: FAMILY MEDICINE

## 2021-08-05 RX ORDER — MINERAL OIL
180 ENEMA (ML) RECTAL DAILY
Qty: 90 TABLET | Refills: 1 | Status: SHIPPED | OUTPATIENT
Start: 2021-08-05 | End: 2021-11-01

## 2021-08-05 NOTE — PROGRESS NOTES
Chief Complaint   Patient presents with    Follow Up Chronic Condition    Ear Pain     draining down throat     1. Have you been to the ER, urgent care clinic since your last visit? Hospitalized since your last visit? No    2. Have you seen or consulted any other health care providers outside of the [address] [place] [address] since your last visit? Include any pap smears or colon screening.  No

## 2021-08-05 NOTE — PROGRESS NOTES
SHIRA  Kin Fu comes in for follow-up care. Ear pain: Patient has pain left ear with plugged up sensation. Has some drainage going down her throat left aspect. She has been on Flonase and takes Zyrtec and Singulair. Denies fever or chills. Denies ear discharge. Denies tinnitus, vertigo or hearing loss. Suspect allergies with possible eustachian tube dysfunction. She can take Tylenol for pain. She will continue to use the Flonase. We will discontinue the Zyrtec and give Allegra. She will continue with the Singulair. I will follow-up in case of no improvement. Chronic back pain: Patient has chronic low back pain with sciatica. Has been seen by the spine specialist in the past.  Previous MRI done was unremarkable. She has numbness and tingling radiating down her left lower extremity. Denies foot drop. Denies bladder or bowel dysfunction. Pain is disabling to the point where she walks for short distances and has to stop to rest.  This is less than 200 feet. She would like to have disability placard and this will be given. She also has been seen by spine specialist in the past.  She would like to see a different spine specialist.  Referral will be placed. Patient wonders about seeing a chiropractor or going into pain management. This will be discussed with her spine specialist.  For now she can take Tylenol arthritis for pain as needed. She also has diclofenac to use as needed. Diabetes mellitus type 2: Patient has type 2 diabetes mellitus. She is followed up by the endocrinologist.  States that her blood glucose numbers have been stable. Her last HbA1c was 5.2. We will recheck labs. She is on insulin and Jardiance. Hypertension: Patient has hypertension. Blood pressure is stable. She denies headache, changes in vision or focal weakness. She is on olmesartan and atenolol. We will continue with the current treatment plan. Follow-up at next visit. We will recheck labs.   GERD: Patient has gastroesophageal reflux disease. Gets heartburn on and off. Denies hematemesis or dark stool. She is on esomeprazole. Continue current management plan. Hypovitaminosis D: Patient has history of low vitamin D. She is on vitamin D replacement therapy. She would like recheck of labs today. Dyslipidemia: Patient has dyslipidemia. She has been exercising and taking a diet low in polysaturated fats. She is on Lipitor. We will recheck lipid panel today. Neuropathy: Patient has neuropathy with numbness and tingling of the hands and the feet. She is on gabapentin. Stable on medication. Continue current treatment plan. Asthma: Patient has a history of asthma. She is on albuterol inhaler. Also takes Singulair for allergies. She will continue with this medication. Denies cough, wheeze, chest tightness or shortness of breath. Denies hemoptysis, fever or chills. Migraine headaches: Patient has a history of migraine headaches. Has been followed up in the past by the neurologist.  She is on Aimovig. Past Medical History  Past Medical History:   Diagnosis Date    Asthma     Chronic constipation     Diabetes ([facility] [address]     Fibromyalgia     GERD (gastroesophageal reflux disease)     Hypertension     IBS (irritable bowel syndrome)     Migraines     unknown if aura    Psychiatric disorder     she denies any diagnosis despite being on antipsychotics, SSRIs, etc in the past    Scoliosis     Urinary incontinence     mixed       Surgical History  Past Surgical History:   Procedure Laterality Date    HX  SECTION  ,     HX COLONOSCOPY      HX DILATION AND CURETTAGE      HX ENDOSCOPY      HX PELVIC LAPAROSCOPY          Medications  Current Outpatient Medications   Medication Sig Dispense Refill    fexofenadine (ALLEGRA) 180 mg tablet Take 1 Tablet by mouth daily.  90 Tablet 1    atorvastatin (LIPITOR) 10 mg tablet TAKE 1 TABLET BY MOUTH EVERY DAY 90 Tablet 1    diclofenac EC (VOLTAREN) 50 mg EC tablet TAKE 1 TAB BY MOUTH TWO (2) TIMES DAILY AS NEEDED FOR PAIN. 60 Tablet 1    atenoloL (TENORMIN) 25 mg tablet TAKE 1 TABLET BY MOUTH EVERY DAY 90 Tab 1    aspirin delayed-release 81 mg tablet TAKE 1 TABLET BY MOUTH EVERY DAY 90 Tab 1    diclofenac (Voltaren) 1 % gel Apply  to affected area as needed for Pain.  cholecalciferol (VITAMIN D3) (50,000 UNITS /1250 MCG) capsule Take 1 Cap by mouth every seven (7) days. 13 Cap 3    gabapentin (NEURONTIN) 600 mg tablet TAKE 1 TABLET BY MOUTH TWICE A DAY      Jardiance 10 mg tablet TAKE 1 TABLET BY MOUTH EVERY DAY 30 Tab 2    glucose blood VI test strips (ASCENSIA AUTODISC VI, ONE TOUCH ULTRA TEST VI) strip Check blood glucose 4 x day 400 Strip 3    lancets misc Check blood glucose 4 x day 400 Each 3    insulin glargine (LANTUS,BASAGLAR) 100 unit/mL (3 mL) inpn 22 Units by SubCUTAneous route nightly. 30 mL 1    Insulin Needles, Disposable, 31 gauge x 5/16\" ndle To use daily with insulin injection subcutaneously 1 Package 11    montelukast (SINGULAIR) 10 mg tablet Take 1 Tab by mouth nightly.  valACYclovir (VALTREX) 500 mg tablet daily.  ANORO ELLIPTA 62.5-25 mcg/actuation inhaler 1 INH DAILY - USE EVERY DAY  6    AIMOVIG AUTOINJECTOR 70 mg/mL injection AS DIRECTED - 1 INJECTION SUBQ ONCE MONTHLY  3    mometasone (NASONEX) 50 mcg/actuation nasal spray USE 1-2 SPRAYS INTO EACH NOSTRIL EVERY DAY AS NEEDED  0    FERRETTS IPS 40 mg/15 mL liqd 15 ML ORALLY 2 TIMES PER DAY. 2    EPINEPHrine (EPIPEN) 0.3 mg/0.3 mL injection AS DIRECTED AS NEEDED INTRAMUSCULAR 1 DAYS  5    albuterol (PROVENTIL HFA, VENTOLIN HFA, PROAIR HFA) 90 mcg/actuation inhaler Take 2 Puffs by inhalation every four (4) hours as needed for Wheezing or Shortness of Breath. 1 Inhaler 0    esomeprazole (NEXIUM) 40 mg capsule Take  by mouth daily.  POLYETHYLENE GLYCOL 3350 (MIRALAX PO) Take  by mouth.       CALCIUM CARBONATE/MAG HYDROX (MYLANTA PO) Take  by mouth as needed.  olmesartan (BENICAR) 40 mg tablet TAKE 1 TABLET BY MOUTH EVERY DAY (Patient not taking: Reported on 6/9/2021) 90 Tab 1    clotrimazole-betamethasone (LOTRISONE) topical cream APPLY THIN LAYER TO AFFECTED SITE 2 X DAY (Patient not taking: Reported on 6/9/2021) 45 g 0       Allergies  Allergies   Allergen Reactions    Iodine Hives and Nausea and Vomiting    Iodinated Contrast Media Hives    Oxycodone-Acetaminophen Other (comments)     Hallucinations     Prochlorperazine Other (comments)    Propoxyphene-Acetaminophen Unknown (comments)     Allergy to propoxyphene only. Has previously tolerated acetaminophen    Reglan [Metoclopramide] Not Reported This Time    Sulfa (Sulfonamide Antibiotics) Not Reported This Time    Wygesic Not Reported This Time       Family History  Family History   Problem Relation Age of Onset    Kidney Disease Father     Other Father         Pancreatic Disease    Cancer Father     Heart Disease Father     Heart Attack Father     Thyroid Disease Other         Grandparent       Social History  Social History     Socioeconomic History    Marital status: SINGLE     Spouse name: Not on file    Number of children: Not on file    Years of education: Not on file    Highest education level: Not on file   Occupational History    Not on file   Tobacco Use    Smoking status: Never Smoker    Smokeless tobacco: Never Used   Vaping Use    Vaping Use: Never used   Substance and Sexual Activity    Alcohol use: No    Drug use: No    Sexual activity: Yes     Partners: Male   Other Topics Concern    Not on file   Social History Narrative    Not on file     Social Determinants of Health     Financial Resource Strain:     Difficulty of Paying Living Expenses:    Food Insecurity:     Worried About Running Out of Food in the Last Year:     Ran Out of Food in the Last Year:    Transportation Needs:     Lack of Transportation (Medical):      Lack of Transportation (Non-Medical):    Physical Activity:     Days of Exercise per Week:     Minutes of Exercise per Session:    Stress:     Feeling of Stress :    Social Connections:     Frequency of Communication with Friends and Family:     Frequency of Social Gatherings with Friends and Family:     Attends Zoroastrianism Services:     Active Member of Clubs or Organizations:     Attends Club or Organization Meetings:     Marital Status:    Intimate Partner Violence:     Fear of Current or Ex-Partner:     Emotionally Abused:     Physically Abused:     Sexually Abused:        Review of Systems  Review of Systems - Review of all systems is negative except as noted above in the HPI.     Vital Signs  Visit Vitals  /84 (BP 1 Location: Left upper arm, BP Patient Position: Sitting, BP Cuff Size: Adult)   Pulse 84   Temp 98 °F (36.7 °C) (Oral)   Resp 16   Ht 4' 11\" (1.499 m)   Wt 147 lb (66.7 kg)   SpO2 99%   BMI 29.69 kg/m²         Physical Exam  Physical Examination: General appearance - oriented to person, place, and time and acyanotic, in no respiratory distress  Mental status - affect appropriate to mood  Ears - bilateral TM's and external ear canals normal  Nose - normal and patent, no erythema, discharge or polyps  Mouth - mucous membranes moist, pharynx normal without lesions  Neck - supple, no significant adenopathy  Lymphatics - no palpable lymphadenopathy  Chest - no tachypnea, retractions or cyanosis  Heart - S1 and S2 normal  Abdomen - no rebound tenderness noted  Back exam - limited range of motion  Neurological - abnormal neurological exam unchanged from prior examinations  Musculoskeletal - osteoarthritic changes noted in both hands  Extremities - intact peripheral pulses    Results  Results for orders placed or performed in visit on 05/27/21   EXERCISE CARDIAC STRESS TEST   Result Value Ref Range    Target  bpm    ST Elevation (mm) 0 mm    ST Depression (mm) 0 mm    Angina Index 0     STRESS SR [facility] TREADMILL SCORE 7     Baseline HR 88 bpm    Baseline  mmHg    Stress Base Diastolic BP 84 mmHg    Post peak  bpm    Percent HR 90 %    Stress Base Systolic  mmHg    Stress Rate Pressure Product 28,220 bpm*mmHg    Stress Base Diastolic BP 90 mmHg    Exercise duration time 7:1 min:sec    Estimated workload 10.1 METS    Stress Stage 1  bpm    Stress Stage 1 /78 mmHg    Stress Stage 2  bpm    Stress Stage 2 /84 mmHg    Recovery Stage 1  bpm    Recovery Stage 1 /90 mmHg    Recovery Stage 2  bpm    Recovery Stage 2 /76 mmHg    Recovery Stage 3  bpm    Recovery Stage 3 /70 mmHg       ASSESSMENT and PLAN    ICD-10-CM ICD-9-CM    1. Chronic midline low back pain, unspecified whether sciatica present  M54.5 724.2 REFERRAL TO ORTHOPEDICS    G89.29 338.29    2. Left ear pain  H92.02 388.70    3. Dysfunction of left eustachian tube  H69.82 381.81 fexofenadine (ALLEGRA) 180 mg tablet   4. Type 2 diabetes mellitus with hyperglycemia, without long-term current use of insulin ([facility])  E11.65 250.00 HEMOGLOBIN A1C WITH EAG     059.18 METABOLIC PANEL, COMPREHENSIVE      CBC W/O DIFF   5. Vitamin D deficiency  E55.9 268.9 VITAMIN D, 25 HYDROXY   6. Dyslipidemia  E78.5 272.4 LIPID PANEL   7. Essential (primary) hypertension  I10 401.9    8. Mood disorder ([facility])  F39 296.90    9. Migraine without status migrainosus, not intractable, unspecified migraine type  G43.909 346.90    10. Uncomplicated asthma, unspecified asthma severity, unspecified whether persistent  J45.909 493.90    11.  Neuropathy  G62.9 355.9      lab results and schedule of future lab studies reviewed with patient  reviewed diet, exercise and weight control  cardiovascular risk and specific lipid/LDL goals reviewed  reviewed medications and side effects in detail  specific diabetic recommendations: low cholesterol diet, weight control and daily exercise discussed, home glucose monitoring emphasized, all medications, side effects and compliance discussed carefully, annual eye examinations at Ophthalmology discussed, glycohemoglobin and other lab monitoring discussed and long term diabetic complications discussed  use of aspirin to prevent MI and TIA's discussed      I have discussed the diagnosis with the patient and the intended plan of care as seen in the above orders. The patient has received an after-visit summary and questions were answered concerning future plans. I have discussed medication, side effects, and warnings with the patient in detail. The patient verbalized understanding and is in agreement with the plan of care. The patient will contact the office with any additional concerns. Mikayla Cee MD    PLEASE NOTE:   This document has been produced using voice recognition software.  Unrecognized errors in transcription may be present

## 2021-08-10 NOTE — PROGRESS NOTES
AMBULATORY PROGRESS NOTE      Patient: Herve Lakeland             MRN: [id number]     SSN: [id number] Body mass index is 28.68 kg/m². YOB: 1985     AGE: 39 y.o. EX: female    PCP: Caryle Gallop, MD       IMPRESSION //  DIAGNOSIS AND TREATMENT PLAN        Herve Lakeland has a diagnosis of:      She has and continues describe left-sided sciatica, the peroneal distribution, left side L4 nerve root distribution. Recommendation, is for MRI for    Lumbar spine to reassess the L4 nerve root. To see if there is any compression changes along the L4 nerve root on the left side. She also has a history of diabetes. She had her MRI looked normal, in this region, then I will have her get the EMG nerve conduction studies as well to see there is any changes on the signal consistent with polyneuropathy of her diabetes. She is not taking any oral agents for diabetes at this current time. She has seen Dr. Shagufta Null in the past, for her back. DIAGNOSES    1. Left sided sciatica    2. 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 Answer:   Unknown        PLAN:    1. MRI of lumbar spine  2. Anticipate referral to Neurology      RTO-  F/u  Of MRI    [facility]  expresses understanding of the diagnosis, treatment plan, and all of their proposed questions were answered to their satisfaction. Patient education has been provided re the diagnoses.          HPI //  OBJECTIVE EXAMINATION        Sharri Chiu IS A 39 y.o. female who is a/an  established patient, presenting to my outpatient office for evaluation of  the following chief complaint(s):     Chief Complaint   Patient presents with    Leg Pain     left leg    Foot Pain     both foot       At LOV patient presented w/ left leg and bilateral foot pain. Formal PT: Hamstring streting stretching, proximal calves stretching. Since LOV patient presents w/ bilateral foot pain and left leg pain. Reports occasional startup left leg pain in the morning. Notes PT has provided minimal relief. PT stated a nerve in her back is causing left leg pain. She reports using Voltaren gel on her leg and Biofreeze for left leg and bilateral foot pain. Mentions prolonged standing  occasionally causes throbbing in her right leg and her left leg to give out on her. H/o of Scoliosis and DM. She used to see Dr. Milagros Sarmiento for her back. MRI of back about 2 years ago. She has younger kids. Visit Vitals  Pulse 84   Temp 98.7 °F (37.1 °C) (Temporal)   Resp 15   Ht 4' 11\" (1.499 m)   Wt 142 lb (64.4 kg)   SpO2 95%   BMI 28.68 kg/m²       Appearance: Alert, well appearing and pleasant patient who is in no distress, oriented to person, place/time, and who follows commands. This patient is accompanied in the examination room by her  self. There is signs of: no dementia  Psychiatric: Affect/mood are appropriate. Speech normal in context and clarity, memory intact grossly, no involuntary movements - tremors. Patient arrives to office via: without assistive device:   H EENT (2): Head normocephalic & atraumatic. Eye: pupils are round// EOM are intact // Neck: ROM WNL  // Hearings Intact   Respiratory: Breathing non labored     ANKLE/FOOT bilateral     Gait: normal  Tenderness: mild post lateral portion of left medial peroneal nerve, and posterior medial posterior lateral possible gastroc regions, has some mild tenderness  Cutaneous:   WNL. Joint Motion:   WNL  Joint / Tendon Stability: No Ankle or Subtalar instability or joint laxity.                        No peroneal sublux ability or dislocation  Alignment: neutral Hindfoot,    Neuro Motor/Sensory: NL/NL  Vascular: NL foot/ankle pulses,   Lymphatics: No extremity lymphedema, No calf swelling, no tenderness to calf muscles. CHART REVIEW     Gracie Welch has been experiencing pain and discomfort confirmed as outlined in the pain assessment outlined below.  was reviewed by Jesus Woodard MD on 2021. Pain Assessment  2021   Location of Pain Foot; Ankle   Pain Location Comment -   Location Modifiers Left;Right   Severity of Pain 7   Quality of Pain Aching   Quality of Pain Comment -   Duration of Pain -   Duration of Pain Comment -   Frequency of Pain -   Frequency of Pain Comment -   Date Pain First Started -   Aggravating Factors Walking   Aggravating Factors Comment -   Limiting Behavior -   Relieving Factors -   Relieving Factors Comment -   Result of Injury -   Work-Related Injury -   Type of Injury -   Type of Injury Comment -        Sharri Felipecariajay Kendall  has a past medical history of Asthma, Chronic constipation, Diabetes (HCC), Fibromyalgia, GERD (gastroesophageal reflux disease), Hypertension, IBS (irritable bowel syndrome), Migraines, Psychiatric disorder, Scoliosis, and Urinary incontinence. Patients is employed at:         Past Medical History:   Diagnosis Date    Asthma     Chronic constipation     Diabetes ([facility] [address]     Fibromyalgia     GERD (gastroesophageal reflux disease)     Hypertension     IBS (irritable bowel syndrome)     Migraines     unknown if aura    Psychiatric disorder     she denies any diagnosis despite being on antipsychotics, SSRIs, etc in the past    Scoliosis     Urinary incontinence     mixed     Past Surgical History:   Procedure Laterality Date    HX  SECTION  ,     HX COLONOSCOPY      HX DILATION AND CURETTAGE      HX ENDOSCOPY      HX PELVIC LAPAROSCOPY       Current Outpatient Medications   Medication Sig    fexofenadine (ALLEGRA) 180 mg tablet Take 1 Tablet by mouth daily.     atorvastatin (LIPITOR) 10 mg tablet TAKE 1 TABLET BY MOUTH EVERY DAY    diclofenac EC (VOLTAREN) 50 mg EC tablet TAKE 1 TAB BY MOUTH TWO (2) TIMES DAILY AS NEEDED FOR PAIN.  atenoloL (TENORMIN) 25 mg tablet TAKE 1 TABLET BY MOUTH EVERY DAY    aspirin delayed-release 81 mg tablet TAKE 1 TABLET BY MOUTH EVERY DAY    diclofenac (Voltaren) 1 % gel Apply  to affected area as needed for Pain.  cholecalciferol (VITAMIN D3) (50,000 UNITS /1250 MCG) capsule Take 1 Cap by mouth every seven (7) days.  gabapentin (NEURONTIN) 600 mg tablet TAKE 1 TABLET BY MOUTH TWICE A DAY    Jardiance 10 mg tablet TAKE 1 TABLET BY MOUTH EVERY DAY    olmesartan (BENICAR) 40 mg tablet TAKE 1 TABLET BY MOUTH EVERY DAY    clotrimazole-betamethasone (LOTRISONE) topical cream APPLY THIN LAYER TO AFFECTED SITE 2 X DAY    glucose blood VI test strips (ASCENSIA AUTODISC VI, ONE TOUCH ULTRA TEST VI) strip Check blood glucose 4 x day    lancets misc Check blood glucose 4 x day    insulin glargine (LANTUS,BASAGLAR) 100 unit/mL (3 mL) inpn 22 Units by SubCUTAneous route nightly.  Insulin Needles, Disposable, 31 gauge x 5/16\" ndle To use daily with insulin injection subcutaneously    montelukast (SINGULAIR) 10 mg tablet Take 1 Tab by mouth nightly.  valACYclovir (VALTREX) 500 mg tablet daily.  ANORO ELLIPTA 62.5-25 mcg/actuation inhaler 1 INH DAILY - USE EVERY DAY    AIMOVIG AUTOINJECTOR 70 mg/mL injection AS DIRECTED - 1 INJECTION SUBQ ONCE MONTHLY    mometasone (NASONEX) 50 mcg/actuation nasal spray USE 1-2 SPRAYS INTO EACH NOSTRIL EVERY DAY AS NEEDED    FERRETTS IPS 40 mg/15 mL liqd 15 ML ORALLY 2 TIMES PER DAY.  EPINEPHrine (EPIPEN) 0.3 mg/0.3 mL injection AS DIRECTED AS NEEDED INTRAMUSCULAR 1 DAYS    albuterol (PROVENTIL HFA, VENTOLIN HFA, PROAIR HFA) 90 mcg/actuation inhaler Take 2 Puffs by inhalation every four (4) hours as needed for Wheezing or Shortness of Breath.  esomeprazole (NEXIUM) 40 mg capsule Take  by mouth daily.  POLYETHYLENE GLYCOL 3350 (MIRALAX PO) Take  by mouth.     CALCIUM CARBONATE/MAG HYDROX (MYLANTA PO) Take  by mouth as needed. No current facility-administered medications for this visit. Allergies   Allergen Reactions    Iodine Hives and Nausea and Vomiting    Iodinated Contrast Media Hives    Oxycodone-Acetaminophen Other (comments)     Hallucinations     Prochlorperazine Other (comments)    Propoxyphene-Acetaminophen Unknown (comments)     Allergy to propoxyphene only. Has previously tolerated acetaminophen    Reglan [Metoclopramide] Not Reported This Time    Sulfa (Sulfonamide Antibiotics) Not Reported This Time    Wygesic Not Reported This Time     Social History     Occupational History    Not on file   Tobacco Use    Smoking status: Never Smoker    Smokeless tobacco: Never Used   Vaping Use    Vaping Use: Never used   Substance and Sexual Activity    Alcohol use: No    Drug use: No    Sexual activity: Yes     Partners: Male     Family History   Problem Relation Age of Onset    Kidney Disease Father     Other Father         Pancreatic Disease    Cancer Father     Heart Disease Father     Heart Attack Father     Thyroid Disease Other         Grandparent        DIAGNOSTIC LAB DATA      Lab Results   Component Value Date/Time    Hemoglobin A1c 9.0 (H) 09/22/2020 06:30 AM    Hemoglobin A1c 5.9 (H) 05/21/2020 08:07 AM    Hemoglobin A1c 6.6 (H) 01/15/2020 03:53 AM    //   Lab Results   Component Value Date/Time    Glucose 79 04/13/2021 12:19 PM    Glucose (POC) 378 (H) 09/22/2020 11:08 AM    Glucose POC [facility] 09/28/2020 03:00 PM        Lab Results   Component Value Date/Time    Hemoglobin A1c (POC) 5.2 01/14/2021 03:43 PM         Lab Results   Component Value Date/Time    VITAMIN D, 25-HYDROXY 27.6 (L) 04/13/2021 12:19 PM         REVIEW OF SYSTEMS : 8/11/2021  ALL BELOW ARE Negative except : SEE HPI     All other systems reviewed and are negative. 12 point review of systems otherwise negative unless noted in HPI.       DIAGNOSTIC IMAGING /ORDERS       Orders Placed This Encounter    MRI LUMB SPINE WO CONT     Standing Status:   Future     Standing Expiration Date:   9/11/2021     Order Specific Question:   Is Patient Pregnant? Answer:   Unknown               I have reviewed the results of the above study. The interpretation of this study is my professional opinion. On this date 08/11/2021 I have spent 30 minutes reviewing previous notes, test results and face to face with the patient discussing the diagnosis and importance of compliance with the treatment plan as well as documenting on the day of the visit. An electronic signature was used to authenticate this note. Disclaimer: Sections of this note are dictated using utilizing voice recognition software, which may have resulted in some phonetic based errors in grammar and contents. Even though attempts were made to correct all the mistakes, some may have been missed, and remained in the body of the document. If questions arise, please contact our department. Crystal Amador may have a reminder for a \"due or due soon\" health maintenance. I have asked that she contact her primary care provider for follow-up on this health maintenance.     Virginia Lopez, as dictated by  Surjit Baron MD  8/11/2021  8:53 AM

## 2021-08-11 ENCOUNTER — OFFICE VISIT (OUTPATIENT)
Dept: ORTHOPEDIC SURGERY | Age: 36
End: 2021-08-11
Payer: MEDICARE

## 2021-08-11 VITALS
OXYGEN SATURATION: 95 % | RESPIRATION RATE: 15 BRPM | TEMPERATURE: 98.7 F | HEIGHT: 59 IN | HEART RATE: 84 BPM | BODY MASS INDEX: 28.63 KG/M2 | WEIGHT: 142 LBS

## 2021-08-11 DIAGNOSIS — M79.605 LEFT LEG PAIN: ICD-10-CM

## 2021-08-11 DIAGNOSIS — M54.32 LEFT SIDED SCIATICA: Primary | ICD-10-CM

## 2021-08-11 DIAGNOSIS — M60.162 INTERSTITIAL MYOSITIS OF LEFT LOWER LEG: ICD-10-CM

## 2021-08-11 PROCEDURE — G8510 SCR DEP NEG, NO PLAN REQD: HCPCS | Performed by: ORTHOPAEDIC SURGERY

## 2021-08-11 PROCEDURE — G8427 DOCREV CUR MEDS BY ELIG CLIN: HCPCS | Performed by: ORTHOPAEDIC SURGERY

## 2021-08-11 PROCEDURE — G8756 NO BP MEASURE DOC: HCPCS | Performed by: ORTHOPAEDIC SURGERY

## 2021-08-11 PROCEDURE — 99214 OFFICE O/P EST MOD 30 MIN: CPT | Performed by: ORTHOPAEDIC SURGERY

## 2021-08-11 PROCEDURE — G8417 CALC BMI ABV UP PARAM F/U: HCPCS | Performed by: ORTHOPAEDIC SURGERY

## 2021-08-18 ENCOUNTER — LAB ONLY (OUTPATIENT)
Dept: FAMILY MEDICINE CLINIC | Age: 36
End: 2021-08-18
Payer: MEDICARE

## 2021-08-18 DIAGNOSIS — Z01.89 ENCOUNTER FOR LABORATORY TEST: Primary | ICD-10-CM

## 2021-08-18 DIAGNOSIS — E11.65 TYPE 2 DIABETES MELLITUS WITH HYPERGLYCEMIA, WITHOUT LONG-TERM CURRENT USE OF INSULIN (HCC): ICD-10-CM

## 2021-08-18 DIAGNOSIS — E55.9 VITAMIN D DEFICIENCY: ICD-10-CM

## 2021-08-18 DIAGNOSIS — E78.5 DYSLIPIDEMIA: ICD-10-CM

## 2021-08-18 PROCEDURE — 36415 COLL VENOUS BLD VENIPUNCTURE: CPT | Performed by: FAMILY MEDICINE

## 2021-08-19 LAB
25(OH)D3+25(OH)D2 SERPL-MCNC: 64.8 NG/ML (ref 30–100)
ALBUMIN SERPL-MCNC: 4.5 G/DL (ref 3.8–4.8)
ALBUMIN/GLOB SERPL: 1.6 {RATIO} (ref 1.2–2.2)
ALP SERPL-CCNC: 95 IU/L (ref 48–121)
ALT SERPL-CCNC: 13 IU/L (ref 0–32)
AST SERPL-CCNC: 19 IU/L (ref 0–40)
BILIRUB SERPL-MCNC: 0.4 MG/DL (ref 0–1.2)
BUN SERPL-MCNC: 11 MG/DL (ref 6–20)
BUN/CREAT SERPL: 14 (ref 9–23)
CALCIUM SERPL-MCNC: 9.3 MG/DL (ref 8.7–10.2)
CHLORIDE SERPL-SCNC: 104 MMOL/L (ref 96–106)
CHOLEST SERPL-MCNC: 174 MG/DL (ref 100–199)
CO2 SERPL-SCNC: 24 MMOL/L (ref 20–29)
CREAT SERPL-MCNC: 0.81 MG/DL (ref 0.57–1)
ERYTHROCYTE [DISTWIDTH] IN BLOOD BY AUTOMATED COUNT: 12.5 % (ref 11.7–15.4)
EST. AVERAGE GLUCOSE BLD GHB EST-MCNC: 111 MG/DL
GLOBULIN SER CALC-MCNC: 2.9 G/DL (ref 1.5–4.5)
GLUCOSE SERPL-MCNC: 86 MG/DL (ref 65–99)
HBA1C MFR BLD: 5.5 % (ref 4.8–5.6)
HCT VFR BLD AUTO: 37 % (ref 34–46.6)
HDLC SERPL-MCNC: 34 MG/DL
HGB BLD-MCNC: 12 G/DL (ref 11.1–15.9)
IMP & REVIEW OF LAB RESULTS: NORMAL
LDLC SERPL CALC-MCNC: 131 MG/DL (ref 0–99)
MCH RBC QN AUTO: 28.4 PG (ref 26.6–33)
MCHC RBC AUTO-ENTMCNC: 32.4 G/DL (ref 31.5–35.7)
MCV RBC AUTO: 88 FL (ref 79–97)
PLATELET # BLD AUTO: 211 X10E3/UL (ref 150–450)
POTASSIUM SERPL-SCNC: 4 MMOL/L (ref 3.5–5.2)
PROT SERPL-MCNC: 7.4 G/DL (ref 6–8.5)
RBC # BLD AUTO: 4.22 X10E6/UL (ref 3.77–5.28)
SODIUM SERPL-SCNC: 139 MMOL/L (ref 134–144)
TRIGL SERPL-MCNC: 46 MG/DL (ref 0–149)
VLDLC SERPL CALC-MCNC: 9 MG/DL (ref 5–40)
WBC # BLD AUTO: 5.8 X10E3/UL (ref 3.4–10.8)

## 2021-08-21 ENCOUNTER — HOSPITAL ENCOUNTER (OUTPATIENT)
Age: 36
Discharge: HOME | End: 2021-08-21
Attending: ORTHOPAEDIC SURGERY
Payer: MEDICARE

## 2021-08-21 DIAGNOSIS — M60.162 INTERSTITIAL MYOSITIS OF LEFT LOWER LEG: ICD-10-CM

## 2021-08-21 DIAGNOSIS — M79.605 LEFT LEG PAIN: ICD-10-CM

## 2021-08-21 DIAGNOSIS — M54.32 LEFT SIDED SCIATICA: ICD-10-CM

## 2021-08-21 PROCEDURE — 72148 MRI LUMBAR SPINE W/O DYE: CPT

## 2021-08-22 RX ORDER — ATORVASTATIN CALCIUM 20 MG/1
20 TABLET, FILM COATED ORAL DAILY
Qty: 90 TABLET | Refills: 1 | Status: SHIPPED | OUTPATIENT
Start: 2021-08-22 | End: 2021-12-16 | Stop reason: DRUGHIGH

## 2021-08-22 NOTE — PROGRESS NOTES
Please let patient know her LDL cholesterol is elevated at 131. She is on Lipitor 10 mg daily. Would want this number to go down to below 100. I would recommend going up on the Lipitor to 20 mg daily. I will send in a prescription at the new dose. Recheck fasting lipid panel in 3 months. Rest of the labs are stable.   Hue Vásquez MD

## 2021-08-24 NOTE — PROGRESS NOTES
Spoke with patient at this time. Patient verbalized understanding at this time. Patient states she have not been taking her atorvastatin at this time. Patient states she will start back taking medication at this time. Patient would like the 10mg sent to the pharmacy and cancel 20 mg

## 2021-08-31 ENCOUNTER — OFFICE VISIT (OUTPATIENT)
Dept: ORTHOPEDIC SURGERY | Age: 36
End: 2021-08-31
Payer: MEDICARE

## 2021-08-31 VITALS
HEIGHT: 59 IN | OXYGEN SATURATION: 100 % | TEMPERATURE: 97.5 F | BODY MASS INDEX: 29.23 KG/M2 | HEART RATE: 83 BPM | WEIGHT: 145 LBS | RESPIRATION RATE: 16 BRPM

## 2021-08-31 DIAGNOSIS — M54.32 LEFT SIDED SCIATICA: Primary | ICD-10-CM

## 2021-08-31 PROCEDURE — G8427 DOCREV CUR MEDS BY ELIG CLIN: HCPCS | Performed by: ORTHOPAEDIC SURGERY

## 2021-08-31 PROCEDURE — G8756 NO BP MEASURE DOC: HCPCS | Performed by: ORTHOPAEDIC SURGERY

## 2021-08-31 PROCEDURE — G8432 DEP SCR NOT DOC, RNG: HCPCS | Performed by: ORTHOPAEDIC SURGERY

## 2021-08-31 PROCEDURE — G8417 CALC BMI ABV UP PARAM F/U: HCPCS | Performed by: ORTHOPAEDIC SURGERY

## 2021-08-31 PROCEDURE — 99214 OFFICE O/P EST MOD 30 MIN: CPT | Performed by: ORTHOPAEDIC SURGERY

## 2021-08-31 NOTE — PROGRESS NOTES
AMBULATORY PROGRESS NOTE      Patient: Teressa Thompson             MRN: [id number]     SSN: [id number] Body mass index is 29.29 kg/m². YOB: 1985     AGE: 39 y.o. EX: female    PCP: Bin Vasquez MD       IMPRESSION //  DIAGNOSIS AND TREATMENT PLAN        Bianca Long has a diagnosis of: So she describes low back pain. And states she has pain that shoots down her leg from her back. Her MRI looked very good to the lumbar spine I see no discogenic pathology that would explain her having this. Today she mentions having pain in the muscles, she points to the paraspinal muscles of her thoracic and lumbar regions as areas of discomfort. Denies any change in her bowel or bladder habits. Today she had some focal tenderness the paraspinal muscles of her thoracic and lumbar spine: I get her into formal PT for this she is nontender it to each hip, no signs of piriformis syndrome to either hip. DIAGNOSES    1. Left sided sciatica        Orders Placed This Encounter    REFERRAL TO PHYSICAL THERAPY     Referral Priority:   Routine     Referral Type:   PT/OT/ST     Referral Reason:   Specialty Services Required     Number of Visits Requested:   1        PLAN:    1. Advised patient to do lumbar : HEP  2. Renew PT for back: Paraspinal muscle tightness  3. Continue using medicated creams for back pain. RTO-  1 month    Sharri Long  expresses understanding of the diagnosis, treatment plan, and all of their proposed questions were answered to their satisfaction. Patient education has been provided re the diagnoses. HPI //  OBJECTIVE EXAMINATION        Sharri Chiu IS A 39 y.o. female who is a/an  established patient, presenting to my outpatient office for evaluation of  the following chief complaint(s):     Chief Complaint   Patient presents with    Lumbar Pain       At [address] [place] [address] patient presented w/ left sided sciatia. Ordered an MRI of lumbar spine. Anticipate referral to Neurology. Since LOV patient continues to endorse lumbar pain and bilateral foot pain. She notes her lumbar pain is gradually moving up her back. Mentions activity related right plantar foot pain. She is wearing good [employer] shoes. She has tenderness along the paraspinal muscles. Visit Vitals  Pulse 83   Temp 97.5 °F (36.4 °C)   Resp 16   Ht 4' 11\" (1.499 m)   Wt 145 lb (65.8 kg)   SpO2 100%   BMI 29.29 kg/m²       Appearance: Alert, well appearing and pleasant patient who is in no distress, oriented to person, place/time, and who follows commands. This patient is accompanied in the examination room by her  self. There is signs of: no dementia  Psychiatric: Affect/mood are appropriate. Speech normal in context and clarity, memory intact grossly, no involuntary movements - tremors. Patient arrives to office via: without assistive device:   H EENT (2): Head normocephalic & atraumatic. Eye: pupils are round// EOM are intact // Neck: ROM WNL  // Hearings Intact   Respiratory: Breathing non labored     ANKLE/FOOT Bilateral    Gait: normal  Tenderness: mild over  plantar fascia of right foot and   Cutaneous:   WNL. Joint Motion:   WNL  Joint / Tendon Stability: No Ankle or Subtalar instability or joint laxity. No peroneal sublux ability or dislocation  Alignment: neutral Hindfoot,    Neuro Motor/Sensory: NL/NL  Vascular: NL foot/ankle pulses,   Lymphatics: No extremity lymphedema, No calf swelling, no tenderness to calf muscles. Lumbar spine: Thoracic spine: There is is some mild focal tenderness the paraspinal muscles on each side of the spinous processes of the thoracic and lumbar spine. No focal motor or sensory deficits are noted to her bilateral extremities.     HIP REGION: both    Gait normal  Cutaneous: Skin intact, redness not present, erythema not present,drainage not present , rash not present  Visible swelling: not present  ROM: Normal IR, Normal ER   Normal FLEXION, Normal EXTENSION   Stability: none noted  Effusion: difficult to assess due to soft tissues  Tenderness: to Groin not present, GT region not present, to distal femur not present   Fullness: Popliteal region not present  Deformity: not present         CHART REVIEW     Israel Muñoz has been experiencing pain and discomfort confirmed as outlined in the pain assessment outlined below.  was reviewed by Arsen Luis MD on 2021. Pain Assessment  2021   Location of Pain Back   Pain Location Comment -   Location Modifiers -   Severity of Pain 8   Quality of Pain Sharp; Throbbing; Other (Comment)   Quality of Pain Comment pressure   Duration of Pain A few hours   Duration of Pain Comment -   Frequency of Pain Intermittent   Frequency of Pain Comment -   Date Pain First Started -   Aggravating Factors -   Aggravating Factors Comment -   Limiting Behavior Yes   Relieving Factors Heat;Other (Comment);NSAID   Relieving Factors Comment -   Result of Injury -   Work-Related Injury -   Type of Injury -   Type of Injury Comment -        Sharri Chiu  has a past medical history of Asthma, Chronic constipation, Diabetes ([address] Fibromyalgia, GERD (gastroesophageal reflux disease), Hypertension, IBS (irritable bowel syndrome), Migraines, Psychiatric disorder, Scoliosis, and Urinary incontinence.      Patients is employed at:         Past Medical History:   Diagnosis Date    Asthma     Chronic constipation     Diabetes ([address]     Fibromyalgia     GERD (gastroesophageal reflux disease)     Hypertension     IBS (irritable bowel syndrome)     Migraines     unknown if aura    Psychiatric disorder     she denies any diagnosis despite being on antipsychotics, SSRIs, etc in the past    Scoliosis     Urinary incontinence     mixed     Past Surgical History:   Procedure Laterality Date    HX  SECTION  ,     HX COLONOSCOPY      HX DILATION AND CURETTAGE      HX ENDOSCOPY      HX PELVIC LAPAROSCOPY       Current Outpatient Medications   Medication Sig    atorvastatin (LIPITOR) 20 mg tablet Take 1 Tablet by mouth daily.  fexofenadine (ALLEGRA) 180 mg tablet Take 1 Tablet by mouth daily.  diclofenac EC (VOLTAREN) 50 mg EC tablet TAKE 1 TAB BY MOUTH TWO (2) TIMES DAILY AS NEEDED FOR PAIN.  atenoloL (TENORMIN) 25 mg tablet TAKE 1 TABLET BY MOUTH EVERY DAY    aspirin delayed-release 81 mg tablet TAKE 1 TABLET BY MOUTH EVERY DAY    diclofenac (Voltaren) 1 % gel Apply  to affected area as needed for Pain.  cholecalciferol (VITAMIN D3) (50,000 UNITS /1250 MCG) capsule Take 1 Cap by mouth every seven (7) days.  gabapentin (NEURONTIN) 600 mg tablet TAKE 1 TABLET BY MOUTH TWICE A DAY    Jardiance 10 mg tablet TAKE 1 TABLET BY MOUTH EVERY DAY    glucose blood VI test strips (ASCENSIA AUTODISC VI, ONE TOUCH ULTRA TEST VI) strip Check blood glucose 4 x day    lancets misc Check blood glucose 4 x day    insulin glargine (LANTUS,BASAGLAR) 100 unit/mL (3 mL) inpn 22 Units by SubCUTAneous route nightly.  Insulin Needles, Disposable, 31 gauge x 5/16\" ndle To use daily with insulin injection subcutaneously    montelukast (SINGULAIR) 10 mg tablet Take 1 Tab by mouth nightly.  valACYclovir (VALTREX) 500 mg tablet daily.  ANORO ELLIPTA 62.5-25 mcg/actuation inhaler 1 INH DAILY - USE EVERY DAY    AIMOVIG AUTOINJECTOR 70 mg/mL injection AS DIRECTED - 1 INJECTION SUBQ ONCE MONTHLY    mometasone (NASONEX) 50 mcg/actuation nasal spray USE 1-2 SPRAYS INTO EACH NOSTRIL EVERY DAY AS NEEDED    FERRETTS IPS 40 mg/15 mL liqd 15 ML ORALLY 2 TIMES PER DAY.  EPINEPHrine (EPIPEN) 0.3 mg/0.3 mL injection AS DIRECTED AS NEEDED INTRAMUSCULAR 1 DAYS    albuterol (PROVENTIL HFA, VENTOLIN HFA, PROAIR HFA) 90 mcg/actuation inhaler Take 2 Puffs by inhalation every four (4) hours as needed for Wheezing or Shortness of Breath.     esomeprazole (NEXIUM) 40 mg capsule Take  by mouth daily.    POLYETHYLENE GLYCOL 3350 (MIRALAX PO) Take  by mouth.  CALCIUM CARBONATE/MAG HYDROX (MYLANTA PO) Take  by mouth as needed.  olmesartan (BENICAR) 40 mg tablet TAKE 1 TABLET BY MOUTH EVERY DAY (Patient not taking: Reported on 8/31/2021)    clotrimazole-betamethasone (LOTRISONE) topical cream APPLY THIN LAYER TO AFFECTED SITE 2 X DAY (Patient not taking: Reported on 8/31/2021)     No current facility-administered medications for this visit. Allergies   Allergen Reactions    Iodine Hives and Nausea and Vomiting    Iodinated Contrast Media Hives    Oxycodone-Acetaminophen Other (comments)     Hallucinations     Prochlorperazine Other (comments)    Propoxyphene-Acetaminophen Unknown (comments)     Allergy to propoxyphene only.   Has previously tolerated acetaminophen    Reglan [Metoclopramide] Not Reported This Time    Sulfa (Sulfonamide Antibiotics) Not Reported This Time    Wygesic Not Reported This Time     Social History     Occupational History    Not on file   Tobacco Use    Smoking status: Never Smoker    Smokeless tobacco: Never Used   Vaping Use    Vaping Use: Never used   Substance and Sexual Activity    Alcohol use: No    Drug use: No    Sexual activity: Yes     Partners: Male     Family History   Problem Relation Age of Onset    Kidney Disease Father     Other Father         Pancreatic Disease    Cancer Father     Heart Disease Father     Heart Attack Father     Thyroid Disease Other         Grandparent        DIAGNOSTIC LAB DATA      Lab Results   Component Value Date/Time    Hemoglobin A1c 5.5 08/18/2021 10:56 AM    Hemoglobin A1c 9.0 (H) 09/22/2020 06:30 AM    Hemoglobin A1c 5.9 (H) 05/21/2020 08:07 AM    //   Lab Results   Component Value Date/Time    Glucose 86 08/18/2021 10:56 AM    Glucose (POC) 378 (H) 09/22/2020 11:08 AM    Glucose POC [facility] 09/28/2020 03:00 PM        Lab Results   Component Value Date/Time    Hemoglobin A1c (POC) 5.2 01/14/2021 03:43 PM Lab Results   Component Value Date/Time    VITAMIN D, 25-HYDROXY 64.8 08/18/2021 10:56 AM         REVIEW OF SYSTEMS : 8/31/2021  ALL BELOW ARE Negative except : SEE HPI     All other systems reviewed and are negative. 12 point review of systems otherwise negative unless noted in HPI. DIAGNOSTIC IMAGING /ORDERS       Orders Placed This Encounter    REFERRAL TO PHYSICAL THERAPY     Referral Priority:   Routine     Referral Type:   PT/OT/ST     Referral Reason:   Specialty Services Required     Number of Visits Requested:   1        MRI Results (most recent):  Results from Hospital Encounter encounter on 08/21/21    MRI LUMB SPINE WO CONT    Narrative  EXAM: MRI LUMB SPINE WO CONT    CLINICAL INDICATIONS/HISTORY: Left leg pain, left sciatica    COMPARISON: CT abdomen and pelvis February 18, 2001    TECHNIQUE: Multi-sequence multiplanar MR imaging obtained of the lumbar spine. FINDINGS:    Alignment: Normal lumbar lordosis without listhesis. Vertebral body height: Normal  Marrow signal: There is decreased T1 and T2 signal within the bone marrow,  nonspecific but may be seen in chronic anemia, smoking and marrow proliferative  process. Conus: The conus terminates at the inferior L2 level. There is normal MR signal  characteristics and morphology. Axial imaging correlation:  L1-2: No significant disc displacement, neural foraminal or central canal  stenosis. L2-3: No significant disc displacement, neural foraminal or central canal  stenosis. L3-4: No significant disc displacement, neural foraminal or central canal  stenosis. L4-5: No significant disc displacement, neural foraminal or central canal  stenosis. L5-S1: No significant disc displacement, neural foraminal or central canal  stenosis. Other structures: Unremarkable. Impression  1. No significant degenerative disc disease or neural foraminal stenosis  identified. I have reviewed the results of the above study.  The interpretation of this study is my professional opinion. On this date 08/31/2021 I have spent 30 minutes reviewing previous notes, test results and face to face with the patient discussing the diagnosis and importance of compliance with the treatment plan as well as documenting on the day of the visit. An electronic signature was used to authenticate this note. Disclaimer: Sections of this note are dictated using utilizing voice recognition software, which may have resulted in some phonetic based errors in grammar and contents. Even though attempts were made to correct all the mistakes, some may have been missed, and remained in the body of the document. If questions arise, please contact our department. Chel Prasad may have a reminder for a \"due or due soon\" health maintenance. I have asked that she contact her primary care provider for follow-up on this health maintenance. Jayson Conklin, as dictated by, Reid Veloz.   8/31/2021  7:44 AM

## 2021-09-22 NOTE — PROGRESS NOTES
AMBULATORY PROGRESS NOTE      Patient: Herve Duncanpshire             MRN: [id number]     SSN: [id number] Body mass index is 29.08 kg/m². YOB: 1985     AGE: 39 y.o. EX: female    PCP: Caryle Gallop, MD       IMPRESSION //  DIAGNOSIS AND TREATMENT PLAN        Sharri Nichols has a diagnosis of:      DIAGNOSES    1. Muscle spasm of left lower extremity    2. Lumbar pain    3. Left sided sciatica        Orders Placed This Encounter    [facility] [place] [facility]     Referral Priority:   Routine     Referral Type:   Consultation     Referral Reason:   Specialty Services Required     Referred to Provider:   Jose Chance MD     Number of Visits Requested:   1    metaxalone (Skelaxin) 800 mg tablet     Sig: Take 1 Tablet by mouth two (2) times a day. Dispense:  60 Tablet     Refill:  0        PLAN:    1. Referral to Micheal Fuentes for lumbar pain  2. Rx of  Skelaxin 800mg       RTO-  6 weeks    Herve Alcala  expresses understanding of the diagnosis, treatment plan, and all of their proposed questions were answered to their satisfaction. Patient education has been provided re the diagnoses. HPI //  OBJECTIVE EXAMINATION        Sharri Chiu IS A 39 y.o. female who is a/an  established patient, presenting to my outpatient office for evaluation of  the following chief complaint(s):     Chief Complaint   Patient presents with    Foot Pain     bilateral       At LOV pt presented w/ lumbar pain. Advised patient to do lumbar : HEP. Renew PT for back: Paraspinal muscle tightness. Continue using medicated creams for back pain. Since LOV she continues to endorse bilateral foot pain (R>L). She mentions the pain of her lower left extremity before the knee is beginning to hurt again. She has tried PT and medicated creams to no avail. She has difficulty sleeping due to pain. She states her lumbar pain is radiating upward.     Visit Vitals  Pulse 75   Temp 97.1 °F (36.2 °C)   Resp 16   Ht 4' 11\" (1.499 m)   Wt 144 lb (65.3 kg)   SpO2 98%   BMI 29.08 kg/m²       Appearance: Alert, well appearing and pleasant patient who is in no distress, oriented to person, place/time, and who follows commands. This patient is accompanied in the examination room by her  self. There is signs of: no dementia  Psychiatric: Affect/mood are appropriate. Speech normal in context and clarity, memory intact grossly, no involuntary movements - tremors. Patient arrives to office via: without assistive device:    H EENT (2): Head normocephalic & atraumatic. Eye: pupils are round// EOM are intact // Neck: ROM WNL  // Hearings Intact   Respiratory: Breathing non labored     ANKLE/FOOT bilateral     Gait: normal  Tenderness: no  }   Cutaneous:   WNL. Joint Motion:   WNL  Joint / Tendon Stability: No Ankle or Subtalar instability or joint laxity. No peroneal sublux ability or dislocation  Alignment: neutral Hindfoot,    Neuro Motor/Sensory: NL/NL  Vascular: NL foot/ankle pulses,   Lymphatics: No extremity lymphedema, No calf swelling, no tenderness to calf muscles. Knees: left       Cutaneous: Skin intact, no abrasions, blisters, wounds, erythema       Effusion: Is not present       Crepitus:  no PF joint crepitus       Tenderness posterior lateral hamstring on the left knee:       Alignment of Knee: neutral when standing       ROM: full range of motion       Fullness or swelling: None to popliteal fossa region,         Stability: No instability to anterior, posterior, varus, valgus stress testing       Thrust:   No varus thrust with gait        Neuro: plantar flexion and plantar dorsiflexion, Extensor mechanism is intact          CHART REVIEW     Sharri Samuel has been experiencing pain and discomfort confirmed as outlined in the pain assessment outlined below.  was reviewed by Fabricio Holliday MD on 9/28/2021.      Pain Assessment  9/28/2021   Location of Pain Foot Pain Location Comment -   Location Modifiers Left;Right   Severity of Pain 5   Quality of Pain Throbbing; Other (Comment)   Quality of Pain Comment tingling   Duration of Pain A few hours   Duration of Pain Comment -   Frequency of Pain Constant   Frequency of Pain Comment -   Date Pain First Started -   Aggravating Factors Standing;Walking;Stairs   Aggravating Factors Comment -   Limiting Behavior Some   Relieving Factors NSAID;Other (Comment); Heat;Exercises   Relieving Factors Comment biofreeze   Result of Injury -   Work-Related Injury -   Type of Injury -   Type of Injury Comment -        Sharri Combs Sumaya  has a past medical history of Asthma, Chronic constipation, Diabetes ([facility] Fibromyalgia, GERD (gastroesophageal reflux disease), Hypertension, IBS (irritable bowel syndrome), Migraines, Psychiatric disorder, Scoliosis, and Urinary incontinence. Patients is employed at:         Past Medical History:   Diagnosis Date    Asthma     Chronic constipation     [facility]     Fibromyalgia     GERD (gastroesophageal reflux disease)     Hypertension     IBS (irritable bowel syndrome)     Migraines     unknown if aura    Psychiatric disorder     she denies any diagnosis despite being on antipsychotics, SSRIs, etc in the past    Scoliosis     Urinary incontinence     mixed     Past Surgical History:   Procedure Laterality Date    HX  SECTION  ,     HX COLONOSCOPY      HX DILATION AND CURETTAGE      HX ENDOSCOPY      HX PELVIC LAPAROSCOPY       Current Outpatient Medications   Medication Sig    metaxalone (Skelaxin) 800 mg tablet Take 1 Tablet by mouth two (2) times a day.  atorvastatin (LIPITOR) 20 mg tablet Take 1 Tablet by mouth daily.  fexofenadine (ALLEGRA) 180 mg tablet Take 1 Tablet by mouth daily.  diclofenac EC (VOLTAREN) 50 mg EC tablet TAKE 1 TAB BY MOUTH TWO (2) TIMES DAILY AS NEEDED FOR PAIN.     atenoloL (TENORMIN) 25 mg tablet TAKE 1 TABLET BY MOUTH EVERY DAY    aspirin delayed-release 81 mg tablet TAKE 1 TABLET BY MOUTH EVERY DAY    diclofenac (Voltaren) 1 % gel Apply  to affected area as needed for Pain.  cholecalciferol (VITAMIN D3) (50,000 UNITS /1250 MCG) capsule Take 1 Cap by mouth every seven (7) days.  gabapentin (NEURONTIN) 600 mg tablet TAKE 1 TABLET BY MOUTH TWICE A DAY    Jardiance 10 mg tablet TAKE 1 TABLET BY MOUTH EVERY DAY    glucose blood VI test strips (ASCENSIA AUTODISC VI, ONE TOUCH ULTRA TEST VI) strip Check blood glucose 4 x day    lancets misc Check blood glucose 4 x day    insulin glargine (LANTUS,BASAGLAR) 100 unit/mL (3 mL) inpn 22 Units by SubCUTAneous route nightly.  Insulin Needles, Disposable, 31 gauge x 5/16\" ndle To use daily with insulin injection subcutaneously    montelukast (SINGULAIR) 10 mg tablet Take 1 Tab by mouth nightly.  valACYclovir (VALTREX) 500 mg tablet daily.  ANORO ELLIPTA 62.5-25 mcg/actuation inhaler 1 INH DAILY - USE EVERY DAY    AIMOVIG AUTOINJECTOR 70 mg/mL injection AS DIRECTED - 1 INJECTION SUBQ ONCE MONTHLY    mometasone (NASONEX) 50 mcg/actuation nasal spray USE 1-2 SPRAYS INTO EACH NOSTRIL EVERY DAY AS NEEDED    FERRETTS IPS 40 mg/15 mL liqd 15 ML ORALLY 2 TIMES PER DAY.  EPINEPHrine (EPIPEN) 0.3 mg/0.3 mL injection AS DIRECTED AS NEEDED INTRAMUSCULAR 1 DAYS    albuterol (PROVENTIL HFA, VENTOLIN HFA, PROAIR HFA) 90 mcg/actuation inhaler Take 2 Puffs by inhalation every four (4) hours as needed for Wheezing or Shortness of Breath.  esomeprazole (NEXIUM) 40 mg capsule Take  by mouth daily.  POLYETHYLENE GLYCOL 3350 (MIRALAX PO) Take  by mouth.  CALCIUM CARBONATE/MAG HYDROX (MYLANTA PO) Take  by mouth as needed.     olmesartan (BENICAR) 40 mg tablet TAKE 1 TABLET BY MOUTH EVERY DAY (Patient not taking: Reported on 8/31/2021)    clotrimazole-betamethasone (LOTRISONE) topical cream APPLY THIN LAYER TO AFFECTED SITE 2 X DAY (Patient not taking: Reported on 8/31/2021)     No current facility-administered medications for this visit. Allergies   Allergen Reactions    Iodine Hives and Nausea and Vomiting    Iodinated Contrast Media Hives    Oxycodone-Acetaminophen Other (comments)     Hallucinations     Prochlorperazine Other (comments)    Propoxyphene-Acetaminophen Unknown (comments)     Allergy to propoxyphene only. Has previously tolerated acetaminophen    Reglan [Metoclopramide] Not Reported This Time    Sulfa (Sulfonamide Antibiotics) Not Reported This Time    Wygesic Not Reported This Time     Social History     Occupational History    Not on file   Tobacco Use    Smoking status: Never Smoker    Smokeless tobacco: Never Used   Vaping Use    Vaping Use: Never used   Substance and Sexual Activity    Alcohol use: No    Drug use: No    Sexual activity: Yes     Partners: Male     Family History   Problem Relation Age of Onset    Kidney Disease Father     Other Father         Pancreatic Disease    Cancer Father     Heart Disease Father     Heart Attack Father     Thyroid Disease Other         Grandparent        DIAGNOSTIC LAB DATA      Lab Results   Component Value Date/Time    Hemoglobin A1c 5.5 08/18/2021 10:56 AM    Hemoglobin A1c 9.0 (H) 09/22/2020 06:30 AM    Hemoglobin A1c 5.9 (H) 05/21/2020 08:07 AM    //   Lab Results   Component Value Date/Time    Glucose 86 08/18/2021 10:56 AM    Glucose (POC) 378 (H) 09/22/2020 11:08 AM    Glucose POC [address] 09/28/2020 03:00 PM        Lab Results   Component Value Date/Time    Hemoglobin A1c (POC) 5.2 01/14/2021 03:43 PM         Lab Results   Component Value Date/Time    VITAMIN D, 25-HYDROXY 64.8 08/18/2021 10:56 AM         REVIEW OF SYSTEMS : 9/28/2021  ALL BELOW ARE Negative except : SEE HPI     All other systems reviewed and are negative. 12 point review of systems otherwise negative unless noted in HPI.       DIAGNOSTIC IMAGING /ORDERS       Orders Placed This Encounter    EMPL [address] Ref [facility] Referral Priority:   Routine     Referral Type:   Consultation     Referral Reason:   Specialty Services Required     Referred to Provider:   Wei Wiggins MD     Number of Visits Requested:   1    metaxalone (Skelaxin) 800 mg tablet     Sig: Take 1 Tablet by mouth two (2) times a day. Dispense:  60 Tablet     Refill:  0      12:46 PM [id number] 12:46 PM   Result Information    Status: Final result (Exam End: 6/8/2021 12:46) Provider Status: Reviewed   Study Result    Narrative & Impression   MRI TIB/FIB LT WO CONT: 6/8/2021 12:46 PM     CLINICAL INFORMATION  left mid leg pain. Interstitial myositis.     COMPARISON  Left knee radiographs 3 May 2021, left tibia/fibula radiographs 17 August 2020     TECHNIQUE  Multiplanar MR images obtained of the left tibia/fibula utilizing T1 and  T2-weighted sequences.     FINDINGS      Focal 1 cm osteochondral lesion at the posterior margin of the medial femoral  condyle with subchondral edema/cystic change.     Osseous structures of the imaged left lower extremity otherwise show normal  marrow signal intensity and alignment.     Neurovascular bundles follow normal course and caliber.     Muscle bulk is preserved.     IMPRESSION     No MRI findings of myositis.     Incidentally noted 1 cm osteochondral lesion of the posterior margin of the  medial tibial plateau. MRI Results (most recent):  Results from [place] encounter on 08/21/21    MRI LUMB SPINE WO CONT    Narrative  EXAM: MRI LUMB SPINE WO CONT    CLINICAL INDICATIONS/HISTORY: Left leg pain, left sciatica    COMPARISON: CT abdomen and pelvis February 18, 2001    TECHNIQUE: Multi-sequence multiplanar MR imaging obtained of the lumbar spine. FINDINGS:    Alignment: Normal lumbar lordosis without listhesis.   Vertebral body height: Normal  Marrow signal: There is decreased T1 and T2 signal within the bone marrow,  nonspecific but may be seen in chronic anemia, smoking and marrow proliferative  process. Conus: The conus terminates at the inferior L2 level. There is normal MR signal  characteristics and morphology. Axial imaging correlation:  L1-2: No significant disc displacement, neural foraminal or central canal  stenosis. L2-3: No significant disc displacement, neural foraminal or central canal  stenosis. L3-4: No significant disc displacement, neural foraminal or central canal  stenosis. L4-5: No significant disc displacement, neural foraminal or central canal  stenosis. L5-S1: No significant disc displacement, neural foraminal or central canal  stenosis. Other structures: Unremarkable. Impression  1. No significant degenerative disc disease or neural foraminal stenosis  identified. I have reviewed the results of the above study. The interpretation of this study is my professional opinion. On this date 09/28/2021 I have spent 30 minutes reviewing previous notes, test results and face to face with the patient discussing the diagnosis and importance of compliance with the treatment plan as well as documenting on the day of the visit. An electronic signature was used to authenticate this note. Disclaimer: Sections of this note are dictated using utilizing voice recognition software, which may have resulted in some phonetic based errors in grammar and contents. Even though attempts were made to correct all the mistakes, some may have been missed, and remained in the body of the document. If questions arise, please contact our department. Janell Lucero may have a reminder for a \"due or due soon\" health maintenance. I have asked that she contact her primary care provider for follow-up on this health maintenance.     Kriss Forte, as dictated byLacey  9/28/2021  11:24 AM

## 2021-09-28 ENCOUNTER — OFFICE VISIT (OUTPATIENT)
Dept: ORTHOPEDIC SURGERY | Age: 36
End: 2021-09-28
Payer: MEDICARE

## 2021-09-28 VITALS
OXYGEN SATURATION: 98 % | BODY MASS INDEX: 29.03 KG/M2 | HEART RATE: 75 BPM | TEMPERATURE: 97.1 F | WEIGHT: 144 LBS | HEIGHT: 59 IN | RESPIRATION RATE: 16 BRPM

## 2021-09-28 DIAGNOSIS — M54.50 LUMBAR PAIN: ICD-10-CM

## 2021-09-28 DIAGNOSIS — M54.32 LEFT SIDED SCIATICA: ICD-10-CM

## 2021-09-28 DIAGNOSIS — M62.838 MUSCLE SPASM OF LEFT LOWER EXTREMITY: Primary | ICD-10-CM

## 2021-09-28 PROCEDURE — G8756 NO BP MEASURE DOC: HCPCS | Performed by: ORTHOPAEDIC SURGERY

## 2021-09-28 PROCEDURE — G8427 DOCREV CUR MEDS BY ELIG CLIN: HCPCS | Performed by: ORTHOPAEDIC SURGERY

## 2021-09-28 PROCEDURE — G8419 CALC BMI OUT NRM PARAM NOF/U: HCPCS | Performed by: ORTHOPAEDIC SURGERY

## 2021-09-28 PROCEDURE — G8432 DEP SCR NOT DOC, RNG: HCPCS | Performed by: ORTHOPAEDIC SURGERY

## 2021-09-28 PROCEDURE — 99214 OFFICE O/P EST MOD 30 MIN: CPT | Performed by: ORTHOPAEDIC SURGERY

## 2021-09-28 RX ORDER — METAXALONE 800 MG/1
800 TABLET ORAL 2 TIMES DAILY
Qty: 60 TABLET | Refills: 0 | Status: SHIPPED | OUTPATIENT
Start: 2021-09-28 | End: 2021-10-01 | Stop reason: CLARIF

## 2021-10-01 ENCOUNTER — TELEPHONE (OUTPATIENT)
Dept: ORTHOPEDIC SURGERY | Age: 36
End: 2021-10-01

## 2021-10-01 RX ORDER — CYCLOBENZAPRINE HCL 10 MG
10 TABLET ORAL 2 TIMES DAILY
Qty: 60 TABLET | Refills: 0 | Status: SHIPPED | OUTPATIENT
Start: 2021-10-01 | End: 2021-11-01

## 2021-10-01 NOTE — TELEPHONE ENCOUNTER
Tried calling patient but no answer and her voicemail box has not been set up yet so unable to leave message.  Insurance wouldn't cover the skelaxin so we sent flexeril over to her pharmacy instead

## 2021-10-01 NOTE — TELEPHONE ENCOUNTER
The following prescriptions have been e-prescribed to the patients [facility] located [facility]". Orders Placed This Encounter    cyclobenzaprine (FLEXERIL) 10 mg tablet     Sig: Take 1 Tablet by mouth two (2) times a day. Dispense:  60 Tablet     Refill:  0           Mariia Thomas, [facility], MPA, PA-C  10/1/2021  9:22 AM

## 2021-10-03 DIAGNOSIS — G89.29 CHRONIC PAIN OF LEFT KNEE: ICD-10-CM

## 2021-10-03 DIAGNOSIS — M25.562 CHRONIC PAIN OF LEFT KNEE: ICD-10-CM

## 2021-10-04 RX ORDER — DICLOFENAC SODIUM 50 MG/1
50 TABLET, DELAYED RELEASE ORAL
Qty: 60 TABLET | Refills: 1 | Status: SHIPPED | OUTPATIENT
Start: 2021-10-04 | End: 2021-12-13

## 2021-11-01 ENCOUNTER — OFFICE VISIT (OUTPATIENT)
Dept: ORTHOPEDIC SURGERY | Age: 36
End: 2021-11-01
Payer: MEDICARE

## 2021-11-01 VITALS
OXYGEN SATURATION: 97 % | HEIGHT: 59 IN | RESPIRATION RATE: 16 BRPM | BODY MASS INDEX: 29.43 KG/M2 | TEMPERATURE: 95.6 F | HEART RATE: 80 BPM | WEIGHT: 146 LBS

## 2021-11-01 DIAGNOSIS — M79.7 FIBROMYALGIA: Primary | ICD-10-CM

## 2021-11-01 DIAGNOSIS — G47.8 NON-RESTORATIVE SLEEP: ICD-10-CM

## 2021-11-01 DIAGNOSIS — G56.03 BILATERAL CARPAL TUNNEL SYNDROME: ICD-10-CM

## 2021-11-01 PROCEDURE — 99214 OFFICE O/P EST MOD 30 MIN: CPT | Performed by: PHYSICAL MEDICINE & REHABILITATION

## 2021-11-01 PROCEDURE — G8432 DEP SCR NOT DOC, RNG: HCPCS | Performed by: PHYSICAL MEDICINE & REHABILITATION

## 2021-11-01 PROCEDURE — G8427 DOCREV CUR MEDS BY ELIG CLIN: HCPCS | Performed by: PHYSICAL MEDICINE & REHABILITATION

## 2021-11-01 PROCEDURE — G8419 CALC BMI OUT NRM PARAM NOF/U: HCPCS | Performed by: PHYSICAL MEDICINE & REHABILITATION

## 2021-11-01 PROCEDURE — G8756 NO BP MEASURE DOC: HCPCS | Performed by: PHYSICAL MEDICINE & REHABILITATION

## 2021-11-01 RX ORDER — CETIRIZINE HCL 10 MG
10 TABLET ORAL
COMMUNITY

## 2021-11-01 RX ORDER — TIZANIDINE 2 MG/1
TABLET ORAL
Qty: 60 TABLET | Refills: 1 | Status: SHIPPED | OUTPATIENT
Start: 2021-11-01 | End: 2021-11-24

## 2021-11-01 NOTE — PATIENT INSTRUCTIONS
Fibromyalgia: Care Instructions  Overview     Fibromyalgia is a painful condition that is not completely understood by medical experts. The cause of fibromyalgia is not known. It can make you feel tired and ache all over. It causes tender spots at specific points of the body that hurt only when you press on them. You may have trouble sleeping, as well as other symptoms. These problems can upset your work and home life. Symptoms tend to come and go, although they may never go away completely. Fibromyalgia does not harm your muscles, joints, or organs. Follow-up care is a key part of your treatment and safety. Be sure to make and go to all appointments, and call your doctor if you are having problems. It's also a good idea to know your test results and keep a list of the medicines you take. How can you care for yourself at home? · Exercise often. Walk, swim, or bike to help with pain and sleep problems and to make you feel better. · Try to get a good night's sleep. Go to bed and get up at the same time each day, whether you feel rested or not. Make sure you have a good mattress and pillow. · Reduce stress. Avoid things that cause you stress, if you can. If not, work at making them less stressful. Learn to use biofeedback, guided imagery, meditation, or other methods to relax. · Make healthy changes. Eat a balanced diet, quit smoking, and limit alcohol and caffeine. · Use a heating pad set on low or take warm baths or showers for pain. Using cold packs for up to 20 minutes at a time can also relieve pain. Put a thin cloth between the cold pack and your skin. A gentle massage might help too. · Be safe with medicines. Take your medicines exactly as prescribed. Call your doctor if you think you are having a problem with your medicine. Your doctor may talk to you about taking antidepressant medicines. These medicines may improve sleep, relieve pain, and in some cases treat depression.   · Learn about fibromyalgia. This makes coping easier. Then, take an active role in your treatment. · Think about joining a support group with others who have fibromyalgia to learn more and get support. When should you call for help? Watch closely for changes in your health, and be sure to contact your doctor if:    · You feel sad, helpless, or hopeless; lose interest in things you used to enjoy; or have other symptoms of depression.     · Your fibromyalgia symptoms get worse. Where can you learn more? Go to http://www.gray.com/  Enter V003 in the search box to learn more about \"Fibromyalgia: Care Instructions. \"  Current as of: April 8, 2021               Content Version: 13.0  © 7590-9369 [employer]. Care instructions adapted under license by [employer] (which disclaims liability or warranty for this information). If you have questions about a medical condition or this instruction, always ask your healthcare professional. [address] any warranty or liability for your use of this information.

## 2021-11-01 NOTE — LETTER
11/1/2021    Patient: Rosalie Schilling   YOB: 1985   Date of Visit: 11/1/2021     Alba Horton MD  [address]  [address] [facility] [address], [address] [facility]  [address]  [address] [facility] [address]  [address] [employer]    Dear MD Orestes Wolf MD,      Thank you for referring Ms. Sharri Chiu to [address] [facility] for evaluation. My notes for this consultation are attached. If you have questions, please do not hesitate to call me. I look forward to following your patient along with you.       Sincerely,    Dylan Olmedo MD

## 2021-11-01 NOTE — PROGRESS NOTES
[facility].  [address], [address]  [facility], [address]  Phone: [phone]  Fax: [phone]        Marielle Koch  : 1985  PCP: Lisa Leone MD    NEW PATIENT EVALUATION      ASSESSMENT AND PLAN    Diagnoses and all orders for this visit:    1. Fibromyalgia  -     tiZANidine (ZANAFLEX) 2 mg tablet; One to two po qhs prn pain    2. Non-restorative sleep  -     tiZANidine (ZANAFLEX) 2 mg tablet; One to two po qhs prn pain    3. Bilateral carpal tunnel syndrome         1. Lupe Grant is a 39 y.o. female with diffuse myofascial pain, benign cervical and lumbar MRIs. 2. Patient was given a Theracane demonstration and encouraged use. 3. Trial of Zanaflex 2 mg 1-2 po QHS PRN  4. Given information on FM, importance of sleep, physical activity, and stress management. Follow-up and Dispositions    · Return in about 10 weeks (around 1/10/2022). HISTORY OF PRESENT ILLNESS  Jesika Barbosa is seen today at the request of Dr. Georgie Garcia in consultation for back pain. L > R    She reports a worsening low back pain that is exacerbated by bending. She has numbness and tingling in her legs. She has difficulty sleeping at night due to pain. She is previously seen Dr. Estela Calderon, notes reviewed. Multiple MRIs and EMGs. Patient takes Voltaren 50 mg BID and Flexeril with temporary benefit. Pain Assessment  2021   Location of Pain Back;Leg   Pain Location Comment -   Location Modifiers Left   Severity of Pain 7   Quality of Pain Sharp;Burning   Quality of Pain Comment numbness tingling   Duration of Pain Other (Comment)   Duration of Pain Comment depends   Frequency of Pain Intermittent   Frequency of Pain Comment -   Date Pain First Started 2021   Aggravating Factors Stairs; Bending   Aggravating Factors Comment -   Limiting Behavior Yes   Relieving Factors Rest   Relieving Factors Comment -   Result of Injury No   Work-Related Injury -   Type of Injury -   Type of Injury Comment -       Onset of pain: 2021    Does patient have numbness/tingling in extremities: BLE    Denies persistent fevers, chills, weight changes, saddle paresthesias, and neurogenic bowel or bladder symptoms. Investigations:   L MRI 2021: normal  BUE EMG : severe right CTS, moderate left CTS  C MRI 2019: unremarkable  UE EMG 2019: mild right CTS  BLE EMG 2016: normal  Spine surgery consult: no    Treatments:  Physical therapy: Multiple rounds, no benefit  Spinal injections: no  Spinal surgery- no  Beneficial medications: Flexeril (temporary), Voltaren   Failed medications: Gabapentin, Topamax, Lyrica Cymbalta    Work Status: currently not employed  Pertinent PMHx:  Asthma, DM, FM, GERD, IBS, chronic constipation, urinary incontinence . PHYSICAL EXAM    TTP B/L upper traps, B/L levator scapula, lumbar paraspinals,  Tender left sciatic notch  Non tender left trochanteric bursa  LE strength intact  SLR negative  DTRs hypoactive LE  No edema    Visit Vitals  Pulse 80   Temp (!) 95.6 °F (35.3 °C) (Tympanic)   Resp 16   Ht 4' 11\" (1.499 m)   Wt 146 lb (66.2 kg)   SpO2 97%   BMI 29.49 kg/m²       Past Medical History:   Diagnosis Date    Asthma     Chronic constipation     Diabetes (HCC)     Fibromyalgia     GERD (gastroesophageal reflux disease)     Hypertension     IBS (irritable bowel syndrome)     Migraines     unknown if aura    Psychiatric disorder     she denies any diagnosis despite being on antipsychotics, SSRIs, etc in the past    Scoliosis     Urinary incontinence     mixed        Past Surgical History:   Procedure Laterality Date    HX  SECTION  ,     HX COLONOSCOPY      HX DILATION AND CURETTAGE      HX ENDOSCOPY      HX PELVIC LAPAROSCOPY           Current Outpatient Medications   Medication Sig Dispense Refill    cetirizine (ZyrTEC) 10 mg tablet Take  by mouth.       tiZANidine (ZANAFLEX) 2 mg tablet One to two po qhs prn pain 60 Tablet 1    diclofenac EC (VOLTAREN) 50 mg EC tablet TAKE 1 TAB BY MOUTH TWO (2) TIMES DAILY AS NEEDED FOR PAIN. 60 Tablet 1    atorvastatin (LIPITOR) 20 mg tablet Take 1 Tablet by mouth daily. 90 Tablet 1    atenoloL (TENORMIN) 25 mg tablet TAKE 1 TABLET BY MOUTH EVERY DAY 90 Tab 1    aspirin delayed-release 81 mg tablet TAKE 1 TABLET BY MOUTH EVERY DAY 90 Tab 1    diclofenac (Voltaren) 1 % gel Apply  to affected area as needed for Pain.  cholecalciferol (VITAMIN D3) (50,000 UNITS /1250 MCG) capsule Take 1 Cap by mouth every seven (7) days. 13 Cap 3    gabapentin (NEURONTIN) 600 mg tablet TAKE 1 TABLET BY MOUTH TWICE A DAY      Jardiance 10 mg tablet TAKE 1 TABLET BY MOUTH EVERY DAY 30 Tab 2    glucose blood VI test strips (ASCENSIA AUTODISC VI, ONE TOUCH ULTRA TEST VI) strip Check blood glucose 4 x day 400 Strip 3    lancets misc Check blood glucose 4 x day 400 Each 3    montelukast (SINGULAIR) 10 mg tablet Take 1 Tab by mouth nightly.  valACYclovir (VALTREX) 500 mg tablet daily.  ANORO ELLIPTA 62.5-25 mcg/actuation inhaler 1 INH DAILY - USE EVERY DAY  6    AIMOVIG AUTOINJECTOR 70 mg/mL injection AS DIRECTED - 1 INJECTION SUBQ ONCE MONTHLY  3    mometasone (NASONEX) 50 mcg/actuation nasal spray USE 1-2 SPRAYS INTO EACH NOSTRIL EVERY DAY AS NEEDED  0    FERRETTS IPS 40 mg/15 mL liqd 15 ML ORALLY 2 TIMES PER DAY. 2    EPINEPHrine (EPIPEN) 0.3 mg/0.3 mL injection AS DIRECTED AS NEEDED INTRAMUSCULAR 1 DAYS  5    albuterol (PROVENTIL HFA, VENTOLIN HFA, PROAIR HFA) 90 mcg/actuation inhaler Take 2 Puffs by inhalation every four (4) hours as needed for Wheezing or Shortness of Breath. 1 Inhaler 0    esomeprazole (NEXIUM) 40 mg capsule Take  by mouth daily.  POLYETHYLENE GLYCOL 3350 (MIRALAX PO) Take  by mouth.  CALCIUM CARBONATE/MAG HYDROX (MYLANTA PO) Take  by mouth as needed.       Insulin Needles, Disposable, 31 gauge x 5/16\" ndle To use daily with insulin injection subcutaneously (Patient not taking: Reported on 11/1/2021) 1 Package 11

## 2021-11-01 NOTE — PROGRESS NOTES
Chief Complaint   Patient presents with    Back Pain       Pt preferred language for health care discussion is english. Is someone accompanying this pt? no    Is the patient using any DME equipment during [address]? no    Depression Screening:  3 most recent PHQ Screens 8/11/2021 8/5/2021 6/15/2021 5/11/2021 5/3/2021 5/3/2021 3/9/2021   PHQ Not Done - - - - - - -   Little interest or pleasure in doing things Not at all Not at all Not at all Not at all Not at all Not at all Not at all   Feeling down, depressed, irritable, or hopeless Not at all Not at all Not at all Not at all Not at all Not at all Not at all   Total Score PHQ 2 0 0 0 0 0 0 0   Trouble falling or staying asleep, or sleeping too much - - - - Not at all - -   Feeling tired or having little energy - - - - Not at all - -   Poor appetite, weight loss, or overeating - - - - Not at all - -   Feeling bad about yourself - or that you are a failure or have let yourself or your family down - - - - Not at all - -   Trouble concentrating on things such as school, work, reading, or watching TV - - - - Not at all - -   Moving or speaking so slowly that other people could have noticed; or the opposite being so fidgety that others notice - - - - Not at all - -   Thoughts of being better off dead, or hurting yourself in some way - - - - Not at all - -   PHQ 9 Score - - - - 0 - -   How difficult have these problems made it for you to do your work, take care of your home and get along with others - - - - Not difficult at all - -       Learning Assessment:  Learning Assessment 1/10/2020   PRIMARY LEARNER Patient   PRIMARY LANGUAGE ENGLISH   LEARNER PREFERENCE PRIMARY READING   ANSWERED BY patient   RELATIONSHIP SELF         Health Maintenance reviewed and discussed per provider. Yes        Advance Directive:  1. Do you have an advance directive in place? Patient Reply:no    2. If not, would you like material regarding how to put one in place?  Patient Reply: no    Coordination of Care:  1. Have you been to the ER, urgent care clinic since your last visit? Hospitalized since your last visit? no    2. Have you seen or consulted any other health care providers outside of the [address] [place] [address] since your last visit? Include any pap smears or colon screening.

## 2021-11-08 NOTE — PROGRESS NOTES
AMBULATORY PROGRESS NOTE      Patient: Josie Terry             MRN: [id number]     SSN: [id number] Body mass index is 29.49 kg/m². YOB: 1985     AGE: 39 y.o. EX: female    PCP: Lafonda Kayser, MD       IMPRESSION //  DIAGNOSIS AND TREATMENT PLAN        Keely Murotorinsade Villalobos has a diagnosis of: To listen to her, impression that the that she presents today, stating that she is having burning pain that was in the present the morning and sometimes during the day. My impression is that she may have diabetic peripheral neuropathy. Her MRI of her back, did not show any significant pathology. In looking at her data, her blood work, at 1 point she had a hemoglobin A1c of 9, one-point she is mated the hospital because of her for high sugar levels. Her most recent hemoglobin A1c is 5.5, no sign on August 18, 2021. Additional diagnostic studies, she had MRI of her left tib-fib, on June 8, 2021, this was essentially unremarkable for revealing any type of pathology that would be in close proximity to the neurovascular bundle, and she had MRI of her lumbar spine, August 21, 2021, and this was unremarkable for any central canal stenosis or intervertebral foraminal stenosis. Today on evaluation, she is non tender to her right foot and ankle. Her left foot, she has some mild tenderness to the abductor muscle midportion of her left plantar first metatarsal, just medial to the FHL. But otherwise she had normal strength, normal skin, and clinically there is no signs of Charcot neuroarthropathy arthropathy. DIAGNOSES    1. Neuropathy        No orders of the defined types were placed in this encounter. PLAN:    1. Advise her to f/u w/ her Neurologist: So they can titrate her Neurontin      RTO  Prn     There are no Patient Instructions on file for this visit.         Please follow up with your PCP for any health maintenance as recommended         Josie Terry expresses understanding of the diagnosis, treatment plan, and all of their proposed questions were answered to their satisfaction. Patient education has been provided re the diagnoses. HPI //  OBJECTIVE EXAMINATION        Sharri Chiu IS A 39 y.o. female who is a/an  established patient, presenting to my outpatient office for evaluation of  the following chief complaint(s):     Chief Complaint   Patient presents with    Leg Pain     left leg       At [address] presented w/ bilateral foot pain. Referral to [facility] for lumbar pain. Rx of  Skelaxin 800mg.       Since [address] continues to endorse left leg pain. She mentions numbness and tingiling is coming back in both feet. States she has to massage her left foot in the morning to get rid of the numbness and tingling. H/O DM. She sees Dr. Desiree Eddy for her lumbar pain. Visit Vitals  Pulse 85   Temp 98 °F (36.7 °C) (Temporal)   Ht 4' 11\" (1.499 m)   Wt 146 lb (66.2 kg)   SpO2 98%   BMI 29.49 kg/m²       Appearance: Alert, well appearing and pleasant patient who is in no distress, oriented to person, place/time, and who follows commands. Normal dress/motor activity/thought processes/memory. This patient is accompanied in the examination room by her  self. There is signs of: no dementia  Patient arrives to office via: without assistive device:   Psychiatric:  Normal Affect/mood. Judgement, behavior, and conduct are appropriate. Speech normal in context and clarity, memory intact grossly, no involuntary movements - tremors. H EENT (2): Head normocephalic & atraumatic. Eye: pupils are round// EOM are intact // Neck: ROM WNL  // Hearings Intact   Respiratory: Breathing non labored     ANKLE/FOOT left    Gait: normal  Tenderness:  Left  mild over  plantar fascia  ATFL/FHL  first metatarsal  Cutaneous:   WNL. Joint Motion:   WNL   Joint / Tendon Stability: No Ankle or Subtalar instability or joint laxity.                        No peroneal sublux ability or dislocation  Alignment: neutral Hindfoot,    Neuro Motor/Sensory: NL/NL  Vascular: NL foot/ankle pulses,   Lymphatics: No extremity lymphedema, No calf swelling, no tenderness to calf muscles. CHART REVIEW     Kristen Cassidy has been experiencing pain and discomfort confirmed as outlined in the pain assessment outlined below.  was reviewed by Jose Maria Kim MD on 2021. Pain Assessment  2021   Location of Pain Leg   Pain Location Comment -   Location Modifiers Left   Severity of Pain 7   Quality of Pain Other (Comment)   Quality of Pain Comment numbness and tingling   Duration of Pain -   Duration of Pain Comment -   Frequency of Pain -   Frequency of Pain Comment -   Date Pain First Started -   Aggravating Factors -   Aggravating Factors Comment -   Limiting Behavior -   Relieving Factors -   Relieving Factors Comment -   Result of Injury -   Work-Related Injury -   Type of Injury -   Type of Injury Comment -        Sharri Villela Manuelito  has a past medical history of Asthma, Chronic constipation, Diabetes ([facility] [address] Fibromyalgia, GERD (gastroesophageal reflux disease), Hypertension, IBS (irritable bowel syndrome), Migraines, Psychiatric disorder, Scoliosis, and Urinary incontinence.      Patients is employed at:         Past Medical History:   Diagnosis Date    Asthma     Chronic constipation     Diabetes ([address]     Fibromyalgia     GERD (gastroesophageal reflux disease)     Hypertension     IBS (irritable bowel syndrome)     Migraines     unknown if aura    Psychiatric disorder     she denies any diagnosis despite being on antipsychotics, SSRIs, etc in the past    Scoliosis     Urinary incontinence     mixed     Past Surgical History:   Procedure Laterality Date    HX  SECTION  ,     HX COLONOSCOPY      HX DILATION AND CURETTAGE      HX ENDOSCOPY      HX PELVIC LAPAROSCOPY       Current Outpatient Medications   Medication Sig    cetirizine (ZyrTEC) 10 mg tablet Take  by mouth.  tiZANidine (ZANAFLEX) 2 mg tablet One to two po qhs prn pain    diclofenac EC (VOLTAREN) 50 mg EC tablet TAKE 1 TAB BY MOUTH TWO (2) TIMES DAILY AS NEEDED FOR PAIN.  atorvastatin (LIPITOR) 20 mg tablet Take 1 Tablet by mouth daily.  atenoloL (TENORMIN) 25 mg tablet TAKE 1 TABLET BY MOUTH EVERY DAY    aspirin delayed-release 81 mg tablet TAKE 1 TABLET BY MOUTH EVERY DAY    diclofenac (Voltaren) 1 % gel Apply  to affected area as needed for Pain.  cholecalciferol (VITAMIN D3) (50,000 UNITS /1250 MCG) capsule Take 1 Cap by mouth every seven (7) days.  gabapentin (NEURONTIN) 600 mg tablet TAKE 1 TABLET BY MOUTH TWICE A DAY    Jardiance 10 mg tablet TAKE 1 TABLET BY MOUTH EVERY DAY    glucose blood VI test strips (ASCENSIA AUTODISC VI, ONE TOUCH ULTRA TEST VI) strip Check blood glucose 4 x day    lancets misc Check blood glucose 4 x day    Insulin Needles, Disposable, 31 gauge x 5/16\" ndle To use daily with insulin injection subcutaneously    montelukast (SINGULAIR) 10 mg tablet Take 1 Tab by mouth nightly.  valACYclovir (VALTREX) 500 mg tablet daily.  ANORO ELLIPTA 62.5-25 mcg/actuation inhaler 1 INH DAILY - USE EVERY DAY    AIMOVIG AUTOINJECTOR 70 mg/mL injection AS DIRECTED - 1 INJECTION SUBQ ONCE MONTHLY    mometasone (NASONEX) 50 mcg/actuation nasal spray USE 1-2 SPRAYS INTO EACH NOSTRIL EVERY DAY AS NEEDED    FERRETTS IPS 40 mg/15 mL liqd 15 ML ORALLY 2 TIMES PER DAY.  EPINEPHrine (EPIPEN) 0.3 mg/0.3 mL injection AS DIRECTED AS NEEDED INTRAMUSCULAR 1 DAYS    albuterol (PROVENTIL HFA, VENTOLIN HFA, PROAIR HFA) 90 mcg/actuation inhaler Take 2 Puffs by inhalation every four (4) hours as needed for Wheezing or Shortness of Breath.  esomeprazole (NEXIUM) 40 mg capsule Take  by mouth daily.  POLYETHYLENE GLYCOL 3350 (MIRALAX PO) Take  by mouth.  CALCIUM CARBONATE/MAG HYDROX (MYLANTA PO) Take  by mouth as needed.      No current facility-administered medications for this visit. Allergies   Allergen Reactions    Iodine Hives and Nausea and Vomiting    Iodinated Contrast Media Hives    Oxycodone-Acetaminophen Other (comments)     Hallucinations     Prochlorperazine Other (comments)    Propoxyphene-Acetaminophen Unknown (comments)     Allergy to propoxyphene only. Has previously tolerated acetaminophen    Reglan [Metoclopramide] Not Reported This Time    Sulfa (Sulfonamide Antibiotics) Not Reported This Time    Wygesic Not Reported This Time     Social History     Occupational History    Not on file   Tobacco Use    Smoking status: Never Smoker    Smokeless tobacco: Never Used   Vaping Use    Vaping Use: Never used   Substance and Sexual Activity    Alcohol use: No    Drug use: No    Sexual activity: Yes     Partners: Male     Family History   Problem Relation Age of Onset    Kidney Disease Father     Other Father         Pancreatic Disease    Cancer Father     Heart Disease Father     Heart Attack Father     Thyroid Disease Other         Grandparent        DIAGNOSTIC LAB DATA      Lab Results   Component Value Date/Time    Hemoglobin A1c 5.5 08/18/2021 10:56 AM    Hemoglobin A1c 9.0 (H) 09/22/2020 06:30 AM    Hemoglobin A1c 5.9 (H) 05/21/2020 08:07 AM    //   Lab Results   Component Value Date/Time    Glucose 86 08/18/2021 10:56 AM    Glucose (POC) 378 (H) 09/22/2020 11:08 AM    Glucose POC [facility] 09/28/2020 03:00 PM        Lab Results   Component Value Date/Time    Hemoglobin A1c (POC) 5.2 01/14/2021 03:43 PM         Lab Results   Component Value Date/Time    VITAMIN D, 25-HYDROXY 64.8 08/18/2021 10:56 AM        Drug Screen Most Recent Result Date    No resulted procedures found. REVIEW OF SYSTEMS : 11/9/2021  ALL BELOW ARE Negative except : SEE HPI     All other systems reviewed and are negative. 12 point review of systems otherwise negative unless noted in HPI.       RADIOGRAPHS// IMAGING//DIAGNOSTIC DATA     No orders of the defined types were placed in this encounter. none    I have personally reviewed the images of the above study. The interpretation of this study is my professional opinion            On this date 11/09/2021 I have spent 20 minutes reviewing previous notes, test results and face to face with the patient discussing the diagnosis and importance of compliance with the treatment plan as well as documenting on the day of the visit. Disclaimer:     Sections of this note are dictated using utilizing voice recognition software, which may have resulted in some phonetic based errors in grammar and contents. Even though attempts were made to correct all the mistakes, some may have been missed, and remained in the body of the document. If questions arise, please contact our department. An electronic signature was used to authenticate this note. Aishwarya Abdul may have a reminder for a \"due or due soon\" health maintenance. I have asked that she contact her primary care provider for follow-up on this health maintenance. There are no Patient Instructions on file for this visit. Please follow up with your PCP for any health maintenance as recommended           Keenan Moise, as dictated by,  Inga Sosa.   11/9/2021  2:35 PM

## 2021-11-09 ENCOUNTER — OFFICE VISIT (OUTPATIENT)
Dept: ORTHOPEDIC SURGERY | Age: 36
End: 2021-11-09
Payer: MEDICARE

## 2021-11-09 VITALS
TEMPERATURE: 98 F | HEIGHT: 59 IN | WEIGHT: 146 LBS | BODY MASS INDEX: 29.43 KG/M2 | OXYGEN SATURATION: 98 % | HEART RATE: 85 BPM

## 2021-11-09 DIAGNOSIS — G62.9 NEUROPATHY: Primary | ICD-10-CM

## 2021-11-09 PROCEDURE — 99213 OFFICE O/P EST LOW 20 MIN: CPT | Performed by: ORTHOPAEDIC SURGERY

## 2021-11-09 PROCEDURE — G8427 DOCREV CUR MEDS BY ELIG CLIN: HCPCS | Performed by: ORTHOPAEDIC SURGERY

## 2021-11-09 PROCEDURE — G8756 NO BP MEASURE DOC: HCPCS | Performed by: ORTHOPAEDIC SURGERY

## 2021-11-09 PROCEDURE — G8419 CALC BMI OUT NRM PARAM NOF/U: HCPCS | Performed by: ORTHOPAEDIC SURGERY

## 2021-11-09 PROCEDURE — G8432 DEP SCR NOT DOC, RNG: HCPCS | Performed by: ORTHOPAEDIC SURGERY

## 2021-11-24 DIAGNOSIS — G47.8 NON-RESTORATIVE SLEEP: ICD-10-CM

## 2021-11-24 DIAGNOSIS — M79.7 FIBROMYALGIA: ICD-10-CM

## 2021-11-24 RX ORDER — TIZANIDINE 2 MG/1
TABLET ORAL
Qty: 60 TABLET | Refills: 1 | Status: SHIPPED | OUTPATIENT
Start: 2021-11-24 | End: 2022-01-25

## 2021-12-01 NOTE — PROGRESS NOTES
AMBULATORY PROGRESS NOTE      Patient: Lupe Grant             MRN: [id number]     SSN: [id number] Body mass index is 29.12 kg/m². YOB: 1985     AGE: 39 y.o. EX: female    PCP: Lisa Leone MD       IMPRESSION //  DIAGNOSIS AND TREATMENT PLAN        Sharri Barbosa has a diagnosis of:      Continue conservative care, for her right heel contusion, and well-healed linear heel laceration:       RTO   ;;; As needed    DIAGNOSES    1. Contusion of foot or heel, right, initial encounter    2. Neuropathy    3. Right foot pain        Orders Placed This Encounter    AMB POC XRAY, FOOT; COMPLETE, 3+ VIEW     ASK ALL FEMALE PATIENTS IF PREGNANT     Order Specific Question:   Reason for Exam     Answer:   PAIN     Order Specific Question:   Is Patient Pregnant? Answer:   Unknown            PLAN:    1. AVS: Tuli heel cup      RTO : PRN    Patient Instructions   Tuli Heel cup : [employer] or [employer]          Please follow up with your PCP for any health maintenance as recommended         Lupe Grant  expresses understanding of the diagnosis, treatment plan, and all of their proposed questions were answered to their satisfaction. Patient education has been provided re the diagnoses. HPI //  OBJECTIVE EXAMINATION        Sharri Chiu IS A 39 y.o. female who is a/an  established patient, presenting to my outpatient office for evaluation of  the following chief complaint(s):     Chief Complaint   Patient presents with    Foot Pain       R/ heel pain       At [address] presented w/ left leg pain. Advise her to f/u w/ her Neurologist: So they can titrate her Neurontin. Since LOV continues presents today w/ right heel pain. She has accidentally hit the back of her heel against an object and has a small laceration on her right heel.     Visit Vitals  Pulse 88   Temp 98.4 °F (36.9 °C) (Temporal)   Resp 16   Ht 4' 11\" (1.499 m)   Wt 144 lb 3.2 oz (65.4 kg) SpO2 97%   BMI 29.12 kg/m²       Appearance: Alert, well appearing and pleasant patient who is in no distress, oriented to person, place/time, and who follows commands. Normal dress/motor activity/thought processes/memory. This patient is accompanied in the examination room by her  self. Patient arrives to office via: without assistive device:     Psychiatric:  Normal Affect/mood. Judgement, behavior, and conduct are appropriate. Speech normal in context and clarity, memory intact grossly, no involuntary movements - tremors. H EENT (2): Head normocephalic & atraumatic. Eye: pupils are round// EOM are intact // Neck: ROM WNL  // Hearings Intact   Respiratory: Breathing non labored     ANKLE/FOOT right    Gait: normal  Tenderness: mild over  heel  Cutaneous: Superficial healed 1 cm x 1 mm linear abrasion on left heel///no redness no erythema no signs infection  Joint Motion:   WNL   Joint / Tendon Stability:  No Ankle or Subtalar instability or joint laxity. No peroneal sublux ability or dislocation  Alignment: neutral Hindfoot,    Neuro Motor/Sensory: NL/NL  Vascular: NL foot/ankle pulses,   Lymphatics: No extremity lymphedema, No calf swelling, no tenderness to calf muscles. CHART REVIEW     Isrrael Simmons has been experiencing pain and discomfort confirmed as outlined in the pain assessment outlined below.  was reviewed by Angel Samuel MD on 12/6/2021.      Pain Assessment  11/9/2021   Location of Pain Leg   Pain Location Comment -   Location Modifiers Left   Severity of Pain 7   Quality of Pain Other (Comment)   Quality of Pain Comment numbness and tingling   Duration of Pain -   Duration of Pain Comment -   Frequency of Pain -   Frequency of Pain Comment -   Date Pain First Started -   Aggravating Factors -   Aggravating Factors Comment -   Limiting Behavior -   Relieving Factors -   Relieving Factors Comment -   Result of Injury -   Work-Related Injury -   Type of Injury -   Type of Injury Comment -        Sharri PETERSON Iveth Varner  has a past medical history of Asthma, Chronic constipation, Diabetes ([address] Fibromyalgia, GERD (gastroesophageal reflux disease), Hypertension, IBS (irritable bowel syndrome), Migraines, Psychiatric disorder, Scoliosis, and Urinary incontinence. Patients is employed at:         Past Medical History:   Diagnosis Date    Asthma     Chronic constipation     Diabetes ([address]     Fibromyalgia     GERD (gastroesophageal reflux disease)     Hypertension     IBS (irritable bowel syndrome)     Migraines     unknown if aura    Psychiatric disorder     she denies any diagnosis despite being on antipsychotics, SSRIs, etc in the past    Scoliosis     Urinary incontinence     mixed     Past Surgical History:   Procedure Laterality Date    HX  SECTION  ,     HX COLONOSCOPY      HX DILATION AND CURETTAGE      HX ENDOSCOPY      HX PELVIC LAPAROSCOPY       Current Outpatient Medications   Medication Sig    tiZANidine (ZANAFLEX) 2 mg tablet ONE TO TWO TABLETS BY MOUTH EVERY NIGHT AT BEDTIME FOR PAIN    cetirizine (ZyrTEC) 10 mg tablet Take  by mouth.  diclofenac EC (VOLTAREN) 50 mg EC tablet TAKE 1 TAB BY MOUTH TWO (2) TIMES DAILY AS NEEDED FOR PAIN.  atorvastatin (LIPITOR) 20 mg tablet Take 1 Tablet by mouth daily.  atenoloL (TENORMIN) 25 mg tablet TAKE 1 TABLET BY MOUTH EVERY DAY    aspirin delayed-release 81 mg tablet TAKE 1 TABLET BY MOUTH EVERY DAY    diclofenac (Voltaren) 1 % gel Apply  to affected area as needed for Pain.  cholecalciferol (VITAMIN D3) (50,000 UNITS /1250 MCG) capsule Take 1 Cap by mouth every seven (7) days.     gabapentin (NEURONTIN) 600 mg tablet TAKE 1 TABLET BY MOUTH TWICE A DAY    Jardiance 10 mg tablet TAKE 1 TABLET BY MOUTH EVERY DAY    glucose blood VI test strips (ASCENSIA AUTODISC VI, ONE TOUCH ULTRA TEST VI) strip Check blood glucose 4 x day    lancets misc Check blood glucose 4 x day  Insulin Needles, Disposable, 31 gauge x 5/16\" ndle To use daily with insulin injection subcutaneously    montelukast (SINGULAIR) 10 mg tablet Take 1 Tab by mouth nightly.  valACYclovir (VALTREX) 500 mg tablet daily.  ANORO ELLIPTA 62.5-25 mcg/actuation inhaler 1 INH DAILY - USE EVERY DAY    AIMOVIG AUTOINJECTOR 70 mg/mL injection AS DIRECTED - 1 INJECTION SUBQ ONCE MONTHLY    mometasone (NASONEX) 50 mcg/actuation nasal spray USE 1-2 SPRAYS INTO EACH NOSTRIL EVERY DAY AS NEEDED    EPINEPHrine (EPIPEN) 0.3 mg/0.3 mL injection AS DIRECTED AS NEEDED INTRAMUSCULAR 1 DAYS    albuterol (PROVENTIL HFA, VENTOLIN HFA, PROAIR HFA) 90 mcg/actuation inhaler Take 2 Puffs by inhalation every four (4) hours as needed for Wheezing or Shortness of Breath.  esomeprazole (NEXIUM) 40 mg capsule Take  by mouth daily.  POLYETHYLENE GLYCOL 3350 (MIRALAX PO) Take  by mouth.  CALCIUM CARBONATE/MAG HYDROX (MYLANTA PO) Take  by mouth as needed.  FERRETTS IPS 40 mg/15 mL liqd 15 ML ORALLY 2 TIMES PER DAY. (Patient not taking: Reported on 12/6/2021)     No current facility-administered medications for this visit. Allergies   Allergen Reactions    Iodine Hives and Nausea and Vomiting    Iodinated Contrast Media Hives    Oxycodone-Acetaminophen Other (comments)     Hallucinations     Prochlorperazine Other (comments)    Propoxyphene-Acetaminophen Unknown (comments)     Allergy to propoxyphene only.   Has previously tolerated acetaminophen    Reglan [Metoclopramide] Not Reported This Time    Sulfa (Sulfonamide Antibiotics) Not Reported This Time    Wygesic Not Reported This Time     Social History     Occupational History    Not on file   Tobacco Use    Smoking status: Never Smoker    Smokeless tobacco: Never Used   Vaping Use    Vaping Use: Never used   Substance and Sexual Activity    Alcohol use: No    Drug use: No    Sexual activity: Yes     Partners: Male     Family History   Problem Relation Age of Onset    Kidney Disease Father     Other Father         Pancreatic Disease    Cancer Father     Heart Disease Father     Heart Attack Father     Thyroid Disease Other         Grandparent        DIAGNOSTIC LAB DATA      Lab Results   Component Value Date/Time    Hemoglobin A1c 5.5 08/18/2021 10:56 AM    Hemoglobin A1c 9.0 (H) 09/22/2020 06:30 AM    Hemoglobin A1c 5.9 (H) 05/21/2020 08:07 AM    //   Lab Results   Component Value Date/Time    Glucose 86 08/18/2021 10:56 AM    Glucose (POC) 378 (H) 09/22/2020 11:08 AM    Glucose POC [facility] 09/28/2020 03:00 PM        Lab Results   Component Value Date/Time    Hemoglobin A1c (POC) 5.2 01/14/2021 03:43 PM         Lab Results   Component Value Date/Time    VITAMIN D, 25-HYDROXY 64.8 08/18/2021 10:56 AM        Drug Screen Most Recent Result Date    No resulted procedures found. REVIEW OF SYSTEMS : 12/6/2021  ALL BELOW ARE Negative except : SEE HPI     All other systems reviewed and are negative. 12 point review of systems otherwise negative unless noted in HPI. RADIOGRAPHS// IMAGING//DIAGNOSTIC DATA     Orders Placed This Encounter    AMB POC XRAY, FOOT; COMPLETE, 3+ VIEW        FOOT X RAYS 3 VIEWS Right   AP, LATERAL, OBLIQUE IMAGES   12/6/2021    NON WEIGHT BEARING    X RAYS AT [address] [place] [id number]  12/6/2021      Bones: No fractures or dislocations. No focal osteolytic or osteoblastic process     Bone Spurs: No significant bone spurs  Foot Alignment: WNL  Joint Condition: No Significant OA  Soft Tissues: Normal, No radiopaque foreign body and No abnormal calcific densities to soft tissues   No ankle joint effusion in lateral projection.   Mineralization: Suggests  no Osteopenia    I have personally reviewed the results of the above study and the interpretation of this study is my professional opinion               I have spent 30 minutes reviewing the previous notes, reviewing diagnostic studies [Advanced  Imaging, Diagnostic test results (x-rays)] and had a direct face to face with the patient discussing the diagnosis and importance of compliance with the treatment and plan. There is  discussion for the potential for surgery, answering all questions, as well as documenting patient care coordination for this individual on the day of the visit. Disclaimer:     Sections of this note are dictated using utilizing voice recognition software, which may have resulted in some phonetic based errors in grammar and contents. Even though attempts were made to correct all the mistakes, some may have been missed, and remained in the body of the document. If questions arise, please contact our department. An electronic signature was used to authenticate this note. Deborah Arreguin may have a reminder for a \"due or due soon\" health maintenance. I have asked that she contact her primary care provider for follow-up on this health maintenance. Patient Instructions   Tuli Heel cup : [employer] or [employer]          Please follow up with your PCP for any health maintenance as recommended.             Vince Meyer, as directed by , Chris Krishnan  12/6/2021  1:30 PM

## 2021-12-06 ENCOUNTER — OFFICE VISIT (OUTPATIENT)
Dept: ORTHOPEDIC SURGERY | Age: 36
End: 2021-12-06
Payer: MEDICARE

## 2021-12-06 VITALS
WEIGHT: 144.2 LBS | HEIGHT: 59 IN | OXYGEN SATURATION: 97 % | RESPIRATION RATE: 16 BRPM | BODY MASS INDEX: 29.07 KG/M2 | HEART RATE: 88 BPM | TEMPERATURE: 98.4 F

## 2021-12-06 DIAGNOSIS — M79.671 RIGHT FOOT PAIN: ICD-10-CM

## 2021-12-06 DIAGNOSIS — G62.9 NEUROPATHY: ICD-10-CM

## 2021-12-06 DIAGNOSIS — S90.31XA CONTUSION OF FOOT OR HEEL, RIGHT, INITIAL ENCOUNTER: Primary | ICD-10-CM

## 2021-12-06 PROCEDURE — 73630 X-RAY EXAM OF FOOT: CPT | Performed by: ORTHOPAEDIC SURGERY

## 2021-12-06 PROCEDURE — G8432 DEP SCR NOT DOC, RNG: HCPCS | Performed by: ORTHOPAEDIC SURGERY

## 2021-12-06 PROCEDURE — G8756 NO BP MEASURE DOC: HCPCS | Performed by: ORTHOPAEDIC SURGERY

## 2021-12-06 PROCEDURE — G8427 DOCREV CUR MEDS BY ELIG CLIN: HCPCS | Performed by: ORTHOPAEDIC SURGERY

## 2021-12-06 PROCEDURE — 99213 OFFICE O/P EST LOW 20 MIN: CPT | Performed by: ORTHOPAEDIC SURGERY

## 2021-12-06 PROCEDURE — G8419 CALC BMI OUT NRM PARAM NOF/U: HCPCS | Performed by: ORTHOPAEDIC SURGERY

## 2021-12-08 ENCOUNTER — OFFICE VISIT (OUTPATIENT)
Dept: FAMILY MEDICINE CLINIC | Age: 36
End: 2021-12-08
Payer: MEDICARE

## 2021-12-08 VITALS
BODY MASS INDEX: 29.23 KG/M2 | HEIGHT: 59 IN | HEART RATE: 91 BPM | TEMPERATURE: 97.3 F | WEIGHT: 145 LBS | OXYGEN SATURATION: 100 % | RESPIRATION RATE: 20 BRPM | DIASTOLIC BLOOD PRESSURE: 80 MMHG | SYSTOLIC BLOOD PRESSURE: 127 MMHG

## 2021-12-08 DIAGNOSIS — K21.9 GASTROESOPHAGEAL REFLUX DISEASE, UNSPECIFIED WHETHER ESOPHAGITIS PRESENT: ICD-10-CM

## 2021-12-08 DIAGNOSIS — M25.512 LEFT SHOULDER PAIN, UNSPECIFIED CHRONICITY: ICD-10-CM

## 2021-12-08 DIAGNOSIS — E55.9 VITAMIN D DEFICIENCY: ICD-10-CM

## 2021-12-08 DIAGNOSIS — G62.9 NEUROPATHY: ICD-10-CM

## 2021-12-08 DIAGNOSIS — E78.5 DYSLIPIDEMIA: ICD-10-CM

## 2021-12-08 DIAGNOSIS — E11.65 TYPE 2 DIABETES MELLITUS WITH HYPERGLYCEMIA, WITHOUT LONG-TERM CURRENT USE OF INSULIN (HCC): Primary | ICD-10-CM

## 2021-12-08 DIAGNOSIS — I10 ESSENTIAL (PRIMARY) HYPERTENSION: ICD-10-CM

## 2021-12-08 DIAGNOSIS — M79.7 FIBROMYALGIA: ICD-10-CM

## 2021-12-08 DIAGNOSIS — M54.2 NECK PAIN: ICD-10-CM

## 2021-12-08 PROBLEM — N92.6 IRREGULAR PERIODS: Status: ACTIVE | Noted: 2021-12-08

## 2021-12-08 PROBLEM — D21.9 BENIGN NEOPLASM OF SOFT TISSUE: Status: ACTIVE | Noted: 2018-10-11

## 2021-12-08 PROBLEM — J30.5 ALLERGIC RHINITIS DUE TO FOOD: Status: ACTIVE | Noted: 2017-10-19
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
## 2021-12-08 PROBLEM — L81.0 POSTINFLAMMATORY HYPERPIGMENTATION: Status: ACTIVE | Noted: 2020-06-02

## 2021-12-08 PROBLEM — E78.00 HIGH BLOOD CHOLESTEROL: Status: ACTIVE | Noted: 2021-12-08

## 2021-12-08 PROBLEM — L21.9 SEBORRHEIC DERMATITIS: Status: ACTIVE | Noted: 2020-06-02

## 2021-12-08 PROBLEM — N93.9 ABNORMAL VAGINAL BLEEDING: Status: ACTIVE | Noted: 2021-12-08

## 2021-12-08 PROBLEM — E78.1 PURE HYPERGLYCERIDEMIA: Status: ACTIVE | Noted: 2021-12-08

## 2021-12-08 PROCEDURE — G8419 CALC BMI OUT NRM PARAM NOF/U: HCPCS | Performed by: FAMILY MEDICINE

## 2021-12-08 PROCEDURE — G8754 DIAS BP LESS 90: HCPCS | Performed by: FAMILY MEDICINE

## 2021-12-08 PROCEDURE — 3044F HG A1C LEVEL LT 7.0%: CPT | Performed by: FAMILY MEDICINE

## 2021-12-08 PROCEDURE — G8752 SYS BP LESS 140: HCPCS | Performed by: FAMILY MEDICINE

## 2021-12-08 PROCEDURE — G8510 SCR DEP NEG, NO PLAN REQD: HCPCS | Performed by: FAMILY MEDICINE

## 2021-12-08 PROCEDURE — 99214 OFFICE O/P EST MOD 30 MIN: CPT | Performed by: FAMILY MEDICINE

## 2021-12-08 PROCEDURE — G8427 DOCREV CUR MEDS BY ELIG CLIN: HCPCS | Performed by: FAMILY MEDICINE

## 2021-12-08 PROCEDURE — 2022F DILAT RTA XM EVC RTNOPTHY: CPT | Performed by: FAMILY MEDICINE

## 2021-12-08 NOTE — PROGRESS NOTES
[facility] [place]  Rosalie Schilling comes in for follow-up care. Diabetes mellitus type 2: Patient has type 2 diabetes mellitus. Blood glucose numbers have been stable. The last HbA1c was 5.5. She is on Jardiance. We will continue with the current treatment plan. She is doing lifestyle and dietary modification. We will recheck her HbA1c. Hypertension: Patient has hypertension. Blood pressure is stable. She is on atenolol. Tolerating medication. Continue current treatment plan. Neck pain: Patient has neck pain. This has been chronic but lately over the past week it was worse. Feels like the muscles in the neck are in spasm. She is on a muscle relaxant and anti-inflammatory. She has a follow-up with her spine specialist.  She also is applying diclofenac gel. She has been using a warm compress. Symptoms have improved somewhat. We will continue with supportive care. Shoulder pain: Patient has left shoulder pain and discomfort. Denies trauma or strain. She has limitation in doing overhead motions due to the pain. Wonders about bony abnormality and would like to have an x-ray done. Request will be placed. Neuropathy: Patient has a history of neuropathy. She is on gabapentin. She will continue current treatment plan. Dyslipidemia: Patient has dyslipidemia. She is doing lifestyle and dietary modification. Has not been taking atorvastatin. We will check a lipid panel. Headaches: Patient has history of migraine headaches. Followed up by the neurologist.  She is on Aimovig. Stable on the medication. Fibromyalgia: Patient has fibromyalgia. She is on muscle relaxants and the gabapentin. These have helped with the symptoms. She will continue current management plan. GERD: Patient has gastroesophageal reflux disease. She takes Nexium. Denies hematemesis or dark stools. Health maintenance: Patient declines to have the flu vaccine.       Past Medical History  Past Medical History:   Diagnosis Date    Asthma     Chronic constipation     Diabetes ([facility] [address]     Fibromyalgia     GERD (gastroesophageal reflux disease)     Hypertension     IBS (irritable bowel syndrome)     Migraines     unknown if aura    Psychiatric disorder     she denies any diagnosis despite being on antipsychotics, SSRIs, etc in the past    Scoliosis     Urinary incontinence     mixed       Surgical History  Past Surgical History:   Procedure Laterality Date    HX  SECTION  ,     HX COLONOSCOPY      HX DILATION AND CURETTAGE      HX ENDOSCOPY      HX PELVIC LAPAROSCOPY          Medications  Current Outpatient Medications   Medication Sig Dispense Refill    tiZANidine (ZANAFLEX) 2 mg tablet ONE TO TWO TABLETS BY MOUTH EVERY NIGHT AT BEDTIME FOR PAIN 60 Tablet 1    cetirizine (ZyrTEC) 10 mg tablet Take  by mouth.  diclofenac EC (VOLTAREN) 50 mg EC tablet TAKE 1 TAB BY MOUTH TWO (2) TIMES DAILY AS NEEDED FOR PAIN. 60 Tablet 1    atenoloL (TENORMIN) 25 mg tablet TAKE 1 TABLET BY MOUTH EVERY DAY 90 Tab 1    aspirin delayed-release 81 mg tablet TAKE 1 TABLET BY MOUTH EVERY DAY 90 Tab 1    diclofenac (Voltaren) 1 % gel Apply  to affected area as needed for Pain.  cholecalciferol (VITAMIN D3) (50,000 UNITS /1250 MCG) capsule Take 1 Cap by mouth every seven (7) days. 13 Cap 3    gabapentin (NEURONTIN) 600 mg tablet TAKE 1 TABLET BY MOUTH TWICE A DAY      Jardiance 10 mg tablet TAKE 1 TABLET BY MOUTH EVERY DAY 30 Tab 2    glucose blood VI test strips (ASCENSIA AUTODISC VI, ONE TOUCH ULTRA TEST VI) strip Check blood glucose 4 x day 400 Strip 3    lancets misc Check blood glucose 4 x day 400 Each 3    Insulin Needles, Disposable, 31 gauge x 5/16\" ndle To use daily with insulin injection subcutaneously 1 Package 11    montelukast (SINGULAIR) 10 mg tablet Take 1 Tab by mouth nightly.  valACYclovir (VALTREX) 500 mg tablet daily.       ANORO ELLIPTA 62.5-25 mcg/actuation inhaler 1 INH DAILY - USE EVERY DAY  6  AIMOVIG AUTOINJECTOR 70 mg/mL injection AS DIRECTED - 1 INJECTION SUBQ ONCE MONTHLY  3    mometasone (NASONEX) 50 mcg/actuation nasal spray USE 1-2 SPRAYS INTO EACH NOSTRIL EVERY DAY AS NEEDED  0    EPINEPHrine (EPIPEN) 0.3 mg/0.3 mL injection AS DIRECTED AS NEEDED INTRAMUSCULAR 1 DAYS  5    albuterol (PROVENTIL HFA, VENTOLIN HFA, PROAIR HFA) 90 mcg/actuation inhaler Take 2 Puffs by inhalation every four (4) hours as needed for Wheezing or Shortness of Breath. 1 Inhaler 0    esomeprazole (NEXIUM) 40 mg capsule Take  by mouth daily.  POLYETHYLENE GLYCOL 3350 (MIRALAX PO) Take  by mouth.  CALCIUM CARBONATE/MAG HYDROX (MYLANTA PO) Take  by mouth as needed.  atorvastatin (LIPITOR) 20 mg tablet Take 1 Tablet by mouth daily. (Patient not taking: Reported on 12/8/2021) 90 Tablet 1    FERRETTS IPS 40 mg/15 mL liqd 15 ML ORALLY 2 TIMES PER DAY. (Patient not taking: Reported on 12/6/2021)  2       Allergies  Allergies   Allergen Reactions    Iodine Hives and Nausea and Vomiting    Iodinated Contrast Media Hives    Oxycodone-Acetaminophen Other (comments)     Hallucinations     Prochlorperazine Other (comments)    Propoxyphene-Acetaminophen Unknown (comments)     Allergy to propoxyphene only.   Has previously tolerated acetaminophen    Reglan [Metoclopramide] Not Reported This Time    Sulfa (Sulfonamide Antibiotics) Not Reported This Time    Wygesic Not Reported This Time       Family History  Family History   Problem Relation Age of Onset    Kidney Disease Father     Other Father         Pancreatic Disease    Cancer Father     Heart Disease Father     Heart Attack Father     Thyroid Disease Other         Grandparent       Social History  Social History     Socioeconomic History    Marital status: SINGLE     Spouse name: Not on file    Number of children: Not on file    Years of education: Not on file    Highest education level: Not on file   Occupational History    Not on file Tobacco Use    Smoking status: Never Smoker    Smokeless tobacco: Never Used   Vaping Use    Vaping Use: Never used   Substance and Sexual Activity    Alcohol use: No    Drug use: No    Sexual activity: Yes     Partners: Male   Other Topics Concern    Not on file   Social History Narrative    Not on file     Social Determinants of Health     Financial Resource Strain:     Difficulty of Paying Living Expenses: Not on file   Food Insecurity:     Worried About Running Out of Food in the Last Year: Not on file    Alonzo of Food in the Last Year: Not on file   Transportation Needs:     Lack of Transportation (Medical): Not on file    Lack of Transportation (Non-Medical): Not on file   Physical Activity:     Days of Exercise per Week: Not on file    Minutes of Exercise per Session: Not on file   Stress:     Feeling of Stress : Not on file   Social Connections:     Frequency of Communication with Friends and Family: Not on file    Frequency of Social Gatherings with Friends and Family: Not on file    Attends Scientologist Services: Not on file    Active Member of [address] [place] [address] or Organizations: Not on file    Attends Club or Organization Meetings: Not on file    Marital Status: Not on file   Intimate Partner Violence:     Fear of Current or Ex-Partner: Not on file    Emotionally Abused: Not on file    Physically Abused: Not on file    Sexually Abused: Not on file   Housing Stability:     Unable to Pay for Housing in the Last Year: Not on file    Number of [place] in the Last Year: Not on file    Unstable Housing in the Last Year: Not on file       Review of Systems  Review of Systems - Review of all systems is negative except as noted above in the HPI.     Vital Signs  Visit Vitals  /80   Pulse 91   Temp 97.3 °F (36.3 °C) (Oral)   Resp 20   Ht 4' 11\" (1.499 m)   Wt 145 lb (65.8 kg)   SpO2 100%   BMI 29.29 kg/m²         Physical Exam  Physical Examination: General appearance - alert, well appearing, and in no distress, oriented to person, place, and time, acyanotic, in no respiratory distress and well hydrated  Mental status - alert, oriented to person, place, and time, affect appropriate to mood  Neck -discomfort left paracervical muscles with muscle tightness  Lymphatics - no palpable lymphadenopathy, no hepatosplenomegaly  Chest - clear to auscultation, no wheezes, rales or rhonchi, symmetric air entry  Heart - normal rate and regular rhythm, S1 and S2 normal  Abdomen - no rebound tenderness noted  Back exam - limited range of motion  Neurological - abnormal neurological exam unchanged from prior examinations  Musculoskeletal -left shoulder discomfort to palpation along the margins with limitation in overhead motions due to pain and discomfort. Extremities - intact peripheral pulses      Results  Results for orders placed or performed in visit on 08/18/21   CBC W/O DIFF   Result Value Ref Range    WBC 5.8 3.4 - 10.8 x10E3/uL    RBC 4.22 3.77 - 5.28 x10E6/uL    HGB 12.0 11.1 - 15.9 g/dL    HCT 37.0 34.0 - 46.6 %    MCV 88 79 - 97 fL    MCH 28.4 26.6 - 33.0 pg    MCHC 32.4 31.5 - 35.7 g/dL    RDW 12.5 11.7 - 15.4 %    PLATELET 525 720 - 270 L80I1/AK   METABOLIC PANEL, COMPREHENSIVE   Result Value Ref Range    Glucose 86 65 - 99 mg/dL    BUN 11 6 - 20 mg/dL    Creatinine 0.81 0.57 - 1.00 mg/dL    GFR est non-AA 94 >59 mL/min/1.73    GFR est  >59 mL/min/1.73    BUN/Creatinine ratio 14 9 - 23    Sodium 139 134 - 144 mmol/L    Potassium 4.0 3.5 - 5.2 mmol/L    Chloride 104 96 - 106 mmol/L    CO2 24 20 - 29 mmol/L    Calcium 9.3 8.7 - 10.2 mg/dL    Protein, total 7.4 6.0 - 8.5 g/dL    Albumin 4.5 3.8 - 4.8 g/dL    GLOBULIN, TOTAL 2.9 1.5 - 4.5 g/dL    A-G Ratio 1.6 1.2 - 2.2    Bilirubin, total 0.4 0.0 - 1.2 mg/dL    Alk.  phosphatase 95 48 - 121 IU/L    AST (SGOT) 19 0 - 40 IU/L    ALT (SGPT) 13 0 - 32 IU/L   VITAMIN D, 25 HYDROXY   Result Value Ref Range    VITAMIN D, 25-HYDROXY 64.8 30.0 - 100.0 ng/mL   HEMOGLOBIN A1C WITH EAG   Result Value Ref Range    Hemoglobin A1c 5.5 4.8 - 5.6 %    Estimated average glucose 111 mg/dL   LIPID PANEL   Result Value Ref Range    Cholesterol, total 174 100 - 199 mg/dL    Triglyceride 46 0 - 149 mg/dL    HDL Cholesterol 34 (L) >39 mg/dL    VLDL, calculated 9 5 - 40 mg/dL    LDL, calculated 131 (H) 0 - 99 mg/dL   CVD REPORT   Result Value Ref Range    INTERPRETATION Note        ASSESSMENT and PLAN    ICD-10-CM ICD-9-CM    1. Type 2 diabetes mellitus with hyperglycemia, without long-term current use of insulin (HCC)  E11.65 250.00 HEMOGLOBIN A1C WITH EAG     937.21 METABOLIC PANEL, COMPREHENSIVE      CBC WITH AUTOMATED DIFF   2. Vitamin D deficiency  E55.9 268.9 VITAMIN D, 25 HYDROXY   3. Dyslipidemia  E78.5 272.4 LIPID PANEL   4. Neuropathy  G62.9 355.9    5. Neck pain  M54.2 723.1    6. Left shoulder pain, unspecified chronicity  M25.512 719.41 CANCELED: XR SHOULDER RT AP/LAT MIN 2 V   7. Essential (primary) hypertension  I10 401.9    8. Gastroesophageal reflux disease, unspecified whether esophagitis present  K21.9 530.81    9. Fibromyalgia  M79.7 729.1      lab results and schedule of future lab studies reviewed with patient  reviewed diet, exercise and weight control  cardiovascular risk and specific lipid/LDL goals reviewed  reviewed medications and side effects in detail  specific diabetic recommendations: low cholesterol diet, weight control and daily exercise discussed, home glucose monitoring emphasized, all medications, side effects and compliance discussed carefully, annual eye examinations at Ophthalmology discussed, glycohemoglobin and other lab monitoring discussed and long term diabetic complications discussed  use of aspirin to prevent MI and TIA's discussed  radiology results and schedule of future radiology studies reviewed with patient      I have discussed the diagnosis with the patient and the intended plan of care as seen in the above orders.  The patient has received an after-visit summary and questions were answered concerning future plans. I have discussed medication, side effects, and warnings with the patient in detail. The patient verbalized understanding and is in agreement with the plan of care. The patient will contact the office with any additional concerns. Brianne Biswas MD    PLEASE NOTE:   This document has been produced using voice recognition software.  Unrecognized errors in transcription may be present

## 2021-12-09 ENCOUNTER — APPOINTMENT (OUTPATIENT)
Dept: FAMILY MEDICINE CLINIC | Age: 36
End: 2021-12-09

## 2021-12-09 DIAGNOSIS — E78.5 DYSLIPIDEMIA: ICD-10-CM

## 2021-12-09 DIAGNOSIS — M25.562 CHRONIC PAIN OF LEFT KNEE: ICD-10-CM

## 2021-12-09 DIAGNOSIS — E55.9 VITAMIN D DEFICIENCY: ICD-10-CM

## 2021-12-09 DIAGNOSIS — G89.29 CHRONIC PAIN OF LEFT KNEE: ICD-10-CM

## 2021-12-09 DIAGNOSIS — E11.65 TYPE 2 DIABETES MELLITUS WITH HYPERGLYCEMIA, WITHOUT LONG-TERM CURRENT USE OF INSULIN (HCC): ICD-10-CM

## 2021-12-10 LAB
25(OH)D3+25(OH)D2 SERPL-MCNC: 45.2 NG/ML (ref 30–100)
ALBUMIN SERPL-MCNC: 4.4 G/DL (ref 3.8–4.8)
ALBUMIN/GLOB SERPL: 1.5 {RATIO} (ref 1.2–2.2)
ALP SERPL-CCNC: 106 IU/L (ref 44–121)
ALT SERPL-CCNC: 15 IU/L (ref 0–32)
AST SERPL-CCNC: 18 IU/L (ref 0–40)
BASOPHILS # BLD AUTO: 0 X10E3/UL (ref 0–0.2)
BASOPHILS NFR BLD AUTO: 1 %
BILIRUB SERPL-MCNC: 0.6 MG/DL (ref 0–1.2)
BUN SERPL-MCNC: 9 MG/DL (ref 6–20)
BUN/CREAT SERPL: 12 (ref 9–23)
CALCIUM SERPL-MCNC: 9.5 MG/DL (ref 8.7–10.2)
CHLORIDE SERPL-SCNC: 103 MMOL/L (ref 96–106)
CHOLEST SERPL-MCNC: 181 MG/DL (ref 100–199)
CO2 SERPL-SCNC: 24 MMOL/L (ref 20–29)
CREAT SERPL-MCNC: 0.76 MG/DL (ref 0.57–1)
EOSINOPHIL # BLD AUTO: 0 X10E3/UL (ref 0–0.4)
EOSINOPHIL NFR BLD AUTO: 0 %
ERYTHROCYTE [DISTWIDTH] IN BLOOD BY AUTOMATED COUNT: 13.2 % (ref 11.7–15.4)
EST. AVERAGE GLUCOSE BLD GHB EST-MCNC: 114 MG/DL
GLOBULIN SER CALC-MCNC: 2.9 G/DL (ref 1.5–4.5)
GLUCOSE SERPL-MCNC: 69 MG/DL (ref 65–99)
HBA1C MFR BLD: 5.6 % (ref 4.8–5.6)
HCT VFR BLD AUTO: 38.4 % (ref 34–46.6)
HDLC SERPL-MCNC: 32 MG/DL
HGB BLD-MCNC: 12.1 G/DL (ref 11.1–15.9)
IMM GRANULOCYTES # BLD AUTO: 0 X10E3/UL (ref 0–0.1)
IMM GRANULOCYTES NFR BLD AUTO: 0 %
IMP & REVIEW OF LAB RESULTS: NORMAL
LDLC SERPL CALC-MCNC: 137 MG/DL (ref 0–99)
LYMPHOCYTES # BLD AUTO: 2.3 X10E3/UL (ref 0.7–3.1)
LYMPHOCYTES NFR BLD AUTO: 36 %
MCH RBC QN AUTO: 27.1 PG (ref 26.6–33)
MCHC RBC AUTO-ENTMCNC: 31.5 G/DL (ref 31.5–35.7)
MCV RBC AUTO: 86 FL (ref 79–97)
MONOCYTES # BLD AUTO: 0.3 X10E3/UL (ref 0.1–0.9)
MONOCYTES NFR BLD AUTO: 5 %
NEUTROPHILS # BLD AUTO: 3.7 X10E3/UL (ref 1.4–7)
NEUTROPHILS NFR BLD AUTO: 58 %
PLATELET # BLD AUTO: 233 X10E3/UL (ref 150–450)
POTASSIUM SERPL-SCNC: 3.8 MMOL/L (ref 3.5–5.2)
PROT SERPL-MCNC: 7.3 G/DL (ref 6–8.5)
RBC # BLD AUTO: 4.46 X10E6/UL (ref 3.77–5.28)
SODIUM SERPL-SCNC: 143 MMOL/L (ref 134–144)
TRIGL SERPL-MCNC: 63 MG/DL (ref 0–149)
VLDLC SERPL CALC-MCNC: 12 MG/DL (ref 5–40)
WBC # BLD AUTO: 6.3 X10E3/UL (ref 3.4–10.8)

## 2021-12-13 RX ORDER — DICLOFENAC SODIUM 50 MG/1
50 TABLET, DELAYED RELEASE ORAL
Qty: 60 TABLET | Refills: 1 | Status: SHIPPED | OUTPATIENT
Start: 2021-12-13 | End: 2022-01-31

## 2021-12-16 RX ORDER — ATORVASTATIN CALCIUM 40 MG/1
40 TABLET, FILM COATED ORAL DAILY
Qty: 30 TABLET | Refills: 3 | Status: SHIPPED | OUTPATIENT
Start: 2021-12-16 | End: 2022-01-31

## 2021-12-16 NOTE — PROGRESS NOTES
Please let patient know LDL cholesterol remains elevated at 137. I will have her increase Lipitor to 40 mg daily. She should continue to exercise and take a diet low in polysaturated fats. Recheck fasting labs in 3 to 6 months. Rest of the labs are reassuring.   Luly Wright MD

## 2022-01-28 DIAGNOSIS — G89.29 CHRONIC PAIN OF LEFT KNEE: ICD-10-CM

## 2022-01-28 DIAGNOSIS — E11.65 TYPE 2 DIABETES MELLITUS WITH HYPERGLYCEMIA, WITHOUT LONG-TERM CURRENT USE OF INSULIN (HCC): ICD-10-CM

## 2022-01-28 DIAGNOSIS — G47.8 NON-RESTORATIVE SLEEP: ICD-10-CM

## 2022-01-28 DIAGNOSIS — I10 ESSENTIAL (PRIMARY) HYPERTENSION: ICD-10-CM

## 2022-01-28 DIAGNOSIS — M79.7 FIBROMYALGIA: ICD-10-CM

## 2022-01-28 DIAGNOSIS — M25.562 CHRONIC PAIN OF LEFT KNEE: ICD-10-CM

## 2022-01-28 RX ORDER — TIZANIDINE 2 MG/1
TABLET ORAL
Qty: 60 TABLET | Refills: 0 | Status: SHIPPED | OUTPATIENT
Start: 2022-01-28 | End: 2022-02-21 | Stop reason: SINTOL

## 2022-01-31 RX ORDER — EMPAGLIFLOZIN 10 MG/1
TABLET, FILM COATED ORAL
Qty: 30 TABLET | Refills: 2 | Status: SHIPPED | OUTPATIENT
Start: 2022-01-31 | End: 2022-04-28

## 2022-01-31 RX ORDER — ATENOLOL 25 MG/1
TABLET ORAL
Qty: 90 TABLET | Refills: 1 | Status: SHIPPED | OUTPATIENT
Start: 2022-01-31 | End: 2022-05-11

## 2022-01-31 RX ORDER — ATORVASTATIN CALCIUM 40 MG/1
TABLET, FILM COATED ORAL
Qty: 90 TABLET | Refills: 1 | Status: SHIPPED | OUTPATIENT
Start: 2022-01-31 | End: 2022-04-05 | Stop reason: DRUGHIGH

## 2022-01-31 RX ORDER — OLMESARTAN MEDOXOMIL 40 MG/1
TABLET ORAL
Qty: 90 TABLET | Refills: 1 | Status: SHIPPED | OUTPATIENT
Start: 2022-01-31 | End: 2022-02-21

## 2022-01-31 RX ORDER — DICLOFENAC SODIUM 50 MG/1
TABLET, DELAYED RELEASE ORAL
Qty: 60 TABLET | Refills: 1 | Status: SHIPPED | OUTPATIENT
Start: 2022-01-31

## 2022-02-21 ENCOUNTER — OFFICE VISIT (OUTPATIENT)
Dept: ORTHOPEDIC SURGERY | Age: 37
End: 2022-02-21
Payer: MEDICARE

## 2022-02-21 VITALS
HEART RATE: 87 BPM | HEIGHT: 59 IN | BODY MASS INDEX: 29.64 KG/M2 | TEMPERATURE: 97.7 F | SYSTOLIC BLOOD PRESSURE: 132 MMHG | DIASTOLIC BLOOD PRESSURE: 84 MMHG | WEIGHT: 147 LBS | OXYGEN SATURATION: 98 %

## 2022-02-21 DIAGNOSIS — G89.29 CHRONIC RIGHT-SIDED LOW BACK PAIN WITHOUT SCIATICA: ICD-10-CM

## 2022-02-21 DIAGNOSIS — M25.562 POSTERIOR LEFT KNEE PAIN: ICD-10-CM

## 2022-02-21 DIAGNOSIS — M54.50 CHRONIC RIGHT-SIDED LOW BACK PAIN WITHOUT SCIATICA: ICD-10-CM

## 2022-02-21 DIAGNOSIS — M79.7 FIBROMYALGIA: Primary | ICD-10-CM

## 2022-02-21 PROCEDURE — G8419 CALC BMI OUT NRM PARAM NOF/U: HCPCS | Performed by: PHYSICAL MEDICINE & REHABILITATION

## 2022-02-21 PROCEDURE — G8754 DIAS BP LESS 90: HCPCS | Performed by: PHYSICAL MEDICINE & REHABILITATION

## 2022-02-21 PROCEDURE — G8427 DOCREV CUR MEDS BY ELIG CLIN: HCPCS | Performed by: PHYSICAL MEDICINE & REHABILITATION

## 2022-02-21 PROCEDURE — G8752 SYS BP LESS 140: HCPCS | Performed by: PHYSICAL MEDICINE & REHABILITATION

## 2022-02-21 PROCEDURE — 99214 OFFICE O/P EST MOD 30 MIN: CPT | Performed by: PHYSICAL MEDICINE & REHABILITATION

## 2022-02-21 PROCEDURE — G8432 DEP SCR NOT DOC, RNG: HCPCS | Performed by: PHYSICAL MEDICINE & REHABILITATION

## 2022-02-21 RX ORDER — RIZATRIPTAN BENZOATE 10 MG/1
1 TABLET, ORALLY DISINTEGRATING ORAL
COMMUNITY
Start: 2022-02-17

## 2022-02-21 RX ORDER — CYCLOBENZAPRINE HCL 10 MG
5-10 TABLET ORAL
Qty: 60 TABLET | Refills: 2 | Status: SHIPPED | OUTPATIENT
Start: 2022-02-21 | End: 2022-05-19 | Stop reason: SDUPTHER

## 2022-02-21 NOTE — LETTER
2/21/2022    Patient: Carla Martinez   YOB: 1985   Date of Visit: 2/21/2022     Maria Eugenia Chapman MD  [facility]    Dear Maria Eugenia Chapman MD,      Thank you for referring Ms. Sharri Chiu to [address] [facility] for evaluation. My notes for this consultation are attached. If you have questions, please do not hesitate to call me. I look forward to following your patient along with you.       Sincerely,    Tony Hubbard MD

## 2022-02-21 NOTE — PROGRESS NOTES
Adolfo Zendejas presents today for   Chief Complaint   Patient presents with    Back Pain       Is someone accompanying this pt? no    Is the patient using any DME equipment during OV? no    Depression Screening:  3 most recent PHQ Screens 12/8/2021   PHQ Not Done -   Little interest or pleasure in doing things Not at all   Feeling down, depressed, irritable, or hopeless Not at all   Total Score PHQ 2 0   Trouble falling or staying asleep, or sleeping too much -   Feeling tired or having little energy -   Poor appetite, weight loss, or overeating -   Feeling bad about yourself - or that you are a failure or have let yourself or your family down -   Trouble concentrating on things such as school, work, reading, or watching TV -   Moving or speaking so slowly that other people could have noticed; or the opposite being so fidgety that others notice -   Thoughts of being better off dead, or hurting yourself in some way -   PHQ 9 Score -   How difficult have these problems made it for you to do your work, take care of your home and get along with others -       Learning Assessment:  Learning Assessment 1/10/2020   PRIMARY LEARNER Patient   PRIMARY LANGUAGE ENGLISH   LEARNER PREFERENCE PRIMARY READING   ANSWERED BY patient   RELATIONSHIP SELF       Abuse Screening:  Abuse Screening Questionnaire 5/3/2021   Do you ever feel afraid of your partner? N   Are you in a relationship with someone who physically or mentally threatens you? N   Is it safe for you to go home? N       Fall Risk  Fall Risk Assessment, last 12 mths 5/3/2021   Able to walk? Yes   Fall in past 12 months? 0   Do you feel unsteady? 0   Are you worried about falling 0         Coordination of Care:  1. Have you been to the ER, urgent care clinic since your last visit? no  Hospitalized since your last visit? no    2. Have you seen or consulted any other health care providers outside of the [address] [place] [address] since your last visit?  Yes, ENT, hematology/oncology Include any pap smears or colon screening.  no

## 2022-02-21 NOTE — PROGRESS NOTES
[facility] [address], [address]  [place], [facility] [address] [facility]  Phone: [phone]  Fax: [phone]        Eduin Ramirez  : 1985  PCP: Nicole Nunez MD    PROGRESS NOTE      ASSESSMENT AND PLAN    Diagnoses and all orders for this visit:    1. Fibromyalgia  -     cyclobenzaprine (FLEXERIL) 10 mg tablet; Take 0.5-1 Tablets by mouth two (2) times daily as needed for Muscle Spasm(s). 2. Chronic right-sided low back pain without sciatica    3. Posterior left knee pain        1. Steven Gurrola is a 39 y.o. female w/flare of LBP. Spine neuro intact. .  2. Advised to continue HEP as tolerated. 3. DC Zanaflex  4. Trial of Flexeril 5-10 mg BID PRN  5. Patient was offered Toradol injection. She declined. 6. Continue HEP. Follow-up and Dispositions    · Return in about 3 months (around 2022). HISTORY OF PRESENT ILLNESS      Joellebethanie KRISTEN Swapna Wellington is a 39 y.o. female presents for follow up of back pain. LV 2021, trial in Zanaflex 2 mg QHS. She continues to have low back pain. She notes that her pain has increased in the last 2-3 weeks. Patient cannot recall anything that would have caused this flare. She has numbness and tingling in her posterior thigh/knee. She states Zanaflex helps, though it upsets her stomach. She is also using Theracane PRN. She is compliant with her HEP. Takes Voltaren po when topical not effective. Says she is sleeping ok    Denies red flags including persistent fevers, chills, weight changes, neurogenic bowel or bladder symptoms, but affirms constipation.      Pain Assessment  2022   Location of Pain Back   Pain Location Comment -   Location Modifiers -   Severity of Pain 3   Quality of Pain Other (Comment)   Quality of Pain Comment stabbing, pressure   Duration of Pain Persistent   Duration of Pain Comment -   Frequency of Pain Constant   Frequency of Pain Comment -   Date Pain First Started - Aggravating Factors Other (Comment)   Aggravating Factors Comment doing a lot of lifting   Limiting Behavior Some   Relieving Factors Nothing   Relieving Factors Comment -   Result of Injury No   Work-Related Injury -   Type of Injury -   Type of Injury Comment -      Onset of pain: 5/2021    Investigations:   L MRI 8/2021: normal  BUE EMG 2020: severe right CTS, moderate left CTS  C MRI 2019: unremarkable  UE EMG 2019: mild right CTS  BLE EMG 2016: normal  Spine surgery consult: no     Treatments:  Physical therapy: Multiple rounds, no benefit  Spinal injections: no  Spinal surgery- no  Beneficial medications: Flexeril (temporary), Voltaren   Failed medications: Gabapentin, Topamax, Lyrica Cymbalta, Zanaflex (GI issues)     Work Status: currently not employed, disabled  Pertinent PMHx:  Asthma, DM, FM, GERD, IBS, chronic constipation, urinary incontinence .        PHYSICAL EXAMINATION    Visit Vitals  /84 (BP 1 Location: Right upper arm, BP Patient Position: Sitting, BP Cuff Size: Adult)   Pulse 87   Temp 97.7 °F (36.5 °C) (Temporal)   Ht 4' 11\" (1.499 m)   Wt 147 lb (66.7 kg)   SpO2 98% Comment: RA   BMI 29.69 kg/m²     Pain with all Lumbar ROM  Lumbar flexion to knees  Radiating thigh pain with left hip ROM  Tender B/L lumbar paraspinals  Tender posterior left knee, non tender on right, no effusion  LE strength intact  SLR negative  No edema                Written by Timothy Jeffrey, as dictated by Trisha Johns MD.

## 2022-03-18 PROBLEM — M54.50 LOW BACK PAIN: Status: ACTIVE | Noted: 2020-09-21

## 2022-03-18 PROBLEM — D21.9 BENIGN NEOPLASM OF SOFT TISSUE: Status: ACTIVE | Noted: 2018-10-11

## 2022-03-18 PROBLEM — L21.9 SEBORRHEIC DERMATITIS: Status: ACTIVE | Noted: 2020-06-02

## 2022-03-18 PROBLEM — E78.1 PURE HYPERGLYCERIDEMIA: Status: ACTIVE | Noted: 2021-12-08

## 2022-03-18 PROBLEM — M54.50 LOW BACK PAIN WITHOUT SCIATICA: Status: ACTIVE | Noted: 2017-01-27

## 2022-03-18 PROBLEM — I10 HYPERTENSION: Status: ACTIVE | Noted: 2020-09-21

## 2022-03-18 PROBLEM — R07.9 CHEST PAIN: Status: ACTIVE | Noted: 2019-01-04

## 2022-03-18 PROBLEM — N92.6 IRREGULAR PERIODS: Status: ACTIVE | Noted: 2021-12-08

## 2022-03-19 PROBLEM — E11.9 DIABETES MELLITUS WITHOUT COMPLICATION (HCC): Status: ACTIVE | Noted: 2020-09-21

## 2022-03-19 PROBLEM — E11.69 DIABETES MELLITUS TYPE 2 IN OBESE (HCC): Status: ACTIVE | Noted: 2020-09-21

## 2022-03-19 PROBLEM — M54.40 CHRONIC BILATERAL LOW BACK PAIN WITH SCIATICA: Status: ACTIVE | Noted: 2017-05-10

## 2022-03-19 PROBLEM — R07.89 COSTOCHONDRAL CHEST PAIN: Status: ACTIVE | Noted: 2020-09-22

## 2022-03-19 PROBLEM — M54.6 ACUTE BILATERAL THORACIC BACK PAIN: Status: ACTIVE | Noted: 2017-05-10

## 2022-03-19 PROBLEM — E66.9 DIABETES MELLITUS TYPE 2 IN OBESE (HCC): Status: ACTIVE | Noted: 2020-09-21

## 2022-03-19 PROBLEM — M54.41 CHRONIC BILATERAL LOW BACK PAIN WITH SCIATICA: Status: ACTIVE | Noted: 2017-05-10

## 2022-03-19 PROBLEM — E11.40 TYPE 2 DIABETES MELLITUS WITH DIABETIC NEUROPATHY (HCC): Status: ACTIVE | Noted: 2020-09-28

## 2022-03-19 PROBLEM — G89.29 CHRONIC BILATERAL LOW BACK PAIN WITH SCIATICA: Status: ACTIVE | Noted: 2017-05-10

## 2022-03-19 PROBLEM — M54.42 CHRONIC BILATERAL LOW BACK PAIN WITH SCIATICA: Status: ACTIVE | Noted: 2017-05-10

## 2022-03-19 PROBLEM — Z82.49 FAMILY HISTORY OF CHRONIC HEART FAILURE: Status: ACTIVE | Noted: 2019-01-04

## 2022-03-19 PROBLEM — E11.9 NEW ONSET TYPE 2 DIABETES MELLITUS (HCC): Status: ACTIVE | Noted: 2020-09-21

## 2022-03-19 PROBLEM — J45.909 UNCOMPLICATED ASTHMA: Status: ACTIVE | Noted: 2019-01-04

## 2022-03-19 PROBLEM — N93.9 ABNORMAL VAGINAL BLEEDING: Status: ACTIVE | Noted: 2021-12-08

## 2022-03-19 PROBLEM — L81.0 POSTINFLAMMATORY HYPERPIGMENTATION: Status: ACTIVE | Noted: 2020-06-02

## 2022-03-19 PROBLEM — E78.00 HIGH BLOOD CHOLESTEROL: Status: ACTIVE | Noted: 2021-12-08

## 2022-03-19 PROBLEM — E55.9 VITAMIN D DEFICIENCY: Status: ACTIVE | Noted: 2020-09-21

## 2022-03-19 PROBLEM — R06.02 SOB (SHORTNESS OF BREATH) ON EXERTION: Status: ACTIVE | Noted: 2019-01-04
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
## 2022-03-19 PROBLEM — J30.5 ALLERGIC RHINITIS DUE TO FOOD: Status: ACTIVE | Noted: 2017-10-19

## 2022-03-19 PROBLEM — E11.65 HYPERGLYCEMIA DUE TO TYPE 2 DIABETES MELLITUS (HCC): Status: ACTIVE | Noted: 2021-12-08

## 2022-03-19 PROBLEM — E11.21 TYPE 2 DIABETES WITH NEPHROPATHY (HCC): Status: ACTIVE | Noted: 2020-09-28

## 2022-03-19 PROBLEM — R53.1 WEAKNESS: Status: ACTIVE | Noted: 2019-04-24

## 2022-03-20 PROBLEM — M54.2 NECK PAIN: Status: ACTIVE | Noted: 2020-09-21

## 2022-03-28 ENCOUNTER — NURSE TRIAGE (OUTPATIENT)
Dept: OTHER | Facility: CLINIC | Age: 37
End: 2022-03-28

## 2022-03-28 ENCOUNTER — TELEPHONE (OUTPATIENT)
Dept: FAMILY MEDICINE CLINIC | Age: 37
End: 2022-03-28

## 2022-03-28 NOTE — TELEPHONE ENCOUNTER
Received call from Aida Emanuel at [facility] with Red Flag Complaint. Subjective: Caller states \"h/a and numbness left side with dizziness\"     Current Symptoms: h/a dizziness and numbness on left side of body, sone blurred vision comes and goes worst symptom is h/a and dizziness and has nausea, hx htn diabetes acid reflux , states blood sugar doing great, had similar episode 1 month ago    Pt referred to er and pt is refusing the er states just wants to talk to her pcp due to possible high chol level tried to tell pt she needs to go be evaluated at er to make sure it is not a stroke informed pt this could be very serious stoke and bleed and still refused    Onset: Friday     Associated Symptoms: NA    Pain Severity: 7/10    Temperature: none    What has been tried: pain pill    LMP: depo inj Pregnant: No    Recommended disposition: Go to ED Now    Calling office for pt refussal to go to er spoke with Jessy who took the call    Care advice provided, patient verbalizes understanding; denies any other questions or concerns; instructed to call back for any new or worsening symptoms. Patient/Caller agrees with recommended disposition; writer provided warm transfer to at [facility] for appointment scheduling    Attention Provider: Thank you for allowing me to participate in the care of your patient. The patient was connected to triage in response to information provided to the ECC.   Please do not respond through this encounter as the response is not directed to a     Reason for Disposition   Headache (with neurologic deficit)    Protocols used: NEUROLOGIC DEFICIT-ADULT-[place]

## 2022-03-31 NOTE — TELEPHONE ENCOUNTER
Spoke with patient and she stated that the she never begin taking the 10 mg of atorvastatin and that's  Why her numbers was 137. Patient stated can she start off with the 10mg of Liptor to see if she can get it down.

## 2022-04-04 NOTE — TELEPHONE ENCOUNTER
Pt called back to check the status of her request for a change in the medication. Advised pt that Dr Yen Bhatt has not seen the message yet and to give it til later this afternoon for a response.  Ms Sahara Burnett understood but would like to be contacted by the nurse later to know the status of her request.

## 2022-04-05 RX ORDER — ATORVASTATIN CALCIUM 10 MG/1
10 TABLET, FILM COATED ORAL DAILY
Qty: 30 TABLET | Refills: 2 | Status: SHIPPED | OUTPATIENT
Start: 2022-04-05 | End: 2022-05-03 | Stop reason: SDUPTHER

## 2022-04-11 ENCOUNTER — OFFICE VISIT (OUTPATIENT)
Dept: FAMILY MEDICINE CLINIC | Age: 37
End: 2022-04-11
Payer: MEDICARE

## 2022-04-11 VITALS
RESPIRATION RATE: 24 BRPM | OXYGEN SATURATION: 97 % | WEIGHT: 150 LBS | HEIGHT: 59 IN | DIASTOLIC BLOOD PRESSURE: 88 MMHG | TEMPERATURE: 97.8 F | SYSTOLIC BLOOD PRESSURE: 130 MMHG | BODY MASS INDEX: 30.24 KG/M2 | HEART RATE: 78 BPM

## 2022-04-11 DIAGNOSIS — E78.5 DYSLIPIDEMIA: ICD-10-CM

## 2022-04-11 DIAGNOSIS — E11.9 COMPREHENSIVE DIABETIC FOOT EXAMINATION, TYPE 2 DM, ENCOUNTER FOR (HCC): ICD-10-CM

## 2022-04-11 DIAGNOSIS — E11.65 TYPE 2 DIABETES MELLITUS WITH HYPERGLYCEMIA, WITHOUT LONG-TERM CURRENT USE OF INSULIN (HCC): ICD-10-CM

## 2022-04-11 DIAGNOSIS — I10 ESSENTIAL (PRIMARY) HYPERTENSION: Primary | ICD-10-CM

## 2022-04-11 DIAGNOSIS — G62.9 NEUROPATHY: ICD-10-CM

## 2022-04-11 DIAGNOSIS — E55.9 VITAMIN D DEFICIENCY: ICD-10-CM

## 2022-04-11 LAB
CREAT SERPL-MCNC: 100 MG/DL
MICROALBUMIN UR TEST STR-MCNC: 10 MG/L
MICROALBUMIN URINE, EXTERNAL: 10
MICROALBUMIN/CREAT RATIO POC: <30 MG/G

## 2022-04-11 PROCEDURE — G8510 SCR DEP NEG, NO PLAN REQD: HCPCS | Performed by: FAMILY MEDICINE

## 2022-04-11 PROCEDURE — G8752 SYS BP LESS 140: HCPCS | Performed by: FAMILY MEDICINE

## 2022-04-11 PROCEDURE — 3044F HG A1C LEVEL LT 7.0%: CPT | Performed by: FAMILY MEDICINE

## 2022-04-11 PROCEDURE — 99214 OFFICE O/P EST MOD 30 MIN: CPT | Performed by: FAMILY MEDICINE

## 2022-04-11 PROCEDURE — 2022F DILAT RTA XM EVC RTNOPTHY: CPT | Performed by: FAMILY MEDICINE

## 2022-04-11 PROCEDURE — 82044 UR ALBUMIN SEMIQUANTITATIVE: CPT | Performed by: FAMILY MEDICINE

## 2022-04-11 PROCEDURE — G8427 DOCREV CUR MEDS BY ELIG CLIN: HCPCS | Performed by: FAMILY MEDICINE

## 2022-04-11 PROCEDURE — G8754 DIAS BP LESS 90: HCPCS | Performed by: FAMILY MEDICINE

## 2022-04-11 PROCEDURE — G8417 CALC BMI ABV UP PARAM F/U: HCPCS | Performed by: FAMILY MEDICINE

## 2022-04-11 NOTE — PROGRESS NOTES
Chief Complaint   Patient presents with   [facility] Follow Up     still having pain    Rash     1. \"Have you been to the ER, urgent care clinic since your last visit? Hospitalized since your last visit? \" Yes When: 04- Where: [facility] Reason for visit: chest pain    2. \"Have you seen or consulted any other health care providers outside of the [address] [place] [address] since your last visit? \" No     3. For patients aged 39-70: Has the patient had a colonoscopy / FIT/ Cologuard? NA - based on age      If the patient is female:    4. For patients aged 41-77: Has the patient had a mammogram within the past 2 years? NA - based on age or sex      11. For patients aged 21-65: Has the patient had a pap smear?  Yes - no Care Gap present

## 2022-04-11 NOTE — PROGRESS NOTES
[facility]  Taryn Wellington comes in for f/u care. Chest pain: Patient was seen in the emergency room with chest pain. She has a history of chest pain that comes on and off. Was evaluated for acute coronary syndrome but this was negative. Currently the chest pain just comes on and off. Noted with pressure to the lower chest.  This is likely myofascial.  She has been followed up with a cardiologist and has an appointment. She denies shortness of breath or diaphoresis. She will set up an appointment to follow-up with her specialist.  Patient takes diclofenac for pain as needed. She can also take Tylenol. Headache: Patient has migraine headache that comes on and off. She is on Maxalt to take as needed for the headache. Denies changes in vision or focal weakness. We will continue current treatment plan. Hypertension: Patient has hypertension. Blood pressure is stable. She is on atenolol. Denies changes in vision or focal weakness. Continue current treatment plan. Neuropathy: Patient has neuropathy. She has numbness and tingling left upper extremity. Denies weakness of the left upper extremity. Had a CT scan done in the emergency room of the cervical spine and also of the head and this was negative of any acute abnormality. She is doing supportive care. Was recently treated by the neurologist.  Patient takes gabapentin and this helps with neuropathy. Rash: Patient has rash back of neck. This is an eczematous type rash. It is itchy. I will give triamcinolone cream to apply. Dyslipidemia: Patient has dyslipidemia. She is on atorvastatin. Stable on the medication. We will continue current treatment plan. GERD: Patient has gastroesophageal reflux disease. She is on Nexium. Continue current treatment plan. Denies hematemesis or dark stools. Diabetes mellitus type 2: Patient has type 2 diabetes mellitus. Stable on medication. Her last HbA1c was 5.6. We will recheck this today.   She is on Jardiance. Foot exam is stable. Past Medical History  Past Medical History:   Diagnosis Date    Asthma     Chronic constipation     Diabetes ([address]     Fibromyalgia     GERD (gastroesophageal reflux disease)     Herpes zoster without complication     Hypertension     IBS (irritable bowel syndrome)     Migraines     unknown if aura    Psychiatric disorder     she denies any diagnosis despite being on antipsychotics, SSRIs, etc in the past    Scoliosis     Urinary incontinence     mixed       Surgical History  Past Surgical History:   Procedure Laterality Date    HX  SECTION  ,     HX COLONOSCOPY      HX DILATION AND CURETTAGE      HX ENDOSCOPY      HX PELVIC LAPAROSCOPY          Medications  Current Outpatient Medications   Medication Sig Dispense Refill    atorvastatin (LIPITOR) 10 mg tablet Take 1 Tablet by mouth daily. (Patient not taking: Reported on 2022) 30 Tablet 2    rizatriptan (MAXALT-MLT) 10 mg disintegrating tablet Take 1 Tablet by mouth daily as needed.  cyclobenzaprine (FLEXERIL) 10 mg tablet Take 0.5-1 Tablets by mouth two (2) times daily as needed for Muscle Spasm(s). 60 Tablet 2    diclofenac EC (VOLTAREN) 50 mg EC tablet TAKE 1 TAB BY MOUTH TWO TIMES DAILY AS NEEDED FOR PAIN. 60 Tablet 1    atenoloL (TENORMIN) 25 mg tablet TAKE 1 TABLET BY MOUTH EVERY DAY 90 Tablet 1    Jardiance 10 mg tablet TAKE 1 TABLET BY MOUTH EVERY DAY 30 Tablet 2    glucose blood VI test strips (OneTouch Ultra Test) strip CHECK BLOOD GLUCOSE 4 X  Strip 3    OneTouch Delica Plus Lancet 33 gauge misc CHECK BLOOD GLUCOSE 4 X DAY *INS 3 A DAY *[id number] Each 3    cetirizine (ZyrTEC) 10 mg tablet Take 10 mg by mouth daily as needed.  aspirin delayed-release 81 mg tablet TAKE 1 TABLET BY MOUTH EVERY DAY 90 Tab 1    diclofenac (Voltaren) 1 % gel Apply  to affected area as needed for Pain.       cholecalciferol (VITAMIN D3) (50,000 UNITS /1250 MCG) capsule Take 1 Cap by mouth every seven (7) days. 13 Cap 3    gabapentin (NEURONTIN) 600 mg tablet TAKE 1 TABLET BY MOUTH TWICE A DAY      Insulin Needles, Disposable, 31 gauge x 5/16\" ndle To use daily with insulin injection subcutaneously 1 Package 11    montelukast (SINGULAIR) 10 mg tablet Take 1 Tab by mouth nightly.  valACYclovir (VALTREX) 500 mg tablet Take 500 mg by mouth daily.  ANORO ELLIPTA 62.5-25 mcg/actuation inhaler 1 INH DAILY - USE EVERY DAY  6    AIMOVIG AUTOINJECTOR 70 mg/mL injection AS DIRECTED - 1 INJECTION SUBQ ONCE MONTHLY  3    mometasone (NASONEX) 50 mcg/actuation nasal spray USE 1-2 SPRAYS INTO EACH NOSTRIL EVERY DAY AS NEEDED  0    EPINEPHrine (EPIPEN) 0.3 mg/0.3 mL injection AS DIRECTED AS NEEDED INTRAMUSCULAR 1 DAYS  5    albuterol (PROVENTIL HFA, VENTOLIN HFA, PROAIR HFA) 90 mcg/actuation inhaler Take 2 Puffs by inhalation every four (4) hours as needed for Wheezing or Shortness of Breath. 1 Inhaler 0    esomeprazole (NEXIUM) 40 mg capsule Take  by mouth daily.  POLYETHYLENE GLYCOL 3350 (MIRALAX PO) Take  by mouth daily as needed.  CALCIUM CARBONATE/MAG HYDROX (MYLANTA PO) Take  by mouth as needed. Allergies  Allergies   Allergen Reactions    Iodine Hives and Nausea and Vomiting    Iodinated Contrast Media Hives    Oxycodone-Acetaminophen Other (comments)     Hallucinations     Prochlorperazine Other (comments)    Propoxyphene-Acetaminophen Unknown (comments)     Allergy to propoxyphene only.   Has previously tolerated acetaminophen    Reglan [Metoclopramide] Not Reported This Time    Sulfa (Sulfonamide Antibiotics) Not Reported This Time    Wygesic Not Reported This Time       Family History  Family History   Problem Relation Age of Onset    Kidney Disease Father     Other Father         Pancreatic Disease    Cancer Father     Heart Disease Father     Heart Attack Father     Thyroid Disease Other         Grandparent       Social History  Social History     Socioeconomic History    Marital status: SINGLE     Spouse name: Not on file    Number of children: Not on file    Years of education: Not on file    Highest education level: Not on file   Occupational History    Not on file   Tobacco Use    Smoking status: Never Smoker    Smokeless tobacco: Never Used   Vaping Use    Vaping Use: Never used   Substance and Sexual Activity    Alcohol use: No    Drug use: No    Sexual activity: Yes     Partners: Male   Other Topics Concern    Not on file   Social History Narrative    Not on file     Social Determinants of Health     Financial Resource Strain:     Difficulty of Paying Living Expenses: Not on file   Food Insecurity:     Worried About Running Out of Food in the Last Year: Not on file    [place]onzo of Food in the Last Year: Not on file   Transportation Needs:     Lack of Transportation (Medical): Not on file    Lack of Transportation (Non-Medical): Not on file   Physical Activity:     Days of Exercise per Week: Not on file    Minutes of Exercise per Session: Not on file   Stress:     Feeling of Stress : Not on file   Social Connections:     Frequency of Communication with Friends and Family: Not on file    Frequency of Social Gatherings with Friends and Family: Not on file    Attends [employer] Services: Not on file    Active Member of [address] [place] [address] [employer] or Organizations: Not on file    Attends Club or Organization Meetings: Not on file    Marital Status: Not on file   Intimate Partner Violence:     Fear of Current or Ex-Partner: Not on file    Emotionally Abused: Not on file    Physically Abused: Not on file    Sexually Abused: Not on file   Housing Stability:     Unable to Pay for Housing in the Last Year: Not on file    Number of [place] in the Last Year: Not on file    Unstable Housing in the Last Year: Not on file       Review of Systems  Review of Systems - Review of all systems is negative except as noted above in the HPI.     Vital Signs  Visit Vitals  /88 (BP 1 Location: Left upper arm, BP Patient Position: Sitting, BP Cuff Size: Adult)   Pulse 78   Temp 97.8 °F (36.6 °C) (Temporal)   Resp 24   Ht 4' 11\" (1.499 m)   Wt 150 lb (68 kg)   SpO2 97%   BMI 30.30 kg/m²         Physical Exam  Physical Examination: General appearance - oriented to person, place, and time and acyanotic, in no respiratory distress  Mental status - alert, oriented to person, place, and time  Neck - supple, no significant adenopathy  Lymphatics - no palpable lymphadenopathy, no hepatosplenomegaly  Chest - clear to auscultation, no wheezes, rales or rhonchi, symmetric air entry, no tachypnea, retractions or cyanosis  Heart - normal rate, regular rhythm, normal S1, S2, no murmurs, rubs, clicks or gallops  Abdomen - no rebound tenderness noted  Back exam - full range of motion, no tenderness, palpable spasm or pain on motion  Neurological - abnormal neurological exam unchanged from prior examinations  Musculoskeletal - full range of motion without pain  Extremities - no pedal edema noted, intact peripheral pulses  Diabetic foot exam:     Left Foot:   Visual Exam: normal    Pulse DP: 2+ (normal)   Filament test: normal sensation        Right Foot:   Visual Exam: normal    Pulse DP: 2+ (normal)   Filament test: normal sensation      Results  Results for orders placed or performed in visit on 12/09/21   CBC WITH AUTOMATED DIFF   Result Value Ref Range    WBC 6.3 3.4 - 10.8 x10E3/uL    RBC 4.46 3.77 - 5.28 x10E6/uL    HGB 12.1 11.1 - 15.9 g/dL    HCT 38.4 34.0 - 46.6 %    MCV 86 79 - 97 fL    MCH 27.1 26.6 - 33.0 pg    MCHC 31.5 31.5 - 35.7 g/dL    RDW 13.2 11.7 - 15.4 %    PLATELET 019 189 - 331 x10E3/uL    NEUTROPHILS 58 Not Estab. %    Lymphocytes 36 Not Estab. %    MONOCYTES 5 Not Estab. %    EOSINOPHILS 0 Not Estab. %    BASOPHILS 1 Not Estab. %    ABS. NEUTROPHILS 3.7 1.4 - 7.0 x10E3/uL    Abs Lymphocytes 2.3 0.7 - 3.1 x10E3/uL    ABS.  MONOCYTES 0.3 0.1 - 0.9 x10E3/uL    ABS. EOSINOPHILS 0.0 0.0 - 0.4 x10E3/uL    ABS. BASOPHILS 0.0 0.0 - 0.2 x10E3/uL    IMMATURE GRANULOCYTES 0 Not Estab. %    ABS. IMM. GRANS. 0.0 0.0 - 0.1 [id number]   METABOLIC PANEL, COMPREHENSIVE   Result Value Ref Range    Glucose 69 65 - 99 mg/dL    BUN 9 6 - 20 mg/dL    Creatinine 0.76 0.57 - 1.00 mg/dL    GFR est non- >59 mL/min/1.73    GFR est  >59 mL/min/1.73    BUN/Creatinine ratio 12 9 - 23    Sodium 143 134 - 144 mmol/L    Potassium 3.8 3.5 - 5.2 mmol/L    Chloride 103 96 - 106 mmol/L    CO2 24 20 - 29 mmol/L    Calcium 9.5 8.7 - 10.2 mg/dL    Protein, total 7.3 6.0 - 8.5 g/dL    Albumin 4.4 3.8 - 4.8 g/dL    GLOBULIN, TOTAL 2.9 1.5 - 4.5 g/dL    A-G Ratio 1.5 1.2 - 2.2    Bilirubin, total 0.6 0.0 - 1.2 mg/dL    Alk. phosphatase 106 44 - 121 IU/L    AST (SGOT) 18 0 - 40 IU/L    ALT (SGPT) 15 0 - 32 IU/L   VITAMIN D, 25 HYDROXY   Result Value Ref Range    VITAMIN D, 25-HYDROXY 45.2 30.0 - 100.0 ng/mL   HEMOGLOBIN A1C WITH EAG   Result Value Ref Range    Hemoglobin A1c 5.6 4.8 - 5.6 %    Estimated average glucose 114 mg/dL   LIPID PANEL   Result Value Ref Range    Cholesterol, total 181 100 - 199 mg/dL    Triglyceride 63 0 - 149 mg/dL    HDL Cholesterol 32 (L) >39 mg/dL    VLDL, calculated 12 5 - 40 mg/dL    LDL, calculated 137 (H) 0 - 99 mg/dL   CVD REPORT   Result Value Ref Range    INTERPRETATION Note        ASSESSMENT and PLAN    ICD-10-CM ICD-9-CM    1. Essential (primary) hypertension  I10 401.9    2. Type 2 diabetes mellitus with hyperglycemia, without long-term current use of insulin (HCC)  E11.65 250.00 HEMOGLOBIN A1C WITH EAG     790.29 HM DIABETES FOOT EXAM      AMB POC URINE, MICROALBUMIN, SEMIQUANTITATIVE   3. Vitamin D deficiency  E55.9 268.9 VITAMIN D, 25 HYDROXY   4. Neuropathy  G62.9 355.9    5. Dyslipidemia  E78.5 272.4 LIPID PANEL   6.  Comprehensive diabetic foot examination, type 2 DM, encounter for ([facility]  E11.9 250.00  DIABETES FOOT EXAM     lab results and schedule of future lab studies reviewed with patient  reviewed diet, exercise and weight control  cardiovascular risk and specific lipid/LDL goals reviewed  reviewed medications and side effects in detail  specific diabetic recommendations: low cholesterol diet, weight control and daily exercise discussed, home glucose monitoring emphasized, all medications, side effects and compliance discussed carefully, foot care discussed and Podiatry visits discussed, annual eye examinations at Ophthalmology discussed, glycohemoglobin and other lab monitoring discussed and long term diabetic complications discussed      I have discussed the diagnosis with the patient and the intended plan of care as seen in the above orders. The patient has received an after-visit summary and questions were answered concerning future plans. I have discussed medication, side effects, and warnings with the patient in detail. The patient verbalized understanding and is in agreement with the plan of care. The patient will contact the office with any additional concerns. Derick Disla MD    PLEASE NOTE:   This document has been produced using voice recognition software.  Unrecognized errors in transcription may be present

## 2022-04-12 LAB
25(OH)D3+25(OH)D2 SERPL-MCNC: 35.7 NG/ML (ref 30–100)
CHOLEST SERPL-MCNC: 205 MG/DL (ref 100–199)
EST. AVERAGE GLUCOSE BLD GHB EST-MCNC: 108 MG/DL
HBA1C MFR BLD: 5.4 % (ref 4.8–5.6)
HDLC SERPL-MCNC: 42 MG/DL
IMP & REVIEW OF LAB RESULTS: NORMAL
LDLC SERPL CALC-MCNC: 145 MG/DL (ref 0–99)
TRIGL SERPL-MCNC: 102 MG/DL (ref 0–149)
VLDLC SERPL CALC-MCNC: 18 MG/DL (ref 5–40)

## 2022-04-14 NOTE — PROGRESS NOTES
Please let patient know LDL cholesterol is elevated at 145. She should start taking the Lipitor 10 mg daily. We will recheck fasting lipid panel in 3 months. Rest of her labs are reassuring.   Nabil Argueta MD

## 2022-04-20 ENCOUNTER — TELEPHONE (OUTPATIENT)
Dept: FAMILY MEDICINE CLINIC | Age: 37
End: 2022-04-20

## 2022-04-20 NOTE — TELEPHONE ENCOUNTER
Pt returning phone call. Ms. Sahara Burnett can be reached at [phone]. Please advise.  Thank you!!!

## 2022-04-28 DIAGNOSIS — E11.65 TYPE 2 DIABETES MELLITUS WITH HYPERGLYCEMIA, WITHOUT LONG-TERM CURRENT USE OF INSULIN (HCC): ICD-10-CM

## 2022-04-28 RX ORDER — ASPIRIN 325 MG
TABLET, DELAYED RELEASE (ENTERIC COATED) ORAL
Qty: 12 CAPSULE | Refills: 4 | Status: SHIPPED | OUTPATIENT
Start: 2022-04-28

## 2022-04-28 RX ORDER — EMPAGLIFLOZIN 10 MG/1
TABLET, FILM COATED ORAL
Qty: 30 TABLET | Refills: 2 | Status: SHIPPED | OUTPATIENT
Start: 2022-04-28 | End: 2022-09-12

## 2022-05-03 ENCOUNTER — OFFICE VISIT (OUTPATIENT)
Dept: FAMILY MEDICINE CLINIC | Age: 37
End: 2022-05-03
Payer: MEDICARE

## 2022-05-03 VITALS
DIASTOLIC BLOOD PRESSURE: 77 MMHG | WEIGHT: 150 LBS | RESPIRATION RATE: 18 BRPM | BODY MASS INDEX: 30.24 KG/M2 | OXYGEN SATURATION: 97 % | HEART RATE: 66 BPM | TEMPERATURE: 97.7 F | SYSTOLIC BLOOD PRESSURE: 126 MMHG | HEIGHT: 59 IN

## 2022-05-03 DIAGNOSIS — I10 ESSENTIAL (PRIMARY) HYPERTENSION: ICD-10-CM

## 2022-05-03 DIAGNOSIS — E11.65 TYPE 2 DIABETES MELLITUS WITH HYPERGLYCEMIA, WITHOUT LONG-TERM CURRENT USE OF INSULIN (HCC): Primary | ICD-10-CM

## 2022-05-03 DIAGNOSIS — F39 MOOD DISORDER (HCC): ICD-10-CM

## 2022-05-03 DIAGNOSIS — Z71.89 ADVANCED DIRECTIVES, COUNSELING/DISCUSSION: ICD-10-CM

## 2022-05-03 DIAGNOSIS — R21 RASH AND NONSPECIFIC SKIN ERUPTION: ICD-10-CM

## 2022-05-03 DIAGNOSIS — G43.909 MIGRAINE WITHOUT STATUS MIGRAINOSUS, NOT INTRACTABLE, UNSPECIFIED MIGRAINE TYPE: ICD-10-CM

## 2022-05-03 DIAGNOSIS — G62.9 NEUROPATHY: ICD-10-CM

## 2022-05-03 DIAGNOSIS — E66.9 OBESITY (BMI 30-39.9): ICD-10-CM

## 2022-05-03 DIAGNOSIS — Z00.00 MEDICARE ANNUAL WELLNESS VISIT, SUBSEQUENT: ICD-10-CM

## 2022-05-03 DIAGNOSIS — E78.5 DYSLIPIDEMIA: ICD-10-CM

## 2022-05-03 DIAGNOSIS — J45.909 UNCOMPLICATED ASTHMA, UNSPECIFIED ASTHMA SEVERITY, UNSPECIFIED WHETHER PERSISTENT: ICD-10-CM

## 2022-05-03 DIAGNOSIS — Z13.31 SCREENING FOR DEPRESSION: ICD-10-CM

## 2022-05-03 PROCEDURE — G8417 CALC BMI ABV UP PARAM F/U: HCPCS | Performed by: FAMILY MEDICINE

## 2022-05-03 PROCEDURE — G8427 DOCREV CUR MEDS BY ELIG CLIN: HCPCS | Performed by: FAMILY MEDICINE

## 2022-05-03 PROCEDURE — G8754 DIAS BP LESS 90: HCPCS | Performed by: FAMILY MEDICINE

## 2022-05-03 PROCEDURE — 99497 ADVNCD CARE PLAN 30 MIN: CPT | Performed by: FAMILY MEDICINE

## 2022-05-03 PROCEDURE — 3044F HG A1C LEVEL LT 7.0%: CPT | Performed by: FAMILY MEDICINE

## 2022-05-03 PROCEDURE — 2022F DILAT RTA XM EVC RTNOPTHY: CPT | Performed by: FAMILY MEDICINE

## 2022-05-03 PROCEDURE — G0444 DEPRESSION SCREEN ANNUAL: HCPCS | Performed by: FAMILY MEDICINE

## 2022-05-03 PROCEDURE — G0439 PPPS, SUBSEQ VISIT: HCPCS | Performed by: FAMILY MEDICINE

## 2022-05-03 PROCEDURE — 99214 OFFICE O/P EST MOD 30 MIN: CPT | Performed by: FAMILY MEDICINE

## 2022-05-03 PROCEDURE — G8752 SYS BP LESS 140: HCPCS | Performed by: FAMILY MEDICINE

## 2022-05-03 PROCEDURE — G8510 SCR DEP NEG, NO PLAN REQD: HCPCS | Performed by: FAMILY MEDICINE

## 2022-05-03 RX ORDER — ATORVASTATIN CALCIUM 10 MG/1
10 TABLET, FILM COATED ORAL DAILY
Qty: 30 TABLET | Refills: 2 | Status: SHIPPED | OUTPATIENT
Start: 2022-05-03

## 2022-05-03 RX ORDER — TRIAMCINOLONE ACETONIDE 0.25 MG/G
CREAM TOPICAL 2 TIMES DAILY
Qty: 80 G | Refills: 0 | Status: SHIPPED | OUTPATIENT
Start: 2022-05-03 | End: 2022-05-17

## 2022-05-03 NOTE — ACP (ADVANCE CARE PLANNING)
Advance Care Planning     Advance Care Planning (ACP) Physician/NP/PA Conversation      Date of Conversation: 5/3/2022  Conducted with: Patient with Decision Making Capacity    Healthcare Decision Maker:     Primary Decision Maker (Active): Renetta Marques - Other Relative - [phone]  Click here to complete [address] including selection of the Healthcare Decision Maker Relationship (ie \"Primary\")      Today we documented Decision Maker(s). The patient will provide ACP documents. Care Preferences:    Hospitalization: \"If your health worsens and it becomes clear that your chance of recovery is unlikely, what would be your preference regarding hospitalization? \"  The patient would prefer hospitalization. Ventilation: \"If you were unable to breathe on your own and your chance of recovery was unlikely, what would be your preference about the use of a ventilator (breathing machine) if it was available to you? \"   The patient would desire the use of a ventilator. Resuscitation: \"In the event your heart stopped as a result of an underlying serious health condition, would you want attempts to be made to restart your heart, or would you prefer a natural death? \"   Yes, attempt to resuscitate.     Additional topics discussed: treatment goals, ventilation preferences, hospitalization preferences and resuscitation preferences    Conversation Outcomes / Follow-Up Plan:   ACP in process - completing/providing documents   Reviewed DNR/DNI and patient elects Full Code (Attempt Resuscitation)     Length of Voluntary ACP Conversation in minutes:  16 minutes    Chelsy Smart MD

## 2022-05-03 NOTE — PATIENT INSTRUCTIONS
Medicare Wellness Visit, Female     The best way to live healthy is to have a lifestyle where you eat a well-balanced diet, exercise regularly, limit alcohol use, and quit all forms of tobacco/nicotine, if applicable. Regular preventive services are another way to keep healthy. Preventive services (vaccines, screening tests, monitoring & exams) can help personalize your care plan, which helps you manage your own care. Screening tests can find health problems at the earliest stages, when they are easiest to treat. [facility] follows the current, evidence-based guidelines published by the [facility] ([email or web address]STF) when recommending preventive services for our patients. Because we follow these guidelines, sometimes recommendations change over time as research supports it. (For example, mammograms used to be recommended annually. Even though Medicare will still pay for an annual mammogram, the newer guidelines recommend a mammogram every two years for women of average risk). Of course, you and your doctor may decide to screen more often for some diseases, based on your risk and your co-morbidities (chronic disease you are already diagnosed with). Preventive services for you include:  - Medicare offers their members a free annual wellness visit, which is time for you and your primary care provider to discuss and plan for your preventive service needs. Take advantage of this benefit every year!  -All adults over the age of 72 should receive the recommended pneumonia vaccines. Current USPSTF guidelines recommend a series of two vaccines for the best pneumonia protection.   -All adults should have a flu vaccine yearly and a tetanus vaccine every 10 years.   -All adults age 48 and older should receive the shingles vaccines (series of two vaccines).       -All adults age 38-68 who are overweight should have a diabetes screening test once every three years.   -All adults born between 80 and 1965 should be screened once for Hepatitis C.  -Other screening tests and preventive services for persons with diabetes include: an eye exam to screen for diabetic retinopathy, a kidney function test, a foot exam, and stricter control over your cholesterol.   -Cardiovascular screening for adults with routine risk involves an electrocardiogram (ECG) at intervals determined by your doctor.   -Colorectal cancer screenings should be done for adults age 54-65 with no increased risk factors for colorectal cancer. There are a number of acceptable methods of screening for this type of cancer. Each test has its own benefits and drawbacks. Discuss with your doctor what is most appropriate for you during your annual wellness visit. The different tests include: colonoscopy (considered the best screening method), a fecal occult blood test, a fecal DNA test, and sigmoidoscopy.    -A bone mass density test is recommended when a woman turns 65 to screen for osteoporosis. This test is only recommended one time, as a screening. Some providers will use this same test as a disease monitoring tool if you already have osteoporosis. -Breast cancer screenings are recommended every other year for women of normal risk, age 54-69.  -Cervical cancer screenings for women over age 72 are only recommended with certain risk factors. Here is a list of your current Health Maintenance items (your personalized list of preventive services) with a due date:  Health Maintenance Due   Topic Date Due    COVID-19 Vaccine (1) Never done    DTaP/Tdap/Td  (1 - Tdap) Never done    Annual Well Visit  05/04/2022         Medicare Wellness Visit, Female     The best way to live healthy is to have a lifestyle where you eat a well-balanced diet, exercise regularly, limit alcohol use, and quit all forms of tobacco/nicotine, if applicable. Regular preventive services are another way to keep healthy.  Preventive services (vaccines, screening tests, monitoring & exams) can help personalize your care plan, which helps you manage your own care. Screening tests can find health problems at the earliest stages, when they are easiest to treat. [facility] follows the current, evidence-based guidelines published by the [facility] ([facility]) when recommending preventive services for our patients. Because we follow these guidelines, sometimes recommendations change over time as research supports it. (For example, mammograms used to be recommended annually. Even though Medicare will still pay for an annual mammogram, the newer guidelines recommend a mammogram every two years for women of average risk). Of course, you and your doctor may decide to screen more often for some diseases, based on your risk and your co-morbidities (chronic disease you are already diagnosed with). Preventive services for you include:  - Medicare offers their members a free annual wellness visit, which is time for you and your primary care provider to discuss and plan for your preventive service needs. Take advantage of this benefit every year!  -All adults over the age of 72 should receive the recommended pneumonia vaccines. Current USPSTF guidelines recommend a series of two vaccines for the best pneumonia protection.   -All adults should have a flu vaccine yearly and a tetanus vaccine every 10 years.   -All adults age 48 and older should receive the shingles vaccines (series of two vaccines).       -All adults age 38-68 who are overweight should have a diabetes screening test once every three years.   -All adults born between 80 and 1965 should be screened once for Hepatitis C.  -Other screening tests and preventive services for persons with diabetes include: an eye exam to screen for diabetic retinopathy, a kidney function test, a foot exam, and stricter control over your cholesterol.   -Cardiovascular screening for adults with routine risk involves an electrocardiogram (ECG) at intervals determined by your doctor.   -Colorectal cancer screenings should be done for adults age 54-65 with no increased risk factors for colorectal cancer. There are a number of acceptable methods of screening for this type of cancer. Each test has its own benefits and drawbacks. Discuss with your doctor what is most appropriate for you during your annual wellness visit. The different tests include: colonoscopy (considered the best screening method), a fecal occult blood test, a fecal DNA test, and sigmoidoscopy.    -A bone mass density test is recommended when a woman turns 65 to screen for osteoporosis. This test is only recommended one time, as a screening. Some providers will use this same test as a disease monitoring tool if you already have osteoporosis. -Breast cancer screenings are recommended every other year for women of normal risk, age 54-69.  -Cervical cancer screenings for women over age 72 are only recommended with certain risk factors. Here is a list of your current Health Maintenance items (your personalized list of preventive services) with a due date:  Health Maintenance Due   Topic Date Due    COVID-19 Vaccine (1) Never done    Pneumococcal Vaccine (1 - PCV) Never done         Medicare Wellness Visit, Female     The best way to live healthy is to have a lifestyle where you eat a well-balanced diet, exercise regularly, limit alcohol use, and quit all forms of tobacco/nicotine, if applicable. Regular preventive services are another way to keep healthy. Preventive services (vaccines, screening tests, monitoring & exams) can help personalize your care plan, which helps you manage your own care. Screening tests can find health problems at the earliest stages, when they are easiest to treat.    [facility] follows the current, evidence-based guidelines published by the [place] Prevent[employer] Services Task Force (USPSTF) when recommending preventive services for our patients. Because we follow these guidelines, sometimes recommendations change over time as research supports it. (For example, mammograms used to be recommended annually. Even though Medicare will still pay for an annual mammogram, the newer guidelines recommend a mammogram every two years for women of average risk). Of course, you and your doctor may decide to screen more often for some diseases, based on your risk and your co-morbidities (chronic disease you are already diagnosed with). Preventive services for you include:  - Medicare offers their members a free annual wellness visit, which is time for you and your primary care provider to discuss and plan for your preventive service needs. Take advantage of this benefit every year!  -All adults over the age of 72 should receive the recommended pneumonia vaccines. Current USPSTF guidelines recommend a series of two vaccines for the best pneumonia protection.   -All adults should have a flu vaccine yearly and a tetanus vaccine every 10 years.   -All adults age 48 and older should receive the shingles vaccines (series of two vaccines). -All adults age 38-68 who are overweight should have a diabetes screening test once every three years.   -All adults born between 80 and 1965 should be screened once for Hepatitis C.  -Other screening tests and preventive services for persons with diabetes include: an eye exam to screen for diabetic retinopathy, a kidney function test, a foot exam, and stricter control over your cholesterol.   -Cardiovascular screening for adults with routine risk involves an electrocardiogram (ECG) at intervals determined by your doctor.   -Colorectal cancer screenings should be done for adults age 54-65 with no increased risk factors for colorectal cancer. There are a number of acceptable methods of screening for this type of cancer.  Each test has its own benefits and drawbacks. Discuss with your doctor what is most appropriate for you during your annual wellness visit. The different tests include: colonoscopy (considered the best screening method), a fecal occult blood test, a fecal DNA test, and sigmoidoscopy.    -A bone mass density test is recommended when a woman turns 65 to screen for osteoporosis. This test is only recommended one time, as a screening. Some providers will use this same test as a disease monitoring tool if you already have osteoporosis. -Breast cancer screenings are recommended every other year for women of normal risk, age 54-69.  -Cervical cancer screenings for women over age 72 are only recommended with certain risk factors.      Here is a list of your current Health Maintenance items (your personalized list of preventive services) with a due date:  Health Maintenance Due   Topic Date Due    COVID-19 Vaccine (1) Never done    Pneumococcal Vaccine (1 - PCV) Never done

## 2022-05-03 NOTE — PROGRESS NOTES
This is the Subsequent Medicare Annual Wellness Exam, performed 12 months or more after the Initial AWV or the last Subsequent AWV    I have reviewed the patient's medical history in detail and updated the computerized patient record. Assessment/Plan   Education and counseling provided:  Are appropriate based on today's review and evaluation    1. Medicare annual wellness visit, subsequent    2. Advanced directives, counseling/discussion  - ADVANCE CARE PLANNING FIRST 27 MINS  - FULL CODE    3. Screening for depression  - DEPRESSION SCREEN ANNUAL  - RI DEPRESSION SCREEN ANNUAL       Depression Risk Factor Screening     3 most recent PHQ Screens 4/11/2022   PHQ Not Done -   Little interest or pleasure in doing things Not at all   Feeling down, depressed, irritable, or hopeless Not at all   Total Score PHQ 2 0   Trouble falling or staying asleep, or sleeping too much -   Feeling tired or having little energy -   Poor appetite, weight loss, or overeating -   Feeling bad about yourself - or that you are a failure or have let yourself or your family down -   Trouble concentrating on things such as school, work, reading, or watching TV -   Moving or speaking so slowly that other people could have noticed; or the opposite being so fidgety that others notice -   Thoughts of being better off dead, or hurting yourself in some way -   PHQ 9 Score -   How difficult have these problems made it for you to do your work, take care of your home and get along with others -   8 minutes taken on depression screening. Alcohol & Drug Abuse Risk Screen    Do you average more than 1 drink per night or more than 7 drinks a week:  No    On any one occasion in the past three months have you have had more than 3 drinks containing alcohol:  No          Functional Ability and Level of Safety    Hearing: Hearing is good. Activities of Daily Living: The home contains: no safety equipment.   Patient does total self care Ambulation: with no difficulty     Fall Risk:  Fall Risk Assessment, last 12 mths 5/3/2021   Able to walk? Yes   Fall in past 12 months? 0   Do you feel unsteady?  0   Are you worried about falling 0      Abuse Screen:  Patient is not abused       Cognitive Screening    Has your family/caregiver stated any concerns about your memory: no     Cognitive Screening: Normal - Verbal Fluency Test    Health Maintenance Due     Health Maintenance Due   Topic Date Due    COVID-19 Vaccine (1) Never done    DTaP/Tdap/Td series (1 - Tdap) Never done    Medicare Yearly Exam  05/04/2022       Patient Care Team   Patient Care Team:  Renae Perdomo MD as PCP - General (Family Medicine)  Renae Perdomo MD as PCP - [facility] Empaneled Provider  Brittany Cordova MD (Physical Medicine and Rehabilitation)  Ezio Reid MD (Obstetrics & Gynecology)    History   Israel Muñoz comes in for Medicare wellness exam.    Patient Active Problem List   Diagnosis Code    Chronic constipation K59.09    Urinary incontinence R32    Low back pain without sciatica M54.50    Acute bilateral thoracic back pain M54.6    Chronic bilateral low back pain with sciatica M54.40, N40.41    Uncomplicated asthma G22.024    SOB (shortness of breath) on exertion R06.02    Family history of chronic heart failure Z82.49    Chest pain R07.9    Diabetes mellitus type 2 in obese ([address] E11.69, E66.9    New onset type 2 diabetes mellitus ([facility] [address] E11.9    Hypertension I10    Neck pain M54.2    Vitamin D deficiency E55.9    Weakness R53.1    Diabetes mellitus without complication (HCC) T31.7    Low back pain M54.50    Costochondral chest pain R07.89    Type 2 diabetes with nephropathy ([facility] [address] E11.21    Type 2 diabetes mellitus with diabetic neuropathy (HCC) E11.40    Abnormal vaginal bleeding N93.9    Acne vulgaris L70.0    Acute upper respiratory infection J06.9    Allergic rhinitis J30.9    Allergic rhinitis due to food J30.5    Anemia D64.9    Benign neoplasm of soft tissue D21.9    Bite of nonvenomous arthropod W57. 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    Osteoarthrosis M19.90     Past Medical History:   Diagnosis Date    Asthma     Chronic constipation     Diabetes [address] [address]     Fibromyalgia     GERD (gastroesophageal reflux disease)     Herpes zoster without complication     Hypertension     IBS (irritable bowel syndrome)     Migraines     unknown if aura    Psychiatric disorder     she denies any diagnosis despite being on antipsychotics, SSRIs, etc in the past    Scoliosis     Urinary incontinence     mixed      Past Surgical History:   Procedure Laterality Date    HX  SECTION  ,     HX COLONOSCOPY      HX DILATION AND CURETTAGE      HX ENDOSCOPY      HX PELVIC LAPAROSCOPY       Current Outpatient Medications   Medication Sig Dispense Refill    atorvastatin (LIPITOR) 10 mg tablet Take 1 Tablet by mouth daily. (Patient not taking: Reported on 2022) 30 Tablet 2    cholecalciferol (VITAMIN D3) (50,000 UNITS /1250 MCG) capsule TAKE 1 CAPSULE BY MOUTH ONE TIME PER WEEK 12 Capsule 4    Jardiance 10 mg tablet TAKE 1 TABLET BY MOUTH EVERY DAY 30 Tablet 2    rizatriptan (MAXALT-MLT) 10 mg disintegrating tablet Take 1 Tablet by mouth daily as needed.  cyclobenzaprine (FLEXERIL) 10 mg tablet Take 0.5-1 Tablets by mouth two (2) times daily as needed for Muscle Spasm(s).  60 Tablet 2    diclofenac EC (VOLTAREN) 50 mg EC tablet TAKE 1 TAB BY MOUTH TWO TIMES DAILY AS NEEDED FOR PAIN. 60 Tablet 1    atenoloL (TENORMIN) 25 mg tablet TAKE 1 TABLET BY MOUTH EVERY DAY 90 Tablet 1    glucose blood VI test strips (OneTouch Ultra Test) strip CHECK BLOOD GLUCOSE 4 X  Strip 3    OneTouch Delica Plus Lancet 33 gauge misc CHECK BLOOD GLUCOSE 4 X DAY *INS 3 A DAY *[id number] Each 3    cetirizine (ZyrTEC) 10 mg tablet Take 10 mg by mouth daily as needed.  aspirin delayed-release 81 mg tablet TAKE 1 TABLET BY MOUTH EVERY DAY 90 Tab 1    diclofenac (Voltaren) 1 % gel Apply  to affected area as needed for Pain.  gabapentin (NEURONTIN) 600 mg tablet TAKE 1 TABLET BY MOUTH TWICE A DAY      Insulin Needles, Disposable, 31 gauge x 5/16\" ndle To use daily with insulin injection subcutaneously 1 Package 11    montelukast (SINGULAIR) 10 mg tablet Take 1 Tab by mouth nightly.  valACYclovir (VALTREX) 500 mg tablet Take 500 mg by mouth daily.  ANORO ELLIPTA 62.5-25 mcg/actuation inhaler 1 INH DAILY - USE EVERY DAY  6    AIMOVIG AUTOINJECTOR 70 mg/mL injection AS DIRECTED - 1 INJECTION SUBQ ONCE MONTHLY  3    mometasone (NASONEX) 50 mcg/actuation nasal spray USE 1-2 SPRAYS INTO EACH NOSTRIL EVERY DAY AS NEEDED  0    EPINEPHrine (EPIPEN) 0.3 mg/0.3 mL injection AS DIRECTED AS NEEDED INTRAMUSCULAR 1 DAYS  5    albuterol (PROVENTIL HFA, VENTOLIN HFA, PROAIR HFA) 90 mcg/actuation inhaler Take 2 Puffs by inhalation every four (4) hours as needed for Wheezing or Shortness of Breath. 1 Inhaler 0    esomeprazole (NEXIUM) 40 mg capsule Take  by mouth daily.  POLYETHYLENE GLYCOL 3350 (MIRALAX PO) Take  by mouth daily as needed.  CALCIUM CARBONATE/MAG HYDROX (MYLANTA PO) Take  by mouth as needed.        Allergies   Allergen Reactions    Iodine Hives and Nausea and Vomiting    Iodinated Contrast Media Hives    Oxycodone-Acetaminophen Other (comments)     Hallucinations     Prochlorperazine Other (comments)    Propoxyphene-Acetaminophen Unknown (comments)     Allergy to propoxyphene only. Has previously tolerated acetaminophen    Reglan [Metoclopramide] Not Reported This Time    Sulfa (Sulfonamide Antibiotics) Not Reported This Time    Wygesic Not Reported This Time       Family History   Problem Relation Age of Onset    Kidney Disease Father     Other Father         Pancreatic Disease    Cancer Father     Heart Disease Father     Heart Attack Father     Thyroid Disease Other         Grandparent     Social History     Tobacco Use    Smoking status: Never Smoker    Smokeless tobacco: Never Used   Substance Use Topics    Alcohol use: No     I have discussed the diagnosis with the patient and the intended plan of care as seen in the above orders. The patient has received an after-visit summary and questions were answered concerning future plans. I have discussed medication, side effects, and warnings with the patient in detail. The patient verbalized understanding and is in agreement with the plan of care. The patient will contact the office with any additional concerns. Personalized preventative plan of care was discussed, printed and given to patient.     Derick Disla MD

## 2022-05-03 NOTE — PROGRESS NOTES
SHIRA  Mingo Lombardo comes in for f/u care. Rash: Patient has eczematous rash on her neck and upper back. I will send in triamcinolone to apply. Dyslipidemia: Patient has dyslipidemia. I will send her atorvastatin for her to take. We will recheck labs in 3 to 6 months. She will also exercise and take a diet low in polysaturated fats. Hypertension: Patient has hypertension. Blood pressure is stable. She is on atenolol. Denies headache, changes in vision or focal weakness. Continue current treatment plan. DM2: Patient has type 2 diabetes mellitus. She is on Jardiance. Also on insulin. Her last HbA1c was 5.4. We will continue with the current treatment plan. She will intensify lifestyle and dietary modification. Migraine headache: Patient has migraine headache. She has been followed up by the endocrinologist.  She is on [address] [place] [address] Road. She also has Maxalt to take as needed. Continue current management plan. Asthma: Patient has asthma. Gets wheeze on and off. She is on inhaler medications including albuterol and Anoro Ellipta. Continue current treatment plan. Neuropathy: Patient has neuropathy. Gets numbness and tingling of the lower extremities in the hands. She is on gabapentin. Continue current management plan. GERD: Patient has gastroesophageal reflux disease. Gets heartburn on and off. She is on Nexium. Denies hematemesis or dark stools.       Past Medical History  Past Medical History:   Diagnosis Date    Asthma     Chronic constipation     Diabetes ([facility] [address]     Fibromyalgia     GERD (gastroesophageal reflux disease)     Herpes zoster without complication 7/40/6616    Hypertension     IBS (irritable bowel syndrome)     Migraines     unknown if aura    Psychiatric disorder     she denies any diagnosis despite being on antipsychotics, SSRIs, etc in the past    Scoliosis     Urinary incontinence     mixed       Surgical History  Past Surgical History:   Procedure Laterality Date  HX  SECTION  ,     HX COLONOSCOPY      HX DILATION AND CURETTAGE      HX ENDOSCOPY      HX PELVIC LAPAROSCOPY          Medications  Current Outpatient Medications   Medication Sig Dispense Refill    triamcinolone acetonide (KENALOG) 0.025 % topical cream Apply  to affected area two (2) times a day for 14 days. use thin layer 80 g 0    atorvastatin (LIPITOR) 10 mg tablet Take 1 Tablet by mouth daily. 30 Tablet 2    cholecalciferol (VITAMIN D3) (50,000 UNITS /1250 MCG) capsule TAKE 1 CAPSULE BY MOUTH ONE TIME PER WEEK 12 Capsule 4    Jardiance 10 mg tablet TAKE 1 TABLET BY MOUTH EVERY DAY 30 Tablet 2    rizatriptan (MAXALT-MLT) 10 mg disintegrating tablet Take 1 Tablet by mouth daily as needed.  cyclobenzaprine (FLEXERIL) 10 mg tablet Take 0.5-1 Tablets by mouth two (2) times daily as needed for Muscle Spasm(s). 60 Tablet 2    diclofenac EC (VOLTAREN) 50 mg EC tablet TAKE 1 TAB BY MOUTH TWO TIMES DAILY AS NEEDED FOR PAIN. 60 Tablet 1    atenoloL (TENORMIN) 25 mg tablet TAKE 1 TABLET BY MOUTH EVERY DAY 90 Tablet 1    glucose blood VI test strips (OneTouch Ultra Test) strip CHECK BLOOD GLUCOSE 4 X  Strip 3    OneTouch Delica Plus Lancet 33 gauge misc CHECK BLOOD GLUCOSE 4 X DAY *INS 3 A DAY *[id number] 300 Each 3    cetirizine (ZyrTEC) 10 mg tablet Take 10 mg by mouth daily as needed.  aspirin delayed-release 81 mg tablet TAKE 1 TABLET BY MOUTH EVERY DAY 90 Tab 1    diclofenac (Voltaren) 1 % gel Apply  to affected area as needed for Pain.  gabapentin (NEURONTIN) 600 mg tablet TAKE 1 TABLET BY MOUTH TWICE A DAY      Insulin Needles, Disposable, 31 gauge x \" ndle To use daily with insulin injection subcutaneously 1 Package 11    montelukast (SINGULAIR) 10 mg tablet Take 1 Tab by mouth nightly.  valACYclovir (VALTREX) 500 mg tablet Take 500 mg by mouth daily.       ANORO ELLIPTA 62.5-25 mcg/actuation inhaler 1 INH DAILY - USE EVERY DAY  6    AIMOVIG AUTOINJECTOR 70 mg/mL injection AS DIRECTED - 1 INJECTION SUBQ ONCE MONTHLY  3    mometasone (NASONEX) 50 mcg/actuation nasal spray USE 1-2 SPRAYS INTO EACH NOSTRIL EVERY DAY AS NEEDED  0    EPINEPHrine (EPIPEN) 0.3 mg/0.3 mL injection AS DIRECTED AS NEEDED INTRAMUSCULAR 1 DAYS  5    albuterol (PROVENTIL HFA, VENTOLIN HFA, PROAIR HFA) 90 mcg/actuation inhaler Take 2 Puffs by inhalation every four (4) hours as needed for Wheezing or Shortness of Breath. 1 Inhaler 0    esomeprazole (NEXIUM) 40 mg capsule Take  by mouth daily.  POLYETHYLENE GLYCOL 3350 (MIRALAX PO) Take  by mouth daily as needed.  CALCIUM CARBONATE/MAG HYDROX (MYLANTA PO) Take  by mouth as needed. Allergies  Allergies   Allergen Reactions    Iodine Hives and Nausea and Vomiting    Iodinated Contrast Media Hives    Oxycodone-Acetaminophen Other (comments)     Hallucinations     Prochlorperazine Other (comments)    Propoxyphene-Acetaminophen Unknown (comments)     Allergy to propoxyphene only.   Has previously tolerated acetaminophen    Reglan [Metoclopramide] Not Reported This Time    Sulfa (Sulfonamide Antibiotics) Not Reported This Time    Wygesic Not Reported This Time       Family History  Family History   Problem Relation Age of Onset    Kidney Disease Father     Other Father         Pancreatic Disease    Cancer Father     Heart Disease Father     Heart Attack Father     Thyroid Disease Other         Grandparent       Social History  Social History     Socioeconomic History    Marital status: SINGLE     Spouse name: Not on file    Number of children: Not on file    Years of education: Not on file    Highest education level: Not on file   Occupational History    Not on file   Tobacco Use    Smoking status: Never Smoker    Smokeless tobacco: Never Used   Vaping Use    Vaping Use: Never used   Substance and Sexual Activity    Alcohol use: No    Drug use: No    Sexual activity: Yes     Partners: Male Other Topics Concern    Not on file   Social History Narrative    Not on file     Social Determinants of Health     Financial Resource Strain:     Difficulty of Paying Living Expenses: Not on file   Food Insecurity:     Worried About Running Out of Food in the Last Year: Not on file    Alonzo of Food in the Last Year: Not on file   Transportation Needs:     Lack of Transportation (Medical): Not on file    Lack of Transportation (Non-Medical): Not on file   Physical Activity:     Days of Exercise per Week: Not on file    Minutes of Exercise per Session: Not on file   Stress:     Feeling of Stress : Not on file   Social Connections:     Frequency of Communication with Friends and Family: Not on file    Frequency of Social Gatherings with Friends and Family: Not on file    Attends Scientology Services: Not on file    Active Member of [address] [place] [address] or Organizations: Not on file    Attends Club or Organization Meetings: Not on file    Marital Status: Not on file   Intimate Partner Violence:     Fear of Current or Ex-Partner: Not on file    Emotionally Abused: Not on file    Physically Abused: Not on file    Sexually Abused: Not on file   Housing Stability:     Unable to Pay for Housing in the Last Year: Not on file    Number of [place] in the Last Year: Not on file    Unstable Housing in the Last Year: Not on file       Review of Systems  Review of Systems - Review of all systems is negative except as noted above in the HPI.     Vital Signs  Visit Vitals  /77 (BP 1 Location: Left upper arm, BP Patient Position: Sitting, BP Cuff Size: Adult)   Pulse 66   Temp 97.7 °F (36.5 °C) (Temporal)   Resp 18   Ht 4' 11\" (1.499 m)   Wt 150 lb (68 kg)   LMP  (LMP Unknown)   SpO2 97%   BMI 30.30 kg/m²         Physical Exam  Physical Examination: General appearance - alert, well appearing, and in no distress, oriented to person, place, and time and acyanotic, in no respiratory distress  Mental status - alert, oriented to person, place, and time  Neck - supple, no significant adenopathy  Lymphatics - no palpable lymphadenopathy  Chest - clear to auscultation, no wheezes, rales or rhonchi, symmetric air entry  Heart - normal rate and regular rhythm, S1 and S2 normal  Abdomen - soft, nontender, nondistended, no masses or organomegaly  Back exam - limited range of motion  Neurological - abnormal neurological exam unchanged from prior examinations  Musculoskeletal - osteoarthritic changes noted in both hands  Extremities - intact peripheral pulses  Skin - DERMATITIS NOTED: eczematoid dermatitis on neck and upper back. Results  Results for orders placed or performed in visit on 04/11/22   LIPID PANEL   Result Value Ref Range    Cholesterol, total 205 (H) 100 - 199 mg/dL    Triglyceride 102 0 - 149 mg/dL    HDL Cholesterol 42 >39 mg/dL    VLDL, calculated 18 5 - 40 mg/dL    LDL, calculated 145 (H) 0 - 99 mg/dL   HEMOGLOBIN A1C WITH EAG   Result Value Ref Range    Hemoglobin A1c 5.4 4.8 - 5.6 %    Estimated average glucose 108 mg/dL   VITAMIN D, 25 HYDROXY   Result Value Ref Range    VITAMIN D, 25-HYDROXY 35.7 30.0 - 100.0 ng/mL   CVD REPORT   Result Value Ref Range    INTERPRETATION Note    AMB POC URINE, MICROALBUMIN, SEMIQUANTITATIVE   Result Value Ref Range    Microalbumin urine (POC) 10 MG/L    Microalbumin/creat ratio (POC) <30 <30 MG/G    Creatinine 100 MG/DL       ASSESSMENT and PLAN    ICD-10-CM ICD-9-CM    1. Type 2 diabetes mellitus with hyperglycemia, without long-term current use of insulin (HCC)  E11.65 250.00      790.29    2. Essential (primary) hypertension  I10 401.9    3. Dyslipidemia  E78.5 272.4 atorvastatin (LIPITOR) 10 mg tablet   4. Rash and nonspecific skin eruption  R21 782.1 triamcinolone acetonide (KENALOG) 0.025 % topical cream   5. Neuropathy  G62.9 355.9    6. Uncomplicated asthma, unspecified asthma severity, unspecified whether persistent  J45.909 493.90    7.  Migraine without status migrainosus, not intractable, unspecified migraine type  G43.909 346.90    8. Mood disorder (HCC)  F39 296.90    9. Obesity (BMI 30-39. 9)  E66.9 278.00    10. Medicare annual wellness visit, subsequent  Z00.00 V70.0    11. Advanced directives, counseling/discussion  Z71.89 V65.49 ADVANCE CARE PLANNING FIRST 30 MINS      FULL CODE   12. Screening for depression  Z13.31 V79.0 [address]     lab results and schedule of future lab studies reviewed with patient  reviewed diet, exercise and weight control  cardiovascular risk and specific lipid/LDL goals reviewed  reviewed medications and side effects in detail  specific diabetic recommendations: low cholesterol diet, weight control and daily exercise discussed, home glucose monitoring emphasized, all medications, side effects and compliance discussed carefully, annual eye examinations at Ophthalmology discussed, glycohemoglobin and other lab monitoring discussed and long term diabetic complications discussed      I have discussed the diagnosis with the patient and the intended plan of care as seen in the above orders. The patient has received an after-visit summary and questions were answered concerning future plans. I have discussed medication, side effects, and warnings with the patient in detail. The patient verbalized understanding and is in agreement with the plan of care. The patient will contact the office with any additional concerns. Evangelist Watkins MD    PLEASE NOTE:   This document has been produced using voice recognition software.  Unrecognized errors in transcription may be present

## 2022-05-11 RX ORDER — ATENOLOL 25 MG/1
TABLET ORAL
Qty: 90 TABLET | Refills: 1 | Status: SHIPPED | OUTPATIENT
Start: 2022-05-11

## 2022-05-19 ENCOUNTER — OFFICE VISIT (OUTPATIENT)
Dept: ORTHOPEDIC SURGERY | Age: 37
End: 2022-05-19
Payer: MEDICARE

## 2022-05-19 VITALS
OXYGEN SATURATION: 99 % | BODY MASS INDEX: 30.24 KG/M2 | HEIGHT: 59 IN | DIASTOLIC BLOOD PRESSURE: 85 MMHG | SYSTOLIC BLOOD PRESSURE: 134 MMHG | HEART RATE: 88 BPM | WEIGHT: 150 LBS | TEMPERATURE: 97.8 F

## 2022-05-19 DIAGNOSIS — M79.7 FIBROMYALGIA: ICD-10-CM

## 2022-05-19 DIAGNOSIS — M25.562 ACUTE PAIN OF LEFT KNEE: Primary | ICD-10-CM

## 2022-05-19 PROCEDURE — G8752 SYS BP LESS 140: HCPCS | Performed by: PHYSICAL MEDICINE & REHABILITATION

## 2022-05-19 PROCEDURE — G8432 DEP SCR NOT DOC, RNG: HCPCS | Performed by: PHYSICAL MEDICINE & REHABILITATION

## 2022-05-19 PROCEDURE — G8417 CALC BMI ABV UP PARAM F/U: HCPCS | Performed by: PHYSICAL MEDICINE & REHABILITATION

## 2022-05-19 PROCEDURE — G8427 DOCREV CUR MEDS BY ELIG CLIN: HCPCS | Performed by: PHYSICAL MEDICINE & REHABILITATION

## 2022-05-19 PROCEDURE — G8754 DIAS BP LESS 90: HCPCS | Performed by: PHYSICAL MEDICINE & REHABILITATION

## 2022-05-19 PROCEDURE — 99213 OFFICE O/P EST LOW 20 MIN: CPT | Performed by: PHYSICAL MEDICINE & REHABILITATION

## 2022-05-19 RX ORDER — AZELASTINE HCL 205.5 UG/1
SPRAY NASAL
COMMUNITY
Start: 2022-05-09

## 2022-05-19 RX ORDER — FLUTICASONE FUROATE AND VILANTEROL TRIFENATATE 100; 25 UG/1; UG/1
POWDER RESPIRATORY (INHALATION)
COMMUNITY
Start: 2022-04-28

## 2022-05-19 RX ORDER — CYCLOBENZAPRINE HCL 10 MG
5-10 TABLET ORAL
Qty: 60 TABLET | Refills: 2 | Status: SHIPPED | OUTPATIENT
Start: 2022-05-19 | End: 2022-08-16

## 2022-05-19 NOTE — PROGRESS NOTES
Jourdan Flores presents today for   Chief Complaint   Patient presents with    Back Pain    Knee Pain     left       Is someone accompanying this pt? no    Is the patient using any DME equipment during OV? no    Depression Screening:  3 most recent PHQ Screens 5/3/2022   PHQ Not Done -   Little interest or pleasure in doing things Not at all   Feeling down, depressed, irritable, or hopeless Not at all   Total Score PHQ 2 0   Trouble falling or staying asleep, or sleeping too much -   Feeling tired or having little energy -   Poor appetite, weight loss, or overeating -   Feeling bad about yourself - or that you are a failure or have let yourself or your family down -   Trouble concentrating on things such as school, work, reading, or watching TV -   Moving or speaking so slowly that other people could have noticed; or the opposite being so fidgety that others notice -   Thoughts of being better off dead, or hurting yourself in some way -   PHQ 9 Score -   How difficult have these problems made it for you to do your work, take care of your home and get along with others -       Learning Assessment:  Learning Assessment 1/10/2020   PRIMARY LEARNER Patient   PRIMARY LANGUAGE ENGLISH   LEARNER PREFERENCE PRIMARY READING   ANSWERED BY patient   RELATIONSHIP SELF       Abuse Screening:  Abuse Screening Questionnaire 5/3/2022   Do you ever feel afraid of your partner? N   Are you in a relationship with someone who physically or mentally threatens you? N   Is it safe for you to go home? Y       Fall Risk  Fall Risk Assessment, last 12 mths 5/3/2021   Able to walk? Yes   Fall in past 12 months? 0   Do you feel unsteady? 0   Are you worried about falling 0         Coordination of Care:  1. Have you been to the ER, urgent care clinic since your last visit? Yes, pt states that she went to the ER for chest pains  Hospitalized since your last visit? no    2.  Have you seen or consulted any other health care providers outside of the [facility] since your last visit? no Include any pap smears or colon screening.  no

## 2022-05-19 NOTE — PROGRESS NOTES
[facility] [address]  [address], [address]  [facility], [address]  Phone: [phone]  Fax: [phone]        Tho Multani  : 1985  PCP: Adelia Gant MD    PROGRESS NOTE      ASSESSMENT AND PLAN    Diagnoses and all orders for this visit:    1. Acute pain of left knee    2. Fibromyalgia  -     cyclobenzaprine (FLEXERIL) 10 mg tablet; Take 0.5-1 Tablets by mouth two (2) times daily as needed for Muscle Spasm(s). 1. Bassam Barahona is a 40 y.o. female with ongoing myofascial pain. 2. RF Flexeril, some benefit without side effects  3. She is really going to focus on postural issues. 4. Advised to obtain exercise ball, given therapeutic exercise. Follow-up and Dispositions    · Return in about 3 months (around 2022) for medication management. HISTORY OF PRESENT ILLNESS      Sharri Romero is a 40 y.o. female presents for follow up of back pain. LV trial of Flexeril. Patient continues to have low back pain. She notices changes in her postures and finds herself pulled forward. She has numbness and tingling in her RUE, affirms CTS. She notices weakness in her hands that leads to dropping objects. Denies insomnia. She also complains of left knee pain, worse over the last two weeks. She receives some benefit form Flexeril. Denies side effects. She uses Gabapentin for migraine prevention. She is compliant with her HEP.     Pain Assessment  2022   Location of Pain Back;Knee   Pain Location Comment -   Location Modifiers Left   Severity of Pain 8   Quality of Pain Throbbing   Quality of Pain Comment -   Duration of Pain Persistent   Duration of Pain Comment -   Frequency of Pain Intermittent   Frequency of Pain Comment -   Date Pain First Started -   Aggravating Factors Other (Comment)   Aggravating Factors Comment pt is not sure   Limiting Behavior Yes   Relieving Factors Nothing   Relieving Factors Comment -   Result of Injury No   Work-Related Injury -   Type of Injury -   Type of Injury Comment -       Onset of pain: 5/2021     Investigations:   L MRI 8/2021: normal  BUE EMG 2020: severe right CTS, moderate left CTS  C MRI 2019: unremarkable  UE EMG 2019: mild right CTS  BLE EMG 2016: normal  Spine surgery consult: no     Treatments:  Physical therapy: Multiple rounds, no benefit  Spinal injections: no  Spinal surgery- no  Beneficial medications: Flexeril (temporary), Voltaren   Failed medications: Gabapentin, Topamax, Lyrica Cymbalta, Zanaflex (GI issues)     Work Status: currently not employed, disabled  Pertinent PMHx:  Asthma, DM, FM, GERD, IBS, chronic constipation, urinary incontinence .      PHYSICAL EXAMINATION    Visit Vitals  /85 (BP 1 Location: Left upper arm, BP Patient Position: Sitting, BP Cuff Size: Adult)   Pulse 88   Temp 97.8 °F (36.6 °C) (Tympanic)   Ht 4' 11\" (1.499 m)   Wt 150 lb (68 kg)   LMP  (LMP Unknown)   SpO2 99% Comment: RA   BMI 30.30 kg/m²       TTP left patella, no effusion  Trigger point right upper trap, right medial scapular border  Very limited thoracic and lumbar ROM   DTRs hypoactive UE, 2+ right patella  Pain with B/L IR  Negative Tinel's, Perry's  Intrinsic weakness bilaterally  Deltoids, triceps, biceps intact                Written by Gelacio Gamble, as dictated by Janina Fagan MD.

## 2022-05-19 NOTE — PATIENT INSTRUCTIONS
Therapeutic Ball: Back Exercises  Introduction  Here are some examples of typical exercises for your condition. Start each exercise slowly. Ease off the exercise if you start to have pain. Your doctor or physical therapist will tell you when you can start these exercises and which ones will work best for you. To prepare, make sure that your ball is the right size for you. When inflated and firm, it should allow you to sit with your hips and knees bent at about a 90-degree angle (like the letter L). How to do the exercises  Seated position on ball    1. Use this exercise to get used to moving on the ball and to find your best sitting position. 2. Sit comfortably on the ball with your feet about hip-width apart. If you feel unsteady, rest your hands on the ball near your hips. 3. As you do this exercise, try to keep your shoulders and upper body relaxed and still. 4. Using your stomach and back muscles to move your pelvis, roll the ball forward. This will round your back. 5. Still using your stomach and back muscles, roll the ball back. You will arch your back. 6. Repeat this rounding-arching motion a few times. 7. Stop in between the two positions, where your back is not rounded or arched. This is called your neutral position. Pelvic rotation    1. Sit tall on the ball. 2. Slowly rotate your hips in a [place] pattern. Keep the movement focused at your hips. 3. Repeat, but [place] in the other direction. 4. Repeat 8 to 12 times. Postural sitting    1. Use this position to find a stable, relaxed posture on the ball. You can use this position as your starting point for other ball exercises. If you feel unsteady on the ball, start on a chair first.  2. Sit on a ball or chair, with your feet planted straight in front of you. 3. Imagine that a string at the top of your head is pulling you straight up. Think of yourself as 2 inches taller than you are.  4. Slightly tuck your chin.   5. Keep your shoulders back and relaxed. Knee extension    1. Sit tall on the ball with your feet planted in front of you, hip-width apart. As you do this exercise, avoid slumping your shoulders and arching your back. 2. Rest your hands on the ball near your hip or a steady object next to you. (If you feel very stable on the ball, rest your hands in your lap or at your side.)  3. Slowly straighten one leg at the knee. Slowly lower it back down. Repeat with the other leg. 4. Repeat this exercise 8 to 12 times. Roll-ups    1. Lie on your back with your knees bent, feet resting on the floor. 2. Lay the ball on your thighs. Rest your hands up high on the ball. 3. Raising your head and shoulder blades, roll the ball up your thighs. Exhale as you roll up. 4. If this is hard on your neck, gently support your lower head and upper neck with one hand. Don't use that hand to pull your head up. 5. Repeat 8 to 12 times. Ball curls    1. Lie on your back with your ankles resting on the ball, knees straight. 2. Use your legs to roll the exercise ball toward you. Allow your knees to bend and move closer to your chest.  3. Pause briefly, and then roll the ball to the starting position. Try to keep the ball rolling straight. You will feel the muscles in your lower belly working. 4. Repeat 8 to 12 times. Bridge with ball under legs    1. Lie on your back with your legs up, calves resting on the ball. For more challenge, rest your heels on the ball. 2. Look up at the ceiling, and keep your chin relaxed. You can place a small pillow under your head or neck for comfort. 3. With your arms by your side, press your hands onto the floor for stability. 4. Tighten your belly muscles by pulling in your belly button toward your spine. 5. Push your heels down toward the floor, squeeze your buttocks, and lift your hips off the floor until your shoulders, hips, and knees are all in a straight line. 6. Try to keep the ball steady.  Hold for about 6 seconds as you continue to breathe normally. 7. Slowly lower your hips back down to the floor. 8. Repeat 8 to 12 times. Ball curls with bridge    1. Start flat on your back with your ankles resting on the ball. 2. Look up at the ceiling, and keep your chin relaxed. You can place a small pillow under your head or neck for comfort. 3. With your arms by your side, press your hands onto the floor for stability. 4. Tighten your belly muscles by pulling in your belly button toward your spine. 5. Push your heels down toward the floor, squeeze your buttocks, and lift your hips off the floor until your shoulders, hips, and knees are all in a straight line. 6. While holding the bridge position, roll the ball toward you with your heels. Keep your hips as level as you can. 7. Pause briefly, and then roll the ball back out. Try to keep the ball rolling straight. You will feel the muscles in your lower belly working as you straighten your legs. 8. Lower your hips, and return to your starting position. 9. Repeat 8 to 12 times. 10. When you can keep your body and the ball steady throughout this exercise, you're ready for more challenge. Try keeping your hips raised while rolling the ball out, holding the bridge, and rolling back, a few times in a row. Praying gianna    1. Kneel upright with the ball in front of you. 2. To start, clasp your hands together. Rest them on the ball in front of you. 3. As you do this exercise, keep your back and hips straight and tighten your belly and buttocks muscles. Keep your knees in place. 4. Press on the ball with your arms. Lean forward from the knees. This rolls the ball forward. You will bear most of your weight on your arms. 5. If your back starts to ache, you've gone too far. Pull back a bit. 6. Roll back to the start position. 7. Repeat 8 to 12 times. Walk-out plank on ball    1. Kneel over the ball. Place your hands on the floor in front of you.   2. Walk your hands forward until your legs are straight on the ball. This is the plank position. 3. When in plank position, hold your body straight and tighten your belly and buttocks muscles. Keep your chin slightly tucked. 4. Roll as far forward as you can without losing your balance or letting your hips drop. You may stop with the ball under your thighs, or even under your knees or shins. 5. Hold a few seconds, then walk your hands back and return to the start position. 6. Repeat 8 to 12 times. Push-up with thighs on ball    1. Kneel over the ball. Place your hands on the floor in front of you. 2. Walk your hands forward until your legs are straight on the ball. This is the plank position. 3. When in plank position, hold your body straight and tighten your belly and buttocks muscles. Keep your chin slightly tucked. 4. Roll as far forward as you can without losing your balance or letting your hips drop. You may stop with the ball under your thighs, or even under your knees or shins. 5. Bend your elbows. Slowly lower your body toward the ground as far as you can without losing your balance. 6. If your wrists hurt, try moving your hands a little farther apart so they're not right under your shoulders. 7. Slowly straighten your arms. 8. Do 8 to 12 of these push-ups. Wall squat with ball    1. Stand facing away from a wall. Place your feet about shoulder-width apart. 2. Place the ball between your middle back and the wall. Move your feet out in front of you so they are about a foot in front of your hips. 3. Keep your arms at your sides, or put your hands on your hips. 4. Slowly squat down as if you are going to sit in a chair, rolling your back over the ball as you squat. The ball should move with you but stay pressed into the wall. 5. Be sure that your knees do not go in front of your toes as you squat. 6. Hold for 6 seconds. 7. Slowly rise to your standing position. 8. Repeat 8 to 12 times. Child's pose with ball    1.  Kneeling upright with your back straight, rest your hands on the ball in front of you. 2. Breathe out as you bend at the hips, and roll the ball forward. Lower your chest toward the ground, and drop your hips back toward your heels. 3. To stretch your upper back and shoulders, hold this position for 15 to 30 seconds. 4. Repeat 2 to 4 times. Follow-up care is a key part of your treatment and safety. Be sure to make and go to all appointments, and call your doctor if you are having problems. It's also a good idea to know your test results and keep a list of the medicines you take. Where can you learn more? Go to http://www.gray.com/  Enter Q364 in the search box to learn more about \"Therapeutic Ball: Back Exercises. \"  Current as of: July 1, 2021               Content Version: 13.2  © 2006-2022 [employer]. Care instructions adapted under license by [employer] (which disclaims liability or warranty for this information). If you have questions about a medical condition or this instruction, always ask your healthcare professional. [address] any warranty or liability for your use of this information.

## 2022-05-19 NOTE — LETTER
5/19/2022    Patient: Candy Conklin   YOB: 1985   Date of Visit: 5/19/2022     June Andrews MD  [facility]    Dear June Andrews MD,      Thank you for referring Ms. Sharri Chiu to [address] [facility] for evaluation. My notes for this consultation are attached. If you have questions, please do not hesitate to call me. I look forward to following your patient along with you.       Sincerely,    Alexandra Dobson MD

## 2022-06-13 ENCOUNTER — OFFICE VISIT (OUTPATIENT)
Dept: FAMILY MEDICINE CLINIC | Age: 37
End: 2022-06-13
Payer: MEDICARE

## 2022-06-13 VITALS
WEIGHT: 148 LBS | HEART RATE: 90 BPM | HEIGHT: 59 IN | RESPIRATION RATE: 20 BRPM | DIASTOLIC BLOOD PRESSURE: 76 MMHG | BODY MASS INDEX: 29.84 KG/M2 | TEMPERATURE: 97.9 F | OXYGEN SATURATION: 97 % | SYSTOLIC BLOOD PRESSURE: 129 MMHG

## 2022-06-13 DIAGNOSIS — J45.909 UNCOMPLICATED ASTHMA, UNSPECIFIED ASTHMA SEVERITY, UNSPECIFIED WHETHER PERSISTENT: ICD-10-CM

## 2022-06-13 DIAGNOSIS — E78.5 DYSLIPIDEMIA: ICD-10-CM

## 2022-06-13 DIAGNOSIS — R07.9 CHEST PAIN, UNSPECIFIED TYPE: Primary | ICD-10-CM

## 2022-06-13 DIAGNOSIS — G62.9 NEUROPATHY: ICD-10-CM

## 2022-06-13 DIAGNOSIS — E11.65 TYPE 2 DIABETES MELLITUS WITH HYPERGLYCEMIA, WITHOUT LONG-TERM CURRENT USE OF INSULIN (HCC): ICD-10-CM

## 2022-06-13 DIAGNOSIS — T59.811A SMOKE INHALATION: ICD-10-CM

## 2022-06-13 DIAGNOSIS — I10 ESSENTIAL (PRIMARY) HYPERTENSION: ICD-10-CM

## 2022-06-13 DIAGNOSIS — Z09 HOSPITAL DISCHARGE FOLLOW-UP: ICD-10-CM

## 2022-06-13 LAB
BILIRUB UR QL STRIP: NEGATIVE
GLUCOSE UR-MCNC: NORMAL MG/DL
KETONES P FAST UR STRIP-MCNC: NEGATIVE MG/DL
PH UR STRIP: 6 [PH] (ref 4.6–8)
PROT UR QL STRIP: NEGATIVE
SP GR UR STRIP: 1.02 (ref 1–1.03)
UA UROBILINOGEN AMB POC: NORMAL (ref 0.2–1)
URINALYSIS CLARITY POC: CLEAR
URINALYSIS COLOR POC: YELLOW
URINE BLOOD POC: NEGATIVE
URINE LEUKOCYTES POC: NEGATIVE
URINE NITRITES POC: NEGATIVE

## 2022-06-13 PROCEDURE — G8510 SCR DEP NEG, NO PLAN REQD: HCPCS | Performed by: FAMILY MEDICINE

## 2022-06-13 PROCEDURE — G8754 DIAS BP LESS 90: HCPCS | Performed by: FAMILY MEDICINE

## 2022-06-13 PROCEDURE — 2022F DILAT RTA XM EVC RTNOPTHY: CPT | Performed by: FAMILY MEDICINE

## 2022-06-13 PROCEDURE — 81003 URINALYSIS AUTO W/O SCOPE: CPT | Performed by: FAMILY MEDICINE

## 2022-06-13 PROCEDURE — G8417 CALC BMI ABV UP PARAM F/U: HCPCS | Performed by: FAMILY MEDICINE

## 2022-06-13 PROCEDURE — 1111F DSCHRG MED/CURRENT MED MERGE: CPT | Performed by: FAMILY MEDICINE

## 2022-06-13 PROCEDURE — G8752 SYS BP LESS 140: HCPCS | Performed by: FAMILY MEDICINE

## 2022-06-13 PROCEDURE — 99215 OFFICE O/P EST HI 40 MIN: CPT | Performed by: FAMILY MEDICINE

## 2022-06-13 PROCEDURE — G8427 DOCREV CUR MEDS BY ELIG CLIN: HCPCS | Performed by: FAMILY MEDICINE

## 2022-06-13 PROCEDURE — 3044F HG A1C LEVEL LT 7.0%: CPT | Performed by: FAMILY MEDICINE
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
## 2022-06-13 NOTE — PROGRESS NOTES
Chief Complaint   Patient presents with   [facility] Follow Up     1. \"Have you been to the ER, urgent care clinic since your last visit? Hospitalized since your last visit? \" Yes When: 6/10 Where: [facility] Reason for visit: Chest Pain    2. \"Have you seen or consulted any other health care providers outside of the [address] [place] [address] since your last visit? \" No     3. For patients aged 39-70: Has the patient had a colonoscopy / FIT/ Cologuard? NA - based on age      If the patient is female:    4. For patients aged 41-77: Has the patient had a mammogram within the past 2 years? NA - based on age or sex      11. For patients aged 21-65: Has the patient had a pap smear?  Yes - no Care Gap present

## 2022-06-21 DIAGNOSIS — E78.5 DYSLIPIDEMIA: ICD-10-CM

## 2022-06-21 DIAGNOSIS — E55.9 VITAMIN D DEFICIENCY: ICD-10-CM

## 2022-06-21 DIAGNOSIS — E11.65 TYPE 2 DIABETES MELLITUS WITH HYPERGLYCEMIA, WITHOUT LONG-TERM CURRENT USE OF INSULIN (HCC): ICD-10-CM

## 2022-07-13 ENCOUNTER — OFFICE VISIT (OUTPATIENT)
Dept: CARDIOLOGY CLINIC | Age: 37
End: 2022-07-13
Payer: MEDICARE

## 2022-07-13 VITALS
DIASTOLIC BLOOD PRESSURE: 85 MMHG | SYSTOLIC BLOOD PRESSURE: 129 MMHG | BODY MASS INDEX: 30.04 KG/M2 | HEART RATE: 75 BPM | HEIGHT: 59 IN | WEIGHT: 149 LBS | OXYGEN SATURATION: 99 %

## 2022-07-13 DIAGNOSIS — E78.5 HYPERLIPIDEMIA, UNSPECIFIED HYPERLIPIDEMIA TYPE: ICD-10-CM

## 2022-07-13 DIAGNOSIS — I10 ESSENTIAL HYPERTENSION: Primary | ICD-10-CM

## 2022-07-13 DIAGNOSIS — J45.909 UNCOMPLICATED ASTHMA, UNSPECIFIED ASTHMA SEVERITY, UNSPECIFIED WHETHER PERSISTENT: ICD-10-CM

## 2022-07-13 DIAGNOSIS — Z82.49 FAMILY HISTORY OF EARLY CAD: ICD-10-CM

## 2022-07-13 DIAGNOSIS — E11.9 TYPE 2 DIABETES MELLITUS WITHOUT COMPLICATION, UNSPECIFIED WHETHER LONG TERM INSULIN USE (HCC): ICD-10-CM

## 2022-07-13 DIAGNOSIS — R07.9 CHEST PAIN, UNSPECIFIED TYPE: ICD-10-CM

## 2022-07-13 PROCEDURE — 3044F HG A1C LEVEL LT 7.0%: CPT | Performed by: INTERNAL MEDICINE

## 2022-07-13 PROCEDURE — G8432 DEP SCR NOT DOC, RNG: HCPCS | Performed by: INTERNAL MEDICINE

## 2022-07-13 PROCEDURE — G8417 CALC BMI ABV UP PARAM F/U: HCPCS | Performed by: INTERNAL MEDICINE

## 2022-07-13 PROCEDURE — G8754 DIAS BP LESS 90: HCPCS | Performed by: INTERNAL MEDICINE

## 2022-07-13 PROCEDURE — 99214 OFFICE O/P EST MOD 30 MIN: CPT | Performed by: INTERNAL MEDICINE

## 2022-07-13 PROCEDURE — G8427 DOCREV CUR MEDS BY ELIG CLIN: HCPCS | Performed by: INTERNAL MEDICINE

## 2022-07-13 PROCEDURE — G8752 SYS BP LESS 140: HCPCS | Performed by: INTERNAL MEDICINE

## 2022-07-13 PROCEDURE — 2022F DILAT RTA XM EVC RTNOPTHY: CPT | Performed by: INTERNAL MEDICINE

## 2022-07-13 RX ORDER — PREDNISONE 10 MG/1
TABLET ORAL
COMMUNITY
Start: 2022-06-20 | End: 2022-08-23 | Stop reason: ALTCHOICE

## 2022-07-13 NOTE — PROGRESS NOTES
1. Have you been to the ER, urgent care clinic since your last visit? Hospitalized since your last visit? Yes Where: [facility]    2. Have you seen or consulted any other health care providers outside of the [address] [place] [address] since your last visit? Include any pap smears or colon screening.  Yes Where: Dr. Lisa Fernandez PCP for F/u for CP

## 2022-07-13 NOTE — PROGRESS NOTES
HISTORY OF PRESENT ILLNESS  Soraida Force Vannessa Griffin is a 40 y.o. female. 7/2022  Patient is seen today for follow-up of her chronic conditions. Patient continues to have chest pains more frequently left-sided. Episodic. Shortness of breath stable  Recent flareup of asthma for which she is under care of pulmonary    Chest Pain (Angina)   The history is provided by the patient. This is a chronic problem. The current episode started more than 1 week ago. The problem has not changed since onset. The problem occurs rarely. The pain is associated with rest and exertion. The pain is present in the lateral region and substernal region. The quality of the pain is described as sharp. The pain does not radiate. Associated symptoms include shortness of breath. Pertinent negatives include no dizziness, no nausea, no palpitations and no weakness. Hypertension  The history is provided by the patient. This is a chronic problem. Associated symptoms include chest pain and shortness of breath. Shortness of Breath  The history is provided by the patient. This is a chronic problem. The problem occurs intermittently. Associated symptoms include chest pain. Follow-up  Associated symptoms include chest pain and shortness of breath. Review of Systems   Constitutional: Negative for chills. HENT: Negative for nosebleeds. Eyes: Negative for blurred vision and double vision. Respiratory: Positive for shortness of breath. Cardiovascular: Positive for chest pain. Negative for palpitations. Gastrointestinal: Negative for heartburn and nausea. Musculoskeletal: Negative for myalgias. Neurological: Negative for dizziness and weakness. Endo/Heme/Allergies: Does not bruise/bleed easily.      Family History   Problem Relation Age of Onset    Kidney Disease Father     Other Father         Pancreatic Disease    Cancer Father     Heart Disease Father     Heart Attack Father     Thyroid Disease Other         Grandparent Past Medical History:   Diagnosis Date    Asthma     Chronic constipation     Diabetes ([facility] [address]     Fibromyalgia     GERD (gastroesophageal reflux disease)     Herpes zoster without complication 5406    Hypertension     IBS (irritable bowel syndrome)     Migraines     unknown if aura    Psychiatric disorder     she denies any diagnosis despite being on antipsychotics, SSRIs, etc in the past    Scoliosis     Urinary incontinence     mixed       Past Surgical History:   Procedure Laterality Date    HX  SECTION  , 2016    HX COLONOSCOPY      HX DILATION AND CURETTAGE      HX ENDOSCOPY      HX PELVIC LAPAROSCOPY         Social History     Tobacco Use    Smoking status: Never Smoker    Smokeless tobacco: Never Used   Substance Use Topics    Alcohol use: No       Allergies   Allergen Reactions    Iodine Hives and Nausea and Vomiting    Iodinated Contrast Media Hives    Oxycodone-Acetaminophen Other (comments)     Hallucinations     Prochlorperazine Other (comments)    Propoxyphene-Acetaminophen Unknown (comments)     Allergy to propoxyphene only. Has previously tolerated acetaminophen    Reglan [Metoclopramide] Not Reported This Time    Sulfa (Sulfonamide Antibiotics) Not Reported This Time    Wygesic Not Reported This Time       Prior to Admission medications    Medication Sig Start Date End Date Taking? Authorizing Provider   predniSONE (DELTASONE) 10 mg tablet TAKE 4 TABS FOR 3 DAYS, 3 TABS FOR 3 DAYS, 2 TABS FOR 3 DAYS, 1 TAB FOR 3 DAYS 22  Yes Provider, Historical   Breo Ellipta 100-25 mcg/dose inhaler 1 PUFF INHALATION ONCE A DAY 22  Yes Provider, Historical   azelastine (ASTEPRO) 205.5 mcg (0.15 %) 1 SPRAYS IN EACH NOSTRIL NASALLY ONCE A DAY 30 DAY(S) 22  Yes Provider, Historical   cyclobenzaprine (FLEXERIL) 10 mg tablet Take 0.5-1 Tablets by mouth two (2) times daily as needed for Muscle Spasm(s).  22  Yes Javier Gilbert MD   atenoloL (TENORMIN) 25 mg tablet TAKE 1 TABLET BY MOUTH EVERY DAY 5/11/22  Yes Chelsy Amanda MD   atorvastatin (LIPITOR) 10 mg tablet Take 1 Tablet by mouth daily. 5/3/22  Yes Moe Cook MD   cholecalciferol (VITAMIN D3) (50,000 UNITS /1250 MCG) capsule TAKE 1 CAPSULE BY MOUTH ONE TIME PER WEEK 4/28/22  Yes Chelsy Nuñez MD   Jardiance 10 mg tablet TAKE 1 TABLET BY MOUTH EVERY DAY 4/28/22  Yes Chelsy Nuñez MD   rizatriptan (MAXALT-MLT) 10 mg disintegrating tablet Take 1 Tablet by mouth daily as needed. 2/17/22  Yes Provider, Historical   diclofenac EC (VOLTAREN) 50 mg EC tablet TAKE 1 TAB BY MOUTH TWO TIMES DAILY AS NEEDED FOR PAIN. 1/31/22  Yes Moe Cook MD   glucose blood VI test strips (OneTouch Ultra Test) strip CHECK BLOOD GLUCOSE 4 X DAY 1/26/22  Yes Moe Cook MD   OneTouch Delica Plus Lancet 33 gauge misc CHECK BLOOD GLUCOSE 4 X DAY *INS 3 A DAY *885912 1/26/22  Yes Chelsy Amanda MD   cetirizine (ZyrTEC) 10 mg tablet Take 10 mg by mouth daily as needed. Yes Provider, Historical   aspirin delayed-release 81 mg tablet TAKE 1 TABLET BY MOUTH EVERY DAY 5/18/21  Yes Chelsy Amanda MD   diclofenac (Voltaren) 1 % gel Apply  to affected area as needed for Pain. Yes Provider, Historical   gabapentin (NEURONTIN) 600 mg tablet TAKE 1 TABLET BY MOUTH TWICE A DAY 2/11/21  Yes Provider, Historical   Insulin Needles, Disposable, 31 gauge x 5/16\" ndle To use daily with insulin injection subcutaneously 9/25/20  Yes Audrey Waters NP   montelukast (SINGULAIR) 10 mg tablet Take 1 Tab by mouth nightly. 7/2/20  Yes Parker, MD Chacho   valACYclovir (VALTREX) 500 mg tablet Take 500 mg by mouth daily.  8/12/20  Yes Provider, Historical   AIMOVIG AUTOINJECTOR 70 mg/mL injection AS DIRECTED - 1 INJECTION SUBQ ONCE MONTHLY 7/30/19  Yes Provider, Historical   mometasone (NASONEX) 50 mcg/actuation nasal spray USE 1-2 SPRAYS INTO EACH NOSTRIL EVERY DAY AS NEEDED 2/20/19  Yes Provider, Historical   EPINEPHrine (EPIPEN) 0.3 mg/0.3 mL injection AS DIRECTED AS NEEDED INTRAMUSCULAR 1 DAYS 2/20/19  Yes Provider, Historical   albuterol (PROVENTIL HFA, VENTOLIN HFA, PROAIR HFA) 90 mcg/actuation inhaler Take 2 Puffs by inhalation every four (4) hours as needed for Wheezing or Shortness of Breath. 12/6/18  Yes Kacey PETERSON, NP   esomeprazole (NEXIUM) 40 mg capsule Take  by mouth daily. Yes Provider, Historical   POLYETHYLENE GLYCOL 3350 (MIRALAX PO) Take  by mouth daily as needed. Yes Provider, Historical   CALCIUM CARBONATE/MAG HYDROX (MYLANTA PO) Take  by mouth as needed. Yes Provider, Historical         Visit Vitals  /85 (BP 1 Location: Left upper arm, BP Patient Position: Sitting, BP Cuff Size: Adult)   Pulse 75   Ht 4' 11\" (1.499 m)   Wt 67.6 kg (149 lb)   SpO2 99%   BMI 30.09 kg/m²         Physical Exam  Constitutional:       Appearance: She is well-developed. HENT:      Head: Normocephalic and atraumatic. Eyes:      Conjunctiva/sclera: Conjunctivae normal.   Neck:      Thyroid: No thyromegaly. Vascular: No JVD. Trachea: No tracheal deviation. Cardiovascular:      Rate and Rhythm: Normal rate and regular rhythm. Chest Wall: PMI is not displaced. Pulses: No decreased pulses. Heart sounds: No murmur heard. No gallop. No S3 sounds. Pulmonary:      Effort: No respiratory distress. Breath sounds: No wheezing or rales. Chest:      Chest wall: No tenderness. Abdominal:      Palpations: Abdomen is soft. Tenderness: There is no abdominal tenderness. Musculoskeletal:      Cervical back: Neck supple. Skin:     General: Skin is warm. Neurological:      Mental Status: She is alert and oriented to person, place, and time. ECHO CARDIOGRAM [id number]/28/2015  [employer]  Component Name Value Ref Range   EF Echo 60     Result Impression   :   NORMAL LEFT VENTRICULAR CAVITY SIZE AND SYSTOLIC FUNCTION WITH AN EJECTION FRACTION OF  60  %.    NORMAL DIASTOLIC FUNCTION. NORMAL RIGHT HEART FUNCTION AND SIZE. NO HEMODYNAMICALLY SIGNIFICANT VALVULAR PATHOLOGY. NORMAL PULMONARY ARTERY PRESSURE OF 15 MM/HG. NO PREVIOUS REPORT FOR COMPARISON. I have personally reviewed patient's records available from hospital and other providers and incorporated findings in patient care. 12/2018- ER notes, labs, EKG, chest x-ray and prior records  Ms. Megan Connelly has a reminder for a \"due or due soon\" health maintenance. I have asked that she contact her primary care provider for follow-up on this health maintenance. Interpretation Summary 1/2019       · Normal cavity size, wall thickness, systolic function (ejection fraction normal) and diastolic function. The estimated ejection fraction is 56 - 60%. No regional wall motion abnormality noted. · Normal right ventricular size and function. · No hemodynamically significant valvular pathology. Interpretation Summary 1/2019    · Baseline ECG: Normal sinus rhythm. · Low risk Duke treadmill score. · Negative stress electrocardiogram.        03/10/20   ECHO STRESS 03/11/2020 3/12/2020    Narrative · Baseline ECG: Normal sinus rhythm, non-specific ST-T wave abnormalities. · Moderate risk Duke treadmill score. · Negative stress test.  · Normal stress echocardiogram. Low risk study. Signed by: Reta Mcbride MD     I Have personally reviewed recent relevant labs available and discussed with patient  4/2022-lipids  12/2021- lipid, LFT, BMP  Assessment         ICD-10-CM ICD-9-CM    1. Essential hypertension  I10 401.9 CT HEART W/O CONT WITH CALCIUM    Stable continue treatment   2. Type 2 diabetes mellitus without complication, unspecified whether long term insulin use (HCC)  E11.9 250.00 CT HEART W/O CONT WITH CALCIUM    Continue current treatment follow-up lab with PCP   3.  Uncomplicated asthma, unspecified asthma severity, unspecified whether persistent  J45.909 493.90 CT HEART W/O CONT WITH CALCIUM    Stable symptom monitor with PCP   4. Chest pain, unspecified type  R07.9 786.50 CT HEART W/O CONT WITH CALCIUM    Atypical recurrent. Check CTA   5. Hyperlipidemia, unspecified hyperlipidemia type  E78.5 272.4 LIPID PANEL      HEPATIC FUNCTION PANEL      CT HEART W/O CONT WITH CALCIUM    LDL elevated. Now on statin low-dose. Check lab and decide on adjustment   6. Family history of early CAD  Z80.55 V14.2     Father with MI and heart transplant started at age 46s       There are no discontinued medications. Orders Placed This Encounter    CT HEART W/O CONT WITH CALCIUM     Standing Status:   Future     Standing Expiration Date:   8/13/2023     Order Specific Question:   Is Patient Pregnant? Answer:   No    LIPID PANEL     Standing Status:   Future     Standing Expiration Date:   1/13/2023    HEPATIC FUNCTION PANEL     Standing Status:   Future     Standing Expiration Date:   1/13/2023       Follow-up and Dispositions    · Return for F/u after tests, Follow-up with Dragan. Patient is 55-year-old female with history of hypertension seen for episodes of sharp chest pain with some tightness. Was recently hospitalized at [facility]- serial cardiac enzymes negative for MI. Chest pain-- likely costochondritis-- reproducible. Stress echo in march 2020 - negative for ischemia    Seen for follow-up. Stress test and echo report reviewed with patient- no ischemia. Normal LV function. Suspect costochondritis  Recommend to continue current meds  F/u in 1 yr or sooner if symptoms recur. 7/2022  Recurrent chest pain. Rule out coronary calcification. Now on statin low-dose check lab and decide on medication adjustment.   If significant coronary calcification consider repeat  stress test.  Follow-up after test

## 2022-07-22 ENCOUNTER — HOSPITAL ENCOUNTER (OUTPATIENT)
Dept: CT IMAGING | Age: 37
Discharge: HOME | End: 2022-07-22
Attending: INTERNAL MEDICINE
Payer: SELF-PAY

## 2022-07-22 DIAGNOSIS — J45.909 UNCOMPLICATED ASTHMA, UNSPECIFIED ASTHMA SEVERITY, UNSPECIFIED WHETHER PERSISTENT: ICD-10-CM

## 2022-07-22 DIAGNOSIS — I10 ESSENTIAL HYPERTENSION: ICD-10-CM

## 2022-07-22 DIAGNOSIS — E78.5 HYPERLIPIDEMIA, UNSPECIFIED HYPERLIPIDEMIA TYPE: ICD-10-CM

## 2022-07-22 DIAGNOSIS — E11.9 TYPE 2 DIABETES MELLITUS WITHOUT COMPLICATION, UNSPECIFIED WHETHER LONG TERM INSULIN USE (HCC): ICD-10-CM

## 2022-07-22 DIAGNOSIS — R07.9 CHEST PAIN, UNSPECIFIED TYPE: ICD-10-CM

## 2022-07-22 PROCEDURE — 75571 CT HRT W/O DYE W/CA TEST: CPT

## 2022-08-16 DIAGNOSIS — M79.7 FIBROMYALGIA: ICD-10-CM

## 2022-08-16 RX ORDER — CYCLOBENZAPRINE HCL 10 MG
5-10 TABLET ORAL
Qty: 60 TABLET | Refills: 2 | Status: SHIPPED | OUTPATIENT
Start: 2022-08-16 | End: 2022-09-29 | Stop reason: SDUPTHER

## 2022-08-18 ENCOUNTER — TELEPHONE (OUTPATIENT)
Dept: CARDIOLOGY CLINIC | Age: 37
End: 2022-08-18

## 2022-08-18 LAB
ALBUMIN SERPL-MCNC: 4.7 G/DL (ref 3.8–4.8)
ALP SERPL-CCNC: 78 IU/L (ref 44–121)
ALT SERPL-CCNC: 16 IU/L (ref 0–32)
AST SERPL-CCNC: 17 IU/L (ref 0–40)
BILIRUB DIRECT SERPL-MCNC: 0.11 MG/DL (ref 0–0.4)
BILIRUB SERPL-MCNC: 0.4 MG/DL (ref 0–1.2)
CHOLEST SERPL-MCNC: 188 MG/DL (ref 100–199)
HDLC SERPL-MCNC: 34 MG/DL
LDLC SERPL CALC-MCNC: 140 MG/DL (ref 0–99)
PROT SERPL-MCNC: 7.2 G/DL (ref 6–8.5)
TRIGL SERPL-MCNC: 73 MG/DL (ref 0–149)
VLDLC SERPL CALC-MCNC: 14 MG/DL (ref 5–40)

## 2022-08-18 NOTE — TELEPHONE ENCOUNTER
Spoke to patient regarding lab/test per Dr. Wilton Pope. Lab/test reviewed. Mild increased LDL continue dietary modification. Coronary calcium score is 0. She voices understanding and acceptance of this advice and will call back if any further questions or concerns.

## 2022-08-18 NOTE — TELEPHONE ENCOUNTER
----- Message from Lizzette Roberts MD sent at 8/18/2022  8:25 AM EDT -----  Mild increased LDL continue dietary modification. Coronary calcium score is 0.

## 2022-08-22 ENCOUNTER — TELEPHONE (OUTPATIENT)
Dept: FAMILY MEDICINE CLINIC | Age: 37
End: 2022-08-22

## 2022-08-22 NOTE — TELEPHONE ENCOUNTER
Lieutenant Lambert from [facility] called on behalf of [address] stated the pt has fallen into non adherence in 1 or more of there medications. Lieutenant Lambert can be reached at [phone] the Reference number is [id number]. Please advise.  Thank you!!!

## 2022-08-23 ENCOUNTER — OFFICE VISIT (OUTPATIENT)
Dept: CARDIOLOGY CLINIC | Age: 37
End: 2022-08-23
Payer: MEDICARE

## 2022-08-23 VITALS
SYSTOLIC BLOOD PRESSURE: 120 MMHG | WEIGHT: 148 LBS | DIASTOLIC BLOOD PRESSURE: 76 MMHG | OXYGEN SATURATION: 98 % | BODY MASS INDEX: 29.84 KG/M2 | HEART RATE: 79 BPM | HEIGHT: 59 IN

## 2022-08-23 DIAGNOSIS — E78.5 HYPERLIPIDEMIA, UNSPECIFIED HYPERLIPIDEMIA TYPE: ICD-10-CM

## 2022-08-23 DIAGNOSIS — R07.9 CHEST PAIN, UNSPECIFIED TYPE: Primary | ICD-10-CM

## 2022-08-23 DIAGNOSIS — J45.909 UNCOMPLICATED ASTHMA, UNSPECIFIED ASTHMA SEVERITY, UNSPECIFIED WHETHER PERSISTENT: ICD-10-CM

## 2022-08-23 DIAGNOSIS — Z82.49 FAMILY HISTORY OF EARLY CAD: ICD-10-CM

## 2022-08-23 DIAGNOSIS — I10 ESSENTIAL HYPERTENSION: ICD-10-CM

## 2022-08-23 PROCEDURE — G8754 DIAS BP LESS 90: HCPCS | Performed by: NURSE PRACTITIONER

## 2022-08-23 PROCEDURE — G8752 SYS BP LESS 140: HCPCS | Performed by: NURSE PRACTITIONER

## 2022-08-23 PROCEDURE — 99214 OFFICE O/P EST MOD 30 MIN: CPT | Performed by: NURSE PRACTITIONER

## 2022-08-23 PROCEDURE — G8427 DOCREV CUR MEDS BY ELIG CLIN: HCPCS | Performed by: NURSE PRACTITIONER

## 2022-08-23 PROCEDURE — G8432 DEP SCR NOT DOC, RNG: HCPCS | Performed by: NURSE PRACTITIONER

## 2022-08-23 PROCEDURE — G8417 CALC BMI ABV UP PARAM F/U: HCPCS | Performed by: NURSE PRACTITIONER

## 2022-08-23 NOTE — PROGRESS NOTES
HISTORY OF PRESENT ILLNESS  Gregor Badder Lorenso Burkitt is a 40 y.o. female. 7/2022  Patient is seen today for follow-up of her chronic conditions. Patient continues to have chest pains more frequently left-sided. Episodic. Shortness of breath stable  Recent flareup of asthma for which she is under care of pulmonary  8/2022  Patient seen in follow-up for chest pain. On Aug 4th was diagnosed with COVID. She complains of dyspnea on exertion, with memory loss since being diagnosed with COVID. She complains of occasional chest pressure worse with a deep breath or raising arms overhead. She denies palpitations or edema. Follow-up  The history is provided by the Patient and medical records. Associated symptoms include chest pain and shortness of breath. Chest Pain (Angina)   The history is provided by the Patient. This is a chronic problem. The current episode started more than 1 week ago. The problem has not changed since onset. The problem occurs rarely. The pain is associated with rest and exertion. The pain is present in the lateral region and substernal region. The quality of the pain is described as sharp. The pain does not radiate. Associated symptoms include shortness of breath. Pertinent negatives include no dizziness, no nausea, no palpitations and no weakness. Hypertension  The history is provided by the Patient. This is a chronic problem. Associated symptoms include chest pain and shortness of breath. Shortness of Breath  The history is provided by the Patient. This is a chronic problem. The problem occurs intermittently. Associated symptoms include chest pain. Review of Systems   Constitutional:  Negative for chills. HENT:  Negative for nosebleeds. Eyes:  Negative for blurred vision and double vision. Respiratory:  Positive for shortness of breath. Cardiovascular:  Positive for chest pain. Negative for palpitations. Gastrointestinal:  Negative for heartburn and nausea. Musculoskeletal:  Negative for myalgias. Neurological:  Negative for dizziness and weakness. Endo/Heme/Allergies:  Does not bruise/bleed easily. Family History   Problem Relation Age of Onset    Kidney Disease Father     Other Father         Pancreatic Disease    Cancer Father     Heart Disease Father     Heart Attack Father     Thyroid Disease Other         Grandparent       Past Medical History:   Diagnosis Date    Asthma     Chronic constipation     Diabetes ([facility] [address]     Fibromyalgia     GERD (gastroesophageal reflux disease)     Herpes zoster without complication     Hypertension     IBS (irritable bowel syndrome)     Migraines     unknown if aura    Psychiatric disorder     she denies any diagnosis despite being on antipsychotics, SSRIs, etc in the past    Scoliosis     Urinary incontinence     mixed       Past Surgical History:   Procedure Laterality Date    HX  SECTION  ,     HX COLONOSCOPY      HX DILATION AND CURETTAGE      HX ENDOSCOPY      HX PELVIC LAPAROSCOPY         Social History     Tobacco Use    Smoking status: Never    Smokeless tobacco: Never   Substance Use Topics    Alcohol use: No       Allergies   Allergen Reactions    Iodine Hives and Nausea and Vomiting    Iodinated Contrast Media Hives    Oxycodone-Acetaminophen Other (comments)     Hallucinations     Prochlorperazine Other (comments)    Propoxyphene-Acetaminophen Unknown (comments)     Allergy to propoxyphene only. Has previously tolerated acetaminophen    Reglan [Metoclopramide] Not Reported This Time    Sulfa (Sulfonamide Antibiotics) Not Reported This Time    Wygesic Not Reported This Time       Prior to Admission medications    Medication Sig Start Date End Date Taking?  Authorizing Provider   cyclobenzaprine (FLEXERIL) 10 mg tablet TAKE 0.5-1 TABLETS BY MOUTH TWO (2) TIMES DAILY AS NEEDED FOR MUSCLE SPASM(S). 22  Yes MD Loren Thompson 100-25 mcg/dose inhaler 1 PUFF INHALATION ONCE A DAY 4/28/22  Yes Provider, Historical   azelastine (ASTEPRO) 205.5 mcg (0.15 %) 1 SPRAYS IN EACH NOSTRIL NASALLY ONCE A DAY 30 DAY(S) 5/9/22  Yes Provider, Historical   atenoloL (TENORMIN) 25 mg tablet TAKE 1 TABLET BY MOUTH EVERY DAY 5/11/22  Yes Chelsy Amanda MD   atorvastatin (LIPITOR) 10 mg tablet Take 1 Tablet by mouth daily. 5/3/22  Yes Kelley Becerril MD   cholecalciferol (VITAMIN D3) (50,000 UNITS /1250 MCG) capsule TAKE 1 CAPSULE BY MOUTH ONE TIME PER WEEK 4/28/22  Yes Cehlsy Johnson MD   Jardiance 10 mg tablet TAKE 1 TABLET BY MOUTH EVERY DAY 4/28/22  Yes Chelsy Johnson MD   rizatriptan (MAXALT-MLT) 10 mg disintegrating tablet Take 1 Tablet by mouth daily as needed. 2/17/22  Yes Provider, Historical   diclofenac EC (VOLTAREN) 50 mg EC tablet TAKE 1 TAB BY MOUTH TWO TIMES DAILY AS NEEDED FOR PAIN. 1/31/22  Yes Kelley Becerril MD   glucose blood VI test strips (OneTouch Ultra Test) strip CHECK BLOOD GLUCOSE 4 X DAY 1/26/22  Yes Kelley Becerril MD   OneTouch Delica Plus Lancet 33 gauge misc CHECK BLOOD GLUCOSE 4 X DAY *INS 3 A DAY *[id number] 1/26/22  Yes Chelsy Amanda MD   cetirizine (ZYRTEC) 10 mg tablet Take 10 mg by mouth daily as needed. Yes Provider, Historical   aspirin delayed-release 81 mg tablet TAKE 1 TABLET BY MOUTH EVERY DAY 5/18/21  Yes Chelsy Amanda MD   diclofenac (VOLTAREN) 1 % gel Apply  to affected area as needed for Pain. Yes Provider, Historical   gabapentin (NEURONTIN) 600 mg tablet TAKE 1 TABLET BY MOUTH TWICE A DAY 2/11/21  Yes Provider, Historical   Insulin Needles, Disposable, 31 gauge x 5/16\" ndle To use daily with insulin injection subcutaneously 9/25/20  Yes Denise Beltran NP   montelukast (SINGULAIR) 10 mg tablet Take 1 Tab by mouth nightly. 7/2/20  Yes Other, MD Chacho   valACYclovir (VALTREX) 500 mg tablet Take 500 mg by mouth daily.  8/12/20  Yes Provider, Historical   AIMOVIG AUTOINJECTOR 70 mg/mL injection AS DIRECTED - 1 INJECTION SUBQ ONCE MONTHLY 7/30/19  Yes Provider, Historical   mometasone (NASONEX) 50 mcg/actuation nasal spray USE 1-2 SPRAYS INTO EACH NOSTRIL EVERY DAY AS NEEDED 2/20/19  Yes Provider, Historical   EPINEPHrine (EPIPEN) 0.3 mg/0.3 mL injection AS DIRECTED AS NEEDED INTRAMUSCULAR 1 DAYS 2/20/19  Yes Provider, Historical   albuterol (PROVENTIL HFA, VENTOLIN HFA, PROAIR HFA) 90 mcg/actuation inhaler Take 2 Puffs by inhalation every four (4) hours as needed for Wheezing or Shortness of Breath. 12/6/18  Yes Mino PETERSON, NP   esomeprazole (NEXIUM) 40 mg capsule Take  by mouth daily. Yes Provider, Historical   POLYETHYLENE GLYCOL 3350 (MIRALAX PO) Take  by mouth daily as needed. Yes Provider, Historical   CALCIUM CARBONATE/MAG HYDROX (MYLANTA PO) Take  by mouth as needed. Yes Provider, Historical   predniSONE (DELTASONE) 10 mg tablet TAKE 4 TABS FOR 3 DAYS, 3 TABS FOR 3 DAYS, 2 TABS FOR 3 DAYS, 1 TAB FOR 3 DAYS 6/20/22 8/23/22  Provider, Historical         Visit Vitals  /76   Pulse 79   Ht 4' 11\" (1.499 m)   Wt 67.1 kg (148 lb)   SpO2 98%   BMI 29.89 kg/m²           Physical Exam  Vitals and nursing note reviewed. Constitutional:       Appearance: She is well-developed. HENT:      Head: Normocephalic and atraumatic. Eyes:      Conjunctiva/sclera: Conjunctivae normal.   Neck:      Thyroid: No thyromegaly. Vascular: No JVD. Trachea: No tracheal deviation. Cardiovascular:      Rate and Rhythm: Normal rate and regular rhythm. Chest Wall: PMI is not displaced. Pulses: No decreased pulses. Heart sounds: No murmur heard. No gallop. No S3 sounds. Pulmonary:      Effort: No respiratory distress. Breath sounds: No wheezing or rales. Chest:      Chest wall: No tenderness. Abdominal:      Palpations: Abdomen is soft. Tenderness: There is no abdominal tenderness. Musculoskeletal:      Cervical back: Neck supple. Right lower leg: No edema. Left lower leg: No edema. Skin:     General: Skin is warm. Neurological:      Mental Status: She is alert and oriented to person, place, and time. ECHO CARDIOGRAM [facility]  Component Name Value Ref Range   EF Echo 60     Result Impression   :   NORMAL LEFT VENTRICULAR CAVITY SIZE AND SYSTOLIC FUNCTION WITH AN EJECTION FRACTION OF  60  %. NORMAL DIASTOLIC FUNCTION. NORMAL RIGHT HEART FUNCTION AND SIZE. NO HEMODYNAMICALLY SIGNIFICANT VALVULAR PATHOLOGY. NORMAL PULMONARY ARTERY PRESSURE OF 15 MM/HG. NO PREVIOUS REPORT FOR COMPARISON. I have personally reviewed patient's records available from hospital and other providers and incorporated findings in patient care. 12/2018- ER notes, labs, EKG, chest x-ray and prior records  Ms. Iveth Varner has a reminder for a \"due or due soon\" health maintenance. I have asked that she contact her primary care provider for follow-up on this health maintenance. Interpretation Summary 1/2019       Normal cavity size, wall thickness, systolic function (ejection fraction normal) and diastolic function. The estimated ejection fraction is 56 - 60%. No regional wall motion abnormality noted. Normal right ventricular size and function. No hemodynamically significant valvular pathology. Interpretation Summary 1/2019    Baseline ECG: Normal sinus rhythm. Low risk Duke treadmill score. Negative stress electrocardiogram.        03/10/20   ECHO STRESS 03/11/2020 3/12/2020    Narrative · Baseline ECG: Normal sinus rhythm, non-specific ST-T wave abnormalities. · Moderate risk Duke treadmill score. · Negative stress test.  · Normal stress echocardiogram. Low risk study. Signed by: Jayda Shannon MD     7/2022   IMPRESSION  Coronary calcium score 0.       I Have personally reviewed recent relevant labs available and discussed with patient  4/2022-lipids  12/2021- lipid, LFT, BMP     Latest Reference Range & Units 8/17/22 00:00   Triglyceride 0 - 149 mg/dL 73   Cholesterol, total 100 - 199 mg/dL 188   HDL Cholesterol >39 mg/dL 34 (L)   LDL, calculated 0 - 99 mg/dL 140 (H)   VLDL, calculated 5 - 40 mg/dL 14   (L): Data is abnormally low  (H): Data is abnormally high  Assessment         ICD-10-CM ICD-9-CM    1. Chest pain, unspecified type  R07.9 786.50     Atypical recurrent. CTA calcium score 0      2. Essential hypertension  I10 401.9     B/P is controlled, continue current medications      3. Hyperlipidemia, unspecified hyperlipidemia type  E78.5 272.4     Continue low dose statin and dietary modification      4. Uncomplicated asthma, unspecified asthma severity, unspecified whether persistent  J45.909 493.90     Stable symptom monitor with PCP      5. Family history of early CAD  Z80.55 V14.2      Father with MI and heart transplant started at age 46s              Medications Discontinued During This Encounter   Medication Reason    predniSONE (DELTASONE) 10 mg tablet Therapy Completed       No orders of the defined types were placed in this encounter. Patient is 22-year-old female with history of hypertension seen for episodes of sharp chest pain with some tightness. Was recently hospitalized at [facility]- serial cardiac enzymes negative for MI. Chest pain-- likely costochondritis-- reproducible. Stress echo in march 2020 - negative for ischemia    Seen for follow-up. Stress test and echo report reviewed with patient- no ischemia. Normal LV function. Suspect costochondritis  Recommend to continue current meds  F/u in 1 yr or sooner if symptoms recur. 7/2022  Recurrent chest pain. Rule out coronary calcification. Now on statin low-dose check lab and decide on medication adjustment. If significant coronary calcification consider repeat  stress test.  Follow-up after test  8/2022  Patient recovering from COVID-19 infection, diagnosed August 4. Now with dyspnea on exertion and memory loss.   Recurrent atypical chest pain worse with deep breath and raising arms overhead. Coronary calcium score 0, patient recently started atorvastatin 10 mg recommend to continue. Will allow for recovery of COVID-19, if chest pain continues will consider stress test.  Mild increased LDL continue dietary modification.

## 2022-08-23 NOTE — PROGRESS NOTES
1. Have you been to the ER, urgent care clinic since your last visit? Hospitalized since your last visit? Yes [facility] for chest pain    2. Have you seen or consulted any other health care providers outside of the [address] [place] [address] since your last visit? Include any pap smears or colon screening.       no

## 2022-09-01 ENCOUNTER — OFFICE VISIT (OUTPATIENT)
Dept: FAMILY MEDICINE CLINIC | Age: 37
End: 2022-09-01
Payer: MEDICARE

## 2022-09-01 VITALS
TEMPERATURE: 97.9 F | SYSTOLIC BLOOD PRESSURE: 129 MMHG | BODY MASS INDEX: 31.67 KG/M2 | OXYGEN SATURATION: 98 % | DIASTOLIC BLOOD PRESSURE: 78 MMHG | RESPIRATION RATE: 20 BRPM | WEIGHT: 146.8 LBS | HEART RATE: 80 BPM | HEIGHT: 57 IN

## 2022-09-01 DIAGNOSIS — R30.0 DYSURIA: Primary | ICD-10-CM

## 2022-09-01 DIAGNOSIS — I10 ESSENTIAL (PRIMARY) HYPERTENSION: ICD-10-CM

## 2022-09-01 DIAGNOSIS — E78.5 DYSLIPIDEMIA: ICD-10-CM

## 2022-09-01 DIAGNOSIS — M25.562 CHRONIC PAIN OF LEFT KNEE: ICD-10-CM

## 2022-09-01 DIAGNOSIS — G43.909 MIGRAINE WITHOUT STATUS MIGRAINOSUS, NOT INTRACTABLE, UNSPECIFIED MIGRAINE TYPE: ICD-10-CM

## 2022-09-01 DIAGNOSIS — G89.29 CHRONIC PAIN OF LEFT KNEE: ICD-10-CM

## 2022-09-01 DIAGNOSIS — F39 MOOD DISORDER (HCC): ICD-10-CM

## 2022-09-01 DIAGNOSIS — G62.9 NEUROPATHY: ICD-10-CM

## 2022-09-01 DIAGNOSIS — E11.65 TYPE 2 DIABETES MELLITUS WITH HYPERGLYCEMIA, WITHOUT LONG-TERM CURRENT USE OF INSULIN (HCC): ICD-10-CM

## 2022-09-01 DIAGNOSIS — T78.40XD ALLERGY, SUBSEQUENT ENCOUNTER: ICD-10-CM

## 2022-09-01 LAB
BILIRUB UR QL STRIP: NEGATIVE
GLUCOSE UR-MCNC: NORMAL MG/DL
KETONES P FAST UR STRIP-MCNC: NEGATIVE MG/DL
PH UR STRIP: 5.5 [PH] (ref 4.6–8)
PROT UR QL STRIP: NEGATIVE
SP GR UR STRIP: 1.02 (ref 1–1.03)
UA UROBILINOGEN AMB POC: NORMAL (ref 0.2–1)
URINALYSIS CLARITY POC: CLEAR
URINALYSIS COLOR POC: YELLOW
URINE BLOOD POC: NEGATIVE
URINE LEUKOCYTES POC: NEGATIVE
URINE NITRITES POC: NEGATIVE

## 2022-09-01 PROCEDURE — G8427 DOCREV CUR MEDS BY ELIG CLIN: HCPCS | Performed by: FAMILY MEDICINE

## 2022-09-01 PROCEDURE — G8432 DEP SCR NOT DOC, RNG: HCPCS | Performed by: FAMILY MEDICINE

## 2022-09-01 PROCEDURE — 99214 OFFICE O/P EST MOD 30 MIN: CPT | Performed by: FAMILY MEDICINE

## 2022-09-01 PROCEDURE — G8417 CALC BMI ABV UP PARAM F/U: HCPCS | Performed by: FAMILY MEDICINE

## 2022-09-01 PROCEDURE — G8752 SYS BP LESS 140: HCPCS | Performed by: FAMILY MEDICINE

## 2022-09-01 PROCEDURE — 81003 URINALYSIS AUTO W/O SCOPE: CPT | Performed by: FAMILY MEDICINE

## 2022-09-01 PROCEDURE — 2022F DILAT RTA XM EVC RTNOPTHY: CPT | Performed by: FAMILY MEDICINE

## 2022-09-01 PROCEDURE — 3044F HG A1C LEVEL LT 7.0%: CPT | Performed by: FAMILY MEDICINE

## 2022-09-01 PROCEDURE — G8754 DIAS BP LESS 90: HCPCS | Performed by: FAMILY MEDICINE

## 2022-09-01 RX ORDER — LINACLOTIDE 72 UG/1
CAPSULE, GELATIN COATED ORAL
COMMUNITY
Start: 2022-07-14

## 2022-09-01 RX ORDER — EPINEPHRINE 0.3 MG/.3ML
INJECTION SUBCUTANEOUS
Qty: 2 EACH | Refills: 1 | Status: SHIPPED | OUTPATIENT
Start: 2022-09-01

## 2022-09-01 NOTE — PROGRESS NOTES
SHIRA Larsenist comes in for follow up care. Dysuria: Patient has dysuria that comes on and off. Denies hematuria or flank pain. The urinalysis that is stable. If symptoms persist he may need to see the urologist.  DM2: Patient has type 2 diabetes mellitus. She is on Jardiance and insulin. She has been followed up by the endocrinologist.  Her last HbA1c was 5.4. She will intensify lifestyle and dietary modification. HTN: Patient has hypertension. Blood pressure is stable. She is on atenolol. We will continue with the current treatment plan. Patient denies changes in vision or focal weakness. Patient will take a low-sodium diet. Migraine headache: Patient has a history of migraine headaches. Has been followed up with a neurologist.  She is on aimovig monthly and gabapentin daily  Neuropathy: Patient has neuropathy. She takes gabapentin. Stable on medication. Has numbness and tingling of the lower extremities. Continue current treatment plan. Knee pain: Patient has bilateral knee pain. She takes Tylenol arthritis. She applies diclofenac. Continue current treatment plan. GERD: Patient has gastroesophageal reflux disease. She is on esomeprazole. Denies dark stools or hematemesis.       Past Medical History  Past Medical History:   Diagnosis Date    Asthma     Chronic constipation     Diabetes (HCC)     Fibromyalgia     GERD (gastroesophageal reflux disease)     Herpes zoster without complication     Hypertension     IBS (irritable bowel syndrome)     Migraines     unknown if aura    Psychiatric disorder     she denies any diagnosis despite being on antipsychotics, SSRIs, etc in the past    Scoliosis     Urinary incontinence     mixed       Surgical History  Past Surgical History:   Procedure Laterality Date    HX  SECTION  ,     HX COLONOSCOPY      HX DILATION AND CURETTAGE      HX ENDOSCOPY      HX PELVIC LAPAROSCOPY          Medications  Current Outpatient Medications   Medication Sig Dispense Refill    cyclobenzaprine (FLEXERIL) 10 mg tablet TAKE 0.5-1 TABLETS BY MOUTH TWO (2) TIMES DAILY AS NEEDED FOR MUSCLE SPASM(S). 60 Tablet 2    Breo Ellipta 100-25 mcg/dose inhaler 1 PUFF INHALATION ONCE A DAY      azelastine (ASTEPRO) 205.5 mcg (0.15 %) 1 SPRAYS IN EACH NOSTRIL NASALLY ONCE A DAY 30 DAY(S)      atenoloL (TENORMIN) 25 mg tablet TAKE 1 TABLET BY MOUTH EVERY DAY 90 Tablet 1    atorvastatin (LIPITOR) 10 mg tablet Take 1 Tablet by mouth daily. 30 Tablet 2    cholecalciferol (VITAMIN D3) (50,000 UNITS /1250 MCG) capsule TAKE 1 CAPSULE BY MOUTH ONE TIME PER WEEK 12 Capsule 4    Jardiance 10 mg tablet TAKE 1 TABLET BY MOUTH EVERY DAY 30 Tablet 2    rizatriptan (MAXALT-MLT) 10 mg disintegrating tablet Take 1 Tablet by mouth daily as needed. diclofenac EC (VOLTAREN) 50 mg EC tablet TAKE 1 TAB BY MOUTH TWO TIMES DAILY AS NEEDED FOR PAIN. 60 Tablet 1    glucose blood VI test strips (OneTouch Ultra Test) strip CHECK BLOOD GLUCOSE 4 X  Strip 3    OneTouch Delica Plus Lancet 33 gauge misc CHECK BLOOD GLUCOSE 4 X DAY *INS 3 A DAY *[id number] 300 Each 3    cetirizine (ZYRTEC) 10 mg tablet Take 10 mg by mouth daily as needed. aspirin delayed-release 81 mg tablet TAKE 1 TABLET BY MOUTH EVERY DAY 90 Tab 1    diclofenac (VOLTAREN) 1 % gel Apply  to affected area as needed for Pain.      gabapentin (NEURONTIN) 600 mg tablet TAKE 1 TABLET BY MOUTH TWICE A DAY      Insulin Needles, Disposable, 31 gauge x 5/16\" ndle To use daily with insulin injection subcutaneously 1 Package 11    montelukast (SINGULAIR) 10 mg tablet Take 1 Tab by mouth nightly. valACYclovir (VALTREX) 500 mg tablet Take 500 mg by mouth daily.       AIMOVIG AUTOINJECTOR 70 mg/mL injection AS DIRECTED - 1 INJECTION SUBQ ONCE MONTHLY  3    mometasone (NASONEX) 50 mcg/actuation nasal spray USE 1-2 SPRAYS INTO EACH NOSTRIL EVERY DAY AS NEEDED  0    EPINEPHrine (EPIPEN) 0.3 mg/0.3 mL injection AS DIRECTED AS NEEDED INTRAMUSCULAR 1 DAYS  5    albuterol (PROVENTIL HFA, VENTOLIN HFA, PROAIR HFA) 90 mcg/actuation inhaler Take 2 Puffs by inhalation every four (4) hours as needed for Wheezing or Shortness of Breath. 1 Inhaler 0    POLYETHYLENE GLYCOL 3350 (MIRALAX PO) Take  by mouth daily as needed. CALCIUM CARBONATE/MAG HYDROX (MYLANTA PO) Take  by mouth as needed. Linzess 72 mcg cap capsule 1 CAPSULE DAILY, 30 MINUTES BEFORE BREAKFAST      esomeprazole (NEXIUM) 40 mg capsule Take  by mouth daily. Allergies  Allergies   Allergen Reactions    Iodine Hives and Nausea and Vomiting    Iodinated Contrast Media Hives    Oxycodone-Acetaminophen Other (comments)     Hallucinations     Prochlorperazine Other (comments)    Propoxyphene-Acetaminophen Unknown (comments)     Allergy to propoxyphene only.   Has previously tolerated acetaminophen    Reglan [Metoclopramide] Not Reported This Time    Sulfa (Sulfonamide Antibiotics) Not Reported This Time    Wygesic Not Reported This Time       Family History  Family History   Problem Relation Age of Onset    Kidney Disease Father     Other Father         Pancreatic Disease    Cancer Father     Heart Disease Father     Heart Attack Father     Thyroid Disease Other         Grandparent       Social History  Social History     Socioeconomic History    Marital status: SINGLE     Spouse name: Not on file    Number of children: Not on file    Years of education: Not on file    Highest education level: Not on file   Occupational History    Not on file   Tobacco Use    Smoking status: Never    Smokeless tobacco: Never   Vaping Use    Vaping Use: Never used   Substance and Sexual Activity    Alcohol use: No    Drug use: No    Sexual activity: Yes     Partners: Male   Other Topics Concern    Not on file   Social History Narrative    Not on file     Social Determinants of Health     Financial Resource Strain: Not on file   Food Insecurity: Not on file   Transportation Needs: Not on file   Physical Activity: Not on file   Stress: Not on file   Social Connections: Not on file   Intimate Partner Violence: Not on file   Housing Stability: Not on file     Review of Systems  Review of Systems - Review of all systems is negative except as noted above in the HPI. Vital Signs  Visit Vitals  /78 (BP 1 Location: Left upper arm, BP Patient Position: Sitting, BP Cuff Size: Adult)   Pulse 80   Temp 97.9 °F (36.6 °C) (Temporal)   Resp 20   Ht 4' 9\" (1.448 m)   Wt 146 lb 12.8 oz (66.6 kg)   SpO2 98%   BMI 31.77 kg/m²         Physical Exam  Physical Examination: General appearance - alert, well appearing, and in no distress, oriented to person, place, and time, and acyanotic, in no respiratory distress  Mental status - alert, oriented to person, place, and time, affect appropriate to mood  Lymphatics - no palpable lymphadenopathy, no hepatosplenomegaly  Chest - no tachypnea, retractions or cyanosis  Heart - normal rate and regular rhythm  Abdomen - no rebound tenderness noted  Back exam - limited range of motion  Neurological - abnormal neurological exam unchanged from prior examinations  Musculoskeletal - osteoarthritic changes noted in both hands  Extremities - intact peripheral pulses        Results  Results for orders placed or performed in visit on 07/13/22   LIPID PANEL   Result Value Ref Range    Cholesterol, total 188 100 - 199 mg/dL    Triglyceride 73 0 - 149 mg/dL    HDL Cholesterol 34 (L) >39 mg/dL    VLDL, calculated 14 5 - 40 mg/dL    LDL, calculated 140 (H) 0 - 99 mg/dL   HEPATIC FUNCTION PANEL   Result Value Ref Range    Protein, total 7.2 6.0 - 8.5 g/dL    Albumin 4.7 3.8 - 4.8 g/dL    Bilirubin, total 0.4 0.0 - 1.2 mg/dL    Bilirubin, direct 0.11 0.00 - 0.40 mg/dL    Alk. phosphatase 78 44 - 121 IU/L    AST (SGOT) 17 0 - 40 IU/L    ALT (SGPT) 16 0 - 32 IU/L       ASSESSMENT and PLAN    ICD-10-CM ICD-9-CM    1. Dysuria  R30.0 788.1 AMB POC URINALYSIS DIP STICK AUTO W/O MICRO      2. Type 2 diabetes mellitus with hyperglycemia, without long-term current use of insulin ([facility])  E11.65 250.00      790.29       3. Essential (primary) hypertension  I10 401.9       4. Dyslipidemia  E78.5 272.4       5. Neuropathy  G62.9 355.9       6. Migraine without status migrainosus, not intractable, unspecified migraine type  G43.909 346.90       7. Mood disorder ([facility])  F39 296.90       8. Allergy, subsequent encounter  T78.40XD V58.89 EPINEPHrine (EPIPEN) 0.3 mg/0.3 mL injection      9. Chronic pain of left knee  M25.562 719.46 REFERRAL TO ORTHOPEDICS    G89.29 338.29         lab results and schedule of future lab studies reviewed with patient  reviewed diet, exercise and weight control  cardiovascular risk and specific lipid/LDL goals reviewed  reviewed medications and side effects in detail  specific diabetic recommendations: low cholesterol diet, weight control and daily exercise discussed, home glucose monitoring emphasized, all medications, side effects and compliance discussed carefully, foot care discussed and Podiatry visits discussed, annual eye examinations at Ophthalmology discussed, and glycohemoglobin and other lab monitoring discussed      I have discussed the diagnosis with the patient and the intended plan of care as seen in the above orders. The patient has received an after-visit summary and questions were answered concerning future plans. I have discussed medication, side effects, and warnings with the patient in detail. The patient verbalized understanding and is in agreement with the plan of care. The patient will contact the office with any additional concerns. Maria Bennett MD    PLEASE NOTE:   This document has been produced using voice recognition software.  Unrecognized errors in transcription may be present

## 2022-09-01 NOTE — PROGRESS NOTES
1. \"Have you been to the ER, urgent care clinic since your last visit? Hospitalized since your last visit? \" Yes  [facility]    2. \"Have you seen or consulted any other health care providers outside of the [address] [place] [address] since your last visit? \" No     3. For patients aged 39-70: Has the patient had a colonoscopy / FIT/ Cologuard? NA - based on age      If the patient is female:    4. For patients aged 41-77: Has the patient had a mammogram within the past 2 years? NA - based on age or sex      11. For patients aged 21-65: Has the patient had a pap smear?  Yes - no Care Gap present

## 2022-09-09 DIAGNOSIS — E11.65 TYPE 2 DIABETES MELLITUS WITH HYPERGLYCEMIA, WITHOUT LONG-TERM CURRENT USE OF INSULIN (HCC): ICD-10-CM

## 2022-09-12 RX ORDER — EMPAGLIFLOZIN 10 MG/1
TABLET, FILM COATED ORAL
Qty: 30 TABLET | Refills: 2 | Status: SHIPPED | OUTPATIENT
Start: 2022-09-12

## 2022-09-29 ENCOUNTER — OFFICE VISIT (OUTPATIENT)
Dept: ORTHOPEDIC SURGERY | Age: 37
End: 2022-09-29
Payer: MEDICARE

## 2022-09-29 VITALS — HEART RATE: 79 BPM | OXYGEN SATURATION: 99 % | WEIGHT: 147 LBS | TEMPERATURE: 97.4 F | BODY MASS INDEX: 31.81 KG/M2

## 2022-09-29 DIAGNOSIS — Z86.19 HISTORY OF SHINGLES: ICD-10-CM

## 2022-09-29 DIAGNOSIS — M79.7 FIBROMYALGIA: ICD-10-CM

## 2022-09-29 DIAGNOSIS — M54.50 CHRONIC BILATERAL LOW BACK PAIN WITHOUT SCIATICA: Primary | ICD-10-CM

## 2022-09-29 DIAGNOSIS — G89.29 CHRONIC BILATERAL LOW BACK PAIN WITHOUT SCIATICA: Primary | ICD-10-CM

## 2022-09-29 PROCEDURE — G8417 CALC BMI ABV UP PARAM F/U: HCPCS | Performed by: PHYSICAL MEDICINE & REHABILITATION

## 2022-09-29 PROCEDURE — 72110 X-RAY EXAM L-2 SPINE 4/>VWS: CPT | Performed by: PHYSICAL MEDICINE & REHABILITATION

## 2022-09-29 PROCEDURE — G8427 DOCREV CUR MEDS BY ELIG CLIN: HCPCS | Performed by: PHYSICAL MEDICINE & REHABILITATION

## 2022-09-29 PROCEDURE — G8432 DEP SCR NOT DOC, RNG: HCPCS | Performed by: PHYSICAL MEDICINE & REHABILITATION

## 2022-09-29 PROCEDURE — 99213 OFFICE O/P EST LOW 20 MIN: CPT | Performed by: PHYSICAL MEDICINE & REHABILITATION

## 2022-09-29 PROCEDURE — G8756 NO BP MEASURE DOC: HCPCS | Performed by: PHYSICAL MEDICINE & REHABILITATION

## 2022-09-29 RX ORDER — LIDOCAINE 50 MG/G
2 PATCH TOPICAL EVERY 24 HOURS
Qty: 60 EACH | Refills: 2 | Status: SHIPPED | OUTPATIENT
Start: 2022-09-29

## 2022-09-29 RX ORDER — CYCLOBENZAPRINE HCL 10 MG
5-10 TABLET ORAL
Qty: 90 TABLET | Refills: 1 | Status: SHIPPED | OUTPATIENT
Start: 2022-09-29

## 2022-09-29 NOTE — LETTER
9/29/2022    Patient: Patrizia Hirsch   YOB: 1985   Date of Visit: 9/29/2022     Chung Kenney MD  [facility]    Dear Chung Kenney MD,      Thank you for referring Ms. Sharri Chiu to [address] [facility] for evaluation. My notes for this consultation are attached. If you have questions, please do not hesitate to call me. I look forward to following your patient along with you.       Sincerely,    Trinity Miramnotes MD

## 2022-09-29 NOTE — PROGRESS NOTES
[address]  [address], [address]  [place], [address] [place] [address] Street  Phone: [phone]  Fax: [phone]        Shannan Barbosa  : 1985  PCP: Gillian Case MD    PROGRESS NOTE      ASSESSMENT AND PLAN    Diagnoses and all orders for this visit:    1. Chronic bilateral low back pain without sciatica  -     AMB POC XRAY, SPINE, LUMBOSACRAL; 4+  -     cyclobenzaprine (FLEXERIL) 10 mg tablet; Take 0.5-1 Tablets by mouth three (3) times daily as needed for Muscle Spasm(s). -     lidocaine (LIDODERM) 5 %; 2 Patches by TransDERmal route every twenty-four (24) hours. Apply patch to the affected area for 12 hours a day and remove for 12 hours a day. 2. Fibromyalgia  -     cyclobenzaprine (FLEXERIL) 10 mg tablet; Take 0.5-1 Tablets by mouth three (3) times daily as needed for Muscle Spasm(s). -     lidocaine (LIDODERM) 5 %; 2 Patches by TransDERmal route every twenty-four (24) hours. Apply patch to the affected area for 12 hours a day and remove for 12 hours a day. 3. History of shingles  -     lidocaine (LIDODERM) 5 %; 2 Patches by TransDERmal route every twenty-four (24) hours. Apply patch to the affected area for 12 hours a day and remove for 12 hours a day. Dru Homans is a 40 y.o. female with history of chronic myofascial pain presenting with 2 weeks of increased lumbar pain. Spine neurologically intact. No constitutional symptoms. .   Dose adjustment. Increase Flexeril to 5-10 mg TID PRN  Trial of Lidoderm patches. Advised to continue HEP as tolerated. Patient will send a Xention message or call if she is not improving. HISTORY OF PRESENT ILLNESS      Sharri Terry Chelle is a 40 y.o. female presents for follow up of back pain. She reports worsening low back pain x 2 weeks. Denies recent falls. Denies sciatic pain. Denies numbness or tingling BLE. Her pain is worse at night. She has no difficulty sleeping due to pain.      Pt has been using Flexeril 5-10 mg BID PRN, Voltaren Gel, ice, and heat with no benefit. She notes somnolence when taking a whole tab of Flexeril. She also takes Gabapentin for migraines. Pt purchased a Theracane, which helps some. Pt notes taking Prednisone for an asthma flare 8/2022. Denies red flags including persistent fevers, chills, weight changes, neurogenic bowel or bladder symptoms. Denies hematuria, dysuria. Pain Assessment  9/29/2022   Location of Pain Back   Pain Location Comment -   Location Modifiers Inferior;Superior;Medial   Severity of Pain 9   Quality of Pain Sharp; Throbbing   Quality of Pain Comment -   Duration of Pain Persistent   Duration of Pain Comment -   Frequency of Pain Constant   Frequency of Pain Comment -   Date Pain First Started -   Aggravating Factors Stairs   Aggravating Factors Comment standing a long time   Limiting Behavior Yes   Relieving Factors Nothing   Relieving Factors Comment -   Result of Injury -   Work-Related Injury -   Type of Injury -   Type of Injury Comment -         Onset of pain: 5/2021     Investigations:   L XR AP/lat/flex/ext 4V 9/29/2022 (I personally reviewed these images): normal alignment, no instability. L MRI 8/2021: normal  BUE EMG 2020: severe right CTS, moderate left CTS  C MRI 2019: unremarkable  UE EMG 2019: mild right CTS  BLE EMG 2016: normal  Spine surgery consult: no     Treatments:  Physical therapy: Multiple rounds, no benefit  Spinal injections: no  Spinal surgery- no  Beneficial medications: Flexeril (temporary)  Failed medications: Gabapentin, Topamax, Lyrica Cymbalta, Zanaflex (GI issues), Voltaren Gel     Work Status: currently not employed, disabled  Pertinent PMHx:  Asthma, DM, FM, GERD, IBS, chronic constipation, urinary incontinence .      PHYSICAL EXAMINATION    Visit Vitals  Pulse 79   Temp 97.4 °F (36.3 °C) (Temporal)   Wt 147 lb (66.7 kg)   SpO2 99%   BMI 31.81 kg/m²       TTP T/L to L/S junction  Tender lumbar paraspinals to light palpation  No CVA tenderness  LE strength intact  SLR negative  No edema  Pain with left knee ROM              Written by Soraya Oakley, as dictated by Clinton Parks MD.

## 2022-11-11 DIAGNOSIS — E11.65 TYPE 2 DIABETES MELLITUS WITH HYPERGLYCEMIA, WITHOUT LONG-TERM CURRENT USE OF INSULIN (HCC): ICD-10-CM

## 2022-11-14 RX ORDER — EMPAGLIFLOZIN 10 MG/1
TABLET, FILM COATED ORAL
Qty: 30 TABLET | Refills: 2 | Status: SHIPPED | OUTPATIENT
Start: 2022-11-14

## 2022-11-23 DIAGNOSIS — M79.7 FIBROMYALGIA: ICD-10-CM

## 2022-11-23 DIAGNOSIS — M54.50 CHRONIC BILATERAL LOW BACK PAIN WITHOUT SCIATICA: ICD-10-CM

## 2022-11-23 DIAGNOSIS — G89.29 CHRONIC BILATERAL LOW BACK PAIN WITHOUT SCIATICA: ICD-10-CM

## 2022-11-24 RX ORDER — CYCLOBENZAPRINE HCL 10 MG
TABLET ORAL
Qty: 60 TABLET | Refills: 2 | Status: SHIPPED | OUTPATIENT
Start: 2022-11-24

## 2022-11-28 ENCOUNTER — OFFICE VISIT (OUTPATIENT)
Dept: CARDIOLOGY CLINIC | Age: 37
End: 2022-11-28
Payer: MEDICARE

## 2022-11-28 VITALS
BODY MASS INDEX: 32.36 KG/M2 | DIASTOLIC BLOOD PRESSURE: 95 MMHG | HEART RATE: 84 BPM | WEIGHT: 150 LBS | SYSTOLIC BLOOD PRESSURE: 157 MMHG | HEIGHT: 57 IN | OXYGEN SATURATION: 98 %

## 2022-11-28 DIAGNOSIS — E78.5 HYPERLIPIDEMIA, UNSPECIFIED HYPERLIPIDEMIA TYPE: ICD-10-CM

## 2022-11-28 DIAGNOSIS — Z82.49 FAMILY HISTORY OF EARLY CAD: ICD-10-CM

## 2022-11-28 DIAGNOSIS — I10 PRIMARY HYPERTENSION: ICD-10-CM

## 2022-11-28 DIAGNOSIS — R07.9 CHEST PAIN, UNSPECIFIED TYPE: Primary | ICD-10-CM

## 2022-11-28 DIAGNOSIS — J45.909 UNCOMPLICATED ASTHMA, UNSPECIFIED ASTHMA SEVERITY, UNSPECIFIED WHETHER PERSISTENT: ICD-10-CM

## 2022-11-28 PROCEDURE — G8432 DEP SCR NOT DOC, RNG: HCPCS | Performed by: NURSE PRACTITIONER

## 2022-11-28 PROCEDURE — 3078F DIAST BP <80 MM HG: CPT | Performed by: NURSE PRACTITIONER

## 2022-11-28 PROCEDURE — G8427 DOCREV CUR MEDS BY ELIG CLIN: HCPCS | Performed by: NURSE PRACTITIONER

## 2022-11-28 PROCEDURE — 99214 OFFICE O/P EST MOD 30 MIN: CPT | Performed by: NURSE PRACTITIONER

## 2022-11-28 PROCEDURE — G8753 SYS BP > OR = 140: HCPCS | Performed by: NURSE PRACTITIONER

## 2022-11-28 PROCEDURE — G8754 DIAS BP LESS 90: HCPCS | Performed by: NURSE PRACTITIONER

## 2022-11-28 PROCEDURE — 3074F SYST BP LT 130 MM HG: CPT | Performed by: NURSE PRACTITIONER

## 2022-11-28 PROCEDURE — G8417 CALC BMI ABV UP PARAM F/U: HCPCS | Performed by: NURSE PRACTITIONER

## 2022-11-28 RX ORDER — ATENOLOL 25 MG/1
TABLET ORAL
Qty: 90 TABLET | Refills: 1 | Status: SHIPPED | OUTPATIENT
Start: 2022-11-28

## 2022-11-28 RX ORDER — TERCONAZOLE 4 MG/G
CREAM VAGINAL
COMMUNITY
Start: 2022-11-08

## 2022-11-28 RX ORDER — HYDROCHLOROTHIAZIDE 12.5 MG/1
CAPSULE ORAL
COMMUNITY
End: 2022-11-28 | Stop reason: ALTCHOICE

## 2022-11-28 NOTE — PATIENT INSTRUCTIONS
Patient advised to keep BP/HR(pulse rate) chart twice daily and bring us results in next 4 to 5 days. Patient may send the results via \"My Chart\" if desired. Please rest for 5-10 minutes before checking blood pressure. Sit on a comfortable chair without crossing the legs and put your arm on a table. We recommend that you use an upper arm cuff. Check the blood pressure 3 times each time you check the blood pressure and record the lowest reading. If you check the blood pressure in both arms, use the higher reading.

## 2022-11-28 NOTE — PROGRESS NOTES
1. Have you been to the ER, urgent care clinic since your last visit? Hospitalized since your last visit? Yes Where: [facility]    2. Have you seen or consulted any other health care providers outside of the [address] [place] [address] since your last visit? Include any pap smears or colon screening.  No

## 2022-11-28 NOTE — PROGRESS NOTES
HISTORY OF PRESENT ILLNESS  Kelly Mims Denison Kimberley is a 40 y.o. female. 7/2022  Patient is seen today for follow-up of her chronic conditions. Patient continues to have chest pains more frequently left-sided. Episodic. Shortness of breath stable  Recent flareup of asthma for which she is under care of pulmonary  8/2022  Patient seen in follow-up for chest pain. On Aug 4th was diagnosed with COVID. She complains of dyspnea on exertion, with memory loss since being diagnosed with COVID. She complains of occasional chest pressure worse with a deep breath or raising arms overhead. She denies palpitations or edema. 11/2022  Patient seen in follow-up for atypical chest pain. She reports has recovered from [facility]. She complains of occasional chest pressure  She denies palpitations or edema. Follow-up  The history is provided by the Patient and medical records. Associated symptoms include chest pain. Pertinent negatives include no shortness of breath. Chest Pain (Angina)   The history is provided by the Patient. This is a chronic problem. The current episode started more than 1 week ago. The problem has not changed since onset. The problem occurs rarely. The pain is associated with rest and exertion. The pain is present in the lateral region and substernal region. The quality of the pain is described as sharp. The pain does not radiate. Pertinent negatives include no dizziness, no nausea, no palpitations, no shortness of breath and no weakness. Hypertension  The history is provided by the Patient. This is a chronic problem. Associated symptoms include chest pain. Pertinent negatives include no shortness of breath. Shortness of Breath  The history is provided by the Patient. This is a chronic problem. The problem occurs intermittently. Associated symptoms include chest pain. Review of Systems   Constitutional:  Negative for chills. HENT:  Negative for nosebleeds.     Eyes:  Negative for blurred vision and double vision. Respiratory:  Negative for shortness of breath. Cardiovascular:  Positive for chest pain. Negative for palpitations. Gastrointestinal:  Negative for heartburn and nausea. Musculoskeletal:  Negative for myalgias. Neurological:  Negative for dizziness and weakness. Endo/Heme/Allergies:  Does not bruise/bleed easily. Family History   Problem Relation Age of Onset    Kidney Disease Father     Other Father         Pancreatic Disease    Cancer Father     Heart Disease Father     Heart Attack Father     Thyroid Disease Other         Grandparent       Past Medical History:   Diagnosis Date    Asthma     Chronic constipation     Diabetes ([address]     Fibromyalgia     GERD (gastroesophageal reflux disease)     Herpes zoster without complication 3/18/1581    Hypertension     IBS (irritable bowel syndrome)     Migraines     unknown if aura    Psychiatric disorder     she denies any diagnosis despite being on antipsychotics, SSRIs, etc in the past    Scoliosis     Urinary incontinence     mixed       Past Surgical History:   Procedure Laterality Date    HX  SECTION  ,     HX COLONOSCOPY      HX DILATION AND CURETTAGE      HX ENDOSCOPY      HX PELVIC LAPAROSCOPY         Social History     Tobacco Use    Smoking status: Never    Smokeless tobacco: Never   Substance Use Topics    Alcohol use: No       Allergies   Allergen Reactions    Iodine Hives and Nausea and Vomiting    Iodinated Contrast Media Hives    Oxycodone-Acetaminophen Other (comments)     Hallucinations     Prochlorperazine Other (comments)    Propoxyphene-Acetaminophen Unknown (comments)     Allergy to propoxyphene only. Has previously tolerated acetaminophen    Reglan [Metoclopramide] Not Reported This Time    Sulfa (Sulfonamide Antibiotics) Not Reported This Time    Wygesic Not Reported This Time       Prior to Admission medications    Medication Sig Start Date End Date Taking?  Authorizing Provider   cyclobenzaprine (FLEXERIL) 10 mg tablet TAKE 0.5-1 TABLETS BY MOUTH TWO (2) TIMES DAILY AS NEEDED FOR MUSCLE SPASM(S). 11/24/22  Yes Sarahi Upton NP   Jardiance 10 mg tablet TAKE 1 TABLET BY MOUTH EVERY DAY 11/14/22  Yes Chelsy Amanda MD   lidocaine (LIDODERM) 5 % 2 Patches by TransDERmal route every twenty-four (24) hours. Apply patch to the affected area for 12 hours a day and remove for 12 hours a day. 9/29/22  Yes Irina Glass MD   Linzess 72 mcg cap capsule 1 CAPSULE DAILY, 30 MINUTES BEFORE BREAKFAST 7/14/22  Yes Provider, Historical   EPINEPHrine (EPIPEN) 0.3 mg/0.3 mL injection AS DIRECTED AS NEEDED INTRAMUSCULAR 1 DAYS 9/1/22  Yes Chelsy Amanda MD   Breo Ellipta 100-25 mcg/dose inhaler 1 PUFF INHALATION ONCE A DAY 4/28/22  Yes Provider, Historical   azelastine (ASTEPRO) 205.5 mcg (0.15 %) 1 SPRAYS IN EACH NOSTRIL NASALLY ONCE A DAY 30 DAY(S) 5/9/22  Yes Provider, Historical   atenoloL (TENORMIN) 25 mg tablet TAKE 1 TABLET BY MOUTH EVERY DAY 5/11/22  Yes Chelsy Amanda MD   atorvastatin (LIPITOR) 10 mg tablet Take 1 Tablet by mouth daily. 5/3/22  Yes Katelyn Tate MD   cholecalciferol (VITAMIN D3) (50,000 UNITS /1250 MCG) capsule TAKE 1 CAPSULE BY MOUTH ONE TIME PER WEEK 4/28/22  Yes Chelsy Amanda MD   rizatriptan (MAXALT-MLT) 10 mg disintegrating tablet Take 1 Tablet by mouth daily as needed. 2/17/22  Yes Provider, Historical   diclofenac EC (VOLTAREN) 50 mg EC tablet TAKE 1 TAB BY MOUTH TWO TIMES DAILY AS NEEDED FOR PAIN. 1/31/22  Yes Katelyn Tate MD   glucose blood VI test strips (OneTouch Ultra Test) strip CHECK BLOOD GLUCOSE 4 X DAY 1/26/22  Yes Katelyn Tate MD   OneTouch Delica Plus Lancet 33 gauge misc CHECK BLOOD GLUCOSE 4 X DAY *INS 3 A DAY *[id number] 1/26/22  Yes Chelsy Amanda MD   cetirizine (ZYRTEC) 10 mg tablet Take 10 mg by mouth daily as needed.    Yes Provider, Historical   aspirin delayed-release 81 mg tablet TAKE 1 TABLET BY MOUTH EVERY DAY 5/18/21  Yes Chelsy Wilburn, MD   diclofenac (VOLTAREN) 1 % gel Apply  to affected area as needed for Pain. Yes Provider, Historical   gabapentin (NEURONTIN) 600 mg tablet TAKE 1 TABLET BY MOUTH TWICE A DAY 2/11/21  Yes Provider, Historical   Insulin Needles, Disposable, 31 gauge x 5/16\" ndle To use daily with insulin injection subcutaneously 9/25/20  Yes Trudi Alvarez NP   montelukast (SINGULAIR) 10 mg tablet Take 1 Tab by mouth nightly. 7/2/20  Yes Other, MD Chacho   valACYclovir (VALTREX) 500 mg tablet Take 500 mg by mouth daily. 8/12/20  Yes Provider, Historical   AIMOVIG AUTOINJECTOR 70 mg/mL injection AS DIRECTED - 1 INJECTION SUBQ ONCE MONTHLY 7/30/19  Yes Provider, Historical   mometasone (NASONEX) 50 mcg/actuation nasal spray USE 1-2 SPRAYS INTO EACH NOSTRIL EVERY DAY AS NEEDED 2/20/19  Yes Provider, Historical   albuterol (PROVENTIL HFA, VENTOLIN HFA, PROAIR HFA) 90 mcg/actuation inhaler Take 2 Puffs by inhalation every four (4) hours as needed for Wheezing or Shortness of Breath. 12/6/18  Yes Desmond PETERSON NP   esomeprazole (NEXIUM) 40 mg capsule Take  by mouth daily. Yes Provider, Historical   POLYETHYLENE GLYCOL 3350 (MIRALAX PO) Take  by mouth daily as needed. Yes Provider, Historical   CALCIUM CARBONATE/MAG HYDROX (MYLANTA PO) Take  by mouth as needed. Yes Provider, Historical   terconazole (TERAZOL 7) 0.4 % vaginal cream INSERT 1 APPLICATORFUL VAGINALLY EVERY DAY AT BEDTIME FOR 7 DAYS 11/8/22   Provider, Historical   hydroCHLOROthiazide (MICROZIDE) 12.5 mg capsule Hydrochlorothiazide Oral    daily    active  Patient not taking: Reported on 11/28/2022 11/28/22  Provider, Historical         Visit Vitals  BP (!) 157/95   Pulse 84   Ht 4' 9\" (1.448 m)   Wt 68 kg (150 lb)   SpO2 98%   BMI 32.46 kg/m²           Physical Exam  Vitals and nursing note reviewed. Constitutional:       Appearance: She is well-developed. HENT:      Head: Normocephalic and atraumatic.    Eyes:      Conjunctiva/sclera: Conjunctivae normal. Neck:      Thyroid: No thyromegaly. Vascular: No JVD. Trachea: No tracheal deviation. Cardiovascular:      Rate and Rhythm: Normal rate and regular rhythm. Chest Wall: PMI is not displaced. Pulses: No decreased pulses. Heart sounds: No murmur heard. No gallop. No S3 sounds. Pulmonary:      Effort: No respiratory distress. Breath sounds: No wheezing or rales. Chest:      Chest wall: No tenderness. Abdominal:      Palpations: Abdomen is soft. Tenderness: There is no abdominal tenderness. Musculoskeletal:      Cervical back: Neck supple. Right lower leg: No edema. Left lower leg: No edema. Skin:     General: Skin is warm. Neurological:      Mental Status: She is alert and oriented to person, place, and time. ECHO CARDIOGRAM [facility]  Component Name Value Ref Range   EF Echo 60     Result Impression   :   NORMAL LEFT VENTRICULAR CAVITY SIZE AND SYSTOLIC FUNCTION WITH AN EJECTION FRACTION OF  60  %. NORMAL DIASTOLIC FUNCTION. NORMAL RIGHT HEART FUNCTION AND SIZE. NO HEMODYNAMICALLY SIGNIFICANT VALVULAR PATHOLOGY. NORMAL PULMONARY ARTERY PRESSURE OF 15 MM/HG. NO PREVIOUS REPORT FOR COMPARISON. I have personally reviewed patient's records available from hospital and other providers and incorporated findings in patient care. 12/2018- ER notes, labs, EKG, chest x-ray and prior records  Ms. Bipin Hong has a reminder for a \"due or due soon\" health maintenance. I have asked that she contact her primary care provider for follow-up on this health maintenance. Interpretation Summary 1/2019       Normal cavity size, wall thickness, systolic function (ejection fraction normal) and diastolic function. The estimated ejection fraction is 56 - 60%. No regional wall motion abnormality noted. Normal right ventricular size and function. No hemodynamically significant valvular pathology.         Interpretation Summary 1/2019    Baseline ECG: Normal sinus exercise stress test 521 negative for ischemia rhythm. Low risk Duke treadmill score. Negative stress electrocardiogram.        03/10/20   ECHO STRESS 03/11/2020 3/12/2020    Narrative · Baseline ECG: Normal sinus rhythm, non-specific ST-T wave abnormalities. · Moderate risk Duke treadmill score. · Negative stress test.  · Normal stress echocardiogram. Low risk study. Signed by: Marianna Mortimer, MD     7/2022   IMPRESSION  Coronary calcium score 0. I Have personally reviewed recent relevant labs available and discussed with patient  4/2022-lipids  12/2021- lipid, LFT, BMP     Latest Reference Range & Units 8/17/22 00:00   Triglyceride 0 - 149 mg/dL 73   Cholesterol, total 100 - 199 mg/dL 188   HDL Cholesterol >39 mg/dL 34 (L)   LDL, calculated 0 - 99 mg/dL 140 (H)   VLDL, calculated 5 - 40 mg/dL 14   (L): Data is abnormally low  (H): Data is abnormally high  Assessment         ICD-10-CM ICD-9-CM    1. Chest pain, unspecified type  R07.9 786.50     Atypical recurrent. CTA calcium score 0      2. Primary hypertension  I10 401.9      B/P is controlled, continue current medications          3. Hyperlipidemia, unspecified hyperlipidemia type  E78.5 272.4     Continue low dose statin and dietary modification      4. Uncomplicated asthma, unspecified asthma severity, unspecified whether persistent  J45.909 493.90     Stable symptom monitor with PCP      5. Family history of early CAD  Z80.55 V14.2     Father with MI and heart transplant started at age 46s          Medications Discontinued During This Encounter   Medication Reason    hydroCHLOROthiazide (MICROZIDE) 12.5 mg capsule DISCONTINUED BY ANOTHER CLINICIAN         No orders of the defined types were placed in this encounter. Patient is 51-year-old female with history of hypertension seen for episodes of sharp chest pain with some tightness.  Was recently hospitalized at [facility]- serial cardiac enzymes negative for MI. Chest pain-- likely costochondritis-- reproducible. Stress echo in march 2020 - negative for ischemia    Seen for follow-up. Stress test and echo report reviewed with patient- no ischemia. Normal LV function. Suspect costochondritis  Recommend to continue current meds  F/u in 1 yr or sooner if symptoms recur. 7/2022  Recurrent chest pain. Rule out coronary calcification. Now on statin low-dose check lab and decide on medication adjustment. If significant coronary calcification consider repeat  stress test.  Follow-up after test  8/2022  Patient recovering from COVID-19 infection, diagnosed August 4. Now with dyspnea on exertion and memory loss. Recurrent atypical chest pain worse with deep breath and raising arms overhead. Coronary calcium score 0, patient recently started atorvastatin 10 mg recommend to continue. Will allow for recovery of COVID-19, if chest pain continues will consider stress test.  Mild increased LDL continue dietary modification. 11/2022  Patient has recovered from COVID-19. Complains of mild atypical chest pain with rest.  Most recent testing reviewed and discussed with patient coronary calcium score 0, exercise stress test 521 negative for ischemia. Discussed pain may be musculoskeletal in nature. Advised to follow-up with GI for possible GERD related symptoms. Blood pressure is mildly elevated in office today patient has not taken any blood pressure medications as of yet will follow home BP chart and adjust medications as indicated.

## 2022-12-15 ENCOUNTER — OFFICE VISIT (OUTPATIENT)
Dept: ORTHOPEDIC SURGERY | Age: 37
End: 2022-12-15
Payer: MEDICARE

## 2022-12-15 VITALS
TEMPERATURE: 97.8 F | WEIGHT: 150.2 LBS | OXYGEN SATURATION: 99 % | BODY MASS INDEX: 32.4 KG/M2 | HEIGHT: 57 IN | RESPIRATION RATE: 18 BRPM | HEART RATE: 74 BPM
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
## 2022-12-15 DIAGNOSIS — M54.50 CHRONIC BILATERAL LOW BACK PAIN WITHOUT SCIATICA: ICD-10-CM

## 2022-12-15 DIAGNOSIS — M62.838 TRAPEZIUS MUSCLE SPASM: ICD-10-CM

## 2022-12-15 DIAGNOSIS — G89.29 CHRONIC BILATERAL LOW BACK PAIN WITHOUT SCIATICA: ICD-10-CM

## 2022-12-15 DIAGNOSIS — M79.7 FIBROMYALGIA: ICD-10-CM

## 2022-12-15 DIAGNOSIS — M54.6 ACUTE BILATERAL THORACIC BACK PAIN: ICD-10-CM

## 2022-12-15 RX ORDER — TRETINOIN 0.25 MG/G
CREAM TOPICAL
COMMUNITY
Start: 2022-12-12

## 2022-12-15 RX ORDER — TRIAMCINOLONE ACETONIDE 1 MG/G
CREAM TOPICAL
COMMUNITY
Start: 2022-12-14

## 2022-12-15 RX ORDER — CYCLOBENZAPRINE HCL 10 MG
TABLET ORAL
Qty: 90 TABLET | Refills: 2 | Status: SHIPPED | OUTPATIENT
Start: 2022-12-15

## 2022-12-15 RX ORDER — CLINDAMYCIN PHOSPHATE 10 MG/ML
SOLUTION TOPICAL
COMMUNITY
Start: 2022-12-08

## 2022-12-15 RX ORDER — DOXYCYCLINE 100 MG/1
CAPSULE ORAL
COMMUNITY
Start: 2022-12-06

## 2022-12-15 RX ORDER — FLUCONAZOLE 150 MG/1
TABLET ORAL
COMMUNITY
Start: 2022-11-23

## 2022-12-15 RX ORDER — IBUPROFEN 800 MG/1
800 TABLET ORAL
Qty: 45 TABLET | Refills: 0 | Status: SHIPPED | OUTPATIENT
Start: 2022-12-15

## 2022-12-15 NOTE — PROGRESS NOTES
[address]  [address], [address]  [address]  Phone: [phone]  Fax: [phone]        Otilio Moya  : 1985  PCP: Shannon Berman MD    PROGRESS NOTE      ASSESSMENT AND PLAN    Diagnoses and all orders for this visit:    1. Fall, initial encounter  -     ibuprofen (MOTRIN) 800 mg tablet; Take 1 Tablet by mouth every eight (8) hours as needed for Pain. For 2 weeks. Take w/food. 2. Chronic bilateral low back pain without sciatica  -     cyclobenzaprine (FLEXERIL) 10 mg tablet; TAKE 0.5-1 TABLETS BY MOUTH TWO (2) TIMES DAILY AS NEEDED FOR MUSCLE SPASM(S). 3. Trapezius muscle spasm  -     ibuprofen (MOTRIN) 800 mg tablet; Take 1 Tablet by mouth every eight (8) hours as needed for Pain. For 2 weeks. Take w/food. 4. Acute bilateral thoracic back pain  -     ibuprofen (MOTRIN) 800 mg tablet; Take 1 Tablet by mouth every eight (8) hours as needed for Pain. For 2 weeks. Take w/food. 5. Fibromyalgia  -     cyclobenzaprine (FLEXERIL) 10 mg tablet; TAKE 0.5-1 TABLETS BY MOUTH TWO (2) TIMES DAILY AS NEEDED FOR MUSCLE SPASM(S). Sherita Arizmendi is a 40 y.o. female with chronic myofascial pain. No evidence of radiculopathy or myelopathy. She had a fall yesterday which is flared up her musculoskeletal issues. Follow-up Dr. Dajuan Stevens regarding her severe right CTS. RF as needed Flexeril  RF Ibuprofen x15 days. Advised patient not to combine Ibuprofen with Diclofenac or OTC NSAIDs. Patient may continue Voltaren Gel as needed  Advised to continue HEP as tolerated. Follow-up and Dispositions    Return in about 3 months (around 3/15/2023) for medication management. HISTORY OF PRESENT ILLNESS      Sharri Chambers is a 40 y.o. female presents for follow up of back pain. LV trial of Lidoderm patches, increased frequency of Flexeril. Pt reports increased mid and low back pain post fall.   Also reporting shoulder pain that goes into her right arm pt fell at her son's school event yesterday. denies sciatic pain. Denies numbness or tingling BLE. Pt takes a half tab of Flexeril BID PRN with benefit. Denies side effects. Reports that she was doing fine on Flexeril and for her fall yesterday. She states Lidoderm patches are ineffective. Denies side effects. She is compliant with her HEP and ball exercises. Pain Assessment  12/15/2022   Location of Pain Back   Pain Location Comment -   Location Modifiers -   Severity of Pain 9   Quality of Pain Dull;Aching   Quality of Pain Comment -   Duration of Pain Persistent   Duration of Pain Comment -   Frequency of Pain Constant   Frequency of Pain Comment -   Date Pain First Started -   Aggravating Factors Bending;Stretching;Straightening;Exercise;Kneeling;Squatting;Standing;Walking;Stairs   Aggravating Factors Comment -   Limiting Behavior Yes   Relieving Factors Heat;Other (Comment)   Relieving Factors Comment medication   Result of Injury Yes   Work-Related Injury -   Type of Injury Fall   Type of Injury Comment -            Onset of pain: 5/2021     Investigations:   L XR AP/lat/flex/ext 4V 9/29/2022 (I personally reviewed these images): normal alignment, no instability. L MRI 8/2021: normal  BUE EMG 2020: severe right CTS, moderate left CTS  C MRI 2019: unremarkable  UE EMG 2019: mild right CTS  BLE EMG 2016: normal  Spine surgery consult: no     Treatments:  Physical therapy: Multiple rounds, no benefit  Spinal injections: no  Spinal surgery- no  Beneficial medications: Flexeril (temporary)  Failed medications: Gabapentin, Topamax, Lyrica Cymbalta, Zanaflex (GI issues), Voltaren Gel, Lidoderm patches     Work Status: currently not employed, disabled  Pertinent PMHx:  Asthma, DM, FM, GERD, IBS, chronic constipation, urinary incontinence .      PHYSICAL EXAMINATION    Visit Vitals  Pulse 74   Temp 97.8 °F (36.6 °C) (Temporal)   Resp 18   Ht 4' 9\" (1.448 m)   Wt 150 lb 3.2 oz (68.1 kg) SpO2 99% Comment: RA   BMI 32.50 kg/m²     Trigger point right upper trap  Mild TTP mid thoracic, B/L lumbar paraspinals.   Right shoulder abduction limited to 90 degrees, left shoulder limited to 120  Negative Perry's  Pain with right ankle ROM  No edema  LE strength intact  SLR negative                Written by Gail Oreilly, as dictated by Coby Mann MD.

## 2022-12-15 NOTE — LETTER
12/15/2022    Patient: Kelly Gambino   YOB: 1985   Date of Visit: 12/15/2022     Waleska Gonsales MD  [facility]    Dear Waleska Gonsales MD,      Thank you for referring Ms. Sharri Chiu to [address] [facility] for evaluation. My notes for this consultation are attached. If you have questions, please do not hesitate to call me. I look forward to following your patient along with you.       Sincerely,    Artur Burnett MD

## 2022-12-15 NOTE — PROGRESS NOTES
Shashank Bartlett presents today for   Chief Complaint   Patient presents with    Back Pain       Is someone accompanying this pt? no    Is the patient using any DME equipment during OV? no    Depression Screening:  3 most recent PHQ Screens 8/23/2022   PHQ Not Done -   Little interest or pleasure in doing things Not at all   Feeling down, depressed, irritable, or hopeless Not at all   Total Score PHQ 2 0   Trouble falling or staying asleep, or sleeping too much -   Feeling tired or having little energy -   Poor appetite, weight loss, or overeating -   Feeling bad about yourself - or that you are a failure or have let yourself or your family down -   Trouble concentrating on things such as school, work, reading, or watching TV -   Moving or speaking so slowly that other people could have noticed; or the opposite being so fidgety that others notice -   Thoughts of being better off dead, or hurting yourself in some way -   PHQ 9 Score -   How difficult have these problems made it for you to do your work, take care of your home and get along with others -       Learning Assessment:  Learning Assessment 1/10/2020   PRIMARY LEARNER Patient   PRIMARY LANGUAGE ENGLISH   LEARNER PREFERENCE PRIMARY READING   ANSWERED BY patient   RELATIONSHIP SELF       Abuse Screening:  Abuse Screening Questionnaire 5/3/2022   Do you ever feel afraid of your partner? N   Are you in a relationship with someone who physically or mentally threatens you? N   Is it safe for you to go home? Y       Fall Risk  Fall Risk Assessment, last 12 mths 5/3/2021   Able to walk? Yes   Fall in past 12 months? 0   Do you feel unsteady? 0   Are you worried about falling 0       OPIOID RISK TOOL  No flowsheet data found. Coordination of Care:  1. Have you been to the ER, urgent care clinic since your last visit? N o  Hospitalized since your last visit? no    2.  Have you seen or consulted any other health care providers outside of the [facility] since your last visit? no Include any pap smears or colon screening.  no

## 2023-01-03 ENCOUNTER — OFFICE VISIT (OUTPATIENT)
Dept: FAMILY MEDICINE CLINIC | Age: 38
End: 2023-01-03
Payer: MEDICARE

## 2023-01-03 VITALS
RESPIRATION RATE: 20 BRPM | SYSTOLIC BLOOD PRESSURE: 126 MMHG | BODY MASS INDEX: 30.24 KG/M2 | HEART RATE: 88 BPM | HEIGHT: 59 IN | OXYGEN SATURATION: 96 % | TEMPERATURE: 98.1 F | WEIGHT: 150 LBS | DIASTOLIC BLOOD PRESSURE: 82 MMHG

## 2023-01-03 DIAGNOSIS — G62.9 NEUROPATHY: ICD-10-CM

## 2023-01-03 DIAGNOSIS — G43.909 MIGRAINE WITHOUT STATUS MIGRAINOSUS, NOT INTRACTABLE, UNSPECIFIED MIGRAINE TYPE: ICD-10-CM

## 2023-01-03 DIAGNOSIS — E78.5 DYSLIPIDEMIA: ICD-10-CM

## 2023-01-03 DIAGNOSIS — I10 ESSENTIAL (PRIMARY) HYPERTENSION: ICD-10-CM

## 2023-01-03 DIAGNOSIS — G44.51 HEMICRANIA CONTINUA: Primary | ICD-10-CM

## 2023-01-03 DIAGNOSIS — J45.909 UNCOMPLICATED ASTHMA, UNSPECIFIED ASTHMA SEVERITY, UNSPECIFIED WHETHER PERSISTENT: ICD-10-CM

## 2023-01-03 DIAGNOSIS — E11.65 TYPE 2 DIABETES MELLITUS WITH HYPERGLYCEMIA, WITHOUT LONG-TERM CURRENT USE OF INSULIN (HCC): ICD-10-CM

## 2023-01-03 PROCEDURE — 99214 OFFICE O/P EST MOD 30 MIN: CPT | Performed by: FAMILY MEDICINE

## 2023-01-03 PROCEDURE — 3046F HEMOGLOBIN A1C LEVEL >9.0%: CPT | Performed by: FAMILY MEDICINE

## 2023-01-03 PROCEDURE — 2022F DILAT RTA XM EVC RTNOPTHY: CPT | Performed by: FAMILY MEDICINE

## 2023-01-03 PROCEDURE — G8510 SCR DEP NEG, NO PLAN REQD: HCPCS | Performed by: FAMILY MEDICINE

## 2023-01-03 PROCEDURE — G8417 CALC BMI ABV UP PARAM F/U: HCPCS | Performed by: FAMILY MEDICINE

## 2023-01-03 PROCEDURE — 3074F SYST BP LT 130 MM HG: CPT | Performed by: FAMILY MEDICINE

## 2023-01-03 PROCEDURE — 3078F DIAST BP <80 MM HG: CPT | Performed by: FAMILY MEDICINE

## 2023-01-03 PROCEDURE — G8427 DOCREV CUR MEDS BY ELIG CLIN: HCPCS | Performed by: FAMILY MEDICINE

## 2023-01-03 NOTE — PROGRESS NOTES
1. \"Have you been to the ER, urgent care clinic since your last visit? Hospitalized since your last visit? \" No    2. \"Have you seen or consulted any other health care providers outside of the [address] [place] [address] since your last visit? \" No     3. For patients aged 39-70: Has the patient had a colonoscopy / FIT/ Cologuard? NA - based on age      If the patient is female:    4. For patients aged 41-77: Has the patient had a mammogram within the past 2 years? NA - based on age or sex      11. For patients aged 21-65: Has the patient had a pap smear?  Yes - no Care Gap present

## 2023-01-03 NOTE — PROGRESS NOTES
[facility] [place]  Lupe Grant comes in for follow up care. Headache: Patient has a headache that is chronic. She has a history of migraines but current headaches have been more persistent and more severe. These come after she fell a few days ago. Patient stated that she missed a step and fell. This was a mechanical fall. She denies changes in vision or focal weakness of but given the persistence of the headache I will order head CT scan. I will follow-up with the results. Migraine headache: Patient has a history of migraine headaches. She is on Maxalt. She has also used Aimovig. Patient has been seen by the neurologist.  DM2: Patient has type 2 diabetes mellitus. Her last HbA1c was 5.4. She is on Jardiance. We will continue with the current treatment plan. HTN: Patient has hypertension. Blood pressure is stable. Denies changes in vision or focal weakness. Patient is on atenolol. We will continue with the current treatment plan. Dyslipidemia: Patient has dyslipidemia. She takes atorvastatin 10 mg daily. We will recheck lipid panel at next visit. She should exercise and take a diet low in polysaturated fats. Asthma: Patient has asthma. Has wheeze that comes on and off with occasional cough at night. She is on doxycycline for bronchitis. She is on inhaler medications and been followed up by the pulmonologist.  Patient takes albuterol inhaler and Breo Ellipta. She will continue with the current treatment plan. Neuropathy: Patient has neuropathy with numbness and tingling of the hands and the feet. Patient is on gabapentin. She will continue with this medication.       Past Medical History  Past Medical History:   Diagnosis Date    Asthma     Chronic constipation     Diabetes (HCC)     Fibromyalgia     GERD (gastroesophageal reflux disease)     Herpes zoster without complication 6/43/4915    Hypertension     IBS (irritable bowel syndrome)     Migraines     unknown if aura    Psychiatric disorder she denies any diagnosis despite being on antipsychotics, SSRIs, etc in the past    Scoliosis     Urinary incontinence     mixed       Surgical History  Past Surgical History:   Procedure Laterality Date    HX  SECTION  ,     HX COLONOSCOPY      HX DILATION AND CURETTAGE      HX ENDOSCOPY      HX PELVIC LAPAROSCOPY          Medications  Current Outpatient Medications   Medication Sig Dispense Refill    clindamycin phosphate 1 % swab 1 SWAB EXTERNALLY TWICE A DAY 30      doxycycline (VIBRAMYCIN) 100 mg capsule TAKE 1 CAPSULE BY MOUTH TWICE A DAY FOR 10 DAYS      fluconazole (DIFLUCAN) 150 mg tablet 1 TAB TODAY THEN REPEAT IN 3 DAYS      tretinoin (RETIN-A) 0.025 % topical cream       triamcinolone acetonide (KENALOG) 0.1 % topical cream       ibuprofen (MOTRIN) 800 mg tablet Take 1 Tablet by mouth every eight (8) hours as needed for Pain. For 2 weeks. Take w/food. 45 Tablet 0    cyclobenzaprine (FLEXERIL) 10 mg tablet TAKE 0.5-1 TABLETS BY MOUTH TWO (2) TIMES DAILY AS NEEDED FOR MUSCLE SPASM(S). 90 Tablet 2    terconazole (TERAZOL 7) 0.4 % vaginal cream INSERT 1 APPLICATORFUL VAGINALLY EVERY DAY AT BEDTIME FOR 7 DAYS      Jardiance 10 mg tablet TAKE 1 TABLET BY MOUTH EVERY DAY 30 Tablet 2    lidocaine (LIDODERM) 5 % 2 Patches by TransDERmal route every twenty-four (24) hours. Apply patch to the affected area for 12 hours a day and remove for 12 hours a day. 60 Each 2    Linzess 72 mcg cap capsule 1 CAPSULE DAILY, 30 MINUTES BEFORE BREAKFAST      EPINEPHrine (EPIPEN) 0.3 mg/0.3 mL injection AS DIRECTED AS NEEDED INTRAMUSCULAR 1 DAYS 2 Each 1    Breo Ellipta 100-25 mcg/dose inhaler 1 PUFF INHALATION ONCE A DAY      azelastine (ASTEPRO) 205.5 mcg (0.15 %) 1 SPRAYS IN EACH NOSTRIL NASALLY ONCE A DAY 30 DAY(S)      atorvastatin (LIPITOR) 10 mg tablet Take 1 Tablet by mouth daily.  30 Tablet 2    cholecalciferol (VITAMIN D3) (50,000 UNITS /1250 MCG) capsule TAKE 1 CAPSULE BY MOUTH ONE TIME PER WEEK 12 Capsule 4    rizatriptan (MAXALT-MLT) 10 mg disintegrating tablet Take 1 Tablet by mouth daily as needed. glucose blood VI test strips (OneTouch Ultra Test) strip CHECK BLOOD GLUCOSE 4 X  Strip 3    OneTouch Delica Plus Lancet 33 gauge misc CHECK BLOOD GLUCOSE 4 X DAY *INS 3 A DAY *[id number] 300 Each 3    cetirizine (ZYRTEC) 10 mg tablet Take 10 mg by mouth daily as needed. aspirin delayed-release 81 mg tablet TAKE 1 TABLET BY MOUTH EVERY DAY 90 Tab 1    diclofenac (VOLTAREN) 1 % gel Apply  to affected area as needed for Pain.      gabapentin (NEURONTIN) 600 mg tablet TAKE 1 TABLET BY MOUTH TWICE A DAY      Insulin Needles, Disposable, 31 gauge x 5/16\" ndle To use daily with insulin injection subcutaneously 1 Package 11    montelukast (SINGULAIR) 10 mg tablet Take 1 Tab by mouth nightly. valACYclovir (VALTREX) 500 mg tablet Take 500 mg by mouth daily. AIMOVIG AUTOINJECTOR 70 mg/mL injection AS DIRECTED - 1 INJECTION SUBQ ONCE MONTHLY  3    mometasone (NASONEX) 50 mcg/actuation nasal spray USE 1-2 SPRAYS INTO EACH NOSTRIL EVERY DAY AS NEEDED  0    albuterol (PROVENTIL HFA, VENTOLIN HFA, PROAIR HFA) 90 mcg/actuation inhaler Take 2 Puffs by inhalation every four (4) hours as needed for Wheezing or Shortness of Breath. 1 Inhaler 0    esomeprazole (NEXIUM) 40 mg capsule Take  by mouth daily. POLYETHYLENE GLYCOL 3350 (MIRALAX PO) Take  by mouth daily as needed. CALCIUM CARBONATE/MAG HYDROX (MYLANTA PO) Take  by mouth as needed. atenoloL (TENORMIN) 25 mg tablet TAKE 1 TABLET BY MOUTH EVERY DAY 90 Tablet 1       Allergies  Allergies   Allergen Reactions    Iodine Hives and Nausea and Vomiting    Iodinated Contrast Media Hives    Oxycodone-Acetaminophen Other (comments)     Hallucinations     Prochlorperazine Other (comments)    Propoxyphene-Acetaminophen Unknown (comments)     Allergy to propoxyphene only.   Has previously tolerated acetaminophen    Reglan [Metoclopramide] Not Reported This Time    Sulfa (Sulfonamide Antibiotics) Not Reported This Time    Wygesic Not Reported This Time       Family History  Family History   Problem Relation Age of Onset    Kidney Disease Father     Other Father         Pancreatic Disease    Cancer Father     Heart Disease Father     Heart Attack Father     Thyroid Disease Other         Grandparent       Social History  Social History     Socioeconomic History    Marital status: SINGLE     Spouse name: Not on file    Number of children: Not on file    Years of education: Not on file    Highest education level: Not on file   Occupational History    Not on file   Tobacco Use    Smoking status: Never    Smokeless tobacco: Never   Vaping Use    Vaping Use: Never used   Substance and Sexual Activity    Alcohol use: No    Drug use: No    Sexual activity: Yes     Partners: Male   Other Topics Concern    Not on file   Social History Narrative    Not on file     Social Determinants of Health     Financial Resource Strain: Not on file   Food Insecurity: Not on file   Transportation Needs: Not on file   Physical Activity: Not on file   Stress: Not on file   Social Connections: Not on file   Intimate Partner Violence: Not on file   Housing Stability: Not on file       Review of Systems  Review of Systems - Review of all systems is negative except as noted above in the HPI.     Vital Signs  Visit Vitals  /82 (BP 1 Location: Left upper arm, BP Patient Position: Sitting, BP Cuff Size: Adult long)   Pulse 88   Temp 98.1 °F (36.7 °C)   Resp 20   Ht 4' 11\" (1.499 m)   Wt 150 lb (68 kg)   SpO2 96%   BMI 30.30 kg/m²         Physical Exam  Physical Examination: General appearance - oriented to person, place, and time and acyanotic, in no respiratory distress  Mental status - affect appropriate to mood  Neck - supple, no significant adenopathy  Lymphatics - no palpable lymphadenopathy  Chest - no tachypnea, retractions or cyanosis  Heart - S1 and S2 normal  Abdomen - no rebound tenderness noted  Back exam - limited range of motion  Neurological - abnormal neurological exam unchanged from prior examinations  Musculoskeletal - osteoarthritic changes noted in both hands  Extremities - intact peripheral pulses      Results  Results for orders placed or performed in visit on 09/01/22   AMB POC URINALYSIS DIP STICK AUTO W/O MICRO   Result Value Ref Range    Color (UA POC) Yellow     Clarity (UA POC) Clear     Glucose (UA POC) 2+ Negative    Bilirubin (UA POC) Negative Negative    Ketones (UA POC) Negative Negative    Specific gravity (UA POC) 1.025 1.001 - 1.035    Blood (UA POC) Negative Negative    pH (UA POC) 5.5 4.6 - 8.0    Protein (UA POC) Negative Negative    Urobilinogen (UA POC) 0.2 mg/dL 0.2 - 1    Nitrites (UA POC) Negative Negative    Leukocyte esterase (UA POC) Negative Negative       ASSESSMENT and PLAN    ICD-10-CM ICD-9-CM    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 Essential (primary) hypertension  I10 401.9       3. Dyslipidemia  E78.5 272.4       4. Neuropathy  G62.9 355.9       5. Migraine without status migrainosus, not intractable, unspecified migraine type  G43.909 346.90       6. Type 2 diabetes mellitus with hyperglycemia, without long-term current use of insulin (HCC)  E11.65 250.00      790.29       7.  Uncomplicated asthma, unspecified asthma severity, unspecified whether persistent  J45.909 493.90         lab results and schedule of future lab studies reviewed with patient  reviewed diet, exercise and weight control  cardiovascular risk and specific lipid/LDL goals reviewed  reviewed medications and side effects in detail  specific diabetic recommendations: low cholesterol diet, weight control and daily exercise discussed, home glucose monitoring emphasized, all medications, side effects and compliance discussed carefully, glycohemoglobin and other lab monitoring discussed, and long term diabetic complications discussed  radiology results and schedule of future radiology studies reviewed with patient      I have discussed the diagnosis with the patient and the intended plan of care as seen in the above orders. The patient has received an after-visit summary and questions were answered concerning future plans. I have discussed medication, side effects, and warnings with the patient in detail. The patient verbalized understanding and is in agreement with the plan of care. The patient will contact the office with any additional concerns. Radha Fernandez MD    PLEASE NOTE:   This document has been produced using voice recognition software.  Unrecognized errors in transcription may be present

## 2023-01-20 ENCOUNTER — HOSPITAL ENCOUNTER (OUTPATIENT)
Dept: CT IMAGING | Age: 38
Discharge: HOME | End: 2023-01-20
Attending: FAMILY MEDICINE
Payer: MEDICARE

## 2023-01-20 DIAGNOSIS — G44.51 HEMICRANIA CONTINUA: ICD-10-CM

## 2023-01-20 PROCEDURE — 70450 CT HEAD/BRAIN W/O DYE: CPT

## 2023-02-16 DIAGNOSIS — E11.65 TYPE 2 DIABETES MELLITUS WITH HYPERGLYCEMIA (HCC): ICD-10-CM

## 2023-02-16 RX ORDER — EMPAGLIFLOZIN 10 MG/1
TABLET, FILM COATED ORAL
Qty: 30 TABLET | Refills: 2 | Status: SHIPPED | OUTPATIENT
Start: 2023-02-16

## 2023-02-27 ENCOUNTER — OFFICE VISIT (OUTPATIENT)
Facility: CLINIC | Age: 38
End: 2023-02-27

## 2023-02-27 VITALS
DIASTOLIC BLOOD PRESSURE: 65 MMHG | HEIGHT: 59 IN | WEIGHT: 152 LBS | BODY MASS INDEX: 30.64 KG/M2 | SYSTOLIC BLOOD PRESSURE: 122 MMHG | TEMPERATURE: 97.7 F | OXYGEN SATURATION: 97 % | RESPIRATION RATE: 20 BRPM | HEART RATE: 94 BPM

## 2023-02-27 DIAGNOSIS — K21.9 GASTRO-ESOPHAGEAL REFLUX DISEASE WITHOUT ESOPHAGITIS: ICD-10-CM

## 2023-02-27 DIAGNOSIS — M79.7 FIBROMYALGIA: ICD-10-CM

## 2023-02-27 DIAGNOSIS — J40 BRONCHITIS: Primary | ICD-10-CM

## 2023-02-27 DIAGNOSIS — J45.909 UNCOMPLICATED ASTHMA, UNSPECIFIED ASTHMA SEVERITY, UNSPECIFIED WHETHER PERSISTENT: ICD-10-CM

## 2023-02-27 DIAGNOSIS — G43.909 MIGRAINE WITHOUT STATUS MIGRAINOSUS, NOT INTRACTABLE, UNSPECIFIED MIGRAINE TYPE: ICD-10-CM

## 2023-02-27 DIAGNOSIS — E66.9 OBESITY (BMI 30-39.9): ICD-10-CM

## 2023-02-27 DIAGNOSIS — I10 ESSENTIAL (PRIMARY) HYPERTENSION: ICD-10-CM

## 2023-02-27 DIAGNOSIS — E78.5 HYPERLIPIDEMIA, UNSPECIFIED HYPERLIPIDEMIA TYPE: ICD-10-CM

## 2023-02-27 DIAGNOSIS — G62.9 POLYNEUROPATHY, UNSPECIFIED: ICD-10-CM

## 2023-02-27 DIAGNOSIS — E11.65 TYPE 2 DIABETES MELLITUS WITH HYPERGLYCEMIA, WITHOUT LONG-TERM CURRENT USE OF INSULIN (HCC): ICD-10-CM

## 2023-02-27 SDOH — ECONOMIC STABILITY: INCOME INSECURITY: HOW HARD IS IT FOR YOU TO PAY FOR THE VERY BASICS LIKE FOOD, HOUSING, MEDICAL CARE, AND HEATING?: NOT HARD AT ALL

## 2023-02-27 SDOH — ECONOMIC STABILITY: FOOD INSECURITY: WITHIN THE PAST 12 MONTHS, YOU WORRIED THAT YOUR FOOD WOULD RUN OUT BEFORE YOU GOT MONEY TO BUY MORE.: NEVER TRUE

## 2023-02-27 SDOH — ECONOMIC STABILITY: HOUSING INSECURITY
IN THE LAST 12 MONTHS, WAS THERE A TIME WHEN YOU DID NOT HAVE A STEADY PLACE TO SLEEP OR SLEPT IN A SHELTER (INCLUDING NOW)?: NO

## 2023-02-27 SDOH — ECONOMIC STABILITY: FOOD INSECURITY: WITHIN THE PAST 12 MONTHS, THE FOOD YOU BOUGHT JUST DIDN'T LAST AND YOU DIDN'T HAVE MONEY TO GET MORE.: NEVER TRUE

## 2023-02-27 ASSESSMENT — ANXIETY QUESTIONNAIRES
1. FEELING NERVOUS, ANXIOUS, OR ON EDGE: 0
IF YOU CHECKED OFF ANY PROBLEMS ON THIS QUESTIONNAIRE, HOW DIFFICULT HAVE THESE PROBLEMS MADE IT FOR YOU TO DO YOUR WORK, TAKE CARE OF THINGS AT HOME, OR GET ALONG WITH OTHER PEOPLE: NOT DIFFICULT AT ALL
GAD7 TOTAL SCORE: 0
3. WORRYING TOO MUCH ABOUT DIFFERENT THINGS: 0
2. NOT BEING ABLE TO STOP OR CONTROL WORRYING: 0
5. BEING SO RESTLESS THAT IT IS HARD TO SIT STILL: 0
7. FEELING AFRAID AS IF SOMETHING AWFUL MIGHT HAPPEN: 0
4. TROUBLE RELAXING: 0
6. BECOMING EASILY ANNOYED OR IRRITABLE: 0

## 2023-02-27 ASSESSMENT — PATIENT HEALTH QUESTIONNAIRE - PHQ9
SUM OF ALL RESPONSES TO PHQ QUESTIONS 1-9: 0
2. FEELING DOWN, DEPRESSED OR HOPELESS: 0
SUM OF ALL RESPONSES TO PHQ9 QUESTIONS 1 & 2: 0
1. LITTLE INTEREST OR PLEASURE IN DOING THINGS: 0
SUM OF ALL RESPONSES TO PHQ QUESTIONS 1-9: 0

## 2023-02-27 NOTE — PROGRESS NOTES
[facility]  Chris Galo comes in for follow-up care. Bronchitis: Patient was treated for bronchitis/pneumonia. She was seen in the emergency room. Was put on azithromycin. Has completed medication. She has occasional cough. Denies wheeze or shortness of breath. Denies fever or chills. I had a look at the chest x-ray that was negative for acute abnormality. Patient will continue with supportive care. Back pain: Patient has back pain that has been persistent. Patient has been seen by the spine specialist in the past.  She has fibromyalgia and this causes generalized muscle aches. We discussed supportive care measures. She will take Flexeril in the take ibuprofen for pain as needed. Patient is also on gabapentin for neuropathic pain. Diabetes mellitus type 2: Patient has type 2 diabetes mellitus. Patient is on Jardiance. Last HbA1c was 5.4. Patient will continue with the current management plan. Patient will intensify lifestyle and dietary modification. Hypertension: Patient has hypertension. Blood pressure is stable. Denies changes in vision or focal weakness. Patient is on atenolol. We will continue with the current treatment plan. Fibromyalgia: Patient has fibromyalgia. Gets back pain that comes on and off. Patient is on Flexeril and also takes gabapentin. She will continue with these medications. Dyslipidemia: Patient has dyslipidemia. She is on atorvastatin. She will exercise and take a diet low in polysaturated fats. Asthma: Patient has asthma. Gets wheeze and shortness of breath. She has been seen by the pulmonologist in the past.  She is on Loaiza American. She has albuterol to use as needed. Denies hemoptysis, fever, chills or shortness of breath. GERD: Patient has gastroesophageal reflux disease. Gets heartburn that comes on and off. Denies dark stools or hematemesis. She is on Nexium. Neuropathy: Patient has neuropathy with numbness and tingling of the hands and feet. Patient is on gabapentin for this. Stable on medication. Migraine headache: Patient has history of migraine headache. Currently stable. She is followed up with a neurologist.  Patient is on Maxalt as needed for the pain. Patient has also been on Aimovig. She will continue current treatment plan. Obesity: Patient has a BMI of 30.70. Patient will intensify lifestyle and dietary modification.       Past Medical History  Past Medical History:   Diagnosis Date    Asthma     Chronic constipation     Diabetes ([address]     Fibromyalgia     GERD (gastroesophageal reflux disease)     Herpes zoster without complication     Hypertension     IBS (irritable bowel syndrome)     Migraines     unknown if aura    Pneumonia     Psychiatric disorder     she denies any diagnosis despite being on antipsychotics, SSRIs, etc in the past    Scoliosis     Urinary incontinence     mixed       Surgical History  Past Surgical History:   Procedure Laterality Date     SECTION  ,     COLONOSCOPY      DILATION AND CURETTAGE OF UTERUS      PELVIC LAPAROSCOPY      UPPER GASTROINTESTINAL ENDOSCOPY          Medications  Current Outpatient Medications   Medication Sig Dispense Refill    JARDIANCE 10 MG tablet TAKE 1 TABLET BY MOUTH EVERY DAY 30 tablet 2    albuterol sulfate HFA (PROVENTIL;VENTOLIN;PROAIR) 108 (90 Base) MCG/ACT inhaler Inhale 2 puffs into the lungs every 4 hours as needed      aspirin 81 MG EC tablet TAKE 1 TABLET BY MOUTH EVERY DAY      atenolol (TENORMIN) 25 MG tablet TAKE 1 TABLET BY MOUTH EVERY DAY      atorvastatin (LIPITOR) 10 MG tablet Take 10 mg by mouth daily      azelastine HCl 0.15 % SOLN 1 SPRAYS IN EACH NOSTRIL NASALLY ONCE A DAY 30 DAY(S)      cetirizine (ZYRTEC) 10 MG tablet Take 10 mg by mouth daily as needed      vitamin D (CHOLECALCIFEROL) 55856 UNIT CAPS TAKE 1 CAPSULE BY MOUTH ONE TIME PER WEEK      CLINDAMYCIN PHOSPHATE,TOPICAL, 1 % SWAB 1 SWAB EXTERNALLY TWICE A DAY 30 cyclobenzaprine (FLEXERIL) 10 MG tablet TAKE 0.5-1 TABLETS BY MOUTH TWO (2) TIMES DAILY AS NEEDED FOR MUSCLE SPASM(S). diclofenac sodium (VOLTAREN) 1 % GEL Apply topically as needed      doxycycline hyclate (VIBRAMYCIN) 100 MG capsule TAKE 1 CAPSULE BY MOUTH TWICE A DAY FOR 10 DAYS      EPINEPHrine (EPIPEN) 0.3 MG/0.3ML SOAJ injection AS DIRECTED AS NEEDED INTRAMUSCULAR 1 DAYS      Erenumab-aooe (AIMOVIG) 70 MG/ML SOAJ AS DIRECTED - 1 INJECTION SUBQ ONCE MONTHLY      esomeprazole (NEXIUM) 40 MG delayed release capsule Take by mouth daily      fluconazole (DIFLUCAN) 150 MG tablet 1 TAB TODAY THEN REPEAT IN 3 DAYS      fluticasone furoate-vilanterol (BREO ELLIPTA) 100-25 MCG/ACT inhaler 1 PUFF INHALATION ONCE A DAY      gabapentin (NEURONTIN) 600 MG tablet TAKE 1 TABLET BY MOUTH TWICE A DAY      ibuprofen (ADVIL;MOTRIN) 800 MG tablet Take 800 mg by mouth every 8 hours as needed      lidocaine (LIDODERM) 5 % Place 2 patches onto the skin every 24 hours      linaCLOtide (LINZESS) 72 MCG CAPS capsule 1 CAPSULE DAILY, 30 MINUTES BEFORE BREAKFAST      mometasone (NASONEX) 50 MCG/ACT nasal spray USE 1-2 SPRAYS INTO EACH NOSTRIL EVERY DAY AS NEEDED      montelukast (SINGULAIR) 10 MG tablet Take 1 tablet by mouth      rizatriptan (MAXALT-MLT) 10 MG disintegrating tablet Take 1 tablet by mouth daily as needed      terconazole (TERAZOL 7) 0.4 % vaginal cream INSERT 1 APPLICATORFUL VAGINALLY EVERY DAY AT BEDTIME FOR 7 DAYS      tretinoin (RETIN-A) 0.025 % cream ceived the following from [facility]: Outside name: tretinoin (RETIN-A) 0.025 % topical cream      triamcinolone (KENALOG) 0.1 % cream ceived the following from [facility]: Outside name: triamcinolone acetonide (KENALOG) 0.1 % topical cream      valACYclovir (VALTREX) 500 MG tablet Take 500 mg by mouth daily       No current facility-administered medications for this visit.        Allergies  Allergies   Allergen Reactions    Iodine Hives and Nausea And Vomiting    Iodinated Contrast Media Hives    Metoclopramide      Other reaction(s): Not Reported This Time    Oxycodone-Acetaminophen Other (See Comments)     Hallucinations     Prochlorperazine Other (See Comments)    Sulfa Antibiotics      Other reaction(s): Not Reported This Time       Family History  Family History   Problem Relation Age of Onset    Other Father         Pancreatic Disease    Thyroid Disease Other         Grandparent    Heart Attack Father     Heart Disease Father     Kidney Disease Father     Cancer Father        Social History  Social History     Socioeconomic History    Marital status: Single     Spouse name: Not on file    Number of children: Not on file    Years of education: Not on file    Highest education level: Not on file   Occupational History    Not on file   Tobacco Use    Smoking status: Never    Smokeless tobacco: Never   Substance and Sexual Activity    Alcohol use: No    Drug use: No    Sexual activity: Not on file   Other Topics Concern    Not on file   Social History Narrative    Not on file     Social Determinants of Health     Financial Resource Strain: Not on file   Food Insecurity: Not on file   Transportation Needs: Not on file   Physical Activity: Not on file   Stress: Not on file   Social Connections: Not on file   Intimate Partner Violence: Not on file   Housing Stability: Not on file       Review of Systems  Review of Systems - Review of all systems is negative except as noted above in the HPI.     Vital Signs  /65   Pulse 94   Temp 97.7 °F (36.5 °C) (Temporal)   Resp 20   Ht 4' 11\" (1.499 m)   Wt 152 lb (68.9 kg)   SpO2 97%   BMI 30.70 kg/m²       Physical Exam  Physical Examination: General appearance - alert, well appearing, and in no distress, oriented to person, place, and time, overweight, and acyanotic, in no respiratory distress  Mental status - affect appropriate to mood  Neck - supple, no significant adenopathy  Lymphatics - no palpable lymphadenopathy  Chest - clear to auscultation, no wheezes, rales or rhonchi, symmetric air entry  Heart - normal rate and regular rhythm  Abdomen - no rebound tenderness noted  Back exam - limited range of motion, pain with motion noted during exam, tenderness noted paralumbar muscles  Neurological - abnormal neurological exam unchanged from prior examinations  Extremities - intact peripheral pulses        Results  No results found for this visit on 02/27/23. ASSESSMENT and PLAN   Diagnosis Orders   1. Bronchitis        2. Type 2 diabetes mellitus with hyperglycemia, without long-term current use of insulin (HCC)        3. Essential (primary) hypertension        4. Hyperlipidemia, unspecified hyperlipidemia type        5. Migraine without status migrainosus, not intractable, unspecified migraine type        6. Fibromyalgia        7. Uncomplicated asthma, unspecified asthma severity, unspecified whether persistent        8. Gastro-esophageal reflux disease without esophagitis        9. Polyneuropathy, unspecified        10. Obesity (BMI 30-39.9)          lab results and schedule of future lab studies reviewed with patient  reviewed diet, exercise and weight control  cardiovascular risk and specific lipid/LDL goals reviewed  reviewed medications and side effects in detail  specific diabetic recommendations: low cholesterol diet, weight control and daily exercise discussed, home glucose monitoring emphasized, all medications, side effects and compliance discussed carefully, annual eye examinations at Ophthalmology discussed, glycohemoglobin and other lab monitoring discussed, and long term diabetic complications discussed  radiology results and schedule of future radiology studies reviewed with patient      I have discussed the diagnosis with the patient and the intended plan of care as seen in the above orders.  The patient has received an after-visit summary and questions were answered concerning future plans. I have discussed medication, side effects, and warnings with the patient in detail. The patient verbalized understanding and is in agreement with the plan of care. The patient will contact the office with any additional concerns. I spent 40 minutes with this established patient. Adriel Jones MD    PLEASE NOTE:   This document has been produced using voice recognition software.  Unrecognized errors in transcription may be present

## 2023-02-27 NOTE — PROGRESS NOTES
1. \"Have you been to the ER, urgent care clinic since your last visit? Hospitalized since your last visit? \"Yes Where: [facility]    2. \"Have you seen or consulted any other health care providers outside of the [address] [place] [address] since your last visit? \" No    3. For patients aged 39-70: Has the patient had a colonoscopy / FIT/ Cologuard? Not applicable      If the patient is female:    4. For patients aged 41-77: Has the patient had a mammogram within the past 2 years? Not applicable      5. For patients aged 21-65: Has the patient had a pap smear?  Yes

## 2023-03-15 ENCOUNTER — OFFICE VISIT (OUTPATIENT)
Age: 38
End: 2023-03-15
Payer: MEDICARE

## 2023-03-15 VITALS
SYSTOLIC BLOOD PRESSURE: 137 MMHG | TEMPERATURE: 97.7 F | OXYGEN SATURATION: 97 % | WEIGHT: 153 LBS | HEART RATE: 84 BPM | HEIGHT: 59 IN | BODY MASS INDEX: 30.84 KG/M2 | DIASTOLIC BLOOD PRESSURE: 81 MMHG

## 2023-03-15 DIAGNOSIS — G89.29 CHRONIC BILATERAL LOW BACK PAIN WITHOUT SCIATICA: Primary | ICD-10-CM

## 2023-03-15 DIAGNOSIS — M54.50 CHRONIC BILATERAL LOW BACK PAIN WITHOUT SCIATICA: Primary | ICD-10-CM

## 2023-03-15 DIAGNOSIS — M79.7 FIBROMYALGIA: ICD-10-CM

## 2023-03-15 PROCEDURE — 99214 OFFICE O/P EST MOD 30 MIN: CPT | Performed by: PHYSICAL MEDICINE & REHABILITATION

## 2023-03-15 PROCEDURE — 3075F SYST BP GE 130 - 139MM HG: CPT | Performed by: PHYSICAL MEDICINE & REHABILITATION

## 2023-03-15 PROCEDURE — 3079F DIAST BP 80-89 MM HG: CPT | Performed by: PHYSICAL MEDICINE & REHABILITATION

## 2023-03-15 RX ORDER — CYCLOBENZAPRINE HCL 10 MG
TABLET ORAL
Qty: 60 TABLET | Refills: 5 | Status: SHIPPED | OUTPATIENT
Start: 2023-03-15

## 2023-03-15 RX ORDER — ONDANSETRON 4 MG/1
TABLET, ORALLY DISINTEGRATING ORAL
COMMUNITY
Start: 2023-01-05

## 2023-03-15 RX ORDER — BLOOD SUGAR DIAGNOSTIC
STRIP MISCELLANEOUS
COMMUNITY
Start: 2023-02-27

## 2023-03-15 ASSESSMENT — PATIENT HEALTH QUESTIONNAIRE - PHQ9
SUM OF ALL RESPONSES TO PHQ QUESTIONS 1-9: 0
2. FEELING DOWN, DEPRESSED OR HOPELESS: 0
SUM OF ALL RESPONSES TO PHQ QUESTIONS 1-9: 0
SUM OF ALL RESPONSES TO PHQ9 QUESTIONS 1 & 2: 0
SUM OF ALL RESPONSES TO PHQ QUESTIONS 1-9: 0
SUM OF ALL RESPONSES TO PHQ QUESTIONS 1-9: 0
1. LITTLE INTEREST OR PLEASURE IN DOING THINGS: 0

## 2023-03-15 NOTE — PROGRESS NOTES
[address]    [address], [address]  [place]  Phone: [phone]  Fax: [phone]        Bhavana   : 1985  PCP: Yareli Lemos MD    PROGRESS NOTE      ASSESSMENT AND PLAN    Froilan Mancia was seen today for back pain. Diagnoses and all orders for this visit:    Chronic bilateral low back pain without sciatica  -     cyclobenzaprine (FLEXERIL) 10 MG tablet; TAKE 0.5-1 TABLETS BY MOUTH TWO (2) TIMES DAILY AS NEEDED FOR MUSCLE SPASM(S). Fibromyalgia  -     DME - DURABLE MEDICAL EQUIPMENT  -     cyclobenzaprine (FLEXERIL) 10 MG tablet; TAKE 0.5-1 TABLETS BY MOUTH TWO (2) TIMES DAILY AS NEEDED FOR MUSCLE SPASM(S). Jin Salas is a 45 y.o. female with chronic myofascial pain. DME order for E-Stim unit to help with pain control  Continue PRN Flexeril  Daily stretches  Follow-up Dr. Dariusz Davis for right [address] [place] [address] D Gerald Magana is a 45 y.o. female presents for follow up of back pain.  2022. Advised to follow-up with Dr. Dariusz Davis for her severe CTS. Pt reports mid and low back pain. Denies sciatica, numbness, or tingling BLE. She continues to have numbness tingling and weakness in her right hand. She did not follow-up with Dr. Dariusz Davis for her CTS. She takes Flexeril as needed with some benefit. Pt mainly takes it at night. She is able to tolerate half doses during the day. She had a previous TENS unit which was helpful. AMB PAIN ASSESSMENT 3/15/2023   Location of Pain Back   Severity of Pain 5   Quality of Pain Aching; Other (Comment)   Duration of Pain Persistent   Frequency of Pain Intermittent   Aggravating Factors Other (Comment)   Limiting Behavior Yes   Relieving Factors Heat;Other (Comment)   Result of Injury Yes   Work-Related Injury No   Are there other pain locations you wish to document?  No           Onset of pain: 2021      Investigations:   L XR AP/lat/flex/ext 4V 9/29/2022 (I personally reviewed these images): normal alignment, no instability. L MRI 8/2021: normal  BUE EMG 2020: severe right CTS, moderate left CTS  C MRI 2019: unremarkable  UE EMG 2019: mild right CTS  BLE EMG 2016: normal  Spine surgery consult: no      Treatments:  Physical therapy: Multiple rounds, no benefit  Spinal injections: no  Spinal surgery- no  Beneficial medications: Flexeril (temporary)  Failed medications: Gabapentin, Topamax, Lyrica Cymbalta, Zanaflex (GI issues), Voltaren Gel, Lidoderm patches      Work Status: currently not employed, disabled  Pertinent PMHx:  Asthma, DM, FM, GERD, IBS, chronic constipation, urinary incontinence . PHYSICAL EXAMINATION    /81 (Site: Left Upper Arm, Position: Sitting, Cuff Size: Medium Adult)   Pulse 84   Temp 97.7 °F (36.5 °C) (Temporal)   Ht 4' 11\" (1.499 m)   Wt 153 lb (69.4 kg)   SpO2 97% Comment: RA  BMI 30.90 kg/m²     Diffusely tender upper traps, levator scapula, thoracic and lumbar paraspinals  DTRS 2+ UE and LE  Negative Sancho sign  Ambulatory without assistive device, nonantalgic gait. Written by Perla Ceballos, as dictated by Ilir Esposito MD.  This note was created using Dragon transcription software and may contain unintended errors.

## 2023-03-15 NOTE — PROGRESS NOTES
Karl Galloway presents today for   Chief Complaint   Patient presents with    Back Pain       Is someone accompanying this pt? no    Is the patient using any DME equipment during OV? no    Depression Screening:  PHQ-9 Questionaire 2/27/2023 1/3/2023 8/23/2022 6/13/2022 5/3/2022 4/11/2022 5/3/2021   Little interest or pleasure in doing things 0 0 0 0 0 0 0   Feeling down, depressed, or hopeless 0 0 0 0 0 0 0   Trouble falling or staying asleep, or sleeping too much - 0 - - - - 0   Feeling tired or having little energy - 0 - - - - 0   Poor appetite or overeating - 0 - - - - 0   Feeling bad about yourself - or that you are a failure or have let yourself or your family down - 0 - - - - 0   Trouble concentrating on things, such as reading the newspaper or watching television - 0 - - - - 0   Moving or speaking so slowly that other people could have noticed. Or the opposite - being so fidgety or restless that you have been moving around a lot more than usual - 0 - - - - 0   PHQ-9 Total Score 0 0 0 0 0 0 0     PHQ Scores 2/27/2023 1/3/2023 8/23/2022 6/13/2022 5/3/2022 4/11/2022 12/8/2021   PHQ2 Score 0 0 0 0 0 0 -   PHQ2 Score - - - - 0 0 0   PHQ9 Score 0 0 0 0 0 0 -   PHQ9 Score - - - - - - -       Learning Assessment:  Who is the primary learner? Patient    What is the preferred language for health care of the primary learner? ENGLISH    How does the primary learner prefer to learn new concepts? OTHER (COMMENT) do it and see it   Answered By patient    Relationship to Learner SELF          Coordination of Care:  1. Have you been to the ER, urgent care clinic since your last visit? Yes. Pt went to the ER for pneumonia and bronchitis   Hospitalized since your last visit? no    2. Have you seen or consulted any other health care providers outside of the [address] [place] [address] since your last visit? Yes, pcp  Include any pap smears or colon screening.  no

## 2023-03-16 ENCOUNTER — CLINICAL DOCUMENTATION (OUTPATIENT)
Age: 38
End: 2023-03-16

## 2023-03-16 NOTE — PROGRESS NOTES
The pt's order for e-stim, last office note and demographics were faxed over to [facility] for review. A fax confirmation was received and they will contact the pt about the order. Order form sent to scanning.

## 2023-03-25 DIAGNOSIS — M79.7 FIBROMYALGIA: ICD-10-CM

## 2023-03-25 DIAGNOSIS — M54.50 LOW BACK PAIN, UNSPECIFIED: ICD-10-CM

## 2023-03-27 RX ORDER — IBUPROFEN 800 MG/1
TABLET ORAL
Qty: 45 TABLET | OUTPATIENT
Start: 2023-03-27

## 2023-03-27 RX ORDER — LIDOCAINE 50 MG/G
PATCH TOPICAL
Qty: 60 PATCH | Refills: 2 | Status: SHIPPED | OUTPATIENT
Start: 2023-03-27

## 2023-03-29 ENCOUNTER — OFFICE VISIT (OUTPATIENT)
Facility: CLINIC | Age: 38
End: 2023-03-29
Payer: MEDICARE

## 2023-03-29 VITALS
TEMPERATURE: 98.7 F | SYSTOLIC BLOOD PRESSURE: 128 MMHG | WEIGHT: 152 LBS | HEIGHT: 59 IN | OXYGEN SATURATION: 98 % | RESPIRATION RATE: 20 BRPM | DIASTOLIC BLOOD PRESSURE: 84 MMHG | HEART RATE: 89 BPM | BODY MASS INDEX: 30.64 KG/M2

## 2023-03-29 DIAGNOSIS — M79.7 FIBROMYALGIA: ICD-10-CM

## 2023-03-29 DIAGNOSIS — R06.2 WHEEZE: Primary | ICD-10-CM

## 2023-03-29 DIAGNOSIS — G62.9 POLYNEUROPATHY, UNSPECIFIED: ICD-10-CM

## 2023-03-29 DIAGNOSIS — I10 ESSENTIAL (PRIMARY) HYPERTENSION: ICD-10-CM

## 2023-03-29 DIAGNOSIS — G43.909 MIGRAINE WITHOUT STATUS MIGRAINOSUS, NOT INTRACTABLE, UNSPECIFIED MIGRAINE TYPE: ICD-10-CM

## 2023-03-29 DIAGNOSIS — E78.5 HYPERLIPIDEMIA, UNSPECIFIED HYPERLIPIDEMIA TYPE: ICD-10-CM

## 2023-03-29 DIAGNOSIS — E11.65 TYPE 2 DIABETES MELLITUS WITH HYPERGLYCEMIA, WITHOUT LONG-TERM CURRENT USE OF INSULIN (HCC): ICD-10-CM

## 2023-03-29 DIAGNOSIS — J45.40 MODERATE PERSISTENT ASTHMA WITHOUT COMPLICATION: ICD-10-CM

## 2023-03-29 PROCEDURE — 99214 OFFICE O/P EST MOD 30 MIN: CPT | Performed by: FAMILY MEDICINE

## 2023-03-29 PROCEDURE — 3074F SYST BP LT 130 MM HG: CPT | Performed by: FAMILY MEDICINE

## 2023-03-29 PROCEDURE — 3078F DIAST BP <80 MM HG: CPT | Performed by: FAMILY MEDICINE

## 2023-03-29 RX ORDER — NEBULIZER ACCESSORIES
1 KIT MISCELLANEOUS DAILY PRN
Qty: 1 KIT | Refills: 2 | Status: SHIPPED | OUTPATIENT
Start: 2023-03-29 | End: 2023-03-29 | Stop reason: SDUPTHER

## 2023-03-29 RX ORDER — SOFT LENS DISINFECTANT
SOLUTION, NON-ORAL MISCELLANEOUS
Qty: 1 EACH | Refills: 0 | Status: SHIPPED | OUTPATIENT
Start: 2023-03-29 | End: 2023-03-29 | Stop reason: SDUPTHER

## 2023-03-29 RX ORDER — ALBUTEROL SULFATE 2.5 MG/3ML
2.5 SOLUTION RESPIRATORY (INHALATION) 4 TIMES DAILY PRN
Qty: 120 EACH | Refills: 3 | Status: SHIPPED | OUTPATIENT
Start: 2023-03-29

## 2023-03-29 RX ORDER — NEBULIZER ACCESSORIES
1 KIT MISCELLANEOUS DAILY PRN
Qty: 1 KIT | Refills: 2 | Status: SHIPPED | OUTPATIENT
Start: 2023-03-29

## 2023-03-29 RX ORDER — SOFT LENS DISINFECTANT
SOLUTION, NON-ORAL MISCELLANEOUS
Qty: 1 EACH | Refills: 0 | Status: SHIPPED | OUTPATIENT
Start: 2023-03-29

## 2023-03-29 SDOH — ECONOMIC STABILITY: INCOME INSECURITY: HOW HARD IS IT FOR YOU TO PAY FOR THE VERY BASICS LIKE FOOD, HOUSING, MEDICAL CARE, AND HEATING?: NOT VERY HARD

## 2023-03-29 SDOH — ECONOMIC STABILITY: FOOD INSECURITY: WITHIN THE PAST 12 MONTHS, YOU WORRIED THAT YOUR FOOD WOULD RUN OUT BEFORE YOU GOT MONEY TO BUY MORE.: NEVER TRUE

## 2023-03-29 SDOH — ECONOMIC STABILITY: FOOD INSECURITY: WITHIN THE PAST 12 MONTHS, THE FOOD YOU BOUGHT JUST DIDN'T LAST AND YOU DIDN'T HAVE MONEY TO GET MORE.: NEVER TRUE

## 2023-03-29 ASSESSMENT — PATIENT HEALTH QUESTIONNAIRE - PHQ9
SUM OF ALL RESPONSES TO PHQ QUESTIONS 1-9: 0
2. FEELING DOWN, DEPRESSED OR HOPELESS: 0
1. LITTLE INTEREST OR PLEASURE IN DOING THINGS: 0
SUM OF ALL RESPONSES TO PHQ9 QUESTIONS 1 & 2: 0
SUM OF ALL RESPONSES TO PHQ QUESTIONS 1-9: 0

## 2023-03-29 NOTE — PROGRESS NOTES
1. \"Have you been to the ER, urgent care clinic since your last visit? Hospitalized since your last visit? \"Yes When: Nataliia Munoz last week    2. \"Have you seen or consulted any other health care providers outside of the [address] [place] [address] since your last visit? \" No    3. For patients aged 39-70: Has the patient had a colonoscopy / FIT/ Cologuard? Not applicable      If the patient is female:    4. For patients aged 41-77: Has the patient had a mammogram within the past 2 years? Not applicable      5. For patients aged 21-65: Has the patient had a pap smear?  Yes

## 2023-03-30 NOTE — PROGRESS NOTES
ELAINE  Wilder Patel comes in for follow-up care and post ER visit. Asthma: Patient has wheeze and shortness of breath. She was seen in the emergency room and was put on a prednisone taper and given inhaler medication. Patient now is on albuterol inhaler and she also takes Loaiza American. She would however like to have a nebulizer as this also seems to do well for her. I will send in albuterol nebulizer solution. I will place a prescription for nebulizer kits and supplies. Currently patient has an occasional nocturnal cough. Denies chest tightness, fever, chills or hemoptysis. Hypertension: Patient has hypertension. She takes atenolol. Blood pressure is stable. Patient will continue current treatment plan. Migraine headache: Patient has a history of migraine headache. She has been followed up by the behavioral health specialist.  She is on Aimovig. She also has Maxalt to use. She will continue with management as recommended by the specialist.  Dyslipidemia: Patient has dyslipidemia. She is on Lipitor. Stable on medication. Continue current treatment plan. Fibromyalgia: Patient has fibromyalgia. Gets generalized joint aches and pains. Patient can take Tylenol for pain as needed. She is on gabapentin. Continue current treatment plan. It is mellitus type II: Patient is on Jardiance. Last HbA1c was 5.4. Continue current treatment plan. She will intensify lifestyle and dietary modification. Neuropathy: Patient has neuropathy with numbness and tingling of the hands and the feet. This is due to diabetic polyneuropathy. She is on gabapentin. Continue current treatment plan. Obesity: Patient has a BMI of 30.70. Patient will intensify lifestyle and dietary modification.       Past Medical History  Past Medical History:   Diagnosis Date    Asthma     Chronic constipation     Diabetes (HCC)     Fibromyalgia     GERD (gastroesophageal reflux disease)     Herpes zoster without complication

## 2023-04-05 ENCOUNTER — OFFICE VISIT (OUTPATIENT)
Age: 38
End: 2023-04-05
Payer: MEDICARE

## 2023-04-05 VITALS
HEIGHT: 59 IN | HEART RATE: 89 BPM | WEIGHT: 154 LBS | SYSTOLIC BLOOD PRESSURE: 133 MMHG | OXYGEN SATURATION: 98 % | DIASTOLIC BLOOD PRESSURE: 87 MMHG | BODY MASS INDEX: 31.04 KG/M2

## 2023-04-05 DIAGNOSIS — Z82.49 FAMILY HISTORY OF ISCHEMIC HEART DISEASE AND OTHER DISEASES OF THE CIRCULATORY SYSTEM: ICD-10-CM

## 2023-04-05 DIAGNOSIS — E78.5 HYPERLIPIDEMIA, UNSPECIFIED HYPERLIPIDEMIA TYPE: ICD-10-CM

## 2023-04-05 DIAGNOSIS — R06.02 SHORTNESS OF BREATH: ICD-10-CM

## 2023-04-05 DIAGNOSIS — E11.9 TYPE 2 DIABETES MELLITUS WITHOUT COMPLICATION, WITHOUT LONG-TERM CURRENT USE OF INSULIN (HCC): ICD-10-CM

## 2023-04-05 DIAGNOSIS — I10 ESSENTIAL (PRIMARY) HYPERTENSION: Primary | ICD-10-CM

## 2023-04-05 PROCEDURE — 3075F SYST BP GE 130 - 139MM HG: CPT | Performed by: INTERNAL MEDICINE

## 2023-04-05 PROCEDURE — 3079F DIAST BP 80-89 MM HG: CPT | Performed by: INTERNAL MEDICINE

## 2023-04-05 PROCEDURE — 99214 OFFICE O/P EST MOD 30 MIN: CPT | Performed by: INTERNAL MEDICINE

## 2023-04-05 ASSESSMENT — PATIENT HEALTH QUESTIONNAIRE - PHQ9
1. LITTLE INTEREST OR PLEASURE IN DOING THINGS: 0
2. FEELING DOWN, DEPRESSED OR HOPELESS: 0
DEPRESSION UNABLE TO ASSESS: FUNCTIONAL CAPACITY MOTIVATION LIMITS ACCURACY
SUM OF ALL RESPONSES TO PHQ9 QUESTIONS 1 & 2: 0
SUM OF ALL RESPONSES TO PHQ QUESTIONS 1-9: 0

## 2023-04-05 NOTE — PROGRESS NOTES
1. Have you been to the ER, urgent care clinic since your last visit? Hospitalized since your last visit? YES, [facility]    2. Have you seen or consulted any other health care providers outside of the [address] [place] [address] since your last visit? Include any pap smears or colon screening.       No
for Wheezing 3/29/23  Yes Jesus Love MD   Respiratory Therapy Supplies (NEBULIZER/TUBING/MOUTHPIECE) KIT 1 kit by Does not apply route daily as needed (wheeze) 3/29/23  Yes Jesus Love MD   Respiratory Therapy Supplies (NEBULIZER) JL Use for nebulization as needed. 3/29/23  Yes Jesus Brady MD   lidocaine (LIDODERM) 5 % 2 PATCHES BY TRANSDERMAL ROUTE EVERY TWENTY-FOUR (24) HOURS.  APPLY PATCH TO THE AFFECTED AREA FOR 12 HOURS A DAY AND REMOVE FOR 12 HOURS A DAY. 3/27/23  Yes WIN Balderrama NP   ONETOUCH ULTRA strip TEST BLOOD SUGAR 3 TIMES A DAY AS DIRECTED 2/27/23  Yes Historical Provider, MD   ondansetron (ZOFRAN-ODT) 4 MG disintegrating tablet DISSOLVE 1 TABLET ON TONGUE 3 TIMES A DAY AS NEEDED FOR NAUSEA 1/5/23  Yes Historical Provider, MD   cyclobenzaprine (FLEXERIL) 10 MG tablet TAKE 0.5-1 TABLETS BY MOUTH TWO (2) TIMES DAILY AS NEEDED FOR MUSCLE SPASM(S). 3/15/23  Yes Gisele Ham MD   JARDIANCE 10 MG tablet TAKE 1 TABLET BY MOUTH EVERY DAY 2/16/23  Yes Jesus Love MD   albuterol sulfate HFA (PROVENTIL;VENTOLIN;PROAIR) 108 (90 Base) MCG/ACT inhaler Inhale 2 puffs into the lungs every 4 hours as needed 12/6/18  Yes Ar Automatic Reconciliation   atenolol (TENORMIN) 25 MG tablet TAKE 1 TABLET BY MOUTH EVERY DAY 11/28/22  Yes Ar Automatic Reconciliation   atorvastatin (LIPITOR) 10 MG tablet Take 1 tablet by mouth daily 5/3/22  Yes Ar Automatic Reconciliation   azelastine HCl 0.15 % SOLN 1 SPRAYS IN EACH NOSTRIL NASALLY ONCE A DAY 30 DAY(S) 5/9/22  Yes Ar Automatic Reconciliation   cetirizine (ZYRTEC) 10 MG tablet Take 1 tablet by mouth daily as needed   Yes Ar Automatic Reconciliation   vitamin D (CHOLECALCIFEROL) 10039 UNIT CAPS TAKE 1 CAPSULE BY MOUTH ONE TIME PER WEEK 4/28/22  Yes Ar Automatic Reconciliation   diclofenac sodium (VOLTAREN) 1 % GEL Apply topically as needed   Yes Ar Automatic Reconciliation   EPINEPHrine (EPIPEN) 0.3 MG/0.3ML SOAJ injection AS DIRECTED AS NEEDED

## 2023-04-17 ENCOUNTER — TELEPHONE (OUTPATIENT)
Facility: CLINIC | Age: 38
End: 2023-04-17

## 2023-04-17 RX ORDER — IPRATROPIUM BROMIDE AND ALBUTEROL SULFATE 2.5; .5 MG/3ML; MG/3ML
1 SOLUTION RESPIRATORY (INHALATION) EVERY 4 HOURS PRN
Qty: 360 ML | Refills: 0 | Status: SHIPPED | OUTPATIENT
Start: 2023-04-17

## 2023-04-17 NOTE — TELEPHONE ENCOUNTER
Pt stated the pharmacy is on back order for;     albuterol (PROVENTIL) (2.5 MG/3ML) 0.083% nebulizer solution [[id number]    Please send an alternative to the pharmacy when available

## 2023-04-27 RX ORDER — IBUPROFEN 800 MG/1
TABLET ORAL
Qty: 45 TABLET | OUTPATIENT
Start: 2023-04-27

## 2023-05-03 ENCOUNTER — OFFICE VISIT (OUTPATIENT)
Facility: CLINIC | Age: 38
End: 2023-05-03
Payer: MEDICARE

## 2023-05-03 VITALS
HEART RATE: 90 BPM | OXYGEN SATURATION: 97 % | WEIGHT: 156 LBS | TEMPERATURE: 98.1 F | DIASTOLIC BLOOD PRESSURE: 74 MMHG | RESPIRATION RATE: 20 BRPM | HEIGHT: 59 IN | BODY MASS INDEX: 31.45 KG/M2 | SYSTOLIC BLOOD PRESSURE: 124 MMHG

## 2023-05-03 DIAGNOSIS — E11.9 COMPREHENSIVE DIABETIC FOOT EXAMINATION, TYPE 2 DM, ENCOUNTER FOR (HCC): ICD-10-CM

## 2023-05-03 DIAGNOSIS — I10 ESSENTIAL (PRIMARY) HYPERTENSION: ICD-10-CM

## 2023-05-03 DIAGNOSIS — K21.9 GASTRO-ESOPHAGEAL REFLUX DISEASE WITHOUT ESOPHAGITIS: ICD-10-CM

## 2023-05-03 DIAGNOSIS — E11.21 TYPE 2 DIABETES WITH NEPHROPATHY (HCC): ICD-10-CM

## 2023-05-03 DIAGNOSIS — E55.9 VITAMIN D DEFICIENCY, UNSPECIFIED: ICD-10-CM

## 2023-05-03 DIAGNOSIS — M79.671 RIGHT FOOT PAIN: ICD-10-CM

## 2023-05-03 DIAGNOSIS — Z00.00 MEDICARE ANNUAL WELLNESS VISIT, SUBSEQUENT: Primary | ICD-10-CM

## 2023-05-03 DIAGNOSIS — J02.9 ACUTE VIRAL PHARYNGITIS: ICD-10-CM

## 2023-05-03 DIAGNOSIS — E78.5 HYPERLIPIDEMIA, UNSPECIFIED HYPERLIPIDEMIA TYPE: ICD-10-CM

## 2023-05-03 DIAGNOSIS — R53.81 MALAISE AND FATIGUE: ICD-10-CM

## 2023-05-03 DIAGNOSIS — E07.9 THYROID DISORDER: ICD-10-CM

## 2023-05-03 DIAGNOSIS — R53.83 MALAISE AND FATIGUE: ICD-10-CM

## 2023-05-03 DIAGNOSIS — G62.9 POLYNEUROPATHY, UNSPECIFIED: ICD-10-CM

## 2023-05-03 DIAGNOSIS — E61.1 IRON DEFICIENCY: ICD-10-CM

## 2023-05-03 PROBLEM — N92.0 MENORRHAGIA: Status: ACTIVE | Noted: 2023-05-03

## 2023-05-03 PROBLEM — D50.9 IRON DEFICIENCY ANEMIA: Status: ACTIVE | Noted: 2023-05-03

## 2023-05-03 PROBLEM — M79.669 CALF PAIN: Status: ACTIVE | Noted: 2023-05-03

## 2023-05-03 LAB
BILIRUBIN, URINE, POC: NEGATIVE
BLOOD URINE, POC: NEGATIVE
GLUCOSE URINE, POC: NEGATIVE
HBA1C MFR BLD: 6 %
KETONES, URINE, POC: NEGATIVE
LEUKOCYTE ESTERASE, URINE, POC: NEGATIVE
MICROALB/CREAT RATIO, POC: <30 MG/G
MICROALBUMIN URINE, POC: 30 MG/L
NITRITE, URINE, POC: NEGATIVE
PH, URINE, POC: 6 (ref 4.6–8)
PROTEIN,URINE, POC: NEGATIVE
SPECIFIC GRAVITY, URINE, POC: 1.02 (ref 1–1.03)
URINALYSIS CLARITY, POC: CLEAR
URINALYSIS COLOR, POC: YELLOW
UROBILINOGEN, POC: NORMAL

## 2023-05-03 PROCEDURE — 82044 UR ALBUMIN SEMIQUANTITATIVE: CPT | Performed by: FAMILY MEDICINE

## 2023-05-03 PROCEDURE — 3078F DIAST BP <80 MM HG: CPT | Performed by: FAMILY MEDICINE

## 2023-05-03 PROCEDURE — G0439 PPPS, SUBSEQ VISIT: HCPCS | Performed by: FAMILY MEDICINE

## 2023-05-03 PROCEDURE — 3074F SYST BP LT 130 MM HG: CPT | Performed by: FAMILY MEDICINE

## 2023-05-03 PROCEDURE — 83036 HEMOGLOBIN GLYCOSYLATED A1C: CPT | Performed by: FAMILY MEDICINE

## 2023-05-03 PROCEDURE — 81003 URINALYSIS AUTO W/O SCOPE: CPT | Performed by: FAMILY MEDICINE

## 2023-05-03 PROCEDURE — 99214 OFFICE O/P EST MOD 30 MIN: CPT | Performed by: FAMILY MEDICINE

## 2023-05-03 SDOH — ECONOMIC STABILITY: FOOD INSECURITY: WITHIN THE PAST 12 MONTHS, YOU WORRIED THAT YOUR FOOD WOULD RUN OUT BEFORE YOU GOT MONEY TO BUY MORE.: NEVER TRUE

## 2023-05-03 SDOH — ECONOMIC STABILITY: FOOD INSECURITY: WITHIN THE PAST 12 MONTHS, THE FOOD YOU BOUGHT JUST DIDN'T LAST AND YOU DIDN'T HAVE MONEY TO GET MORE.: NEVER TRUE

## 2023-05-03 SDOH — ECONOMIC STABILITY: INCOME INSECURITY: HOW HARD IS IT FOR YOU TO PAY FOR THE VERY BASICS LIKE FOOD, HOUSING, MEDICAL CARE, AND HEATING?: NOT HARD AT ALL

## 2023-05-03 ASSESSMENT — PATIENT HEALTH QUESTIONNAIRE - PHQ9
1. LITTLE INTEREST OR PLEASURE IN DOING THINGS: 0
SUM OF ALL RESPONSES TO PHQ9 QUESTIONS 1 & 2: 0
SUM OF ALL RESPONSES TO PHQ QUESTIONS 1-9: 0
2. FEELING DOWN, DEPRESSED OR HOPELESS: 0
SUM OF ALL RESPONSES TO PHQ QUESTIONS 1-9: 0
SUM OF ALL RESPONSES TO PHQ9 QUESTIONS 1 & 2: 0
SUM OF ALL RESPONSES TO PHQ QUESTIONS 1-9: 0
1. LITTLE INTEREST OR PLEASURE IN DOING THINGS: 0
SUM OF ALL RESPONSES TO PHQ QUESTIONS 1-9: 0
SUM OF ALL RESPONSES TO PHQ QUESTIONS 1-9: 0
2. FEELING DOWN, DEPRESSED OR HOPELESS: 0
SUM OF ALL RESPONSES TO PHQ QUESTIONS 1-9: 0

## 2023-05-03 ASSESSMENT — LIFESTYLE VARIABLES: HOW MANY STANDARD DRINKS CONTAINING ALCOHOL DO YOU HAVE ON A TYPICAL DAY: PATIENT DOES NOT DRINK

## 2023-05-05 LAB
25(OH)D3+25(OH)D2 SERPL-MCNC: 29.1 NG/ML (ref 30–100)
ALBUMIN SERPL-MCNC: 4.6 G/DL (ref 3.8–4.8)
ALBUMIN/GLOB SERPL: 1.7 {RATIO} (ref 1.2–2.2)
ALP SERPL-CCNC: 96 IU/L (ref 44–121)
ALT SERPL-CCNC: 18 IU/L (ref 0–32)
AST SERPL-CCNC: 15 IU/L (ref 0–40)
BASOPHILS # BLD AUTO: 0 X10E3/UL (ref 0–0.2)
BASOPHILS NFR BLD AUTO: 1 %
BILIRUB SERPL-MCNC: 0.5 MG/DL (ref 0–1.2)
BUN SERPL-MCNC: 10 MG/DL (ref 6–20)
BUN/CREAT SERPL: 11 (ref 9–23)
CALCIUM SERPL-MCNC: 9.6 MG/DL (ref 8.7–10.2)
CHLORIDE SERPL-SCNC: 105 MMOL/L (ref 96–106)
CHOLEST SERPL-MCNC: 205 MG/DL (ref 100–199)
CHOLEST/HDLC SERPL: 6 RATIO (ref 0–4.4)
CO2 SERPL-SCNC: 21 MMOL/L (ref 20–29)
CREAT SERPL-MCNC: 0.88 MG/DL (ref 0.57–1)
EGFRCR SERPLBLD CKD-EPI 2021: 86 ML/MIN/1.73
EOSINOPHIL # BLD AUTO: 0.1 X10E3/UL (ref 0–0.4)
EOSINOPHIL NFR BLD AUTO: 1 %
ERYTHROCYTE [DISTWIDTH] IN BLOOD BY AUTOMATED COUNT: 13.5 % (ref 11.7–15.4)
FERRITIN SERPL-MCNC: 195 NG/ML (ref 15–150)
GLOBULIN SER CALC-MCNC: 2.7 G/DL (ref 1.5–4.5)
GLUCOSE SERPL-MCNC: 83 MG/DL (ref 70–99)
HCT VFR BLD AUTO: 40.4 % (ref 34–46.6)
HDLC SERPL-MCNC: 34 MG/DL
HGB BLD-MCNC: 13.5 G/DL (ref 11.1–15.9)
IMM GRANULOCYTES # BLD AUTO: 0 X10E3/UL (ref 0–0.1)
IMM GRANULOCYTES NFR BLD AUTO: 0 %
IRON SATN MFR SERPL: 21 % (ref 15–55)
IRON SERPL-MCNC: 77 UG/DL (ref 27–159)
LDLC SERPL CALC-MCNC: 154 MG/DL (ref 0–99)
LYMPHOCYTES # BLD AUTO: 2.1 X10E3/UL (ref 0.7–3.1)
LYMPHOCYTES NFR BLD AUTO: 38 %
MCH RBC QN AUTO: 27.6 PG (ref 26.6–33)
MCHC RBC AUTO-ENTMCNC: 33.4 G/DL (ref 31.5–35.7)
MCV RBC AUTO: 82 FL (ref 79–97)
MONOCYTES # BLD AUTO: 0.3 X10E3/UL (ref 0.1–0.9)
MONOCYTES NFR BLD AUTO: 5 %
NEUTROPHILS # BLD AUTO: 3.1 X10E3/UL (ref 1.4–7)
NEUTROPHILS NFR BLD AUTO: 55 %
PLATELET # BLD AUTO: 248 X10E3/UL (ref 150–450)
POTASSIUM SERPL-SCNC: 4.1 MMOL/L (ref 3.5–5.2)
PROT SERPL-MCNC: 7.3 G/DL (ref 6–8.5)
RBC # BLD AUTO: 4.9 X10E6/UL (ref 3.77–5.28)
SODIUM SERPL-SCNC: 142 MMOL/L (ref 134–144)
TIBC SERPL-MCNC: 367 UG/DL (ref 250–450)
TRIGL SERPL-MCNC: 91 MG/DL (ref 0–149)
TSH SERPL DL<=0.005 MIU/L-ACNC: 1.24 UIU/ML (ref 0.45–4.5)
UIBC SERPL-MCNC: 290 UG/DL (ref 131–425)
URATE SERPL-MCNC: 4.2 MG/DL (ref 2.6–6.2)
VLDLC SERPL CALC-MCNC: 17 MG/DL (ref 5–40)
WBC # BLD AUTO: 5.6 X10E3/UL (ref 3.4–10.8)

## 2023-05-09 ASSESSMENT — PATIENT HEALTH QUESTIONNAIRE - PHQ9
SUM OF ALL RESPONSES TO PHQ QUESTIONS 1-9: 0
1. LITTLE INTEREST OR PLEASURE IN DOING THINGS: 0
SUM OF ALL RESPONSES TO PHQ9 QUESTIONS 1 & 2: 0
2. FEELING DOWN, DEPRESSED OR HOPELESS: 0
SUM OF ALL RESPONSES TO PHQ QUESTIONS 1-9: 0

## 2023-05-09 NOTE — PROGRESS NOTES
nebulizer solution Take 3 mLs by nebulization 4 times daily as needed for Wheezing Yes Jesus Walter MD   Respiratory Therapy Supplies (NEBULIZER/TUBING/MOUTHPIECE) KIT 1 kit by Does not apply route daily as needed (wheeze) Yes Jesus Walter MD   Respiratory Therapy Supplies (NEBULIZER) JL Use for nebulization as needed. Yes Jesus Walter MD   ONETOUCH ULTRA strip TEST BLOOD SUGAR 3 TIMES A DAY AS DIRECTED Yes Historical Provider, MD   ondansetron (ZOFRAN-ODT) 4 MG disintegrating tablet DISSOLVE 1 TABLET ON TONGUE 3 TIMES A DAY AS NEEDED FOR NAUSEA Yes Historical Provider, MD   cyclobenzaprine (FLEXERIL) 10 MG tablet TAKE 0.5-1 TABLETS BY MOUTH TWO (2) TIMES DAILY AS NEEDED FOR MUSCLE SPASM(S).  Yes Patience Reinoso MD   JARDIANCE 10 MG tablet TAKE 1 TABLET BY MOUTH EVERY DAY Yes Jesus Walter MD   albuterol sulfate HFA (PROVENTIL;VENTOLIN;PROAIR) 108 (90 Base) MCG/ACT inhaler Inhale 2 puffs into the lungs every 4 hours as needed Yes Ar Automatic Reconciliation   atenolol (TENORMIN) 25 MG tablet TAKE 1 TABLET BY MOUTH EVERY DAY Yes Ar Automatic Reconciliation   atorvastatin (LIPITOR) 10 MG tablet Take 1 tablet by mouth daily Yes Ar Automatic Reconciliation   azelastine HCl 0.15 % SOLN 1 SPRAYS IN EACH NOSTRIL NASALLY ONCE A DAY 30 DAY(S) Yes Ar Automatic Reconciliation   cetirizine (ZYRTEC) 10 MG tablet Take 1 tablet by mouth daily as needed Yes Ar Automatic Reconciliation   vitamin D (CHOLECALCIFEROL) 44272 UNIT CAPS TAKE 1 CAPSULE BY MOUTH ONE TIME PER WEEK Yes Ar Automatic Reconciliation   diclofenac sodium (VOLTAREN) 1 % GEL Apply topically as needed Yes Ar Automatic Reconciliation   EPINEPHrine (EPIPEN) 0.3 MG/0.3ML SOAJ injection AS DIRECTED AS NEEDED INTRAMUSCULAR 1 DAYS Yes Ar Automatic Reconciliation   Erenumab-aooe (AIMOVIG) 70 MG/ML SOAJ AS DIRECTED - 1 INJECTION SUBQ ONCE MONTHLY Yes Ar Automatic Reconciliation   esomeprazole (NEXIUM) 40 MG delayed release capsule Take by mouth daily Yes Ar

## 2023-05-11 RX ORDER — ATORVASTATIN CALCIUM 20 MG/1
20 TABLET, FILM COATED ORAL DAILY
Qty: 30 TABLET | Refills: 5 | Status: SHIPPED | OUTPATIENT
Start: 2023-05-11

## 2023-05-18 ENCOUNTER — TELEPHONE (OUTPATIENT)
Facility: CLINIC | Age: 38
End: 2023-05-18

## 2023-05-18 NOTE — TELEPHONE ENCOUNTER
----- Message from Wicho Tanner sent at 5/17/2023  2:18 PM EDT -----  Subject: Message to Provider    QUESTIONS  Information for Provider? Pt would like to get a print out of her most   recent blood work. Pt said she would like to come and get it so she can   have it. Please call and let her knw when it is ready. ---------------------------------------------------------------------------  --------------  Gabino Mcgee [facility]  [phone]; Do not leave any message, patient will call back for answer  ---------------------------------------------------------------------------  --------------  SCRIPT ANSWERS  Relationship to Patient?  Self

## 2023-05-22 ENCOUNTER — TELEPHONE (OUTPATIENT)
Facility: CLINIC | Age: 38
End: 2023-05-22

## 2023-05-22 NOTE — TELEPHONE ENCOUNTER
Pt came into the office to inform she received the nebulizer kit but not the solution.  Please contact pt when available

## 2023-05-22 NOTE — TELEPHONE ENCOUNTER
Per the pharmacy [facility] shows this medication on back order and recommended the patient call [facility] to have it filled.   Spoke with patient and advised

## 2023-06-09 DIAGNOSIS — E11.65 TYPE 2 DIABETES MELLITUS WITH HYPERGLYCEMIA (HCC): ICD-10-CM

## 2023-06-12 RX ORDER — EMPAGLIFLOZIN 10 MG/1
TABLET, FILM COATED ORAL
Qty: 90 TABLET | Refills: 1 | Status: SHIPPED | OUTPATIENT
Start: 2023-06-12 | End: 2023-08-03 | Stop reason: SDUPTHER

## 2023-07-06 RX ORDER — IBUPROFEN 800 MG/1
TABLET ORAL
Qty: 45 TABLET | OUTPATIENT
Start: 2023-07-06

## 2023-08-03 ENCOUNTER — OFFICE VISIT (OUTPATIENT)
Facility: CLINIC | Age: 38
End: 2023-08-03
Payer: MEDICAID

## 2023-08-03 VITALS
SYSTOLIC BLOOD PRESSURE: 133 MMHG | HEIGHT: 59 IN | RESPIRATION RATE: 20 BRPM | DIASTOLIC BLOOD PRESSURE: 83 MMHG | BODY MASS INDEX: 30.64 KG/M2 | OXYGEN SATURATION: 98 % | WEIGHT: 152 LBS | TEMPERATURE: 97.8 F | HEART RATE: 84 BPM

## 2023-08-03 DIAGNOSIS — E55.9 VITAMIN D DEFICIENCY, UNSPECIFIED: ICD-10-CM

## 2023-08-03 DIAGNOSIS — K21.9 GASTRO-ESOPHAGEAL REFLUX DISEASE WITHOUT ESOPHAGITIS: ICD-10-CM

## 2023-08-03 DIAGNOSIS — M79.89 SWELLING OF FOREARM: ICD-10-CM

## 2023-08-03 DIAGNOSIS — T78.2XXA ANAPHYLAXIS, INITIAL ENCOUNTER: ICD-10-CM

## 2023-08-03 DIAGNOSIS — M79.601 PAIN OF RIGHT UPPER EXTREMITY: ICD-10-CM

## 2023-08-03 DIAGNOSIS — E11.65 TYPE 2 DIABETES MELLITUS WITH HYPERGLYCEMIA (HCC): ICD-10-CM

## 2023-08-03 DIAGNOSIS — J45.40 MODERATE PERSISTENT ASTHMA WITHOUT COMPLICATION: ICD-10-CM

## 2023-08-03 DIAGNOSIS — G62.9 NEUROPATHY: ICD-10-CM

## 2023-08-03 DIAGNOSIS — E66.9 OBESITY (BMI 30-39.9): ICD-10-CM

## 2023-08-03 DIAGNOSIS — E11.65 TYPE 2 DIABETES MELLITUS WITH HYPERGLYCEMIA, UNSPECIFIED WHETHER LONG TERM INSULIN USE (HCC): Primary | ICD-10-CM

## 2023-08-03 DIAGNOSIS — I10 ESSENTIAL (PRIMARY) HYPERTENSION: ICD-10-CM

## 2023-08-03 DIAGNOSIS — K14.0 GLOSSITIS: ICD-10-CM

## 2023-08-03 DIAGNOSIS — E78.5 HYPERLIPIDEMIA, UNSPECIFIED HYPERLIPIDEMIA TYPE: ICD-10-CM

## 2023-08-03 DIAGNOSIS — G43.909 MIGRAINE WITHOUT STATUS MIGRAINOSUS, NOT INTRACTABLE, UNSPECIFIED MIGRAINE TYPE: ICD-10-CM

## 2023-08-03 DIAGNOSIS — J02.9 ACUTE PHARYNGITIS, UNSPECIFIED ETIOLOGY: ICD-10-CM

## 2023-08-03 PROCEDURE — 3078F DIAST BP <80 MM HG: CPT | Performed by: FAMILY MEDICINE

## 2023-08-03 PROCEDURE — 99214 OFFICE O/P EST MOD 30 MIN: CPT | Performed by: FAMILY MEDICINE

## 2023-08-03 PROCEDURE — 3044F HG A1C LEVEL LT 7.0%: CPT | Performed by: FAMILY MEDICINE

## 2023-08-03 PROCEDURE — 3074F SYST BP LT 130 MM HG: CPT | Performed by: FAMILY MEDICINE

## 2023-08-03 RX ORDER — IBUPROFEN 800 MG/1
TABLET ORAL
Qty: 45 TABLET | OUTPATIENT
Start: 2023-08-03

## 2023-08-03 RX ORDER — CHLORHEXIDINE GLUCONATE 0.12 MG/ML
15 RINSE ORAL 2 TIMES DAILY
Qty: 420 ML | Refills: 0 | Status: SHIPPED | OUTPATIENT
Start: 2023-08-03 | End: 2023-08-17

## 2023-08-03 RX ORDER — MELOXICAM 15 MG/1
TABLET ORAL
COMMUNITY
Start: 2023-05-31 | End: 2023-09-25

## 2023-08-03 RX ORDER — ATORVASTATIN CALCIUM 20 MG/1
20 TABLET, FILM COATED ORAL DAILY
Qty: 30 TABLET | Refills: 5 | Status: SHIPPED | OUTPATIENT
Start: 2023-08-03

## 2023-08-03 RX ORDER — EPINEPHRINE 0.3 MG/.3ML
INJECTION SUBCUTANEOUS
Qty: 1 EACH | Refills: 1 | Status: SHIPPED | OUTPATIENT
Start: 2023-08-03

## 2023-08-03 SDOH — ECONOMIC STABILITY: INCOME INSECURITY: HOW HARD IS IT FOR YOU TO PAY FOR THE VERY BASICS LIKE FOOD, HOUSING, MEDICAL CARE, AND HEATING?: NOT VERY HARD

## 2023-08-03 SDOH — ECONOMIC STABILITY: FOOD INSECURITY: WITHIN THE PAST 12 MONTHS, YOU WORRIED THAT YOUR FOOD WOULD RUN OUT BEFORE YOU GOT MONEY TO BUY MORE.: NEVER TRUE

## 2023-08-03 SDOH — ECONOMIC STABILITY: FOOD INSECURITY: WITHIN THE PAST 12 MONTHS, THE FOOD YOU BOUGHT JUST DIDN'T LAST AND YOU DIDN'T HAVE MONEY TO GET MORE.: NEVER TRUE

## 2023-08-03 ASSESSMENT — PATIENT HEALTH QUESTIONNAIRE - PHQ9
2. FEELING DOWN, DEPRESSED OR HOPELESS: 0
1. LITTLE INTEREST OR PLEASURE IN DOING THINGS: 0
SUM OF ALL RESPONSES TO PHQ QUESTIONS 1-9: 0
SUM OF ALL RESPONSES TO PHQ9 QUESTIONS 1 & 2: 0
SUM OF ALL RESPONSES TO PHQ QUESTIONS 1-9: 0

## 2023-08-03 NOTE — TELEPHONE ENCOUNTER
Requested Prescriptions     Pending Prescriptions Disp Refills    ibuprofen (ADVIL;MOTRIN) 800 MG tablet [Pharmacy Med Name: IBUPROFEN 800 MG TABLET] 45 tablet      Sig: TAKE 1 TABLET BY MOUTH EVERY EIGHT (8) HOURS AS NEEDED FOR PAIN. FOR 2 WEEKS.  TAKE W/FOOD.          ibuprofen (ADVIL;MOTRIN) 800 MG tablet [[id number]]  DISCONTINUED    Order Details  Dose: 800 mg Route: Oral Frequency: EVERY 8 HOURS PRN   Dispense Quantity: -- Refills: --          Sig: Take 1 tablet by mouth every 8 hours as needed   Patient not taking: Reported on 5/3/2023         Start Date: 12/15/22 End Date: 05/03/23   Discontinued by: Janice Gr MA on 5/3/2023 11:14   Reason: Therapy completed

## 2023-08-03 NOTE — PROGRESS NOTES
1. \"Have you been to the ER, urgent care clinic since your last visit? Hospitalized since your last visit? \"No    2. \"Have you seen or consulted any other health care providers outside of the [address] [place] [address] since your last visit? \" No    3. For patients aged 43-73: Has the patient had a colonoscopy / FIT/ Cologuard? Not applicable      If the patient is female:    4. For patients aged 43-66: Has the patient had a mammogram within the past 2 years? Not applicable      5. For patients aged 21-65: Has the patient had a pap smear?  Not applicable

## 2023-08-03 NOTE — PROGRESS NOTES
ELAINE  Bob Mcelroy comes in for follow up care. Swelling right forearm: Patient has localized swelling right forearm. This is cystic type swelling that is  just distal to the elbow. She has occasional tenderness. There is no redness, drainage and it is not fluctuant. This is a cyst.  Patient states that she would like to have it removed. I will refer her to the orthopedist.  Arm pain: Patient has pain right arm. Denies discoloration of the arm. She has a history of carpal tunnel with numbness and tingling. Given the pain and occasional swelling I will order duplex ultrasound to evaluate for DVT. Sore throat: Patient has occasional sore throat. Denies fever, chills odynophagia. This is viral pharyngitis. We discussed supportive care measures. Glossitis: Patient has pain and discomfort lateral aspect of her tongue. No bleeding of the tongue. This is glossitis. I will give Peridex oral gargle. Dyslipidemia: Patient has dyslipidemia. Patient is on atorvastatin. Patient will intensify lifestyle and dietary modification. We will recheck lipid panel. DM2: Patient has diabetes mellitus type 2. She is doing lifestyle and dietary modification. She is on Jardiance. Her last HbA1c was 5.0. We will recheck this today. HTN: Patient has hypertension. Blood pressure is stable. Denies changes in vision or focal weakness. She will take a low-sodium diet. She is on atenolol. GERD: Patient has gastroesophageal reflux disease. She gets heartburn on and off. Denies dark stools or hematemesis. Patient is on esomeprazole. Continue current treatment plan. Neuropathy: Patient has neuropathy with numbness and tingling of the hands and the feet. She is on gabapentin. Stable on medication. Continue current treatment plan. Hypovitaminosis D: Patient has hypovitaminosis D. She is on vitamin D replacement therapy. We will recheck labs today. Headache: Patient has headache.   She has seen the

## 2023-08-05 LAB
25(OH)D3+25(OH)D2 SERPL-MCNC: 37.8 NG/ML (ref 30–100)
ALBUMIN SERPL-MCNC: 4.5 G/DL (ref 3.9–4.9)
ALBUMIN/GLOB SERPL: 1.6 {RATIO} (ref 1.2–2.2)
ALP SERPL-CCNC: 102 IU/L (ref 44–121)
ALT SERPL-CCNC: 18 IU/L (ref 0–32)
AST SERPL-CCNC: 17 IU/L (ref 0–40)
BASOPHILS # BLD AUTO: 0 X10E3/UL (ref 0–0.2)
BASOPHILS NFR BLD AUTO: 1 %
BILIRUB SERPL-MCNC: 0.3 MG/DL (ref 0–1.2)
BUN SERPL-MCNC: 11 MG/DL (ref 6–20)
BUN/CREAT SERPL: 14 (ref 9–23)
CALCIUM SERPL-MCNC: 9.6 MG/DL (ref 8.7–10.2)
CHLORIDE SERPL-SCNC: 108 MMOL/L (ref 96–106)
CHOLEST SERPL-MCNC: 180 MG/DL (ref 100–199)
CO2 SERPL-SCNC: 18 MMOL/L (ref 20–29)
CREAT SERPL-MCNC: 0.81 MG/DL (ref 0.57–1)
EGFRCR SERPLBLD CKD-EPI 2021: 95 ML/MIN/1.73
EOSINOPHIL # BLD AUTO: 0 X10E3/UL (ref 0–0.4)
EOSINOPHIL NFR BLD AUTO: 0 %
ERYTHROCYTE [DISTWIDTH] IN BLOOD BY AUTOMATED COUNT: 13.7 % (ref 11.7–15.4)
GLOBULIN SER CALC-MCNC: 2.9 G/DL (ref 1.5–4.5)
GLUCOSE SERPL-MCNC: 98 MG/DL (ref 70–99)
HBA1C MFR BLD: 5.8 % (ref 4.8–5.6)
HCT VFR BLD AUTO: 38.8 % (ref 34–46.6)
HDLC SERPL-MCNC: 34 MG/DL
HGB BLD-MCNC: 12.6 G/DL (ref 11.1–15.9)
IMM GRANULOCYTES # BLD AUTO: 0 X10E3/UL (ref 0–0.1)
IMM GRANULOCYTES NFR BLD AUTO: 0 %
LDLC SERPL CALC-MCNC: 135 MG/DL (ref 0–99)
LYMPHOCYTES # BLD AUTO: 1.9 X10E3/UL (ref 0.7–3.1)
LYMPHOCYTES NFR BLD AUTO: 35 %
MCH RBC QN AUTO: 27.2 PG (ref 26.6–33)
MCHC RBC AUTO-ENTMCNC: 32.5 G/DL (ref 31.5–35.7)
MCV RBC AUTO: 84 FL (ref 79–97)
MONOCYTES # BLD AUTO: 0.3 X10E3/UL (ref 0.1–0.9)
MONOCYTES NFR BLD AUTO: 6 %
NEUTROPHILS # BLD AUTO: 3 X10E3/UL (ref 1.4–7)
NEUTROPHILS NFR BLD AUTO: 58 %
PLATELET # BLD AUTO: 206 X10E3/UL (ref 150–450)
POTASSIUM SERPL-SCNC: 4.1 MMOL/L (ref 3.5–5.2)
PROT SERPL-MCNC: 7.4 G/DL (ref 6–8.5)
RBC # BLD AUTO: 4.63 X10E6/UL (ref 3.77–5.28)
SODIUM SERPL-SCNC: 144 MMOL/L (ref 134–144)
TRIGL SERPL-MCNC: 58 MG/DL (ref 0–149)
VLDLC SERPL CALC-MCNC: 11 MG/DL (ref 5–40)
WBC # BLD AUTO: 5.3 X10E3/UL (ref 3.4–10.8)

## 2023-08-11 ENCOUNTER — HOSPITAL ENCOUNTER (OUTPATIENT)
Facility: HOSPITAL | Age: 38
End: 2023-08-11
Payer: MEDICAID

## 2023-08-11 DIAGNOSIS — M79.89 SWELLING OF FOREARM: ICD-10-CM

## 2023-08-11 DIAGNOSIS — M79.601 PAIN OF RIGHT UPPER EXTREMITY: ICD-10-CM

## 2023-08-11 PROCEDURE — 93971 EXTREMITY STUDY: CPT | Performed by: STUDENT IN AN ORGANIZED HEALTH CARE EDUCATION/TRAINING PROGRAM

## 2023-08-11 PROCEDURE — 93971 EXTREMITY STUDY: CPT

## 2023-08-30 ENCOUNTER — TELEPHONE (OUTPATIENT)
Facility: CLINIC | Age: 38
End: 2023-08-30

## 2023-08-30 NOTE — TELEPHONE ENCOUNTER
Pt calling to get her ultrasound and lab results. Ms. Greg Cordero can be reawched at [phone]. Please advise.  Thank you!!!

## 2023-09-10 DIAGNOSIS — M79.7 FIBROMYALGIA: ICD-10-CM

## 2023-09-10 DIAGNOSIS — G89.29 CHRONIC BILATERAL LOW BACK PAIN WITHOUT SCIATICA: ICD-10-CM

## 2023-09-10 DIAGNOSIS — M54.50 CHRONIC BILATERAL LOW BACK PAIN WITHOUT SCIATICA: ICD-10-CM

## 2023-09-11 RX ORDER — CYCLOBENZAPRINE HCL 10 MG
TABLET ORAL
Qty: 90 TABLET | Refills: 2 | OUTPATIENT
Start: 2023-09-11

## 2023-09-11 RX ORDER — IBUPROFEN 800 MG/1
TABLET ORAL
Qty: 45 TABLET | OUTPATIENT
Start: 2023-09-11

## 2023-09-13 ENCOUNTER — OFFICE VISIT (OUTPATIENT)
Age: 38
End: 2023-09-13
Payer: MEDICARE

## 2023-09-13 VITALS — BODY MASS INDEX: 30.64 KG/M2 | HEIGHT: 59 IN | WEIGHT: 152 LBS

## 2023-09-13 DIAGNOSIS — M54.14 THORACIC RADICULOPATHY: ICD-10-CM

## 2023-09-13 DIAGNOSIS — L72.0 EPIDERMAL INCLUSION CYST: ICD-10-CM

## 2023-09-13 DIAGNOSIS — M54.12 CERVICAL RADICULOPATHY: Primary | ICD-10-CM

## 2023-09-13 PROCEDURE — 99214 OFFICE O/P EST MOD 30 MIN: CPT | Performed by: ORTHOPAEDIC SURGERY

## 2023-09-20 RX ORDER — IBUPROFEN 800 MG/1
TABLET ORAL
Qty: 45 TABLET | OUTPATIENT
Start: 2023-09-20

## 2023-09-21 ENCOUNTER — OFFICE VISIT (OUTPATIENT)
Facility: CLINIC | Age: 38
End: 2023-09-21
Payer: MEDICARE

## 2023-09-21 VITALS
WEIGHT: 158 LBS | HEIGHT: 59 IN | RESPIRATION RATE: 20 BRPM | BODY MASS INDEX: 31.85 KG/M2 | OXYGEN SATURATION: 97 % | SYSTOLIC BLOOD PRESSURE: 132 MMHG | HEART RATE: 80 BPM | TEMPERATURE: 98.1 F | DIASTOLIC BLOOD PRESSURE: 80 MMHG

## 2023-09-21 DIAGNOSIS — N64.4 BREAST PAIN, RIGHT: Primary | ICD-10-CM

## 2023-09-21 DIAGNOSIS — E66.9 OBESITY (BMI 30-39.9): ICD-10-CM

## 2023-09-21 DIAGNOSIS — E55.9 VITAMIN D DEFICIENCY, UNSPECIFIED: ICD-10-CM

## 2023-09-21 DIAGNOSIS — G43.909 MIGRAINE WITHOUT STATUS MIGRAINOSUS, NOT INTRACTABLE, UNSPECIFIED MIGRAINE TYPE: ICD-10-CM

## 2023-09-21 DIAGNOSIS — K59.09 CHRONIC CONSTIPATION: ICD-10-CM

## 2023-09-21 DIAGNOSIS — M79.601 RIGHT ARM PAIN: ICD-10-CM

## 2023-09-21 DIAGNOSIS — E11.65 TYPE 2 DIABETES MELLITUS WITH HYPERGLYCEMIA, WITHOUT LONG-TERM CURRENT USE OF INSULIN (HCC): ICD-10-CM

## 2023-09-21 DIAGNOSIS — M79.7 FIBROMYALGIA: ICD-10-CM

## 2023-09-21 DIAGNOSIS — I10 ESSENTIAL (PRIMARY) HYPERTENSION: ICD-10-CM

## 2023-09-21 DIAGNOSIS — E78.5 HYPERLIPIDEMIA, UNSPECIFIED HYPERLIPIDEMIA TYPE: ICD-10-CM

## 2023-09-21 PROCEDURE — 3078F DIAST BP <80 MM HG: CPT | Performed by: FAMILY MEDICINE

## 2023-09-21 PROCEDURE — 99214 OFFICE O/P EST MOD 30 MIN: CPT | Performed by: FAMILY MEDICINE

## 2023-09-21 PROCEDURE — 3074F SYST BP LT 130 MM HG: CPT | Performed by: FAMILY MEDICINE

## 2023-09-21 PROCEDURE — 3044F HG A1C LEVEL LT 7.0%: CPT | Performed by: FAMILY MEDICINE

## 2023-09-21 SDOH — ECONOMIC STABILITY: FOOD INSECURITY: WITHIN THE PAST 12 MONTHS, YOU WORRIED THAT YOUR FOOD WOULD RUN OUT BEFORE YOU GOT MONEY TO BUY MORE.: NEVER TRUE

## 2023-09-21 SDOH — ECONOMIC STABILITY: FOOD INSECURITY: WITHIN THE PAST 12 MONTHS, THE FOOD YOU BOUGHT JUST DIDN'T LAST AND YOU DIDN'T HAVE MONEY TO GET MORE.: NEVER TRUE

## 2023-09-21 SDOH — ECONOMIC STABILITY: INCOME INSECURITY: HOW HARD IS IT FOR YOU TO PAY FOR THE VERY BASICS LIKE FOOD, HOUSING, MEDICAL CARE, AND HEATING?: NOT VERY HARD

## 2023-09-21 ASSESSMENT — PATIENT HEALTH QUESTIONNAIRE - PHQ9
2. FEELING DOWN, DEPRESSED OR HOPELESS: 0
1. LITTLE INTEREST OR PLEASURE IN DOING THINGS: 0
SUM OF ALL RESPONSES TO PHQ QUESTIONS 1-9: 0
SUM OF ALL RESPONSES TO PHQ9 QUESTIONS 1 & 2: 0

## 2023-09-21 NOTE — PROGRESS NOTES
1. \"Have you been to the ER, urgent care clinic since your last visit? Hospitalized since your last visit? \"No    2. \"Have you seen or consulted any other health care providers outside of the [address] [place] [address] since your last visit? \" No    3. For patients aged 43-73: Has the patient had a colonoscopy / FIT/ Cologuard? Not applicable      If the patient is female:    4. For patients aged 43-66: Has the patient had a mammogram within the past 2 years? Not applicable      5. For patients aged 21-65: Has the patient had a pap smear?  Yes

## 2023-09-21 NOTE — PROGRESS NOTES
ELAINE  Wayne Ogden comes in for follow-up care. Right arm pain: Patient has chronic right arm pain. Pain is noted when she lays on the right side and when she moves the arm. No redness or swelling of the arm. We have referred her to the orthopedist.  She would like an opinion from a different orthopedist.  We have done a duplex ultrasound right upper extremity that is negative for DVT. She denies trauma to the arm, straining. Patient has a history of cervical neuropathy and this might be causing some of this pain. She is on meloxicam, Flexeril and gabapentin. She has been followed up with a spine specialist.  Right breast pain: Patient has pain right axillary breast area. We will order diagnostic mammogram and ultrasound right breast.  No nipple discharge/drainage or lumps noted in the breast.  Fibromyalgia: Patient has a history of fibromyalgia. She is on Flexeril. She is on gabapentin. She has generalized body aches. Currently she has right arm swelling and this might be due to fibromyalgia. Hypertension: Patient has hypertension. Blood pressure is stable. Patient is on Tenormin. She will continue with the medication. She denies headache or changes in vision. Migraine headache: Patient has a history of migraine headache. Currently has been stable. She is on Maxalt for acute headache. She has been seen by neurologist.  She has used Aimovig for headaches. Asthma: Patient has asthma. Gets wheeze and shortness of breath that comes on and off. She is on albuterol inhaler as needed. She is on Loaiza American daily. Chronic constipation: Patient has a history of chronic constipation. She has seen the gastroenterologist in the past.  She is on Linzess. Diabetes mellitus type 2: Patient has type 2 diabetes mellitus. Patient is on Jardiance. Last HbA1c was 5.8. She will intensify lifestyle and dietary modification. Dyslipidemia: Patient has dyslipidemia. Patient is on Lipitor 20 mg daily.

## 2023-09-22 RX ORDER — FLUTICASONE PROPIONATE 50 MCG
SPRAY, SUSPENSION (ML) NASAL
COMMUNITY
Start: 2023-09-10

## 2023-09-25 ENCOUNTER — OFFICE VISIT (OUTPATIENT)
Age: 38
End: 2023-09-25
Payer: MEDICARE

## 2023-09-25 VITALS
HEART RATE: 88 BPM | OXYGEN SATURATION: 98 % | RESPIRATION RATE: 18 BRPM | TEMPERATURE: 97.6 F | BODY MASS INDEX: 31.45 KG/M2 | WEIGHT: 156 LBS | HEIGHT: 59 IN

## 2023-09-25 DIAGNOSIS — M79.7 FIBROMYALGIA: ICD-10-CM

## 2023-09-25 DIAGNOSIS — M54.12 BRACHIAL NEURALGIA: Primary | ICD-10-CM

## 2023-09-25 DIAGNOSIS — M79.621 AXILLARY PAIN, RIGHT: ICD-10-CM

## 2023-09-25 PROCEDURE — 99214 OFFICE O/P EST MOD 30 MIN: CPT | Performed by: PHYSICAL MEDICINE & REHABILITATION

## 2023-09-25 RX ORDER — CYCLOBENZAPRINE HCL 10 MG
TABLET ORAL
Qty: 60 TABLET | Refills: 5 | Status: SHIPPED | OUTPATIENT
Start: 2023-09-25

## 2023-09-25 RX ORDER — GABAPENTIN 600 MG/1
600 TABLET ORAL 2 TIMES DAILY
Qty: 60 TABLET | Refills: 2 | Status: SHIPPED | OUTPATIENT
Start: 2023-09-25 | End: 2023-12-25

## 2023-09-25 NOTE — PROGRESS NOTES
[place] [id number] [facility], [address] [place] [address] [address]  Phone: [phone]  Fax: [phone]        Roel Spencer  : 1985  PCP: Juan Lindsey MD    PROGRESS NOTE      ASSESSMENT AND PLAN    Jocelyn Sy was seen today for back problem, pain and back pain. Diagnoses and all orders for this visit:    Brachial neuralgia  -     diclofenac sodium (VOLTAREN) 1 % GEL; Apply 4 g topically as needed for Pain  -     gabapentin (NEURONTIN) 600 MG tablet; Take 1 tablet by mouth 2 times daily for 91 days. Max Daily Amount: 1,200 mg    Axillary pain, right    Fibromyalgia  -     cyclobenzaprine (FLEXERIL) 10 MG tablet; TAKE 0.5-1 TABLETS BY MOUTH TWO (2) TIMES DAILY AS NEEDED FOR MUSCLE SPASM(S). Luis Roe is a 45 y.o. female with 1 month of vague right upper extremity pain radiating into her axilla and anterior pectoralis. Limited shoulder range of motion, denies neck pain. Has history of FM, takes as needed gabapentin. I have advised her to take this routinely until her symptoms improve. Vascular studies negative. Refilled as needed Flexeril and as needed diclofenac. History of right severe CTS by EMG. May need repeat if not improving. Given number for central scheduling so that she may schedule her mammogram and ultrasound. Follow-up and Dispositions    Return in about 4 weeks (around 10/23/2023) for med adjustment/re-eval, spine re-eval.           HISTORY OF PRESENT ILLNESS      Estherrome Villarreal is a 45 y.o. female presents for follow up of chronic myofascial pain. LV 3/15/2023 I gave a DME order for E-stim unit to help with pain control. Patient was advised to continue PRN Flexeril and daily stretches. Patient was going to follow-up with Dr. Rachel Ralph for right CTS. Referred back to Dr. Ana Burton due to right arm pain and tingling.     About a month ago (2023), patient had spontaneous onset of an unpainful knot on her right elbow and

## 2023-09-25 NOTE — PROGRESS NOTES
Treva Amato presents today for   Chief Complaint   Patient presents with    Back Problem    Pain    Back Pain       Is someone accompanying this pt? no    Is the patient using any DME equipment during OV? no    Depression Screening:       No data to display                Learning Assessment:  No flowsheet data found. Abuse Screening:       No data to display                Fall Risk  No flowsheet data found. OPIOID RISK TOOL  No flowsheet data found. Coordination of Care:  1. Have you been to the ER, urgent care clinic since your last visit? no  Hospitalized since your last visit? no    2. Have you seen or consulted any other health care providers outside of the [address] [place] [address] since your last visit? no Include any pap smears or colon screening.  no

## 2023-09-27 DIAGNOSIS — K14.0 GLOSSITIS: ICD-10-CM

## 2023-09-27 DIAGNOSIS — J02.9 ACUTE PHARYNGITIS, UNSPECIFIED ETIOLOGY: ICD-10-CM

## 2023-09-27 RX ORDER — CHLORHEXIDINE GLUCONATE ORAL RINSE 1.2 MG/ML
SOLUTION DENTAL
Qty: 473 ML | Refills: 1 | Status: SHIPPED | OUTPATIENT
Start: 2023-09-27

## 2023-10-16 ENCOUNTER — HOSPITAL ENCOUNTER (OUTPATIENT)
Facility: HOSPITAL | Age: 38
Discharge: HOME | End: 2023-10-19
Payer: MEDICARE

## 2023-10-16 DIAGNOSIS — N64.4 BREAST PAIN, RIGHT: ICD-10-CM

## 2023-10-16 PROCEDURE — 76642 ULTRASOUND BREAST LIMITED: CPT

## 2023-10-16 PROCEDURE — G0279 TOMOSYNTHESIS, MAMMO: HCPCS

## 2023-10-17 DIAGNOSIS — R92.8 ABNORMAL MAMMOGRAM: Primary | ICD-10-CM

## 2023-10-17 DIAGNOSIS — N63.20 MASS OF LEFT BREAST, UNSPECIFIED QUADRANT: ICD-10-CM

## 2023-10-23 ENCOUNTER — TELEPHONE (OUTPATIENT)
Facility: CLINIC | Age: 38
End: 2023-10-23

## 2023-10-23 NOTE — TELEPHONE ENCOUNTER
After obtaining identifiers patient was made aware of all results. Name and number to referral was given.   Patient verbalized understanding

## 2023-10-25 ENCOUNTER — OFFICE VISIT (OUTPATIENT)
Age: 38
End: 2023-10-25
Payer: MEDICARE

## 2023-10-25 VITALS — HEIGHT: 59 IN | WEIGHT: 156 LBS | TEMPERATURE: 98.1 F | BODY MASS INDEX: 31.45 KG/M2 | OXYGEN SATURATION: 97 %

## 2023-10-25 DIAGNOSIS — M54.12 BRACHIAL NEURALGIA: ICD-10-CM

## 2023-10-25 DIAGNOSIS — G56.03 CARPAL TUNNEL SYNDROME, BILATERAL UPPER LIMBS: ICD-10-CM

## 2023-10-25 DIAGNOSIS — M54.12 CERVICAL RADICULOPATHY: Primary | ICD-10-CM

## 2023-10-25 PROCEDURE — 99214 OFFICE O/P EST MOD 30 MIN: CPT | Performed by: PHYSICAL MEDICINE & REHABILITATION

## 2023-10-25 PROCEDURE — 72040 X-RAY EXAM NECK SPINE 2-3 VW: CPT | Performed by: PHYSICAL MEDICINE & REHABILITATION

## 2023-10-25 RX ORDER — GABAPENTIN 600 MG/1
600 TABLET ORAL 4 TIMES DAILY
Qty: 120 TABLET | Refills: 2 | Status: SHIPPED | OUTPATIENT
Start: 2023-10-25 | End: 2024-01-24

## 2023-10-25 RX ORDER — ROSUVASTATIN CALCIUM 40 MG/1
TABLET, COATED ORAL
COMMUNITY
Start: 2023-10-18

## 2023-10-25 RX ORDER — DULAGLUTIDE 0.75 MG/.5ML
INJECTION, SOLUTION SUBCUTANEOUS
COMMUNITY
Start: 2023-10-10

## 2023-10-25 NOTE — PROGRESS NOTES
[facility]    [address], [address] [place] [address] Dr  Phone: [phone]  Fax: [phone]        Tommy Rod  : 1985  PCP: Caridad Godinez MD    PROGRESS NOTE      ASSESSMENT AND PLAN    Johana Later was seen today for lower back pain. Diagnoses and all orders for this visit:    Cervical radiculopathy  -     MRI CERVICAL SPINE WO CONTRAST; Future  -     gabapentin (NEURONTIN) 600 MG tablet; Take 1 tablet by mouth in the morning, at noon, in the evening, and at bedtime for 91 days. Max Daily Amount: 2,400 mg    Brachial neuralgia  -     MRI CERVICAL SPINE WO CONTRAST; Future  -     AMB POC XRAY, SPINE, CERVICAL; 2 OR 3  -     gabapentin (NEURONTIN) 600 MG tablet; Take 1 tablet by mouth in the morning, at noon, in the evening, and at bedtime for 91 days. Max Daily Amount: 2,400 mg    Carpal tunnel syndrome, bilateral upper limbs        Travis Turner is a 45 y.o. female presenting with cervical myeloradiculopathy. In cervical MRI for further evaluation of impingement. Increased gabapentin to 600 4 times daily as tolerated. Given cervical home exercise, perform as tolerated. Follow-up and Dispositions    Return in about 4 weeks (around 2023) for fu MRI/CT/EDx. HISTORY OF PRESENT ILLNESS      Eloy Monge is a 45 y.o. female presents for follow up of vague right upper extremity pain radiating into her axilla and anterior pectoralis. LV 2023 Continue Gabapentin PRN and refilled Flexeril and Diclofenac PRN    Patient reports that her pain is changed. It is now going up into her neck. Patient presents today with continuing upper back and chest pain that spreads across her shoulder region. She experiences intermittent numbness in her right arm. She denies left arm pain. She struggles to sleep at night because she still needs to wrap her right shoulder in a heating pad.  When she holds silverware, she experiences a

## 2023-10-25 NOTE — PROGRESS NOTES
Saleem Malhotra presents today for   Chief Complaint   Patient presents with    Lower Back Pain       Is someone accompanying this pt? no    Is the patient using any DME equipment during OV? no      Coordination of Care:  1. Have you been to the ER, urgent care clinic since your last visit? no  Hospitalized since your last visit? no    2. Have you seen or consulted any other health care providers outside of the [address] [place] [address] since your last visit? Yes, multiple  Include any pap smears or colon screening.  no

## 2023-10-25 NOTE — PATIENT INSTRUCTIONS
Neck Arthritis: Exercises  Introduction  Here are some examples of exercises for you to try. The exercises may be suggested for a condition or for rehabilitation. Start each exercise slowly. Ease off the exercises if you start to have pain. You will be told when to start these exercises and which ones will work best for you. How to do the exercises  Neck stretches to the side    This stretch works best if you keep your shoulder down as you lean away from it. To help you remember to do this, start by relaxing your shoulders and lightly holding on to your thighs or your chair. Tilt your head toward your shoulder and hold for 15 to 30 seconds. Let the weight of your head stretch your muscles. Repeat 2 to 4 times toward each shoulder. Chin tuck    Lie on the floor with a rolled-up towel under your neck. Your head should be touching the floor. Slowly bring your chin toward your chest.  Hold for a count of 6, and then relax for up to 10 seconds. Repeat 8 to 12 times. Active cervical rotation    Sit in a firm chair, or stand up straight. Keeping your chin level, turn your head to the right, and hold for 15 to 30 seconds. Turn your head to the left and hold for 15 to 30 seconds. Repeat 2 to 4 times to each side. Shoulder blade squeeze    While standing, squeeze your shoulder blades together. Do not raise your shoulders up as you are squeezing. Hold for 6 seconds. Repeat 8 to 12 times. Shoulder rolls    Sit comfortably with your feet shoulder-width apart. You can also do this exercise standing up. Roll your shoulders up, then back, and then down in a smooth, circular motion. Repeat 2 to 4 times. Follow-up care is a key part of your treatment and safety. Be sure to make and go to all appointments, and call your doctor if you are having problems. It's also a good idea to know your test results and keep a list of the medicines you take.   Current as of: November 9, 2022               Content Version:

## 2023-11-08 ENCOUNTER — HOSPITAL ENCOUNTER (OUTPATIENT)
Facility: HOSPITAL | Age: 38
Discharge: HOME | End: 2023-11-11
Attending: PHYSICAL MEDICINE & REHABILITATION
Payer: MEDICAID

## 2023-11-08 DIAGNOSIS — M54.12 BRACHIAL NEURALGIA: ICD-10-CM

## 2023-11-08 DIAGNOSIS — M54.12 CERVICAL RADICULOPATHY: ICD-10-CM

## 2023-11-08 PROCEDURE — 72141 MRI NECK SPINE W/O DYE: CPT

## 2023-11-09 ENCOUNTER — OFFICE VISIT (OUTPATIENT)
Facility: CLINIC | Age: 38
End: 2023-11-09

## 2023-11-09 VITALS
SYSTOLIC BLOOD PRESSURE: 130 MMHG | HEART RATE: 86 BPM | WEIGHT: 154 LBS | RESPIRATION RATE: 20 BRPM | DIASTOLIC BLOOD PRESSURE: 83 MMHG | OXYGEN SATURATION: 98 % | HEIGHT: 60 IN | BODY MASS INDEX: 30.23 KG/M2 | TEMPERATURE: 98.2 F

## 2023-11-09 DIAGNOSIS — R06.2 WHEEZE: ICD-10-CM

## 2023-11-09 DIAGNOSIS — E78.5 HYPERLIPIDEMIA, UNSPECIFIED HYPERLIPIDEMIA TYPE: ICD-10-CM

## 2023-11-09 DIAGNOSIS — E66.9 OBESITY (BMI 30-39.9): ICD-10-CM

## 2023-11-09 DIAGNOSIS — E11.65 TYPE 2 DIABETES MELLITUS WITH HYPERGLYCEMIA, WITHOUT LONG-TERM CURRENT USE OF INSULIN (HCC): ICD-10-CM

## 2023-11-09 DIAGNOSIS — I10 ESSENTIAL (PRIMARY) HYPERTENSION: ICD-10-CM

## 2023-11-09 DIAGNOSIS — G62.9 NEUROPATHY: ICD-10-CM

## 2023-11-09 DIAGNOSIS — K21.9 GASTRO-ESOPHAGEAL REFLUX DISEASE WITHOUT ESOPHAGITIS: ICD-10-CM

## 2023-11-09 DIAGNOSIS — J41.1 PURULENT BRONCHITIS (HCC): Primary | ICD-10-CM

## 2023-11-09 DIAGNOSIS — J45.40 MODERATE PERSISTENT ASTHMA WITHOUT COMPLICATION: ICD-10-CM

## 2023-11-09 DIAGNOSIS — R07.89 CHEST WALL PAIN: ICD-10-CM

## 2023-11-09 RX ORDER — ALBUTEROL SULFATE 90 UG/1
2 AEROSOL, METERED RESPIRATORY (INHALATION) EVERY 4 HOURS PRN
Qty: 18 G | Refills: 2 | Status: SHIPPED | OUTPATIENT
Start: 2023-11-09

## 2023-11-09 RX ORDER — ALBUTEROL SULFATE 2.5 MG/3ML
2.5 SOLUTION RESPIRATORY (INHALATION) 4 TIMES DAILY PRN
Qty: 120 EACH | Refills: 3 | Status: SHIPPED | OUTPATIENT
Start: 2023-11-09

## 2023-11-09 RX ORDER — AZITHROMYCIN 250 MG/1
250 TABLET, FILM COATED ORAL SEE ADMIN INSTRUCTIONS
Qty: 6 TABLET | Refills: 0 | Status: SHIPPED | OUTPATIENT
Start: 2023-11-09 | End: 2023-11-14

## 2023-11-09 RX ORDER — BENZONATATE 200 MG/1
200 CAPSULE ORAL 3 TIMES DAILY PRN
Qty: 30 CAPSULE | Refills: 0 | Status: SHIPPED | OUTPATIENT
Start: 2023-11-09

## 2023-11-09 SDOH — ECONOMIC STABILITY: FOOD INSECURITY: WITHIN THE PAST 12 MONTHS, THE FOOD YOU BOUGHT JUST DIDN'T LAST AND YOU DIDN'T HAVE MONEY TO GET MORE.: NEVER TRUE

## 2023-11-09 SDOH — ECONOMIC STABILITY: INCOME INSECURITY: HOW HARD IS IT FOR YOU TO PAY FOR THE VERY BASICS LIKE FOOD, HOUSING, MEDICAL CARE, AND HEATING?: NOT VERY HARD

## 2023-11-09 SDOH — ECONOMIC STABILITY: FOOD INSECURITY: WITHIN THE PAST 12 MONTHS, YOU WORRIED THAT YOUR FOOD WOULD RUN OUT BEFORE YOU GOT MONEY TO BUY MORE.: NEVER TRUE

## 2023-11-13 RX ORDER — IBUPROFEN 800 MG/1
TABLET ORAL
Qty: 45 TABLET | OUTPATIENT
Start: 2023-11-13

## 2023-11-17 ENCOUNTER — OFFICE VISIT (OUTPATIENT)
Age: 38
End: 2023-11-17
Payer: MEDICARE

## 2023-11-17 VITALS
BODY MASS INDEX: 30.35 KG/M2 | SYSTOLIC BLOOD PRESSURE: 126 MMHG | TEMPERATURE: 97.9 F | WEIGHT: 154.6 LBS | HEART RATE: 89 BPM | OXYGEN SATURATION: 98 % | HEIGHT: 60 IN | DIASTOLIC BLOOD PRESSURE: 82 MMHG | RESPIRATION RATE: 14 BRPM

## 2023-11-17 DIAGNOSIS — N63.20 MASS OF LEFT BREAST, UNSPECIFIED QUADRANT: Primary | ICD-10-CM

## 2023-11-17 DIAGNOSIS — N64.4 PAIN OF BOTH BREASTS: ICD-10-CM

## 2023-11-17 PROCEDURE — 99204 OFFICE O/P NEW MOD 45 MIN: CPT | Performed by: SURGERY

## 2023-11-17 PROCEDURE — 3074F SYST BP LT 130 MM HG: CPT | Performed by: SURGERY

## 2023-11-17 PROCEDURE — 3079F DIAST BP 80-89 MM HG: CPT | Performed by: SURGERY

## 2023-11-17 ASSESSMENT — ENCOUNTER SYMPTOMS
COLOR CHANGE: 0
SHORTNESS OF BREATH: 0
CHEST TIGHTNESS: 0

## 2023-11-17 NOTE — PROGRESS NOTES
Patito Moody is a 45 y.o. female (: 1985)     No chief complaint on file. Medication list and allergies have been reviewed with Patito Moody and updated as of today's date. I have gone over all Medical, Surgical and Social History with Patito Moody and updated/added the information accordingly.
3.1 x10E3/uL Final    Monocytes Absolute 08/03/2023 0.3  0.1 - 0.9 x10E3/uL Final    Eosinophils Absolute 08/03/2023 0.0  0.0 - 0.4 x10E3/uL Final    Basophils Absolute 08/03/2023 0.0  0.0 - 0.2 x10E3/uL Final    Immature Granulocytes 08/03/2023 0  Not Estab. % Final    Immature Grans (Abs) 08/03/2023 0.0  0.0 - 0.1 x10E3/uL Final    Hemoglobin A1C 08/03/2023 5.8 (H)  4.8 - 5.6 % Final    Comment:          Prediabetes: 5.7 - 6.4           Diabetes: >6.4           Glycemic control for adults with diabetes: <7.0      Vit D, 25-Hydroxy 08/03/2023 37.8  30.0 - 100.0 ng/mL Final    Comment: Vitamin D deficiency has been defined by the [place] of  Medicine and an Endocrine Society practice guideline as a  level of serum 25-OH vitamin D less than 20 ng/mL (1,2). The Endocrine Society went on to further define vitamin D  insufficiency as a level between 21 and 29 ng/mL (2). 1. IOM ([place] of [employer]). 2010. Dietary reference     intakes for calcium and D. [employer]: [employer]     [employer]. 2. Win MF, Luis KRAMER, Indira HWEITT, et al.     Evaluation, treatment, and prevention of vitamin D     deficiency: an Endocrine Society clinical practice     guideline. JCEM. 2011 Jul; 96(7):1911-30.       Glucose 08/03/2023 98  70 - 99 mg/dL Final    BUN 08/03/2023 11  6 - 20 mg/dL Final    Creatinine 08/03/2023 0.81  0.57 - 1.00 mg/dL Final    Est, Glomerular Filtration Rate 08/03/2023 95  >59 mL/min/1.73 Final    BUN/Creatinine Ratio 08/03/2023 14  9 - 23 Final    Sodium 08/03/2023 144  134 - 144 mmol/L Final    Potassium 08/03/2023 4.1  3.5 - 5.2 mmol/L Final    Chloride 08/03/2023 108 (H)  96 - 106 mmol/L Final    CO2 08/03/2023 18 (L)  20 - 29 mmol/L Final    Calcium 08/03/2023 9.6  8.7 - 10.2 mg/dL Final    Total Protein 08/03/2023 7.4  6.0 - 8.5 g/dL Final    Albumin 08/03/2023 4.5  3.9 - 4.9 g/dL Final    Globulin, Total 08/03/2023 2.9  1.5 - 4.5 g/dL Final    Albumin/Globulin Ratio 08/03/2023 1.6

## 2023-11-27 DIAGNOSIS — K14.0 GLOSSITIS: ICD-10-CM

## 2023-11-27 DIAGNOSIS — J02.9 ACUTE PHARYNGITIS, UNSPECIFIED ETIOLOGY: ICD-10-CM

## 2023-11-27 RX ORDER — CHLORHEXIDINE GLUCONATE ORAL RINSE 1.2 MG/ML
SOLUTION DENTAL
Qty: 473 ML | Refills: 1 | Status: SHIPPED | OUTPATIENT
Start: 2023-11-27

## 2023-11-27 RX ORDER — ATENOLOL 25 MG/1
TABLET ORAL
Qty: 90 TABLET | Refills: 1 | Status: SHIPPED | OUTPATIENT
Start: 2023-11-27

## 2023-11-27 RX ORDER — IBUPROFEN 800 MG/1
TABLET ORAL
Qty: 45 TABLET | OUTPATIENT
Start: 2023-11-27

## 2023-11-29 NOTE — PROGRESS NOTES
[facility]    [address], [address] [place] [address]   Phone: [phone]  Fax: [phone]        Leeroy Martinez  : 1985  PCP: Walter Hensley MD    PROGRESS NOTE      ASSESSMENT AND PLAN    Sunita Muhammad was seen today for neck pain. Diagnoses and all orders for this visit:    Brachial neuralgia  -     EMG ONE EXTREMITY UPPER RT; Future  -     gabapentin (NEURONTIN) 400 MG capsule; One po tid and 2 po qhs    Chronic right shoulder pain    Carpal tunnel syndrome, bilateral upper limbs    Cervical radiculopathy  -     EMG ONE EXTREMITY UPPER RT; Future  -     gabapentin (NEURONTIN) 400 MG capsule; One po tid and 2 po qhs        Evin Hilton is a 45 y.o. female RHD with diffuse progressive right upper quadrant and upper extremity pain and paresthesias. She has limitation with right shoulder range of motion. Cervical MRI negative for impingement. Referral for electrodiagnostics right upper extremity. Previous history of severe CTS. Discontinue gabapentin 600 mg. Start gabapentin 400 mg 3 times daily and 400-800 nightly. Follow-up and Dispositions    Return for EMG w/ Dr Meredith Olvera. fu w/me in [place].           Huang John is a 45 y.o. female presents for follow up of cervical myeloradiculopathy.  10/2023 Ordered C MRI, increased Gabapentin to 600 QID and given cervical HEP. Cervical MRI reviewed. No impingement. He is unable to tolerate 600 mg of gabapentin 4 times daily, she has reduced this to twice daily-3 times daily. Patient presents today with worsening right arm pain that radiates into the chest, axilla, and scapula. She reports that she still experiences numbness and tingling in her right arm. She denies any falls that could have caused her pain. She states that she has not had her shoulder checked out. No injections have relieved her pain.      She denies having surgery on her

## 2023-11-30 ENCOUNTER — OFFICE VISIT (OUTPATIENT)
Age: 38
End: 2023-11-30

## 2023-11-30 VITALS
DIASTOLIC BLOOD PRESSURE: 79 MMHG | SYSTOLIC BLOOD PRESSURE: 131 MMHG | HEIGHT: 60 IN | BODY MASS INDEX: 30.43 KG/M2 | HEART RATE: 84 BPM | WEIGHT: 155 LBS | TEMPERATURE: 97.4 F | OXYGEN SATURATION: 96 %

## 2023-11-30 DIAGNOSIS — M54.12 BRACHIAL NEURALGIA: Primary | ICD-10-CM

## 2023-11-30 DIAGNOSIS — M54.12 CERVICAL RADICULOPATHY: ICD-10-CM

## 2023-11-30 DIAGNOSIS — M25.511 CHRONIC RIGHT SHOULDER PAIN: ICD-10-CM

## 2023-11-30 DIAGNOSIS — G56.03 CARPAL TUNNEL SYNDROME, BILATERAL UPPER LIMBS: ICD-10-CM

## 2023-11-30 DIAGNOSIS — G89.29 CHRONIC RIGHT SHOULDER PAIN: ICD-10-CM

## 2023-11-30 RX ORDER — ATORVASTATIN CALCIUM 20 MG/1
20 TABLET, FILM COATED ORAL DAILY
COMMUNITY
Start: 2023-11-12

## 2023-11-30 RX ORDER — POLYETHYLENE GLYCOL 3350 17 G/DOSE
POWDER (GRAM) ORAL
COMMUNITY
Start: 2023-11-27

## 2023-11-30 RX ORDER — GABAPENTIN 400 MG/1
CAPSULE ORAL
Qty: 150 CAPSULE | Refills: 2 | Status: SHIPPED | OUTPATIENT
Start: 2023-11-30 | End: 2024-01-30

## 2023-11-30 RX ORDER — DICYCLOMINE HCL 20 MG
TABLET ORAL
COMMUNITY
Start: 2023-11-18

## 2023-11-30 RX ORDER — LEVOCETIRIZINE DIHYDROCHLORIDE 5 MG/1
TABLET, FILM COATED ORAL
COMMUNITY
Start: 2023-11-14

## 2023-11-30 RX ORDER — MELOXICAM 15 MG/1
TABLET ORAL
COMMUNITY
Start: 2023-11-02

## 2023-11-30 NOTE — PROGRESS NOTES
Stephanie Hilton presents today for   Chief Complaint   Patient presents with    Neck Pain       Is someone accompanying this pt? no    Is the patient using any DME equipment during OV? no    Coordination of Care:  1. Have you been to the ER, urgent care clinic since your last visit? no  Hospitalized since your last visit? no    2. Have you seen or consulted any other health care providers outside of the [address] [place] [address] since your last visit? Yes, multiple  Include any pap smears or colon screening.  no

## 2023-12-17 ENCOUNTER — TELEPHONE (OUTPATIENT)
Facility: HOSPITAL | Age: 38
End: 2023-12-17

## 2023-12-17 RX ORDER — PREDNISONE 50 MG/1
TABLET ORAL
Qty: 3 TABLET | Refills: 0 | Status: SHIPPED | OUTPATIENT
Start: 2023-12-17

## 2024-01-04 NOTE — PROGRESS NOTES
[facility]    [address] [place] [address], [address]  [place] [address]  Phone: [phone]  Fax: [phone]        Travis Lujan  : 1985  PCP: Jesus Brady MD    PROGRESS NOTE      ASSESSMENT AND PLAN    Travis was seen today for neck pain.    Diagnoses and all orders for this visit:    Chronic right shoulder pain  -     gabapentin (NEURONTIN) 400 MG capsule; One po tid and 2 po qhs  -     [facility]    Carpal tunnel syndrome of right wrist  -     gabapentin (NEURONTIN) 400 MG capsule; One po tid and 2 po qhs  -     [facility]    Cervical spondylosis  -     [facility]    Fibromyalgia  -     [facility]        Travis Lujan is a 38 y.o. female with a neck, shoulder, and arm pain.  Cervical MRI negative for impingement.  EMG positive for right CTS.   Referred to PT for neck and shoulder exercises  Refilled Gabapentin 400 mg TID and 400-800 mg nightly.   Discussed referral for shoulder if not improving.  She prefers not to see Dr. Esteban again.  Consider Dr. Terrazas.    Follow-up and Dispositions    Return in about 8 weeks (around 3/4/2024) for PT f/u.           HISTORY OF PRESENT ILLNESS      Travis Lujan is a 38 y.o. female presents for follow up of diffuse progressive right upper quadrant and upper extremity pain and paresthesias. LV 2023 Referred for electrodiagnostics right upper extremity, discontinue Gabapentin 600 mg and trial of Gabapentin 400 mg TID and 400-800 mg nightly.    Patient presents today with continuing neck and shoulder pain. However, she was recently sick and was in bed for one week. Now, she complains of lower back stiffness that comes up from her upper back. She denies numbness and tingling down her legs.     She denies having

## 2024-01-05 ENCOUNTER — PROCEDURE VISIT (OUTPATIENT)
Age: 39
End: 2024-01-05

## 2024-01-05 VITALS
WEIGHT: 151.2 LBS | DIASTOLIC BLOOD PRESSURE: 74 MMHG | RESPIRATION RATE: 18 BRPM | SYSTOLIC BLOOD PRESSURE: 123 MMHG | OXYGEN SATURATION: 97 % | HEIGHT: 60 IN | TEMPERATURE: 98.1 F | HEART RATE: 86 BPM | BODY MASS INDEX: 29.68 KG/M2

## 2024-01-05 DIAGNOSIS — G56.01 CARPAL TUNNEL SYNDROME OF RIGHT WRIST: Primary | ICD-10-CM

## 2024-01-05 NOTE — PROGRESS NOTES
[facility]  [facility] [facility], [address]  [place] [address]  Phone: [phone]  Fax: [phone]    Travis Lujan  : 1985  PCP: Jesus Brady MD  2024    ELECTROMYOGRAPHY AND NERVE CONDUCTION STUDIES    Travis Lujan was referred by Dr. Gonzalez for electrodiagnostic evaluation of RUE    NCV & EMG Findings:  Evaluation of the right ulnar (ADM) motor nerve showed decreased conduction velocity ([place]bow-Wrist, 52 m/s).  The right median-ulnar (dig IV) sensory nerve showed abnormal peak latency difference ((Median Wrist)-(Ulnar Wrist), 1.1 ms).  All remaining nerves (as indicated in the following tables) were within normal limits.    INTERPRETATION  This is an abnormal electrodiagnostic examination. These findings may be consistent with:  Mild median mononeuropathy at the right wrist (carpal tunnel syndrome)    CLINICAL INTERPRETATION  Her electrodiagnostic findings of median mononeuropathy appears to be consistent with her right hand symptoms.       HISTORY OF PRESENT ILLNESS  Travis Lujan is a 38 y.o. female.    Pt presents today with RUE EMG evaluation for Brachial neuralgia vs CTS vs cervical radiculopathy. Patient expresses that she has pain in her left axilla. She also notes that her muscles will often cramp and get stuck.     Cervical Spine MRI images dated 23 were reviewed. Per report, Minimum degenerative disc disease at C4-7 without regional compromise.      PAST MEDICAL HISTORY   Past Medical History:   Diagnosis Date    Asthma     Chronic constipation     Diabetes (HCC)     Fibromyalgia     GERD (gastroesophageal reflux disease)     Herpes zoster without complication 3/30/2015    Hypertension     IBS (irritable bowel syndrome)     Migraines     unknown if aura    Pneumonia     Psychiatric disorder     she denies any diagnosis despite being on antipsychotics, SSRIs, etc in the past    Scoliosis     Urinary incontinence     mixed

## 2024-01-08 ENCOUNTER — OFFICE VISIT (OUTPATIENT)
Age: 39
End: 2024-01-08
Payer: MEDICAID

## 2024-01-08 VITALS
WEIGHT: 151 LBS | TEMPERATURE: 97.8 F | BODY MASS INDEX: 29.64 KG/M2 | SYSTOLIC BLOOD PRESSURE: 121 MMHG | HEART RATE: 94 BPM | HEIGHT: 60 IN | OXYGEN SATURATION: 97 % | DIASTOLIC BLOOD PRESSURE: 77 MMHG

## 2024-01-08 DIAGNOSIS — G56.01 CARPAL TUNNEL SYNDROME OF RIGHT WRIST: ICD-10-CM

## 2024-01-08 DIAGNOSIS — G89.29 CHRONIC RIGHT SHOULDER PAIN: Primary | ICD-10-CM

## 2024-01-08 DIAGNOSIS — M47.812 CERVICAL SPONDYLOSIS: ICD-10-CM

## 2024-01-08 DIAGNOSIS — M25.511 CHRONIC RIGHT SHOULDER PAIN: Primary | ICD-10-CM

## 2024-01-08 DIAGNOSIS — M79.7 FIBROMYALGIA: ICD-10-CM

## 2024-01-08 PROCEDURE — 3078F DIAST BP <80 MM HG: CPT | Performed by: PHYSICAL MEDICINE & REHABILITATION

## 2024-01-08 PROCEDURE — 99214 OFFICE O/P EST MOD 30 MIN: CPT | Performed by: PHYSICAL MEDICINE & REHABILITATION

## 2024-01-08 PROCEDURE — 3074F SYST BP LT 130 MM HG: CPT | Performed by: PHYSICAL MEDICINE & REHABILITATION

## 2024-01-08 RX ORDER — GABAPENTIN 400 MG/1
CAPSULE ORAL
Qty: 150 CAPSULE | Refills: 2 | Status: SHIPPED | OUTPATIENT
Start: 2024-01-08 | End: 2024-03-09

## 2024-01-09 ENCOUNTER — TELEPHONE (OUTPATIENT)
Age: 39
End: 2024-01-09

## 2024-01-09 NOTE — TELEPHONE ENCOUNTER
Patient called back and stated that the physical therapy facility that she was referred to informed her that they don't have a therapist that treats the wrist and hand at that location.      She's requesting for a referral to a location closer to her home.    Patient can be reached at [phone].

## 2024-01-25 ENCOUNTER — HOSPITAL ENCOUNTER (OUTPATIENT)
Facility: HOSPITAL | Age: 39
Discharge: HOME | End: 2024-01-25
Attending: SURGERY
Payer: MEDICAID

## 2024-01-25 LAB — CREAT UR-MCNC: 1.1 MG/DL (ref 0.6–1.3)

## 2024-01-25 PROCEDURE — 6360000004 HC RX CONTRAST MEDICATION: Performed by: SURGERY

## 2024-01-25 PROCEDURE — C8908 MRI W/O FOL W/CONT, BREAST,: HCPCS

## 2024-01-25 PROCEDURE — 82565 ASSAY OF CREATININE: CPT

## 2024-01-25 PROCEDURE — A9577 INJ MULTIHANCE: HCPCS | Performed by: SURGERY

## 2024-01-25 RX ADMIN — GADOBENATE DIMEGLUMINE 15 ML: 529 INJECTION, SOLUTION INTRAVENOUS at 10:14

## 2024-01-29 ENCOUNTER — TELEPHONE (OUTPATIENT)
Age: 39
End: 2024-01-29

## 2024-01-29 DIAGNOSIS — G56.01 CARPAL TUNNEL SYNDROME OF RIGHT WRIST: Primary | ICD-10-CM

## 2024-01-29 NOTE — TELEPHONE ENCOUNTER
Pt was advised by [facility] that they cannot perform all therapy as ordered. Pt cannot attend therapy in [place] and is asking for the referral to be sent elsewhere closer to where she lives that can offer all therapy.

## 2024-02-01 NOTE — TELEPHONE ENCOUNTER
Patient called in and states she need a separate referral for Occupational Therapy for her wrist sent over to [facility]      Please call and advise patient at  [phone]

## 2024-02-01 NOTE — TELEPHONE ENCOUNTER
Per Dr. Gonzalez note:  Referred to PT for neck and shoulder exercises     Pt positive for CTS.  Was she going to do Pt for her wrist or does she want to refer to Dr. Person?

## 2024-02-02 ENCOUNTER — OFFICE VISIT (OUTPATIENT)
Age: 39
End: 2024-02-02
Payer: MEDICAID

## 2024-02-02 VITALS
TEMPERATURE: 97 F | WEIGHT: 151 LBS | SYSTOLIC BLOOD PRESSURE: 124 MMHG | BODY MASS INDEX: 30.44 KG/M2 | HEART RATE: 86 BPM | HEIGHT: 59 IN | OXYGEN SATURATION: 99 % | DIASTOLIC BLOOD PRESSURE: 70 MMHG | RESPIRATION RATE: 18 BRPM

## 2024-02-02 DIAGNOSIS — N64.4 PAIN OF BOTH BREASTS: ICD-10-CM

## 2024-02-02 DIAGNOSIS — N63.20 MASS OF LEFT BREAST, UNSPECIFIED QUADRANT: Primary | ICD-10-CM

## 2024-02-02 PROCEDURE — 99214 OFFICE O/P EST MOD 30 MIN: CPT | Performed by: SURGERY

## 2024-02-02 PROCEDURE — 3078F DIAST BP <80 MM HG: CPT | Performed by: SURGERY

## 2024-02-02 PROCEDURE — 3074F SYST BP LT 130 MM HG: CPT | Performed by: SURGERY

## 2024-02-02 NOTE — PROGRESS NOTES
Chief Complaint   Patient presents with    Follow-up     F/u mri breast    1. Have you been to the ER, urgent care clinic since your last visit?  Hospitalized since your last visit?No    2. Have you seen or consulted any other health care providers outside of the [facility] since your last visit?  Include any pap smears or colon screening. Yes gyn   
(KENALOG) 0.1 % cream ceived the following from [facility]: Outside name: triamcinolone acetonide (KENALOG) 0.1 % topical cream     • valACYclovir (VALTREX) 500 MG tablet Take 1 tablet by mouth daily     • EPINEPHrine (EPIPEN) 0.3 MG/0.3ML SOAJ injection AS DIRECTED AS NEEDED INTRAMUSCULAR 1 DAYS (Patient not taking: Reported on 2024) 1 each 1     No current facility-administered medications on file prior to visit.       Systems Review:  Review of Systems   Constitutional:  Positive for fever. Negative for fatigue.     PMH:  Past Medical History:   Diagnosis Date   • Asthma    • Chronic constipation    • CTS (carpal tunnel syndrome), right by EDx 2024   • Diabetes (HCC)    • Fibromyalgia    • GERD (gastroesophageal reflux disease)    • Herpes zoster without complication 3/30/2015   • Hypertension    • IBS (irritable bowel syndrome)    • Migraines     unknown if aura   • Pneumonia    • Psychiatric disorder     she denies any diagnosis despite being on antipsychotics, SSRIs, etc in the past   • Scoliosis    • Urinary incontinence     mixed       Surgical History:  Past Surgical History:   Procedure Laterality Date   •  SECTION  ,    • COLONOSCOPY     • DILATION AND CURETTAGE OF UTERUS     • PELVIC LAPAROSCOPY     • UPPER GASTROINTESTINAL ENDOSCOPY         Social History:  Social History     Socioeconomic History   • Marital status: Single     Spouse name: None   • Number of children: None   • Years of education: None   • Highest education level: None   Tobacco Use   • Smoking status: Never   • Smokeless tobacco: Never   Substance and Sexual Activity   • Alcohol use: No   • Drug use: No   • Sexual activity: Yes     Partners: Male     Social Determinants of Health     Financial Resource Strain: Low Risk  (2023)    Overall Financial Resource Strain (CARDIA)    • Difficulty of Paying Living Expenses: Not very hard   Transportation Needs: Unknown (2023)    PRAPARE -

## 2024-02-02 NOTE — TELEPHONE ENCOUNTER
I called and spoke to the pt. She states that the referral for the wrist needs to be a separate referral under occupational therapy. The original physical therapy referral had her neck, shoulder and wrist all together. [facility] is requesting that they be . The pt cannot get her PT eval done until this has been changed. Referral has been pended to provider for review.

## 2024-02-12 ENCOUNTER — OFFICE VISIT (OUTPATIENT)
Facility: CLINIC | Age: 39
End: 2024-02-12
Payer: MEDICAID

## 2024-02-12 VITALS
OXYGEN SATURATION: 97 % | HEART RATE: 91 BPM | WEIGHT: 159 LBS | TEMPERATURE: 97.9 F | RESPIRATION RATE: 20 BRPM | BODY MASS INDEX: 31.22 KG/M2 | HEIGHT: 60 IN | SYSTOLIC BLOOD PRESSURE: 132 MMHG | DIASTOLIC BLOOD PRESSURE: 86 MMHG

## 2024-02-12 DIAGNOSIS — E55.9 HYPOVITAMINOSIS D: ICD-10-CM

## 2024-02-12 DIAGNOSIS — E11.65 TYPE 2 DIABETES MELLITUS WITH HYPERGLYCEMIA, WITHOUT LONG-TERM CURRENT USE OF INSULIN (HCC): ICD-10-CM

## 2024-02-12 DIAGNOSIS — E61.1 IRON DEFICIENCY: ICD-10-CM

## 2024-02-12 DIAGNOSIS — R10.30 LOWER ABDOMINAL PAIN: ICD-10-CM

## 2024-02-12 DIAGNOSIS — I10 ESSENTIAL (PRIMARY) HYPERTENSION: ICD-10-CM

## 2024-02-12 DIAGNOSIS — R10.9 FLANK PAIN: ICD-10-CM

## 2024-02-12 DIAGNOSIS — G62.9 NEUROPATHY: ICD-10-CM

## 2024-02-12 DIAGNOSIS — M54.50 ACUTE LOW BACK PAIN WITHOUT SCIATICA, UNSPECIFIED BACK PAIN LATERALITY: Primary | ICD-10-CM

## 2024-02-12 DIAGNOSIS — G43.909 MIGRAINE WITHOUT STATUS MIGRAINOSUS, NOT INTRACTABLE, UNSPECIFIED MIGRAINE TYPE: ICD-10-CM

## 2024-02-12 DIAGNOSIS — E66.9 OBESITY (BMI 30-39.9): ICD-10-CM

## 2024-02-12 DIAGNOSIS — J45.40 MODERATE PERSISTENT ASTHMA WITHOUT COMPLICATION: ICD-10-CM

## 2024-02-12 DIAGNOSIS — K21.9 GASTRO-ESOPHAGEAL REFLUX DISEASE WITHOUT ESOPHAGITIS: ICD-10-CM

## 2024-02-12 DIAGNOSIS — H66.90 ACUTE OTITIS MEDIA, UNSPECIFIED OTITIS MEDIA TYPE: ICD-10-CM

## 2024-02-12 LAB
BILIRUBIN, URINE, POC: NORMAL
BLOOD URINE, POC: NORMAL
GLUCOSE URINE, POC: NORMAL
KETONES, URINE, POC: NORMAL
LEUKOCYTE ESTERASE, URINE, POC: NORMAL
NITRITE, URINE, POC: NORMAL
PH, URINE, POC: 5.5 (ref 4.6–8)
PROTEIN,URINE, POC: NORMAL
SPECIFIC GRAVITY, URINE, POC: 1.02 (ref 1–1.03)
URINALYSIS CLARITY, POC: CLEAR
URINALYSIS COLOR, POC: YELLOW
UROBILINOGEN, POC: NORMAL

## 2024-02-12 PROCEDURE — 3079F DIAST BP 80-89 MM HG: CPT | Performed by: FAMILY MEDICINE

## 2024-02-12 PROCEDURE — 99215 OFFICE O/P EST HI 40 MIN: CPT | Performed by: FAMILY MEDICINE

## 2024-02-12 PROCEDURE — 3044F HG A1C LEVEL LT 7.0%: CPT | Performed by: FAMILY MEDICINE

## 2024-02-12 PROCEDURE — 81001 URINALYSIS AUTO W/SCOPE: CPT | Performed by: FAMILY MEDICINE

## 2024-02-12 PROCEDURE — 3075F SYST BP GE 130 - 139MM HG: CPT | Performed by: FAMILY MEDICINE

## 2024-02-12 RX ORDER — AZITHROMYCIN 250 MG/1
250 TABLET, FILM COATED ORAL SEE ADMIN INSTRUCTIONS
Qty: 6 TABLET | Refills: 0 | Status: SHIPPED | OUTPATIENT
Start: 2024-02-12 | End: 2024-02-17

## 2024-02-12 NOTE — PROGRESS NOTES
\"Have you been to the ER, urgent care clinic since your last visit?  Hospitalized since your last visit?\"    NO    “Have you seen or consulted any other health care providers outside of [facility] since your last visit?”    NO         
normal  Abdomen -mild suprapubic discomfort to palpation, no masses organomegaly  Back exam - limited range of motion, pain with motion noted during exam, tenderness noted paralumbar muscles  Neurological - neck supple without rigidity  Extremities - intact peripheral pulses      Results  No results found for this visit on 02/12/24.    ASSESSMENT and PLAN    ICD-10-CM    1. Acute low back pain without sciatica, unspecified back pain laterality  M54.50 AMB POC URINALYSIS DIP STICK AUTO W/ MICRO      2. Flank pain  R10.9       3. Type 2 diabetes mellitus with hyperglycemia, without long-term current use of insulin (HCC)  E11.65 Hemoglobin A1C     Lipid Panel     Comprehensive Metabolic Panel     CBC with Auto Differential      4. Lower abdominal pain  R10.30       5. Hypovitaminosis D  E55.9 Vitamin D 25 Hydroxy      6. Acute otitis media, unspecified otitis media type  H66.90 azithromycin (ZITHROMAX) 250 MG tablet      7. Iron deficiency  E61.1 Iron and TIBC     Ferritin      8. Gastro-esophageal reflux disease without esophagitis  K21.9       9. Neuropathy  G62.9       10. Obesity (BMI 30-39.9)  E66.9       11. Essential (primary) hypertension  I10       12. Migraine without status migrainosus, not intractable, unspecified migraine type  G43.909       13. Moderate persistent asthma without complication  J45.40       lab results and schedule of future lab studies reviewed with patient  reviewed diet, exercise and weight control  cardiovascular risk and specific lipid/LDL goals reviewed  reviewed medications and side effects in detail  radiology results and schedule of future radiology studies reviewed with patient  I spent 40 minutes with this established patient.    I have discussed the diagnosis with the patient and the intended plan of care as seen in the above orders. The patient has received an after-visit summary and questions were answered concerning future plans. I have discussed medication, side effects, and

## 2024-02-14 LAB
25(OH)D3+25(OH)D2 SERPL-MCNC: 26 NG/ML (ref 30–100)
ALBUMIN SERPL-MCNC: 4.6 G/DL (ref 3.9–4.9)
ALBUMIN/GLOB SERPL: 1.7 {RATIO} (ref 1.2–2.2)
ALP SERPL-CCNC: 97 IU/L (ref 44–121)
ALT SERPL-CCNC: 14 IU/L (ref 0–32)
AST SERPL-CCNC: 17 IU/L (ref 0–40)
BASOPHILS # BLD AUTO: 0 X10E3/UL (ref 0–0.2)
BASOPHILS NFR BLD AUTO: 1 %
BILIRUB SERPL-MCNC: 0.5 MG/DL (ref 0–1.2)
BUN SERPL-MCNC: 13 MG/DL (ref 6–20)
BUN/CREAT SERPL: 16 (ref 9–23)
CALCIUM SERPL-MCNC: 9.4 MG/DL (ref 8.7–10.2)
CHLORIDE SERPL-SCNC: 105 MMOL/L (ref 96–106)
CHOLEST SERPL-MCNC: 184 MG/DL (ref 100–199)
CO2 SERPL-SCNC: 20 MMOL/L (ref 20–29)
CREAT SERPL-MCNC: 0.79 MG/DL (ref 0.57–1)
EGFRCR SERPLBLD CKD-EPI 2021: 98 ML/MIN/1.73
EOSINOPHIL # BLD AUTO: 0.1 X10E3/UL (ref 0–0.4)
EOSINOPHIL NFR BLD AUTO: 1 %
ERYTHROCYTE [DISTWIDTH] IN BLOOD BY AUTOMATED COUNT: 13.7 % (ref 11.7–15.4)
FERRITIN SERPL-MCNC: 140 NG/ML (ref 15–150)
GLOBULIN SER CALC-MCNC: 2.7 G/DL (ref 1.5–4.5)
GLUCOSE SERPL-MCNC: 71 MG/DL (ref 70–99)
HBA1C MFR BLD: 5.9 % (ref 4.8–5.6)
HCT VFR BLD AUTO: 38.9 % (ref 34–46.6)
HDLC SERPL-MCNC: 37 MG/DL
HGB BLD-MCNC: 12.3 G/DL (ref 11.1–15.9)
IMM GRANULOCYTES # BLD AUTO: 0 X10E3/UL (ref 0–0.1)
IMM GRANULOCYTES NFR BLD AUTO: 0 %
IRON SATN MFR SERPL: 16 % (ref 15–55)
IRON SERPL-MCNC: 58 UG/DL (ref 27–159)
LDLC SERPL CALC-MCNC: 132 MG/DL (ref 0–99)
LYMPHOCYTES # BLD AUTO: 1.7 X10E3/UL (ref 0.7–3.1)
LYMPHOCYTES NFR BLD AUTO: 38 %
MCH RBC QN AUTO: 26.9 PG (ref 26.6–33)
MCHC RBC AUTO-ENTMCNC: 31.6 G/DL (ref 31.5–35.7)
MCV RBC AUTO: 85 FL (ref 79–97)
MONOCYTES # BLD AUTO: 0.3 X10E3/UL (ref 0.1–0.9)
MONOCYTES NFR BLD AUTO: 6 %
NEUTROPHILS # BLD AUTO: 2.3 X10E3/UL (ref 1.4–7)
NEUTROPHILS NFR BLD AUTO: 54 %
PLATELET # BLD AUTO: 220 X10E3/UL (ref 150–450)
POTASSIUM SERPL-SCNC: 4 MMOL/L (ref 3.5–5.2)
PROT SERPL-MCNC: 7.3 G/DL (ref 6–8.5)
RBC # BLD AUTO: 4.58 X10E6/UL (ref 3.77–5.28)
SODIUM SERPL-SCNC: 141 MMOL/L (ref 134–144)
TIBC SERPL-MCNC: 368 UG/DL (ref 250–450)
TRIGL SERPL-MCNC: 82 MG/DL (ref 0–149)
UIBC SERPL-MCNC: 310 UG/DL (ref 131–425)
VLDLC SERPL CALC-MCNC: 15 MG/DL (ref 5–40)
WBC # BLD AUTO: 4.3 X10E3/UL (ref 3.4–10.8)

## 2024-03-25 DIAGNOSIS — N63.20 MASS OF LEFT BREAST, UNSPECIFIED QUADRANT: Primary | ICD-10-CM

## 2024-03-25 DIAGNOSIS — N63.20 MASS OF LEFT BREAST, UNSPECIFIED QUADRANT: ICD-10-CM

## 2024-04-01 DIAGNOSIS — E11.65 TYPE 2 DIABETES MELLITUS WITH HYPERGLYCEMIA, UNSPECIFIED WHETHER LONG TERM INSULIN USE (HCC): ICD-10-CM

## 2024-04-05 ENCOUNTER — OFFICE VISIT (OUTPATIENT)
Age: 39
End: 2024-04-05
Payer: MEDICAID

## 2024-04-05 VITALS
WEIGHT: 155 LBS | OXYGEN SATURATION: 97 % | HEIGHT: 59 IN | DIASTOLIC BLOOD PRESSURE: 101 MMHG | SYSTOLIC BLOOD PRESSURE: 166 MMHG | HEART RATE: 83 BPM | BODY MASS INDEX: 31.25 KG/M2

## 2024-04-05 DIAGNOSIS — R53.83 OTHER FATIGUE: ICD-10-CM

## 2024-04-05 DIAGNOSIS — R06.02 SHORTNESS OF BREATH: ICD-10-CM

## 2024-04-05 DIAGNOSIS — E78.5 HYPERLIPIDEMIA, UNSPECIFIED HYPERLIPIDEMIA TYPE: ICD-10-CM

## 2024-04-05 DIAGNOSIS — I10 ESSENTIAL (PRIMARY) HYPERTENSION: ICD-10-CM

## 2024-04-05 DIAGNOSIS — I10 ESSENTIAL (PRIMARY) HYPERTENSION: Primary | ICD-10-CM

## 2024-04-05 PROCEDURE — 3079F DIAST BP 80-89 MM HG: CPT | Performed by: INTERNAL MEDICINE

## 2024-04-05 PROCEDURE — 3075F SYST BP GE 130 - 139MM HG: CPT | Performed by: INTERNAL MEDICINE

## 2024-04-05 PROCEDURE — 99214 OFFICE O/P EST MOD 30 MIN: CPT | Performed by: INTERNAL MEDICINE

## 2024-04-05 PROCEDURE — 93000 ELECTROCARDIOGRAM COMPLETE: CPT | Performed by: INTERNAL MEDICINE

## 2024-04-05 ASSESSMENT — ENCOUNTER SYMPTOMS
EYES NEGATIVE: 1
GASTROINTESTINAL NEGATIVE: 1
SHORTNESS OF BREATH: 1

## 2024-04-05 ASSESSMENT — PATIENT HEALTH QUESTIONNAIRE - PHQ9
2. FEELING DOWN, DEPRESSED OR HOPELESS: NOT AT ALL
SUM OF ALL RESPONSES TO PHQ QUESTIONS 1-9: 0
1. LITTLE INTEREST OR PLEASURE IN DOING THINGS: NOT AT ALL
SUM OF ALL RESPONSES TO PHQ QUESTIONS 1-9: 0
SUM OF ALL RESPONSES TO PHQ9 QUESTIONS 1 & 2: 0

## 2024-04-05 NOTE — PROGRESS NOTES
Travis Lujan is a 39 y.o. year old female.    Follow-up of hypertension, hyperlipidemia, family history of CAD  History of diabetes    7/2022  Patient is seen today for follow-up of her chronic conditions.  Patient continues to have chest pains more frequently left-sided.  Episodic.  Shortness of breath stable  Recent flareup of asthma for which she is under care of pulmonary  8/2022  Patient seen in follow-up for chest pain.  On Aug 4th was diagnosed with COVID.  She complains of dyspnea on exertion, with memory loss since being diagnosed with COVID.  She complains of occasional chest pressure worse with a deep breath or raising arms overhead.  She denies palpitations or edema.  11/2022  Patient seen in follow-up for atypical chest pain.  She reports has recovered from COVID.  She complains of occasional chest pressure  She denies palpitations or edema.  4/5/2024 had an episode of syncope while waiting in line for food at a family function. 1 day prior, had severe dizziness and feeling of heart all over the body while standing on the porch.  Has been nauseous and having intermittent sweating for the last 3 to 4 weeks.  Feels funny in the left side of the body.  Denies any pain.  Dyspnea on exertion persists if she goes up the steps. No edema.          Review of Systems   Constitutional: Negative.    HENT: Negative.     Eyes: Negative.    Respiratory:  Positive for shortness of breath.    Cardiovascular: Negative.    Gastrointestinal: Negative.    Endocrine: Negative.    Genitourinary: Negative.    Musculoskeletal: Negative.    Neurological:  Positive for dizziness and syncope.   Psychiatric/Behavioral: Negative.     All other systems reviewed and are negative.        Physical Exam  Vitals and nursing note reviewed.   Constitutional:       Appearance: Normal appearance. She is obese.   HENT:      Head: Normocephalic and atraumatic.      Nose: Nose normal.   Eyes:      Conjunctiva/sclera: Conjunctivae normal.

## 2024-04-07 LAB
ALBUMIN SERPL-MCNC: 4.3 G/DL (ref 3.9–4.9)
ALP SERPL-CCNC: 99 IU/L (ref 44–121)
ALT SERPL-CCNC: 17 IU/L (ref 0–32)
AST SERPL-CCNC: 23 IU/L (ref 0–40)
BILIRUB DIRECT SERPL-MCNC: <0.1 MG/DL (ref 0–0.4)
BILIRUB SERPL-MCNC: 0.4 MG/DL (ref 0–1.2)
BUN SERPL-MCNC: 10 MG/DL (ref 6–20)
BUN/CREAT SERPL: 11 (ref 9–23)
CALCIUM SERPL-MCNC: 9.3 MG/DL (ref 8.7–10.2)
CHLORIDE SERPL-SCNC: 104 MMOL/L (ref 96–106)
CO2 SERPL-SCNC: 22 MMOL/L (ref 20–29)
CREAT SERPL-MCNC: 0.93 MG/DL (ref 0.57–1)
EGFRCR SERPLBLD CKD-EPI 2021: 80 ML/MIN/1.73
ERYTHROCYTE [DISTWIDTH] IN BLOOD BY AUTOMATED COUNT: 13.4 % (ref 11.7–15.4)
GLUCOSE SERPL-MCNC: 89 MG/DL (ref 70–99)
HCT VFR BLD AUTO: 38.9 % (ref 34–46.6)
HGB BLD-MCNC: 12.7 G/DL (ref 11.1–15.9)
MAGNESIUM SERPL-MCNC: 2.2 MG/DL (ref 1.6–2.3)
MCH RBC QN AUTO: 27.6 PG (ref 26.6–33)
MCHC RBC AUTO-ENTMCNC: 32.6 G/DL (ref 31.5–35.7)
MCV RBC AUTO: 85 FL (ref 79–97)
PLATELET # BLD AUTO: 231 X10E3/UL (ref 150–450)
POTASSIUM SERPL-SCNC: 4.4 MMOL/L (ref 3.5–5.2)
PROT SERPL-MCNC: 6.9 G/DL (ref 6–8.5)
RBC # BLD AUTO: 4.6 X10E6/UL (ref 3.77–5.28)
SODIUM SERPL-SCNC: 143 MMOL/L (ref 134–144)
TSH SERPL DL<=0.005 MIU/L-ACNC: 3.18 UIU/ML (ref 0.45–4.5)
WBC # BLD AUTO: 5.9 X10E3/UL (ref 3.4–10.8)

## 2024-04-08 DIAGNOSIS — E11.65 TYPE 2 DIABETES MELLITUS WITH HYPERGLYCEMIA, UNSPECIFIED WHETHER LONG TERM INSULIN USE (HCC): ICD-10-CM

## 2024-04-08 DIAGNOSIS — M54.12 BRACHIAL NEURALGIA: ICD-10-CM

## 2024-04-08 RX ORDER — EMPAGLIFLOZIN 10 MG/1
10 TABLET, FILM COATED ORAL DAILY
Qty: 90 TABLET | Refills: 1 | OUTPATIENT
Start: 2024-04-08

## 2024-04-11 ENCOUNTER — OFFICE VISIT (OUTPATIENT)
Age: 39
End: 2024-04-11
Payer: MEDICAID

## 2024-04-11 VITALS
RESPIRATION RATE: 18 BRPM | OXYGEN SATURATION: 96 % | SYSTOLIC BLOOD PRESSURE: 127 MMHG | DIASTOLIC BLOOD PRESSURE: 83 MMHG | BODY MASS INDEX: 31.33 KG/M2 | HEIGHT: 59 IN | WEIGHT: 155.4 LBS | TEMPERATURE: 98.3 F | HEART RATE: 87 BPM

## 2024-04-11 DIAGNOSIS — M79.7 FIBROMYALGIA: ICD-10-CM

## 2024-04-11 DIAGNOSIS — M54.12 BRACHIAL NEURALGIA: Primary | ICD-10-CM

## 2024-04-11 DIAGNOSIS — M25.511 CHRONIC RIGHT SHOULDER PAIN: ICD-10-CM

## 2024-04-11 DIAGNOSIS — M47.812 CERVICAL SPONDYLOSIS: ICD-10-CM

## 2024-04-11 DIAGNOSIS — G56.01 CARPAL TUNNEL SYNDROME OF RIGHT WRIST: ICD-10-CM

## 2024-04-11 DIAGNOSIS — G89.29 CHRONIC RIGHT SHOULDER PAIN: ICD-10-CM

## 2024-04-11 PROCEDURE — 3079F DIAST BP 80-89 MM HG: CPT | Performed by: PHYSICAL MEDICINE & REHABILITATION

## 2024-04-11 PROCEDURE — 3074F SYST BP LT 130 MM HG: CPT | Performed by: PHYSICAL MEDICINE & REHABILITATION

## 2024-04-11 PROCEDURE — 99214 OFFICE O/P EST MOD 30 MIN: CPT | Performed by: PHYSICAL MEDICINE & REHABILITATION

## 2024-04-11 RX ORDER — PREDNISONE 10 MG/1
TABLET ORAL
COMMUNITY
Start: 2024-04-04

## 2024-04-11 RX ORDER — GABAPENTIN 400 MG/1
CAPSULE ORAL
Qty: 120 CAPSULE | Refills: 5 | Status: SHIPPED | OUTPATIENT
Start: 2024-04-11 | End: 2024-06-11

## 2024-04-11 NOTE — PROGRESS NOTES
[facility]    [address] [place] [address], [address]  [place] [address]  Phone: [phone]  Fax: [phone]        Travis Lujan  : 1985  PCP: Jesus Brady MD    PROGRESS NOTE      ASSESSMENT AND PLAN    Travis was seen today for pain, shoulder pain and back problem.    Diagnoses and all orders for this visit:    Brachial neuralgia  -     gabapentin (NEURONTIN) 400 MG capsule; One po bid and 1-2 qhs    Carpal tunnel syndrome of right wrist  -     gabapentin (NEURONTIN) 400 MG capsule; One po bid and 1-2 qhs    Cervical spondylosis    Fibromyalgia  -     gabapentin (NEURONTIN) 400 MG capsule; One po bid and 1-2 qhs    Chronic right shoulder pain        Travis Lujan is a 39 y.o. female with history of fibromyalgia, chronic neck pain and right CTS.  Presenting with vague complaints of diffuse left-sided paresthesias.  No focal deficit.  No hyperreflexia.  Recommended patient see Dr. Boswell for her left-sided paresthesias symptoms, this may be a migraine equivalent.  Rf Gabapentin 400 mg TID. Patient may take 800 mg for nighttime dose as needed.  Advised patient to reserve Flexeril for nighttime pain as tolerated.  Patient can call for new referral to PT when her dizzy spells have been managed.     Follow-up and Dispositions    Return in about 3 months (around 2024).           HISTORY OF PRESENT ILLNESS    Travis Lujan is a 39 y.o. female presents for follow up of neck, shoulder, and arm pain. At her last OV (2024), referred patient to PT and Rf Gabapentin 400 mg TID and 400-800 mg qHS. Discussed referral for shoulder if not improving.    Patient reports suffering a recent fall last month (2024) after her left knee gave out. Additionally, she states that she passed out on .     Patient describes left-sided weakness and dizzy spells that started before her fall last month.  She is seeing cardiology.      She explains that she

## 2024-04-11 NOTE — PROGRESS NOTES
Travis Lujan presents today for   Chief Complaint   Patient presents with    Pain    Shoulder Pain    Back Problem       Is someone accompanying this pt? no    Is the patient using any DME equipment during OV? no    Depression Screening:       No data to display                Learning Assessment:  Failed to redirect to the Timeline version of the delicious SmartLink.    Abuse Screening:       No data to display                Fall Risk  Failed to redirect to the Timeline version of the delicious SmartLink.    OPIOID RISK TOOL  Failed to redirect to the Timeline version of the delicious SmartLink.    Coordination of Care:  1. Have you been to the ER, urgent care clinic since your last visit? no  Hospitalized since your last visit? no    2. Have you seen or consulted any other health care providers outside of the [facility] since your last visit? no Include any pap smears or colon screening. no

## 2024-04-15 ENCOUNTER — HOSPITAL ENCOUNTER (OUTPATIENT)
Facility: HOSPITAL | Age: 39
Discharge: HOME | End: 2024-04-18
Attending: SURGERY
Payer: MEDICARE

## 2024-04-15 VITALS — HEIGHT: 59 IN | BODY MASS INDEX: 31.37 KG/M2

## 2024-04-15 DIAGNOSIS — N63.20 MASS OF LEFT BREAST, UNSPECIFIED QUADRANT: ICD-10-CM

## 2024-04-15 PROCEDURE — G0279 TOMOSYNTHESIS, MAMMO: HCPCS

## 2024-04-28 DIAGNOSIS — E11.65 TYPE 2 DIABETES MELLITUS WITH HYPERGLYCEMIA, UNSPECIFIED WHETHER LONG TERM INSULIN USE (HCC): ICD-10-CM

## 2024-04-29 RX ORDER — EMPAGLIFLOZIN 10 MG/1
10 TABLET, FILM COATED ORAL DAILY
Qty: 90 TABLET | Refills: 1 | Status: SHIPPED | OUTPATIENT
Start: 2024-04-29

## 2024-04-29 NOTE — TELEPHONE ENCOUNTER
Last seen 2/12/2024   Last labs   Last filled  8/3/23  Next appointment 5/8/2024     Lab Results   Component Value Date     04/05/2024    K 4.4 04/05/2024     04/05/2024    CO2 22 04/05/2024    BUN 10 04/05/2024    CREATININE 0.93 04/05/2024    GLUCOSE 89 04/05/2024    CALCIUM 9.3 04/05/2024    PROT 6.9 04/05/2024    BILITOT 0.4 04/05/2024    ALKPHOS 99 04/05/2024    AST 23 04/05/2024    ALT 17 04/05/2024    LABGLOM 80 04/05/2024    GFRAA 117 12/09/2021    AGRATIO 1.7 02/13/2024    GLOB 4.4 09/21/2020

## 2024-05-08 ENCOUNTER — OFFICE VISIT (OUTPATIENT)
Facility: CLINIC | Age: 39
End: 2024-05-08
Payer: MEDICARE

## 2024-05-08 VITALS
OXYGEN SATURATION: 96 % | HEART RATE: 90 BPM | DIASTOLIC BLOOD PRESSURE: 78 MMHG | SYSTOLIC BLOOD PRESSURE: 131 MMHG | WEIGHT: 153 LBS | HEIGHT: 59 IN | TEMPERATURE: 98.1 F | RESPIRATION RATE: 20 BRPM | BODY MASS INDEX: 30.84 KG/M2

## 2024-05-08 DIAGNOSIS — R53.83 MALAISE AND FATIGUE: ICD-10-CM

## 2024-05-08 DIAGNOSIS — I10 ESSENTIAL (PRIMARY) HYPERTENSION: ICD-10-CM

## 2024-05-08 DIAGNOSIS — E78.5 HYPERLIPIDEMIA, UNSPECIFIED HYPERLIPIDEMIA TYPE: ICD-10-CM

## 2024-05-08 DIAGNOSIS — R55 SYNCOPE, UNSPECIFIED SYNCOPE TYPE: ICD-10-CM

## 2024-05-08 DIAGNOSIS — E11.21 TYPE 2 DIABETES WITH NEPHROPATHY (HCC): Primary | ICD-10-CM

## 2024-05-08 DIAGNOSIS — G43.909 MIGRAINE WITHOUT STATUS MIGRAINOSUS, NOT INTRACTABLE, UNSPECIFIED MIGRAINE TYPE: ICD-10-CM

## 2024-05-08 DIAGNOSIS — R53.81 MALAISE AND FATIGUE: ICD-10-CM

## 2024-05-08 DIAGNOSIS — E11.9 COMPREHENSIVE DIABETIC FOOT EXAMINATION, TYPE 2 DM, ENCOUNTER FOR (HCC): ICD-10-CM

## 2024-05-08 DIAGNOSIS — F39 UNSPECIFIED MOOD (AFFECTIVE) DISORDER (HCC): ICD-10-CM

## 2024-05-08 DIAGNOSIS — E55.9 HYPOVITAMINOSIS D: ICD-10-CM

## 2024-05-08 DIAGNOSIS — G62.9 NEUROPATHY: ICD-10-CM

## 2024-05-08 DIAGNOSIS — E66.9 OBESITY (BMI 30-39.9): ICD-10-CM

## 2024-05-08 DIAGNOSIS — E11.21 TYPE 2 DIABETES WITH NEPHROPATHY (HCC): ICD-10-CM

## 2024-05-08 DIAGNOSIS — R29.810 FACIAL WEAKNESS: ICD-10-CM

## 2024-05-08 DIAGNOSIS — K21.9 GASTRO-ESOPHAGEAL REFLUX DISEASE WITHOUT ESOPHAGITIS: ICD-10-CM

## 2024-05-08 PROCEDURE — 3075F SYST BP GE 130 - 139MM HG: CPT | Performed by: FAMILY MEDICINE

## 2024-05-08 PROCEDURE — 3044F HG A1C LEVEL LT 7.0%: CPT | Performed by: FAMILY MEDICINE

## 2024-05-08 PROCEDURE — 99215 OFFICE O/P EST HI 40 MIN: CPT | Performed by: FAMILY MEDICINE

## 2024-05-08 PROCEDURE — 3078F DIAST BP <80 MM HG: CPT | Performed by: FAMILY MEDICINE

## 2024-05-08 ASSESSMENT — PATIENT HEALTH QUESTIONNAIRE - PHQ9
1. LITTLE INTEREST OR PLEASURE IN DOING THINGS: NOT AT ALL
SUM OF ALL RESPONSES TO PHQ QUESTIONS 1-9: 0
2. FEELING DOWN, DEPRESSED OR HOPELESS: NOT AT ALL
SUM OF ALL RESPONSES TO PHQ QUESTIONS 1-9: 0
SUM OF ALL RESPONSES TO PHQ QUESTIONS 1-9: 0
SUM OF ALL RESPONSES TO PHQ9 QUESTIONS 1 & 2: 0
SUM OF ALL RESPONSES TO PHQ QUESTIONS 1-9: 0

## 2024-05-08 ASSESSMENT — LIFESTYLE VARIABLES
HOW OFTEN DO YOU HAVE A DRINK CONTAINING ALCOHOL: NEVER
HOW MANY STANDARD DRINKS CONTAINING ALCOHOL DO YOU HAVE ON A TYPICAL DAY: PATIENT DOES NOT DRINK

## 2024-05-08 NOTE — PROGRESS NOTES
[facility]  Travis Lujan comes in for follow up care.  Syncope: Patient had episode of syncope.  She was seen by the cardiologist, had EKG and echocardiogram, will request records.  She denies chest pain, shortness of breath or diaphoresis.  She denies any seizure activity.  Facial weakness: Patient was noted to have facial weakness.  She has been seen by the neurologist, awaits EEG, MRI.  She will continue with the current management plan as recommended by the specialist.  Headache: Patient has migraine headache.  She is followed up with a neurologist.  She is on Aimovig and Maxalt as needed for acute migraine.  She will continue with the current treatment plan.  Generalized weakness: has generalized weakness but more so on the left side.  HTN: Patient has hypertension.  Blood pressure is stable.  Denies headache, changes in vision or focal weakness.  She is on atenolol.  Will continue current treatment plan.  DM2: Patient has type 2 diabetes mellitus.  Her last hba1c was 5.9.  Patient is on Jardiance, Trulicity.  Will continue current treatment plan.  Dyslipidemia: Patient has dyslipidemia.  She is on Crestor.  Stable on medication.  She will exercise and take a diet low in polysaturated fats.  Neuropathy: Patient has neuropathy.  She gets numbness and tingling of the hands and the feet.  She is followed up by the neurologist.  She is on gabapentin.  Continue current treatment plan.  GERD: Patient has gastroesophageal reflux disease.  Patient is on esomeprazole.  She is stable on medication.  Continue current treatment plan.  Mood disorder: Patient has history of mood disorder.  Patient is doing lifestyle and dietary modification.  Previously was on SSRIs but no longer on the medication.  Continue current treatment plan.  Fibromyalgia: Patient has a history of fibromyalgia.  Gets generalized joint aches and muscle aches.  She uses meloxicam as needed.  She is also on Flexeril.  Will continue current treatment

## 2024-05-08 NOTE — ACP (ADVANCE CARE PLANNING)
Advance Care Planning     Advance Care Planning (ACP) Physician/NP/PA Conversation    Date of Conversation: 5/8/2024  Conducted with: Patient with Decision Making Capacity  Other persons present: None    Healthcare Decision Maker:   Primary Decision Maker (Active): Larissa Welsh - [place] - [phone]  Click here to complete Healthcare Decision Makers including selection of the Healthcare Decision Maker Relationship (ie \"Primary\").   Today we documented Decision Maker(s) consistent with Legal Next of Kin hierarchy.    Care Preferences:    Hospitalization:  \"If your health worsens and it becomes clear that your chance of recovery is unlikely, what would be your preference regarding hospitalization?\"  The patient would prefer hospitalization.    Ventilation:  \"If you were unable to breath on your own and your chance of recovery was unlikely, what would be your preference about the use of a ventilator (breathing machine) if it was available to you?\"  The patient would desire the use of a ventilator.    Resuscitation:  \"In the event your heart stopped as a result of an underlying serious health condition, would you want attempts made to restart your heart, or would you prefer a natural death?\"  Yes, attempt to resuscitate.    treatment goals, ventilation preferences, hospitalization preferences, and resuscitation preferences    Conversation Outcomes / Follow-Up Plan:  ACP in process - information provided, considering goals and options  Reviewed DNR/DNI and patient elects Full Code (Attempt Resuscitation)    Length of Voluntary ACP Conversation in minutes:  16 minutes    Jesus Brady MD

## 2024-05-08 NOTE — PROGRESS NOTES
1. \"Have you been to the ER, urgent care clinic since your last visit?  Hospitalized since your last visit?\"No    2. \"Have you seen or consulted any other health care providers outside of the [facility] since your last visit?\" No    3. For patients aged 45-75: Has the patient had a colonoscopy / FIT/ Cologuard? Not applicable      If the patient is female:    4. For patients aged 40-74: Has the patient had a mammogram within the past 2 years? Yes      5. For patients aged 21-65: Has the patient had a pap smear? Yes

## 2024-05-13 ENCOUNTER — OFFICE VISIT (OUTPATIENT)
Age: 39
End: 2024-05-13
Payer: MEDICARE

## 2024-05-13 VITALS
WEIGHT: 153.4 LBS | DIASTOLIC BLOOD PRESSURE: 84 MMHG | HEART RATE: 97 BPM | TEMPERATURE: 96.9 F | HEIGHT: 59 IN | BODY MASS INDEX: 30.92 KG/M2 | RESPIRATION RATE: 14 BRPM | SYSTOLIC BLOOD PRESSURE: 122 MMHG | OXYGEN SATURATION: 99 %

## 2024-05-13 DIAGNOSIS — N64.4 PAIN OF BOTH BREASTS: ICD-10-CM

## 2024-05-13 DIAGNOSIS — N63.20 MASS OF LEFT BREAST, UNSPECIFIED QUADRANT: Primary | ICD-10-CM

## 2024-05-13 PROCEDURE — 3074F SYST BP LT 130 MM HG: CPT | Performed by: SURGERY

## 2024-05-13 PROCEDURE — 3079F DIAST BP 80-89 MM HG: CPT | Performed by: SURGERY

## 2024-05-13 PROCEDURE — 99214 OFFICE O/P EST MOD 30 MIN: CPT | Performed by: SURGERY

## 2024-05-13 ASSESSMENT — ENCOUNTER SYMPTOMS
SHORTNESS OF BREATH: 0
CHEST TIGHTNESS: 0

## 2024-05-13 NOTE — PROGRESS NOTES
Travis Lujan is a 39 y.o. female (: 1985)     Chief Complaint   Patient presents with    Follow-up     Bilateral breast       Medication list and allergies have been reviewed with Travis Lujan and updated as of today's date.     I have gone over all Medical, Surgical and Social History with Travis Lujan and updated/added the information accordingly.      1. Have you been to the ER, urgent care clinic since your last visit?  Hospitalized since your last visit?No    2. Have you seen or consulted any other health care providers outside of the [facility] since your last visit?  Include any pap smears or colon screening. No

## 2024-05-13 NOTE — PROGRESS NOTES
CC:   Chief Complaint   Patient presents with    Follow-up     Bilateral breast        Assessment:    ICD-10-CM    1. Mass of left breast, unspecified quadrant  N63.20       2. Pain of both breasts  N64.4           Plan: I reviewed the diagnostic bilateral mammogram with her from April 2024 as well as left breast ultrasound which was BI-RADS 2 demonstrating a benign-appearing internal mammary node.  I encouraged her to continue to perform self breast exams monthly and notify us of any changes to her breast including new skin changes, lumps, new pain or nipple discharge.  She understands that pain may improve over time or symptoms could progress. Conservative management is recommended. Recommend she obtain a annual screening mammogram in April 2025 and yearly follow-up.  I would like to see her back prior to that time should she have any questions or concerns.  She agrees with this plan.        HPI:  Travis Lujan is a 38 y.o. female who is here for follow up left breast mass.  She states that she initially developed pain in the right breast which initially triggered her undergoing mammogram but then shortly after that did develop pain in the left breast as well as itching sensation.  This can be very intense in nature and will spontaneously resolve on its own.  No changes to medication nor does she recall any trauma once her times first began.  She does not appreciate any lumps or changes to the breast otherwise.  She states there is family history of breast cancer in maternal cousins and maternal aunts.  Age of menarche is 12 and she has very irregular periods she was placed on the Depo shot which has helped.  Age of prima para was 23 and she did not breast-feed.     Allergies:  Allergies   Allergen Reactions    Iodine Hives and Nausea And Vomiting    Gadolinium Derivatives Hives    Iodinated Contrast Media Hives    Metoclopramide      Other reaction(s): Not Reported This Time    Oxycodone-Acetaminophen Other

## 2024-05-15 LAB
25(OH)D3+25(OH)D2 SERPL-MCNC: 27.6 NG/ML (ref 30–100)
ALBUMIN SERPL-MCNC: 4.5 G/DL (ref 3.9–4.9)
ALBUMIN/GLOB SERPL: 1.7 {RATIO} (ref 1.2–2.2)
ALP SERPL-CCNC: 97 IU/L (ref 44–121)
ALT SERPL-CCNC: 14 IU/L (ref 0–32)
AST SERPL-CCNC: 20 IU/L (ref 0–40)
BASOPHILS # BLD AUTO: 0 X10E3/UL (ref 0–0.2)
BASOPHILS NFR BLD AUTO: 0 %
BILIRUB SERPL-MCNC: 0.4 MG/DL (ref 0–1.2)
BUN SERPL-MCNC: 8 MG/DL (ref 6–20)
BUN/CREAT SERPL: 10 (ref 9–23)
CALCIUM SERPL-MCNC: 8.8 MG/DL (ref 8.7–10.2)
CHLORIDE SERPL-SCNC: 106 MMOL/L (ref 96–106)
CO2 SERPL-SCNC: 20 MMOL/L (ref 20–29)
CREAT SERPL-MCNC: 0.79 MG/DL (ref 0.57–1)
EGFRCR SERPLBLD CKD-EPI 2021: 98 ML/MIN/1.73
EOSINOPHIL # BLD AUTO: 0 X10E3/UL (ref 0–0.4)
EOSINOPHIL NFR BLD AUTO: 1 %
ERYTHROCYTE [DISTWIDTH] IN BLOOD BY AUTOMATED COUNT: 13.8 % (ref 11.7–15.4)
GLOBULIN SER CALC-MCNC: 2.6 G/DL (ref 1.5–4.5)
GLUCOSE SERPL-MCNC: 57 MG/DL (ref 70–99)
HBA1C MFR BLD: 5.9 % (ref 4.8–5.6)
HCT VFR BLD AUTO: 41.5 % (ref 34–46.6)
HGB BLD-MCNC: 13.2 G/DL (ref 11.1–15.9)
IMM GRANULOCYTES # BLD AUTO: 0 X10E3/UL (ref 0–0.1)
IMM GRANULOCYTES NFR BLD AUTO: 0 %
LYMPHOCYTES # BLD AUTO: 1.9 X10E3/UL (ref 0.7–3.1)
LYMPHOCYTES NFR BLD AUTO: 38 %
MCH RBC QN AUTO: 27 PG (ref 26.6–33)
MCHC RBC AUTO-ENTMCNC: 31.8 G/DL (ref 31.5–35.7)
MCV RBC AUTO: 85 FL (ref 79–97)
MONOCYTES # BLD AUTO: 0.2 X10E3/UL (ref 0.1–0.9)
MONOCYTES NFR BLD AUTO: 4 %
NEUTROPHILS # BLD AUTO: 2.9 X10E3/UL (ref 1.4–7)
NEUTROPHILS NFR BLD AUTO: 57 %
PLATELET # BLD AUTO: 246 X10E3/UL (ref 150–450)
POTASSIUM SERPL-SCNC: 4.1 MMOL/L (ref 3.5–5.2)
PROT SERPL-MCNC: 7.1 G/DL (ref 6–8.5)
RBC # BLD AUTO: 4.88 X10E6/UL (ref 3.77–5.28)
SODIUM SERPL-SCNC: 143 MMOL/L (ref 134–144)
WBC # BLD AUTO: 5.1 X10E3/UL (ref 3.4–10.8)

## 2024-07-31 NOTE — TELEPHONE ENCOUNTER
Last seen 5/8/2024   Last labs 11/27/2023  Last filled  05/14/2024  Next appointment 8/12/2024     Lab Results   Component Value Date     05/14/2024    K 4.1 05/14/2024     05/14/2024    CO2 20 05/14/2024    BUN 8 05/14/2024    CREATININE 0.79 05/14/2024    GLUCOSE 57 (L) 05/14/2024    CALCIUM 8.8 05/14/2024    BILITOT 0.4 05/14/2024    ALKPHOS 97 05/14/2024    AST 20 05/14/2024    ALT 14 05/14/2024    LABGLOM 98 05/14/2024    GFRAA 117 12/09/2021    AGRATIO 1.7 05/14/2024    GLOB 4.4 09/21/2020           Second Opinion (regarding): Capacity to refuse medications    Reason for Admission:  Danilo Carlisle was admitted for severed depression, hopelessness, helplessness, thoughts of suicide,  posing a risk of harm to herself and others. 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 Patient is barely eating or drinking, though she is taking her meds, she usually vomits afterwards which makes the medications ineffective. Feels nauseous after eating despite poor appetite. Hopeless and helpless themes. The patient does not recognize or acknowledge that she has a mental illness requiring medications/ect/labs/feedings to stabilize her mental status. The patient does not recognize the dangers of not taking the recommended psychiatric medications/not eating/refusing labs. She continues to refuse medications,ect, food. Determination: Based on my independent evaluation of the patient on 9/23/2020, the patient does not have the capacity to refuse the psychiatric medications/labs/feedings/ect currently recommended for her illness. Recommendation: Referral to [facility] for Treatment Over Objection order. The patient does not have a surrogate decision maker or guardian to make these decisions on her behalf.       Signed By:   Lidia Byers NP  9/23/2020

## 2024-08-05 RX ORDER — ATENOLOL 25 MG/1
TABLET ORAL
Qty: 90 TABLET | Refills: 1 | Status: SHIPPED | OUTPATIENT
Start: 2024-08-05

## 2024-08-07 ENCOUNTER — CARE COORDINATION (OUTPATIENT)
Facility: CLINIC | Age: 39
End: 2024-08-07

## 2024-08-07 NOTE — CARE COORDINATION
Care Transitions Note    Initial Call - Call within 2 business days of discharge: Yes    Attempted to reach patient for transitions of care follow up. Unable to reach patient.    Outreach Attempts:   HIPAA compliant voicemail left for patient.     Patient: Travis Lujan    Patient : 1985   MRN: [id number]    Reason for Admission: Diabetic ketoacidosis without coma associated with type 2 diabetes mellitus   Discharge Date: 2024   RURS: No data recorded  Last Discharge Facility  [facility]                   Was this an external facility discharge? 2024    [facility]     Follow Up Appointment:   Patient has hospital follow up appointment scheduled within 7 days of discharge.    Future Appointments         Provider Specialty Dept Phone    2024 10:00 AM Jesus Brady MD Family Medicine [phone]    2024 12:00 PM Tanya Gonzalez MD Orthopedic Surgery [phone]    2024 10:30 AM Vaibhav Vivas MD Cardiology [phone]    2025 11:00 AM Jesus Brady MD Family Medicine [phone]            Plan for follow-up on next business day.      Jaquelin Louise RN

## 2024-08-08 ENCOUNTER — CARE COORDINATION (OUTPATIENT)
Facility: CLINIC | Age: 39
End: 2024-08-08

## 2024-08-08 DIAGNOSIS — R07.9 CHEST PAIN, UNSPECIFIED TYPE: Primary | ICD-10-CM

## 2024-08-08 NOTE — CARE COORDINATION
Care Transitions Note    Initial Call - Call within 2 business days of discharge: Yes    Patient Current Location:  Home: [address]  [facility] [place] [address]    Care Transition Nurse contacted the patient by telephone to perform post hospital discharge assessment, verified name and  as identifiers. Provided introduction to self, and explanation of the Care Transition Nurse role.     Patient: Travis Lujan    Patient : 1985   MRN: [id number]    Reason for Admission: chest pain  Discharge Date:   2024  RURS: No data recorded    Last Discharge Facility       None            Was this an external facility discharge? No    Additional needs identified to be addressed with provider   No needs identified             Method of communication with provider: phone.    Patients top risk factors for readmission: ineffective coping, lack of knowledge about disease, medical condition-Diabetes Mellitus Type 2, and medication management    Interventions to address risk factors:   Education: discussed diabetic diet  Review of patient management of conditions/medications: Discussion about medications    Care Summary Note: CTN spoke with patient via phone. Patient reports feeling generally tired. CTN asked how blood sugars have been and patient report's 200-300's Patient checks blood sugar with a Dexcon meter.  Patient reports that she has spoken with PCP about her blood sugar readings. PCP to make adjustment on Long acting insulin stated by patient. Patient sates she is under a lot of stress. She is a mother of small children and she has been reflective on her father passing at a young age a few years back. Patient has a sister that lives close by that is a nurse and has been very helpful to her.   Patient is agreeable to have CTN follow. CTN will call next business day to follow-up on PCP changes to patient's insulin.Patient has CTN's number to call if there are questions.    Care Transition Nurse reviewed discharge

## 2024-08-12 ENCOUNTER — OFFICE VISIT (OUTPATIENT)
Facility: CLINIC | Age: 39
End: 2024-08-12
Payer: MEDICARE

## 2024-08-12 VITALS
BODY MASS INDEX: 29.35 KG/M2 | TEMPERATURE: 97.7 F | HEART RATE: 93 BPM | SYSTOLIC BLOOD PRESSURE: 134 MMHG | HEIGHT: 59 IN | DIASTOLIC BLOOD PRESSURE: 86 MMHG | RESPIRATION RATE: 16 BRPM | WEIGHT: 145.6 LBS | OXYGEN SATURATION: 100 %

## 2024-08-12 DIAGNOSIS — E11.21 TYPE 2 DIABETES WITH NEPHROPATHY (HCC): Primary | ICD-10-CM

## 2024-08-12 DIAGNOSIS — R11.0 NAUSEA: ICD-10-CM

## 2024-08-12 DIAGNOSIS — J45.40 MODERATE PERSISTENT ASTHMA WITHOUT COMPLICATION: ICD-10-CM

## 2024-08-12 DIAGNOSIS — G43.909 MIGRAINE WITHOUT STATUS MIGRAINOSUS, NOT INTRACTABLE, UNSPECIFIED MIGRAINE TYPE: ICD-10-CM

## 2024-08-12 DIAGNOSIS — G62.9 NEUROPATHY: ICD-10-CM

## 2024-08-12 DIAGNOSIS — E78.5 HYPERLIPIDEMIA, UNSPECIFIED HYPERLIPIDEMIA TYPE: ICD-10-CM

## 2024-08-12 DIAGNOSIS — E87.6 HYPOKALEMIA: ICD-10-CM

## 2024-08-12 DIAGNOSIS — Z09 HOSPITAL DISCHARGE FOLLOW-UP: ICD-10-CM

## 2024-08-12 PROCEDURE — 3044F HG A1C LEVEL LT 7.0%: CPT | Performed by: FAMILY MEDICINE

## 2024-08-12 PROCEDURE — 99215 OFFICE O/P EST HI 40 MIN: CPT | Performed by: FAMILY MEDICINE

## 2024-08-12 PROCEDURE — 3075F SYST BP GE 130 - 139MM HG: CPT | Performed by: FAMILY MEDICINE

## 2024-08-12 PROCEDURE — 1111F DSCHRG MED/CURRENT MED MERGE: CPT | Performed by: FAMILY MEDICINE

## 2024-08-12 PROCEDURE — 3079F DIAST BP 80-89 MM HG: CPT | Performed by: FAMILY MEDICINE

## 2024-08-12 RX ORDER — POTASSIUM CHLORIDE 750 MG/1
10 TABLET, EXTENDED RELEASE ORAL DAILY
Qty: 7 TABLET | Refills: 0 | Status: SHIPPED | OUTPATIENT
Start: 2024-08-12

## 2024-08-12 RX ORDER — FLUCONAZOLE 150 MG/1
150 TABLET ORAL ONCE
Qty: 1 TABLET | Refills: 0 | Status: SHIPPED | OUTPATIENT
Start: 2024-08-12 | End: 2024-08-12

## 2024-08-12 RX ORDER — ONDANSETRON 4 MG/1
4 TABLET, ORALLY DISINTEGRATING ORAL EVERY 8 HOURS PRN
Qty: 30 TABLET | Refills: 1 | Status: SHIPPED | OUTPATIENT
Start: 2024-08-12

## 2024-08-12 SDOH — ECONOMIC STABILITY: INCOME INSECURITY: HOW HARD IS IT FOR YOU TO PAY FOR THE VERY BASICS LIKE FOOD, HOUSING, MEDICAL CARE, AND HEATING?: NOT HARD AT ALL

## 2024-08-12 SDOH — ECONOMIC STABILITY: FOOD INSECURITY: WITHIN THE PAST 12 MONTHS, THE FOOD YOU BOUGHT JUST DIDN'T LAST AND YOU DIDN'T HAVE MONEY TO GET MORE.: NEVER TRUE

## 2024-08-12 SDOH — ECONOMIC STABILITY: FOOD INSECURITY: WITHIN THE PAST 12 MONTHS, YOU WORRIED THAT YOUR FOOD WOULD RUN OUT BEFORE YOU GOT MONEY TO BUY MORE.: NEVER TRUE

## 2024-08-12 ASSESSMENT — PATIENT HEALTH QUESTIONNAIRE - PHQ9
SUM OF ALL RESPONSES TO PHQ QUESTIONS 1-9: 0
SUM OF ALL RESPONSES TO PHQ9 QUESTIONS 1 & 2: 0
1. LITTLE INTEREST OR PLEASURE IN DOING THINGS: NOT AT ALL
SUM OF ALL RESPONSES TO PHQ QUESTIONS 1-9: 0
SUM OF ALL RESPONSES TO PHQ QUESTIONS 1-9: 0
2. FEELING DOWN, DEPRESSED OR HOPELESS: NOT AT ALL
SUM OF ALL RESPONSES TO PHQ QUESTIONS 1-9: 0

## 2024-08-12 NOTE — PROGRESS NOTES
\"Have you been to the ER, urgent care clinic since your last visit?  Hospitalized since your last visit?\"    NO    “Have you seen or consulted any other health care providers outside of [facility] since your last visit?”    NO            Click Here for Release of Records Request    
USE 1-2 SPRAYS INTO EACH NOSTRIL EVERY DAY AS NEEDED      tretinoin (RETIN-A) 0.025 % cream Apply topically daily as needed ceived the following from [facility]: Outside name: tretinoin (RETIN-A) 0.025 % topical cream      triamcinolone (KENALOG) 0.1 % cream ceived the following from [facility]: Outside name: triamcinolone acetonide (KENALOG) 0.1 % topical cream      valACYclovir (VALTREX) 500 MG tablet Take 1 tablet by mouth daily      atenolol (TENORMIN) 25 MG tablet TAKE 1 TABLET BY MOUTH EVERY DAY (Patient not taking: Reported on 8/8/2024) 90 tablet 1    JARDIANCE 10 MG tablet TAKE 1 TABLET BY MOUTH EVERY DAY (Patient not taking: Reported on 8/8/2024) 90 tablet 1    predniSONE (DELTASONE) 10 MG tablet TAKE 4 TABS FOR 4 DAYS, 3 TABS FOR 4 DAYS, 2 TABS FOR 4 DAYS, 1 TAB FOR 4 DAYS 16 DAYS (Patient not taking: Reported on 8/8/2024)      gabapentin (NEURONTIN) 400 MG capsule One po bid and 1-2 qhs 120 capsule 5    meloxicam (MOBIC) 15 MG tablet TAKE 1 TABLET BY MOUTH EVERY DAY FOR 5 DAYS THEN 1 TABLET EVERY DAY AS NEEDED FOR PAIN THEREAFTER (Patient not taking: Reported on 8/8/2024)      [employer] PURELAX 17 GM/SCOOP powder  (Patient not taking: Reported on 8/8/2024)      TRULICITY 0.75 MG/0.5ML SOPN  (Patient not taking: Reported on 8/12/2024)      ondansetron (ZOFRAN-ODT) 4 MG disintegrating tablet DISSOLVE 1 TABLET ON TONGUE 3 TIMES A DAY AS NEEDED FOR NAUSEA (Patient not taking: Reported on 8/8/2024)      montelukast (SINGULAIR) 10 MG tablet Take 1 tablet by mouth nightly (Patient not taking: Reported on 8/8/2024)      rizatriptan (MAXALT-MLT) 10 MG disintegrating tablet Take 1 tablet by mouth daily as needed (Patient not taking: Reported on 8/8/2024)       No current facility-administered medications for this visit.       Allergies  Allergies   Allergen Reactions    Iodine Hives and Nausea And Vomiting    Gadolinium Derivatives Hives    Iodinated Contrast Media Hives

## 2024-08-14 ENCOUNTER — CARE COORDINATION (OUTPATIENT)
Facility: CLINIC | Age: 39
End: 2024-08-14

## 2024-08-14 NOTE — CARE COORDINATION
Care Transitions Note    Follow Up Call     Attempted to reach patient for transitions of care follow up.  Unable to reach patient.      Outreach Attempts:   HIPAA compliant voicemail left for patient.     Care Summary Note: CTN attempted to reach patient no answer HIPAA complaint voicemail left with CTN return call back number.    Follow Up Appointment:   Future Appointments         Provider Specialty Dept Phone    9/5/2024 12:00 PM Tanya Gonzalez MD Orthopedic Surgery [phone]    9/12/2024 2:15 PM Jesus Brady MD Family Medicine [phone]    9/20/2024 10:30 AM Vaibhav Vivas MD Cardiology [phone]    5/12/2025 11:00 AM Jesus Brady MD Family Medicine [phone]            Plan for follow-up call in 2-5 days based on severity of symptoms and risk factors. Plan for next call: symptom management-blood glucose levels  follow-up appointment-appointment scheduled with endocrinologist   medication management-insulin adjustments if made by PCP or specialist    Jaquelin Louise RN

## 2024-08-19 ENCOUNTER — CARE COORDINATION (OUTPATIENT)
Facility: CLINIC | Age: 39
End: 2024-08-19

## 2024-08-19 NOTE — CARE COORDINATION
Care Transitions Note    Follow Up Call     Attempted to reach patient for transitions of care follow up.  Unable to reach patient.      Outreach Attempts:   Multiple attempts to contact patient at phone numbers on file.     Care Summary Note: CTN attempted to reachh patient for follow-up call will Follow-up in 2-5  days    Follow Up Appointment:   Future Appointments         Provider Specialty Dept Phone    9/5/2024 12:00 PM Tanya Gonzalez MD Orthopedic Surgery [phone]    9/12/2024 2:15 PM Jesus Brady MD Family Medicine [phone]    9/20/2024 10:30 AM Vaibhav Vivas MD Cardiology [phone]    5/12/2025 11:00 AM Jesus Brady MD Family Medicine [phone]            Plan for follow-up call in 2-5 days based on severity of symptoms and risk factors. Plan for next call: medication management-insulin blood sugars    Jaquelin Louise RN

## 2024-08-22 ENCOUNTER — CARE COORDINATION (OUTPATIENT)
Facility: CLINIC | Age: 39
End: 2024-08-22

## 2024-08-22 NOTE — CARE COORDINATION
cardiologist office and left a voicemail for the nurse. CTN requested Maggy RN to make Tone HULL aware. Maggy RN confirmed she would notify JEANNA Vargas NP and the office would contact the patient if MD wanted to see the patient in the office again related to elevated blood sugar levels. CTN sent FYI message to patient PCP.   CTN reviewed with patient signs and symptoms of a heart attack also to call her Endocrinologist if she is not seeing any change in glucose levels after adjusting the insulin as ordered by DELLA Wayne. Patient confirmed she understood.        Plan of care updates since last contact:  Review of patient management of conditions/medications: Blood glucose level 400-500 after adjustment in insulin recommendations from Dr. Vargas's endocrinologist. Intermittent chest pain reported by patient.         Advance Care Planning:   Does patient have an Advance Directive: deferred at this time, will discuss on future follow up. .    Medication Review:  Medications changed since last call, reviewed today.         Assessments:  Care Transitions ED Follow Up    Care Transitions Interventions  Diabetes Education: Completed        Do you have any ongoing symptoms?: Yes   Onset of Patient-reported symptoms: In the past 7 days   Patient-reported symptoms: Chest Pain, Other (Comment: elevated glucose levels 400-500)   Do you have all of your prescriptions and are they filled?: No   Were you discharged with any Home Care or Post Acute Services or do you currently have any active services?: No         Do you have any needs or concerns that I can assist you with?: Yes   Identified Barriers: Other             Follow Up Appointment:   Reviewed upcoming appointment(s). and possible  reschedule to be seen earlier with Cardiologist.   Future Appointments         Provider Specialty Dept Phone    9/5/2024 12:00 PM Tanya Gonzalez MD Orthopedic Surgery [phone]    9/12/2024 2:15 PM Jesus Brady MD Family

## 2024-08-23 ENCOUNTER — OFFICE VISIT (OUTPATIENT)
Age: 39
End: 2024-08-23

## 2024-08-23 VITALS
WEIGHT: 146 LBS | BODY MASS INDEX: 29.43 KG/M2 | HEIGHT: 59 IN | DIASTOLIC BLOOD PRESSURE: 80 MMHG | SYSTOLIC BLOOD PRESSURE: 133 MMHG | OXYGEN SATURATION: 98 % | HEART RATE: 94 BPM

## 2024-08-23 DIAGNOSIS — I10 ESSENTIAL (PRIMARY) HYPERTENSION: Primary | ICD-10-CM

## 2024-08-23 DIAGNOSIS — N18.30 TYPE 2 DIABETES MELLITUS WITH STAGE 3 CHRONIC KIDNEY DISEASE, WITH LONG-TERM CURRENT USE OF INSULIN, UNSPECIFIED WHETHER STAGE 3A OR 3B CKD (HCC): ICD-10-CM

## 2024-08-23 DIAGNOSIS — R07.89 OTHER CHEST PAIN: ICD-10-CM

## 2024-08-23 DIAGNOSIS — R07.9 CHEST PAIN, UNSPECIFIED TYPE: ICD-10-CM

## 2024-08-23 DIAGNOSIS — E11.22 TYPE 2 DIABETES MELLITUS WITH STAGE 3 CHRONIC KIDNEY DISEASE, WITH LONG-TERM CURRENT USE OF INSULIN, UNSPECIFIED WHETHER STAGE 3A OR 3B CKD (HCC): ICD-10-CM

## 2024-08-23 DIAGNOSIS — E11.9 TYPE 2 DIABETES MELLITUS WITHOUT COMPLICATION, WITHOUT LONG-TERM CURRENT USE OF INSULIN (HCC): ICD-10-CM

## 2024-08-23 DIAGNOSIS — R53.83 OTHER FATIGUE: ICD-10-CM

## 2024-08-23 DIAGNOSIS — Z82.49 FAMILY HISTORY OF ISCHEMIC HEART DISEASE AND OTHER DISEASES OF THE CIRCULATORY SYSTEM: ICD-10-CM

## 2024-08-23 DIAGNOSIS — Z79.4 TYPE 2 DIABETES MELLITUS WITH STAGE 3 CHRONIC KIDNEY DISEASE, WITH LONG-TERM CURRENT USE OF INSULIN, UNSPECIFIED WHETHER STAGE 3A OR 3B CKD (HCC): ICD-10-CM

## 2024-08-23 RX ORDER — INSULIN DEGLUDEC 100 U/ML
24 INJECTION, SOLUTION SUBCUTANEOUS 3 TIMES DAILY
COMMUNITY

## 2024-08-23 ASSESSMENT — ENCOUNTER SYMPTOMS
SHORTNESS OF BREATH: 1
GASTROINTESTINAL NEGATIVE: 1
EYES NEGATIVE: 1
TROUBLE SWALLOWING: 1

## 2024-08-23 ASSESSMENT — PATIENT HEALTH QUESTIONNAIRE - PHQ9
SUM OF ALL RESPONSES TO PHQ9 QUESTIONS 1 & 2: 0
SUM OF ALL RESPONSES TO PHQ QUESTIONS 1-9: 0
DEPRESSION UNABLE TO ASSESS: FUNCTIONAL CAPACITY MOTIVATION LIMITS ACCURACY
SUM OF ALL RESPONSES TO PHQ QUESTIONS 1-9: 0
2. FEELING DOWN, DEPRESSED OR HOPELESS: NOT AT ALL
SUM OF ALL RESPONSES TO PHQ QUESTIONS 1-9: 0
1. LITTLE INTEREST OR PLEASURE IN DOING THINGS: NOT AT ALL
SUM OF ALL RESPONSES TO PHQ QUESTIONS 1-9: 0

## 2024-08-23 NOTE — PROGRESS NOTES
Travis Lujan is a 39 y.o. year old female.    Follow-up of hypertension, hyperlipidemia, family history of CAD  History of diabetes    7/2022  Patient is seen today for follow-up of her chronic conditions.  Patient continues to have chest pains more frequently left-sided.  Episodic.  Shortness of breath stable  Recent flareup of asthma for which she is under care of pulmonary  8/2022  Patient seen in follow-up for chest pain.  On Aug 4th was diagnosed with COVID.  She complains of dyspnea on exertion, with memory loss since being diagnosed with COVID.  She complains of occasional chest pressure worse with a deep breath or raising arms overhead.  She denies palpitations or edema.  11/2022  Patient seen in follow-up for atypical chest pain.  She reports has recovered from COVID.  She complains of occasional chest pressure  She denies palpitations or edema.  4/5/2024 had an episode of syncope while waiting in line for food at a family function. 1 day prior, had severe dizziness and feeling of heart all over the body while standing on the porch.  Has been nauseous and having intermittent sweating for the last 3 to 4 weeks.  Feels funny in the left side of the body.  Denies any pain.  Dyspnea on exertion persists if she goes up the steps. No edema.  8/2024  Patient seen following recent admission for DKA.  She complains of difficulty swallowing for which she has upcoming appointment with ENT as she also complains of constant chest pain worse with exertion has mild shortness of breath with history of asthma denies palpitations or edema denies dizziness reports having strong family history of CAD.  Patient also reports blood sugars have been difficult to control in the 400-500 range.  She has frequent follow-up with endocrine.          Review of Systems   Constitutional: Negative.    HENT:  Positive for trouble swallowing.    Eyes: Negative.    Respiratory:  Positive for shortness of breath.    Cardiovascular:

## 2024-08-23 NOTE — PROGRESS NOTES
1. Have you been to the ER, urgent care clinic since your last visit?  Hospitalized since your last visit?     Yes, [facility]    2. Have you seen or consulted any other health care providers outside of the [facility] since your last visit?  Include any pap smears or colon screening.      No

## 2024-09-04 ENCOUNTER — CARE COORDINATION (OUTPATIENT)
Facility: CLINIC | Age: 39
End: 2024-09-04

## 2024-09-04 NOTE — CARE COORDINATION
changed?: No  Do you have any questions related to your medications?: No  Do you currently have any active services?: No  Do you have any needs or concerns that I can assist you with?: No  Identified Barriers: Stress, Other  Care Transitions Interventions  Diabetes Education: Completed      Other Interventions:              Upcoming Appointments:    Future Appointments         Provider Specialty Dept Phone    9/5/2024 12:00 PM Tanya Gonzalez MD Orthopedic Surgery [phone]    9/10/2024 7:00 AM [facility] NUCLEAR Cardiology [phone]    9/12/2024 2:15 PM Jesus Brady MD Family Medicine [phone]    10/25/2024 11:30 AM Vaibhav Vivas MD Cardiology [phone]    5/12/2025 11:00 AM Jesus Brady MD Family Medicine [phone]            Patient has agreed to contact primary care provider and/or specialist for any further questions, concerns, or needs.    Jaquelin Louise RN

## 2024-09-05 ENCOUNTER — OFFICE VISIT (OUTPATIENT)
Age: 39
End: 2024-09-05
Payer: MEDICARE

## 2024-09-05 VITALS
BODY MASS INDEX: 29.23 KG/M2 | SYSTOLIC BLOOD PRESSURE: 135 MMHG | WEIGHT: 145 LBS | HEIGHT: 59 IN | OXYGEN SATURATION: 99 % | TEMPERATURE: 96.8 F | DIASTOLIC BLOOD PRESSURE: 89 MMHG | HEART RATE: 82 BPM

## 2024-09-05 DIAGNOSIS — M54.12 BRACHIAL NEURALGIA: Primary | ICD-10-CM

## 2024-09-05 DIAGNOSIS — M79.7 FIBROMYALGIA: ICD-10-CM

## 2024-09-05 DIAGNOSIS — G56.01 CARPAL TUNNEL SYNDROME OF RIGHT WRIST: ICD-10-CM

## 2024-09-05 DIAGNOSIS — M47.812 CERVICAL SPONDYLOSIS: ICD-10-CM

## 2024-09-05 PROCEDURE — 3079F DIAST BP 80-89 MM HG: CPT | Performed by: PHYSICAL MEDICINE & REHABILITATION

## 2024-09-05 PROCEDURE — 99214 OFFICE O/P EST MOD 30 MIN: CPT | Performed by: PHYSICAL MEDICINE & REHABILITATION

## 2024-09-05 PROCEDURE — 3075F SYST BP GE 130 - 139MM HG: CPT | Performed by: PHYSICAL MEDICINE & REHABILITATION

## 2024-09-05 RX ORDER — IBUPROFEN 600 MG/1
600 TABLET, FILM COATED ORAL EVERY 6 HOURS PRN
COMMUNITY
Start: 2024-07-23

## 2024-09-05 RX ORDER — ERGOCALCIFEROL 1.25 MG/1
CAPSULE, LIQUID FILLED ORAL
COMMUNITY
Start: 2024-09-04

## 2024-09-05 RX ORDER — MONTELUKAST SODIUM 10 MG/1
TABLET ORAL
COMMUNITY

## 2024-09-05 RX ORDER — PEN NEEDLE, DIABETIC 32GX 5/32"
NEEDLE, DISPOSABLE MISCELLANEOUS
COMMUNITY
Start: 2024-08-25

## 2024-09-05 RX ORDER — INSULIN LISPRO 100 [IU]/ML
INJECTION, SOLUTION INTRAVENOUS; SUBCUTANEOUS
COMMUNITY
Start: 2024-08-06

## 2024-09-05 RX ORDER — GABAPENTIN 400 MG/1
CAPSULE ORAL
Qty: 120 CAPSULE | Refills: 5 | Status: SHIPPED | OUTPATIENT
Start: 2024-09-05 | End: 2024-11-05
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
## 2024-09-05 NOTE — PROGRESS NOTES
Travis Lujan presents today for   Chief Complaint   Patient presents with    Shoulder Pain     right    Neck Pain    Arm Pain     right    Back Pain       Is someone accompanying this pt? no    Is the patient using any DME equipment during OV? no    Coordination of Care:  1. Have you been to the ER, urgent care clinic since your last visit? Yes, pt went to the ER for chest pain and sore throat  Hospitalized since your last visit? Yes, after ER visit    2. Have you seen or consulted any other health care providers outside of the [facility] since your last visit? Yes, pcp , endocrinlogy, ENT, cardiology, GYN Include any pap smears or colon screening. no

## 2024-09-06 ENCOUNTER — CARE COORDINATION (OUTPATIENT)
Facility: CLINIC | Age: 39
End: 2024-09-06

## 2024-09-06 DIAGNOSIS — I1A.0 RESISTANT HYPERTENSION: Primary | ICD-10-CM

## 2024-09-06 DIAGNOSIS — E11.21 TYPE 2 DIABETES WITH NEPHROPATHY (HCC): ICD-10-CM

## 2024-09-06 DIAGNOSIS — J44.9 CHRONIC OBSTRUCTIVE PULMONARY DISEASE, UNSPECIFIED COPD TYPE (HCC): ICD-10-CM

## 2024-09-06 NOTE — CARE COORDINATION
sugar?: Daily (Comment: Patient has a dexcom monitoring  system)   Do you have barriers with adherence to non-pharmacologic self-management interventions? (Nutrition/Exercise/Self-Monitoring): Yes   Have you ever had to go to the ED for symptoms of low blood sugar?: No       No patient-reported symptoms   Do you have hyperglycemia symptoms?: No   Do you have hypoglycemia symptoms?: No   Last Blood Sugar Value: 380   Blood Sugar Monitoring Regimen: Before Meals         ,   COPD Assessment    Does the patient understand envrionmental exposure?: Yes  Is the patient able to verbalize Rescue vs. Long Acting medications?: Yes  Does the patient have a nebulizer?: Yes  Does the patient use a space with inhaled medications?: No     No patient-reported symptoms         Symptoms:  None: Yes      Have you had a recent diagnosis of pneumonia either by PCP or at a hospital?: No      ,   Hypertension - Encounter Level          ,   General Assessment    Do you have any symptoms that are causing concern?: No          Medications Reviewed:   Patient denies any changes with medications and reports taking all medications as prescribed.    Advance Care Planning:   Not on file; education provided     Care Planning:    Goals Addressed                   This Visit's Progress     Attend follow up appointments on schedule   On track     Prepare patients and caregivers for end of life decisions (ie. need for hospice, pain management, symptom relief, advance directives etc.)   On track     Take all medications as ordered   On track             PCP/Specialist follow up:   Future Appointments         Provider Specialty Dept Phone    9/10/2024 7:00 AM [facility] [place] [facility] Cardiology [phone]    9/12/2024 2:15 PM Jesus Brady MD Family Medicine [phone]    10/25/2024 11:30 AM Vaibhav Vivas MD Cardiology [phone]    1/6/2025 12:00 PM Tanya Gonzalez MD Orthopedic Surgery [phone]    5/12/2025 11:00 AM Caitlyn

## 2024-09-06 NOTE — PROGRESS NOTES
Remote Patient Monitoring Treatment Plan    Received request from ACM/Jovani Coleman RN   to order remote patient monitoring for in home monitoring of COPD and HTN; condition managed by Dr. Brady. Diabetes; condition managed by Ginette Vogel NP ([facility]) and order completed.     Patient will be monitoring blood pressure   glucose  pulse ox   survey questions.      Patient will engage in Remote Patient Monitoring each day to develop the skills necessary for self management.       RPM Care Team Responsibilities:   Alerts will be reviewed daily and addressed within 2-4 hours during operational hours (Monday -Friday 9 am-4 pm)  Alert response and intervention documented in patient medical record  Alert response escalated to PCP per protocol and documented in patient medical record  Patient monitored over approximately  days  Discharge from program based on self-management readiness    See care coordination encounters for additional details.

## 2024-09-09 ENCOUNTER — CARE COORDINATION (OUTPATIENT)
Facility: CLINIC | Age: 39
End: 2024-09-09

## 2024-09-12 ENCOUNTER — OFFICE VISIT (OUTPATIENT)
Facility: CLINIC | Age: 39
End: 2024-09-12
Payer: MEDICARE

## 2024-09-12 ENCOUNTER — CARE COORDINATION (OUTPATIENT)
Facility: CLINIC | Age: 39
End: 2024-09-12

## 2024-09-12 VITALS
BODY MASS INDEX: 29.64 KG/M2 | OXYGEN SATURATION: 99 % | HEIGHT: 59 IN | RESPIRATION RATE: 20 BRPM | DIASTOLIC BLOOD PRESSURE: 85 MMHG | SYSTOLIC BLOOD PRESSURE: 127 MMHG | HEART RATE: 90 BPM | WEIGHT: 147 LBS | TEMPERATURE: 97.8 F

## 2024-09-12 DIAGNOSIS — E11.21 TYPE 2 DIABETES WITH NEPHROPATHY (HCC): ICD-10-CM

## 2024-09-12 DIAGNOSIS — I10 ESSENTIAL (PRIMARY) HYPERTENSION: ICD-10-CM

## 2024-09-12 DIAGNOSIS — J01.90 ACUTE BACTERIAL SINUSITIS: ICD-10-CM

## 2024-09-12 DIAGNOSIS — J02.9 SORE THROAT: ICD-10-CM

## 2024-09-12 DIAGNOSIS — E11.21 TYPE 2 DIABETES WITH NEPHROPATHY (HCC): Primary | ICD-10-CM

## 2024-09-12 DIAGNOSIS — E78.5 HYPERLIPIDEMIA, UNSPECIFIED HYPERLIPIDEMIA TYPE: ICD-10-CM

## 2024-09-12 DIAGNOSIS — B96.89 ACUTE BACTERIAL SINUSITIS: ICD-10-CM

## 2024-09-12 LAB
GROUP A STREP ANTIGEN, POC: NEGATIVE
HBA1C MFR BLD: 11 %
VALID INTERNAL CONTROL, POC: YES

## 2024-09-12 PROCEDURE — 3074F SYST BP LT 130 MM HG: CPT | Performed by: FAMILY MEDICINE

## 2024-09-12 PROCEDURE — 3079F DIAST BP 80-89 MM HG: CPT | Performed by: FAMILY MEDICINE

## 2024-09-12 PROCEDURE — 87880 STREP A ASSAY W/OPTIC: CPT | Performed by: FAMILY MEDICINE

## 2024-09-12 PROCEDURE — 99215 OFFICE O/P EST HI 40 MIN: CPT | Performed by: FAMILY MEDICINE

## 2024-09-12 PROCEDURE — 3044F HG A1C LEVEL LT 7.0%: CPT | Performed by: FAMILY MEDICINE

## 2024-09-12 PROCEDURE — 83036 HEMOGLOBIN GLYCOSYLATED A1C: CPT | Performed by: FAMILY MEDICINE

## 2024-09-12 RX ORDER — AZITHROMYCIN 250 MG/1
TABLET, FILM COATED ORAL
Qty: 6 TABLET | Refills: 0 | Status: SHIPPED | OUTPATIENT
Start: 2024-09-12 | End: 2024-09-22

## 2024-09-15 LAB
ALBUMIN SERPL-MCNC: 4.6 G/DL (ref 3.9–4.9)
ALP SERPL-CCNC: 139 IU/L (ref 44–121)
ALT SERPL-CCNC: 14 IU/L (ref 0–32)
AST SERPL-CCNC: 17 IU/L (ref 0–40)
BASOPHILS # BLD AUTO: 0 X10E3/UL (ref 0–0.2)
BASOPHILS NFR BLD AUTO: 1 %
BILIRUB SERPL-MCNC: 0.3 MG/DL (ref 0–1.2)
BUN SERPL-MCNC: 11 MG/DL (ref 6–20)
BUN/CREAT SERPL: 14 (ref 9–23)
CALCIUM SERPL-MCNC: 9.6 MG/DL (ref 8.7–10.2)
CHLORIDE SERPL-SCNC: 100 MMOL/L (ref 96–106)
CO2 SERPL-SCNC: 22 MMOL/L (ref 20–29)
CREAT SERPL-MCNC: 0.8 MG/DL (ref 0.57–1)
EGFRCR SERPLBLD CKD-EPI 2021: 96 ML/MIN/1.73
EOSINOPHIL # BLD AUTO: 0 X10E3/UL (ref 0–0.4)
EOSINOPHIL NFR BLD AUTO: 0 %
ERYTHROCYTE [DISTWIDTH] IN BLOOD BY AUTOMATED COUNT: 13.1 % (ref 11.7–15.4)
GLOBULIN SER CALC-MCNC: 2.7 G/DL (ref 1.5–4.5)
GLUCOSE SERPL-MCNC: 356 MG/DL (ref 70–99)
HCT VFR BLD AUTO: 42.7 % (ref 34–46.6)
HGB BLD-MCNC: 13 G/DL (ref 11.1–15.9)
IMM GRANULOCYTES # BLD AUTO: 0 X10E3/UL (ref 0–0.1)
IMM GRANULOCYTES NFR BLD AUTO: 0 %
LYMPHOCYTES # BLD AUTO: 1.8 X10E3/UL (ref 0.7–3.1)
LYMPHOCYTES NFR BLD AUTO: 38 %
MCH RBC QN AUTO: 27 PG (ref 26.6–33)
MCHC RBC AUTO-ENTMCNC: 30.4 G/DL (ref 31.5–35.7)
MCV RBC AUTO: 89 FL (ref 79–97)
MONOCYTES # BLD AUTO: 0.2 X10E3/UL (ref 0.1–0.9)
MONOCYTES NFR BLD AUTO: 5 %
NEUTROPHILS # BLD AUTO: 2.7 X10E3/UL (ref 1.4–7)
NEUTROPHILS NFR BLD AUTO: 56 %
PLATELET # BLD AUTO: 207 X10E3/UL (ref 150–450)
POTASSIUM SERPL-SCNC: 4.3 MMOL/L (ref 3.5–5.2)
PROT SERPL-MCNC: 7.3 G/DL (ref 6–8.5)
RBC # BLD AUTO: 4.81 X10E6/UL (ref 3.77–5.28)
SODIUM SERPL-SCNC: 137 MMOL/L (ref 134–144)
WBC # BLD AUTO: 4.8 X10E3/UL (ref 3.4–10.8)

## 2024-09-17 ENCOUNTER — CARE COORDINATION (OUTPATIENT)
Dept: CARE COORDINATION | Age: 39
End: 2024-09-17

## 2024-09-18 ENCOUNTER — CARE COORDINATION (OUTPATIENT)
Dept: CARE COORDINATION | Age: 39
End: 2024-09-18

## 2024-09-19 ENCOUNTER — CARE COORDINATION (OUTPATIENT)
Dept: CARE COORDINATION | Age: 39
End: 2024-09-19

## 2024-09-20 ENCOUNTER — CARE COORDINATION (OUTPATIENT)
Facility: CLINIC | Age: 39
End: 2024-09-20

## 2024-09-27 ENCOUNTER — CARE COORDINATION (OUTPATIENT)
Facility: CLINIC | Age: 39
End: 2024-09-27

## 2024-09-27 VITALS — HEART RATE: 90 BPM | SYSTOLIC BLOOD PRESSURE: 116 MMHG | OXYGEN SATURATION: 98 % | DIASTOLIC BLOOD PRESSURE: 76 MMHG

## 2024-10-04 ENCOUNTER — CARE COORDINATION (OUTPATIENT)
Dept: CARE COORDINATION | Age: 39
End: 2024-10-04

## 2024-10-04 ENCOUNTER — CARE COORDINATION (OUTPATIENT)
Facility: CLINIC | Age: 39
End: 2024-10-04

## 2024-10-04 ASSESSMENT — PATIENT HEALTH QUESTIONNAIRE - PHQ9
1. LITTLE INTEREST OR PLEASURE IN DOING THINGS: NOT AT ALL
SUM OF ALL RESPONSES TO PHQ QUESTIONS 1-9: 0
2. FEELING DOWN, DEPRESSED OR HOPELESS: NOT AT ALL
SUM OF ALL RESPONSES TO PHQ QUESTIONS 1-9: 0
SUM OF ALL RESPONSES TO PHQ9 QUESTIONS 1 & 2: 0

## 2024-10-04 NOTE — CARE COORDINATION
Date/Time:  10/4/2024 8:57 AM  LPN attempted to reach patient by telephone regarding red alert in remote patient monitoring program. Left HIPPA compliant message requesting a return call. Will attempt to reach patient again.

## 2024-10-04 NOTE — CARE COORDINATION
Waxing/Waning  Reported Symptoms: Pain (Comment: In arms and hands, pt believes pain is secondary to Carpal Tunnel)          Medications Reviewed:   Patient denies any changes with medications and reports taking all medications as prescribed.    Advance Care Planning:   Not on file; education provided     Care Planning:    Goals Addressed                   This Visit's Progress     Attend follow up appointments on schedule   On track     Prepare patients and caregivers for end of life decisions (ie. need for hospice, pain management, symptom relief, advance directives etc.)   On track     Take all medications as ordered   On track             PCP/Specialist follow up:   Future Appointments         Provider Specialty Dept Phone    10/24/2024 9:45 AM Jesus Brady MD Family Medicine [phone]    10/25/2024 11:30 AM Vaibhav Vivas MD Cardiology [phone]    1/6/2025 12:00 PM Tanya Gonzalez MD Orthopedic Surgery [phone]    5/12/2025 11:00 AM Jesus Brady MD Family Medicine [phone]            Follow Up:   Plan for next [facility] outreach in approximately 2 weeks to complete:  - medication review   - advance care planning   - goal progression  - education .   Patient  is agreeable to this plan.

## 2024-10-14 ENCOUNTER — CARE COORDINATION (OUTPATIENT)
Dept: CARE COORDINATION | Age: 39
End: 2024-10-14

## 2024-10-15 ENCOUNTER — CARE COORDINATION (OUTPATIENT)
Dept: CASE MANAGEMENT | Age: 39
End: 2024-10-15

## 2024-10-15 NOTE — CARE COORDINATION
No metrics entered in RPM for 4 days. Shops[employer] message sent to pt  Travis Lujan , I am a nurse with the remote patient monitoring team;  reaching out to you today to remind you to please take your vitals. Important: Please try to take your vitals each day before 12pm. This ensures the monitoring team will have time to connect with your providers and get back to you with any new orders or instructions.    Enrolled in RPM for COPD HTN AND DM  Message to [facility]  Travis Lujan  is currently enrolled in Remote Patient Monitoring (RPM) and has not entered vitals in 4 days. The RPM team has Sent [employer] Message your patient to discuss adherence in RPM.    Please attempt to outreach your patient and discuss adherence with RPM. If patient is no longer interested in participating please send a dis-enrollment request to the RPM pool for processing.     Thank You,

## 2024-10-17 ENCOUNTER — CARE COORDINATION (OUTPATIENT)
Dept: CARE COORDINATION | Age: 39
End: 2024-10-17

## 2024-10-17 DIAGNOSIS — R20.2 PARESTHESIA AND PAIN OF BOTH UPPER EXTREMITIES: Primary | ICD-10-CM

## 2024-10-17 DIAGNOSIS — M79.601 PARESTHESIA AND PAIN OF BOTH UPPER EXTREMITIES: Primary | ICD-10-CM

## 2024-10-17 DIAGNOSIS — M79.602 PARESTHESIA AND PAIN OF BOTH UPPER EXTREMITIES: Primary | ICD-10-CM

## 2024-10-17 NOTE — CARE COORDINATION
Since she has had progressive symptoms of neck pain radiating into her bilateral upper extremities with weakness, I recommend a cervical MRI.  Her last cervical MRI was November 2023.  At that time there was no nerve impingement or stenosis.  I will place this order today and schedule her for a follow-up appointment.

## 2024-10-17 NOTE — CARE COORDINATION
-Remote Alert Monitoring Note      Date/Time:  10/17/2024 11:41 AM  Patient Current Location: [place]  Verified patients name and  as identifiers.    Rpm alert to be reviewed by the provider   red alert  blood pressure reading (129/102)    Additional needs to be addressed by provider:  Patient is having extreme pain/numbness/tingling in both arms that extends from her armpits to her fingers. She reports having trouble with fine motor skills such as holding a pen, opening a door knob, etc. She describes the pain as shocking type pain. She reports a lot of pain in the mornings and when she is lying down at night. She is established with ortho for brachial neuralgia, carpal tunnel and cervical spondylosis. She states the pain is worse since the last appt with Dr. Gonzalez on  and she has tried exercises, heat compresses, massage tools, stretching, etc. She believes the pain is driving her BP up. Please advise                     LPN contacted patient by telephone regarding red alert received for BP reading of 129/102    Background: Pt enrolled for DM and HTN and COPD monitoring   Refer to 911 immediately if:  Patient unresponsive or unable to provide history  Change in cognition or sudden confusion  Patient unable to respond in complete sentences  Intense chest pain/tightness  Any concern for any clinical emergency  Red Alert: Provider response time of 1 hr required for any red alert requiring intervention  Yellow Alert: Provider response time of 3hr required for any escalated yellow alert  Patient Chief Complaint:  Blood Pressure BP Triage  Are you having any Chest Pain? no   Are you having any Shortness of Breath? no   Do you have a headache or have any vision changes? NA   Are you having any numbness or tingling? Yes--bilat arms- see note   Are you having any other health concerns or issues? Yes--see note     Clinical Interventions:  Spoke with patient, she reports she is experiencing severe bilateral arm/armpit

## 2024-10-17 NOTE — CARE COORDINATION
Tanya Gonzalez MD2 hours ago (12:40 PM)       Since she has had progressive symptoms of neck pain radiating into her bilateral upper extremities with weakness, I recommend a cervical MRI.  Her last cervical MRI was November 2023.  At that time there was no nerve impingement or stenosis.  I will place this order today and schedule her for a follow-up appointment.         Contacted pt, notified her of message above. She VU

## 2024-10-18 ENCOUNTER — CARE COORDINATION (OUTPATIENT)
Facility: CLINIC | Age: 39
End: 2024-10-18

## 2024-10-18 NOTE — CARE COORDINATION
Ambulatory Care Coordination Note     10/18/2024 3:38 PM     Patient outreach attempt by this ACM today to perform care management follow up . ACM was unable to reach the patient by telephone today; left voice message requesting a return phone call to this ACM.     ACM: Jovani Reyna RN     Care Summary Note: NA    PCP/Specialist follow up:   Future Appointments         Provider Specialty Dept Phone    10/24/2024 9:45 AM Jesus Brady MD Family Medicine [phone]    10/25/2024 11:30 AM Vaibhav Vivas MD Cardiology [phone]    1/6/2025 12:00 PM Tanya Gonzalez MD Orthopedic Surgery [phone]    5/12/2025 11:00 AM Jesus Brady MD Family Medicine [phone]            Follow Up:   Plan for next ACM outreach in approximately 2 weeks to complete:  - medication review  - advance care planning  - goal progression  - education .

## 2024-10-24 ENCOUNTER — OFFICE VISIT (OUTPATIENT)
Facility: CLINIC | Age: 39
End: 2024-10-24
Payer: MEDICARE

## 2024-10-24 VITALS
BODY MASS INDEX: 31.04 KG/M2 | HEIGHT: 59 IN | TEMPERATURE: 97.8 F | HEART RATE: 86 BPM | OXYGEN SATURATION: 100 % | WEIGHT: 154 LBS | SYSTOLIC BLOOD PRESSURE: 135 MMHG | RESPIRATION RATE: 20 BRPM | DIASTOLIC BLOOD PRESSURE: 86 MMHG

## 2024-10-24 DIAGNOSIS — Z79.4 TYPE 2 DIABETES MELLITUS WITH DIABETIC NEUROPATHY, WITH LONG-TERM CURRENT USE OF INSULIN (HCC): Primary | ICD-10-CM

## 2024-10-24 DIAGNOSIS — E11.40 TYPE 2 DIABETES MELLITUS WITH DIABETIC NEUROPATHY, WITH LONG-TERM CURRENT USE OF INSULIN (HCC): Primary | ICD-10-CM

## 2024-10-24 DIAGNOSIS — R20.0 NUMBNESS AND TINGLING: ICD-10-CM

## 2024-10-24 DIAGNOSIS — I10 ESSENTIAL (PRIMARY) HYPERTENSION: ICD-10-CM

## 2024-10-24 DIAGNOSIS — K21.9 GASTRO-ESOPHAGEAL REFLUX DISEASE WITHOUT ESOPHAGITIS: ICD-10-CM

## 2024-10-24 DIAGNOSIS — E78.5 HYPERLIPIDEMIA, UNSPECIFIED HYPERLIPIDEMIA TYPE: ICD-10-CM

## 2024-10-24 DIAGNOSIS — R20.2 NUMBNESS AND TINGLING: ICD-10-CM

## 2024-10-24 PROBLEM — E11.22 TYPE 2 DIABETES MELLITUS WITH CHRONIC KIDNEY DISEASE (HCC): Status: RESOLVED | Noted: 2024-08-23 | Resolved: 2024-10-24

## 2024-10-24 LAB — HBA1C MFR BLD: 8 %

## 2024-10-24 PROCEDURE — 3079F DIAST BP 80-89 MM HG: CPT | Performed by: FAMILY MEDICINE

## 2024-10-24 PROCEDURE — 99214 OFFICE O/P EST MOD 30 MIN: CPT | Performed by: FAMILY MEDICINE

## 2024-10-24 PROCEDURE — 3044F HG A1C LEVEL LT 7.0%: CPT | Performed by: FAMILY MEDICINE

## 2024-10-24 PROCEDURE — 3075F SYST BP GE 130 - 139MM HG: CPT | Performed by: FAMILY MEDICINE

## 2024-10-24 PROCEDURE — 83036 HEMOGLOBIN GLYCOSYLATED A1C: CPT | Performed by: FAMILY MEDICINE

## 2024-10-24 SDOH — ECONOMIC STABILITY: FOOD INSECURITY: WITHIN THE PAST 12 MONTHS, YOU WORRIED THAT YOUR FOOD WOULD RUN OUT BEFORE YOU GOT MONEY TO BUY MORE.: NEVER TRUE

## 2024-10-24 SDOH — ECONOMIC STABILITY: FOOD INSECURITY: WITHIN THE PAST 12 MONTHS, THE FOOD YOU BOUGHT JUST DIDN'T LAST AND YOU DIDN'T HAVE MONEY TO GET MORE.: NEVER TRUE

## 2024-10-24 SDOH — ECONOMIC STABILITY: INCOME INSECURITY: HOW HARD IS IT FOR YOU TO PAY FOR THE VERY BASICS LIKE FOOD, HOUSING, MEDICAL CARE, AND HEATING?: NOT HARD AT ALL

## 2024-10-24 ASSESSMENT — ANXIETY QUESTIONNAIRES
5. BEING SO RESTLESS THAT IT IS HARD TO SIT STILL: NOT AT ALL
7. FEELING AFRAID AS IF SOMETHING AWFUL MIGHT HAPPEN: NOT AT ALL
3. WORRYING TOO MUCH ABOUT DIFFERENT THINGS: NOT AT ALL
2. NOT BEING ABLE TO STOP OR CONTROL WORRYING: NOT AT ALL
1. FEELING NERVOUS, ANXIOUS, OR ON EDGE: NOT AT ALL
GAD7 TOTAL SCORE: 0
6. BECOMING EASILY ANNOYED OR IRRITABLE: NOT AT ALL
4. TROUBLE RELAXING: NOT AT ALL
IF YOU CHECKED OFF ANY PROBLEMS ON THIS QUESTIONNAIRE, HOW DIFFICULT HAVE THESE PROBLEMS MADE IT FOR YOU TO DO YOUR WORK, TAKE CARE OF THINGS AT HOME, OR GET ALONG WITH OTHER PEOPLE: NOT DIFFICULT AT ALL

## 2024-10-24 ASSESSMENT — PATIENT HEALTH QUESTIONNAIRE - PHQ9
SUM OF ALL RESPONSES TO PHQ QUESTIONS 1-9: 0
SUM OF ALL RESPONSES TO PHQ9 QUESTIONS 1 & 2: 0
1. LITTLE INTEREST OR PLEASURE IN DOING THINGS: NOT AT ALL
2. FEELING DOWN, DEPRESSED OR HOPELESS: NOT AT ALL
SUM OF ALL RESPONSES TO PHQ QUESTIONS 1-9: 0

## 2024-10-24 NOTE — PROGRESS NOTES
[facility]  Travis Lujan comes in for follow up care.  DM2: Patient has type 2 diabetes mellitus.  She is being seen by the endocrinologist.  Patient is currently on insulin pump.  Her blood glucose numbers have come down.  Her Hba1c has improved to 8.0.  She will continue with the current management plan.  She will continue to follow-up with her specialist.  Numbness and tingling: Hand has numbness and tingling bilateral upper extremities.  This has been ongoing for weeks.  The numbness and tingling comes with activity and movement especially of the neck.  She has occasional weakness in her hands and the cramping of the fingers.  There is a question of cervical neuropathy.  Patient is due for MRI of the cervical spine.  She has been followed up by the spine specialist and being evaluated for cervical neuropathy.  We will await recommendations by the specialist.  Patient is on gabapentin for neuropathy.  She is on Flexeril for back and neck pain and patient also takes ibuprofen for pain as needed.  Hypertension: Patient has hypertension for blood pressure is stable.  She is on atenolol 25 mg daily.  We will continue current treatment plan.  Dyslipidemia: Patient has dyslipidemia.  Patient is on Crestor 40 mg daily.  Continue current treatment plan.  Patient will exercise and take a diet low in polysaturated fats.  GERD: Patient has gastroesophageal reflux disease.  Patient gets heartburn on and off.  Patient is on Nexium.  Continue with the current treatment plan.      Past Medical History  Past Medical History:   Diagnosis Date    Asthma     Chronic constipation     CTS (carpal tunnel syndrome), right by EDx 1/2024 01/2024    Diabetes (HCC)     Fibromyalgia     GERD (gastroesophageal reflux disease)     Herpes zoster without complication 3/30/2015    Hypertension     IBS (irritable bowel syndrome)     Migraines     unknown if aura    Pneumonia     Psychiatric disorder     she denies any diagnosis despite being on

## 2024-10-25 ENCOUNTER — OFFICE VISIT (OUTPATIENT)
Age: 39
End: 2024-10-25
Payer: MEDICARE

## 2024-10-25 ENCOUNTER — CARE COORDINATION (OUTPATIENT)
Facility: CLINIC | Age: 39
End: 2024-10-25

## 2024-10-25 VITALS
WEIGHT: 154.6 LBS | SYSTOLIC BLOOD PRESSURE: 137 MMHG | HEIGHT: 59 IN | HEART RATE: 93 BPM | BODY MASS INDEX: 31.17 KG/M2 | DIASTOLIC BLOOD PRESSURE: 88 MMHG | OXYGEN SATURATION: 99 %

## 2024-10-25 DIAGNOSIS — I10 PRIMARY HYPERTENSION: Primary | ICD-10-CM

## 2024-10-25 DIAGNOSIS — E78.5 HYPERLIPIDEMIA, UNSPECIFIED HYPERLIPIDEMIA TYPE: ICD-10-CM

## 2024-10-25 DIAGNOSIS — R07.2 PRECORDIAL PAIN: ICD-10-CM

## 2024-10-25 DIAGNOSIS — E66.811 OBESITY (BMI 30.0-34.9): ICD-10-CM

## 2024-10-25 DIAGNOSIS — Z79.4 TYPE 2 DIABETES MELLITUS WITH OTHER SPECIFIED COMPLICATION, WITH LONG-TERM CURRENT USE OF INSULIN (HCC): ICD-10-CM

## 2024-10-25 DIAGNOSIS — E11.69 TYPE 2 DIABETES MELLITUS WITH OTHER SPECIFIED COMPLICATION, WITH LONG-TERM CURRENT USE OF INSULIN (HCC): ICD-10-CM

## 2024-10-25 PROCEDURE — 3075F SYST BP GE 130 - 139MM HG: CPT | Performed by: INTERNAL MEDICINE

## 2024-10-25 PROCEDURE — 3044F HG A1C LEVEL LT 7.0%: CPT | Performed by: INTERNAL MEDICINE

## 2024-10-25 PROCEDURE — 3079F DIAST BP 80-89 MM HG: CPT | Performed by: INTERNAL MEDICINE

## 2024-10-25 PROCEDURE — 99214 OFFICE O/P EST MOD 30 MIN: CPT | Performed by: INTERNAL MEDICINE

## 2024-10-25 RX ORDER — ATENOLOL 50 MG/1
50 TABLET ORAL DAILY
Qty: 90 TABLET | Refills: 3 | Status: SHIPPED | OUTPATIENT
Start: 2024-10-25

## 2024-10-25 ASSESSMENT — PATIENT HEALTH QUESTIONNAIRE - PHQ9
SUM OF ALL RESPONSES TO PHQ QUESTIONS 1-9: 0
2. FEELING DOWN, DEPRESSED OR HOPELESS: NOT AT ALL
SUM OF ALL RESPONSES TO PHQ QUESTIONS 1-9: 0
SUM OF ALL RESPONSES TO PHQ9 QUESTIONS 1 & 2: 0
1. LITTLE INTEREST OR PLEASURE IN DOING THINGS: NOT AT ALL

## 2024-10-25 ASSESSMENT — ENCOUNTER SYMPTOMS
TROUBLE SWALLOWING: 1
EYES NEGATIVE: 1
GASTROINTESTINAL NEGATIVE: 1
SHORTNESS OF BREATH: 1

## 2024-10-25 NOTE — CARE COORDINATION
Patient attending appointment with PCP or Specialists at time of scheduled ACM follow up. Follow up call deferred at this time and ACM will contact Patient at the next scheduled encounter unless earlier encounter is necessary.    PCP appointment

## 2024-10-25 NOTE — PROGRESS NOTES
Travis Lujan is a 39 y.o. year old female.    Follow-up of hypertension, hyperlipidemia, family history of CAD  History of diabetes    7/2022  Patient is seen today for follow-up of her chronic conditions.  Patient continues to have chest pains more frequently left-sided.  Episodic.  Shortness of breath stable  Recent flareup of asthma for which she is under care of pulmonary  8/2022  Patient seen in follow-up for chest pain.  On Aug 4th was diagnosed with COVID.  She complains of dyspnea on exertion, with memory loss since being diagnosed with COVID.  She complains of occasional chest pressure worse with a deep breath or raising arms overhead.  She denies palpitations or edema.  11/2022  Patient seen in follow-up for atypical chest pain.  She reports has recovered from COVID.  She complains of occasional chest pressure  She denies palpitations or edema.  4/5/2024 had an episode of syncope while waiting in line for food at a family function. 1 day prior, had severe dizziness and feeling of heart all over the body while standing on the porch.  Has been nauseous and having intermittent sweating for the last 3 to 4 weeks.  Feels funny in the left side of the body.  Denies any pain.  Dyspnea on exertion persists if she goes up the steps. No edema.  8/2024  Patient seen following recent admission for DKA.  She complains of difficulty swallowing for which she has upcoming appointment with ENT as she also complains of constant chest pain worse with exertion has mild shortness of breath with history of asthma denies palpitations or edema denies dizziness reports having strong family history of CAD.  Patient also reports blood sugars have been difficult to control in the 400-500 range.  She has frequent follow-up with endocrine.  10/25/2024 now on the insulin pump.  Not taking rosuvastatin regularly.  Tries to exercise regularly on steps as well as walking around house.  Chest pains are not as bad but still present.

## 2024-10-25 NOTE — PROGRESS NOTES
1. Have you been to the ER, urgent care clinic since your last visit?  Hospitalized since your last visit?    no    2.  Where do you normally have your labs drawn?   [facility]    3. Do you need any refills today?   no    4. Which local pharmacy do you use (enter pharmacy)?   [facility] [address]    5. Which mail order pharmacy do you use (enter pharmacy)?   no     6. Are you here for surgical clearance and if so who will be doing your     procedure/surgery (care team)?   no

## 2024-10-26 ENCOUNTER — HOSPITAL ENCOUNTER (OUTPATIENT)
Facility: HOSPITAL | Age: 39
Discharge: HOME | End: 2024-10-29
Attending: PHYSICAL MEDICINE & REHABILITATION
Payer: MEDICARE

## 2024-10-26 DIAGNOSIS — M79.602 PARESTHESIA AND PAIN OF BOTH UPPER EXTREMITIES: ICD-10-CM

## 2024-10-26 DIAGNOSIS — R20.2 PARESTHESIA AND PAIN OF BOTH UPPER EXTREMITIES: ICD-10-CM

## 2024-10-26 DIAGNOSIS — M79.601 PARESTHESIA AND PAIN OF BOTH UPPER EXTREMITIES: ICD-10-CM

## 2024-10-26 PROCEDURE — 72141 MRI NECK SPINE W/O DYE: CPT

## 2024-11-01 ENCOUNTER — CARE COORDINATION (OUTPATIENT)
Facility: CLINIC | Age: 39
End: 2024-11-01

## 2024-11-01 NOTE — CARE COORDINATION
Ambulatory Care Coordination Note     11/1/2024 5:10 PM     Patient outreach attempt by this ACM today to perform care management follow up . ACM was unable to reach the patient by telephone today; left voice message requesting a return phone call to this ACM.     ACM: Jovani Reyna RN     Care Summary Note: Review of RPM metrics. Past historical numbers reviewed and any issues addressed with patient. Encouraged patient to continue to take readings daily and notify ACM of any issues. Explained that if a reading seems inaccurate to recheck, and change fingers if O2 sat reading below 90. (Make sure hands are warm and dry)  Metrics green at this time    PCP/Specialist follow up:   Future Appointments         Provider Specialty Dept Phone    1/6/2025 12:00 PM Tanya Gonzalez MD Orthopedic Surgery [phone]    1/27/2025 9:45 AM Jesus Brady MD Family Medicine [phone]    4/10/2025 10:30 AM Vaibhav Vivas MD Cardiology [phone]    5/12/2025 11:00 AM Jesus Brady MD Family Medicine [phone]            Follow Up:   Plan for next ACM outreach in approximately 1 week to complete:  - medication review  - advance care planning  - goal progression  - education   - RPM.

## 2024-11-05 ENCOUNTER — PATIENT MESSAGE (OUTPATIENT)
Age: 39
End: 2024-11-05

## 2024-11-06 NOTE — PROGRESS NOTES
[address], [address]  [place] [address]  Phone: [phone]  Fax: [phone]      Travis Lujan, was evaluated through a synchronous (real-time) audio-video encounter. The patient (or guardian if applicable) is aware that this is a billable service, which includes applicable co-pays. This Virtual Visit was conducted with patient's (and/or legal guardian's) consent. Patient identification was verified, and a caregiver was present when appropriate.   The patient was located at Home: [id number]  [facility] [address]  Provider was located at Facility (Appt Dept): [address]  [address]  [place] [address]  Confirm you are appropriately licensed, registered, or certified to deliver care in the state where the patient is located as indicated above. If you are not or unsure, please re-schedule the visit: Yes, I confirm.         Travis Lujan is a 39 y.o. female evaluated via patient initiated telephone call on 11/11/2024.      Travis was seen today for pain, shoulder pain and arm pit .    Diagnoses and all orders for this visit:    Brachial neuralgia  -     gabapentin (NEURONTIN) 400 MG capsule; One po qid    Carpal tunnel syndrome of right wrist  -     gabapentin (NEURONTIN) 400 MG capsule; One po qid    Fibromyalgia  -     gabapentin (NEURONTIN) 400 MG capsule; One po qid    Cervical spondylosis  -     gabapentin (NEURONTIN) 400 MG capsule; One po qid         Travis Lujan is a 39 y.o. female reporting progression of diffuse upper extremity paresthesias accompanied by weakness.  No stenosis on imaging.  EDX earlier this year mild CTS.  Previously uncontrolled diabetes.  Possible small fiber neuropathy from fibromyalgia.  Increase Gabapentin 400mg to QID. Unable to tolerate higher dose  Continue Flexeril 10mg x0.5-1 BID PRN. No refills required at this time.   Recommend pt continue to establish with care with neurologist. Sees Dr. Boswell for migraines.    Follow-up and

## 2024-11-07 ENCOUNTER — CARE COORDINATION (OUTPATIENT)
Facility: CLINIC | Age: 39
End: 2024-11-07

## 2024-11-07 ASSESSMENT — PATIENT HEALTH QUESTIONNAIRE - PHQ9
SUM OF ALL RESPONSES TO PHQ QUESTIONS 1-9: 1
1. LITTLE INTEREST OR PLEASURE IN DOING THINGS: NOT AT ALL
SUM OF ALL RESPONSES TO PHQ QUESTIONS 1-9: 1
SUM OF ALL RESPONSES TO PHQ QUESTIONS 1-9: 1
SUM OF ALL RESPONSES TO PHQ9 QUESTIONS 1 & 2: 1
2. FEELING DOWN, DEPRESSED OR HOPELESS: SEVERAL DAYS
SUM OF ALL RESPONSES TO PHQ QUESTIONS 1-9: 1

## 2024-11-07 NOTE — CARE COORDINATION
Assessment     Do you have all of your prescriptions and are they filled?: Yes  Barriers to medication adherence: Forgets to take  Are you able to afford your medications?: Yes  How often do you have trouble taking your medications the way you have been told to take them?: Sometimes I take them as prescribed.        Ability to seek help/take action for Emergent Urgent situations i.e. fire, crime, inclement weather or health crisis.: Independent  Ability to ambulate to restroom: Independent  Ability handle personal hygeine needs (bathing/dressing/grooming): Independent  Ability to manage Medications: Independent  Ability to prepare Food Preparation: Independent  Ability to maintain home (clean home, laundry): Independent  Ability to drive and/or has transportation: Independent  Ability to do shopping: Independent  Ability to manage finances: Independent  Is patient able to live independently?: Yes     Current Housing: Private Residence  Who do you live with?: Child  Are you an active caregiver in your home?: Yes  For whom are you the caregiver?: children     Do you have any DME?: No              Suggested Interventions and Community Resources               ,   Diabetes Assessment    Medic Alert ID: No  Meal Planning: Carb counting, Avoidance of concentrated sweets   How often do you test your blood sugar?: Daily (Comment: Patient has a dexcom monitoring  system)   Do you have barriers with adherence to non-pharmacologic self-management interventions? (Nutrition/Exercise/Self-Monitoring): Yes   Have you ever had to go to the ED for symptoms of low blood sugar?: No       No patient-reported symptoms   Do you have hyperglycemia symptoms?: No   Do you have hypoglycemia symptoms?: No   Last Blood Sugar Value: 114   Blood Sugar Monitoring Regimen: Before Meals         ,   COPD Assessment    Does the patient understand envrionmental exposure?: Yes  Is the patient able to verbalize Rescue vs. Long Acting medications?:

## 2024-11-11 ENCOUNTER — SCHEDULED TELEPHONE ENCOUNTER (OUTPATIENT)
Age: 39
End: 2024-11-11
Payer: MEDICARE

## 2024-11-11 DIAGNOSIS — M47.812 CERVICAL SPONDYLOSIS: ICD-10-CM

## 2024-11-11 DIAGNOSIS — G56.01 CARPAL TUNNEL SYNDROME OF RIGHT WRIST: ICD-10-CM

## 2024-11-11 DIAGNOSIS — M79.7 FIBROMYALGIA: ICD-10-CM

## 2024-11-11 DIAGNOSIS — M54.12 BRACHIAL NEURALGIA: Primary | ICD-10-CM

## 2024-11-11 PROCEDURE — 99442 PR PHYS/QHP TELEPHONE EVALUATION 11-20 MIN: CPT | Performed by: PHYSICAL MEDICINE & REHABILITATION

## 2024-11-11 RX ORDER — MOMETASONE FUROATE MONOHYDRATE 50 UG/1
SPRAY, METERED NASAL
COMMUNITY

## 2024-11-11 RX ORDER — GABAPENTIN 400 MG/1
CAPSULE ORAL
Qty: 120 CAPSULE | Refills: 5 | Status: SHIPPED | OUTPATIENT
Start: 2024-11-11 | End: 2025-01-11

## 2024-11-11 RX ORDER — LANCETS
EACH MISCELLANEOUS
COMMUNITY
Start: 2024-09-25

## 2024-11-11 NOTE — PROGRESS NOTES
Travis Lujan presents today for   Chief Complaint   Patient presents with    Pain    Shoulder Pain    arm pit        Is someone accompanying this pt? no    Is the patient using any DME equipment during OV? no    Depression Screening:       No data to display                Learning Assessment:  Failed to redirect to the Timeline version of the [employer] SmartLink.    Abuse Screening:       No data to display                Fall Risk  Failed to redirect to the Timeline version of the [employer] SmartLink.    OPIOID RISK TOOL  Failed to redirect to the Timeline version of the [employer] SmartLink.    Coordination of Care:  1. Have you been to the ER, urgent care clinic since your last visit? no  Hospitalized since your last visit? no    2. Have you seen or consulted any other health care providers outside of the [facility] since your last visit? no Include any pap smears or colon screening. no       Ivermectin Counseling:  Patient instructed to take medication on an empty stomach with a full glass of water.  Patient informed of potential adverse effects including but not limited to nausea, diarrhea, dizziness, itching, and swelling of the extremities or lymph nodes.  The patient verbalized understanding of the proper use and possible adverse effects of ivermectin.  All of the patient's questions and concerns were addressed.

## 2024-11-15 ENCOUNTER — CARE COORDINATION (OUTPATIENT)
Facility: CLINIC | Age: 39
End: 2024-11-15

## 2024-11-20 ENCOUNTER — CARE COORDINATION (OUTPATIENT)
Dept: CARE COORDINATION | Age: 39
End: 2024-11-20

## 2024-11-29 ENCOUNTER — CARE COORDINATION (OUTPATIENT)
Facility: CLINIC | Age: 39
End: 2024-11-29

## 2024-11-29 ASSESSMENT — PATIENT HEALTH QUESTIONNAIRE - PHQ9
2. FEELING DOWN, DEPRESSED OR HOPELESS: SEVERAL DAYS
SUM OF ALL RESPONSES TO PHQ QUESTIONS 1-9: 1
1. LITTLE INTEREST OR PLEASURE IN DOING THINGS: NOT AT ALL
SUM OF ALL RESPONSES TO PHQ QUESTIONS 1-9: 1
SUM OF ALL RESPONSES TO PHQ9 QUESTIONS 1 & 2: 1
SUM OF ALL RESPONSES TO PHQ QUESTIONS 1-9: 1
SUM OF ALL RESPONSES TO PHQ QUESTIONS 1-9: 1

## 2024-11-29 NOTE — CARE COORDINATION
Ambulatory Care Coordination Note     2024 1:08 PM     Patient Current Location:  Home: [address]  [facility] [address]     ACM contacted the patient by telephone. Verified name and  with patient as identifiers.         ACM: Jovani Reyna RN     Challenges to be reviewed by the provider   Additional needs identified to be addressed with provider No  none               Method of communication with provider: phone.    Utilization: Patient has not had any utilization since our last call.     Care Summary Note:   No notable change in Patient health status from last encounter. No ER visit or IP admission since last encounter. Follow up appointments listed below and questions from Patient / Care Giver answered.  Patient has ACM contact information.    Medication reconciliation done at this encounter with patient. Shows understanding of medication therapy  Assessment done with attention to primary illness and / or condition.  Respiratory and cardiac status shows no issues at present    Diabetic Teaching  Reviewed diet restrictions and limits. Assessed ability to test Blood Sugar if need. Reinforced method to take blood sugar regarding when to take following meals, but encouraged Patient to check prior to having breakfast - or wait at least 2 hours after eating. Reminded Patient to examine feet regularly and seek the care of a Podiatrist for proper foot care.    Inhaler Ed:  Instructed patient on proper use of inhaler and to rinse mouth after each use to avoid complications.    Offered patient enrollment in the Remote Patient Monitoring (RPM) program for in-home monitoring: Yes, patient enrolled; current status is activated and monitoring.  All Metrics in RPM are Green at this time   Review of RPM metrics. Past historical numbers reviewed and any issues addressed with patient. Encouraged patient to continue to take readings daily and notify ACM of any issues. Explained that if a reading seems inaccurate to recheck,

## 2024-12-06 ENCOUNTER — CARE COORDINATION (OUTPATIENT)
Facility: CLINIC | Age: 39
End: 2024-12-06

## 2024-12-06 NOTE — CARE COORDINATION
Ambulatory Care Coordination Note     12/6/2024 3:32 PM     Patient outreach attempt by this ACM today to perform care management follow up . ACM was unable to reach the patient by telephone today;   voicemail full and unable to leave a message.      ACM: Jovani Reyna RN     Care Summary Note: NA    PCP/Specialist follow up:   Future Appointments         Provider Specialty Dept Phone    1/6/2025 12:00 PM Tanya Gonzalez MD Orthopedic Surgery [phone]    1/27/2025 9:45 AM Jesus Brady MD Family Medicine [phone]    4/10/2025 10:30 AM Vaibhav Vivas MD Cardiology [phone]    5/12/2025 11:00 AM Jesus Brady MD Family Medicine [phone]            Follow Up:   Plan for next ACM outreach in approximately 1 week to complete:  - medication review  - advance care planning  - goal progression  - education   - RPM.

## 2024-12-13 ENCOUNTER — CARE COORDINATION (OUTPATIENT)
Facility: CLINIC | Age: 39
End: 2024-12-13

## 2024-12-13 ASSESSMENT — PATIENT HEALTH QUESTIONNAIRE - PHQ9
SUM OF ALL RESPONSES TO PHQ QUESTIONS 1-9: 1
2. FEELING DOWN, DEPRESSED OR HOPELESS: SEVERAL DAYS
SUM OF ALL RESPONSES TO PHQ QUESTIONS 1-9: 1
SUM OF ALL RESPONSES TO PHQ QUESTIONS 1-9: 1
1. LITTLE INTEREST OR PLEASURE IN DOING THINGS: NOT AT ALL
SUM OF ALL RESPONSES TO PHQ9 QUESTIONS 1 & 2: 1
SUM OF ALL RESPONSES TO PHQ QUESTIONS 1-9: 1

## 2024-12-13 NOTE — CARE COORDINATION
Ambulatory Care Coordination Note     2024 11:33 AM     Patient Current Location:  Home: [address]  [facility] [address]     ACM contacted the patient by telephone. Verified name and  with patient as identifiers.         ACM: Jovani Reyna RN     Challenges to be reviewed by the provider   Additional needs identified to be addressed with provider No  none               Method of communication with provider: phone.    Utilization: Patient has not had any utilization since our last call.     Care Summary Note:   Patient reporting periods of dyspnea with exertion, dyspnea resolves quickly (within a couple of minutes) with no report of cough, sweating or fever. Patient using inhalers as prescribed.   Otherwise no notable change in Patient health status from last encounter. No ER visit or IP admission since last encounter. Follow up appointments listed below and questions from Patient / Care Giver answered.  Patient has ACM contact information.    Medication reconciliation done at this encounter with patient. Shows understanding of medication therapy  Assessment done with attention to primary illness and / or condition.  Respiratory and cardiac status shows no issues at present    Offered patient enrollment in the Remote Patient Monitoring (RPM) program for in-home monitoring: Yes, patient enrolled; current status is activated and monitoring.   All Metrics in RPM are Green at this time   Review of RPM metrics. Past historical numbers reviewed and any issues addressed with patient. Encouraged patient to continue to take readings daily and notify ACM of any issues. Explained that if a reading seems inaccurate to recheck, and change fingers if O2 sat reading below 90. (Make sure hands are warm and dry)    Assessments Completed:   No changes since last call    Medications Reviewed:   Completed during this call    Advance Care Planning:   Not on file; education provided     Care Planning:    Goals Addressed

## 2025-01-03 ENCOUNTER — CARE COORDINATION (OUTPATIENT)
Facility: CLINIC | Age: 40
End: 2025-01-03

## 2025-01-03 NOTE — CARE COORDINATION
original schedule. Discussed symptoms of HTN - low sodium diet    Offered patient enrollment in the Remote Patient Monitoring (RPM) program for in-home monitoring: Yes, patient enrolled; current status is activated and monitoring.     Assessments Completed:   No changes since last call    Medications Reviewed:   Patient denies any changes with medications and reports taking all medications as prescribed.    Advance Care Planning:   Not on file; education provided     Care Planning:    Goals Addressed                   This Visit's Progress     Attend follow up appointments on schedule   On track     Prepare patients and caregivers for end of life decisions (ie. need for hospice, pain management, symptom relief, advance directives etc.)   On track     Take all medications as ordered   On track             PCP/Specialist follow up:   Future Appointments         Provider Specialty Dept Phone    1/6/2025 12:00 PM Tanya Gonzalez MD Orthopedic Surgery [phone]    1/27/2025 9:45 AM Jesus Brady MD Family Medicine [phone]    4/10/2025 10:30 AM Vaibhav Vivas MD Cardiology [phone]    5/12/2025 11:00 AM Jesus Brady MD Family Medicine [phone]            Follow Up:   Plan for next AC outreach in approximately 1 week to complete:  - medication review   - advance care planning   - goal progression  - education .   Patient  is agreeable to this plan.

## 2025-01-06 ENCOUNTER — OFFICE VISIT (OUTPATIENT)
Age: 40
End: 2025-01-06
Payer: MEDICARE

## 2025-01-06 VITALS
HEIGHT: 59 IN | SYSTOLIC BLOOD PRESSURE: 130 MMHG | DIASTOLIC BLOOD PRESSURE: 72 MMHG | RESPIRATION RATE: 18 BRPM | HEART RATE: 87 BPM | WEIGHT: 165.6 LBS | OXYGEN SATURATION: 99 % | BODY MASS INDEX: 33.38 KG/M2

## 2025-01-06 DIAGNOSIS — M54.12 BRACHIAL NEURALGIA: Primary | ICD-10-CM

## 2025-01-06 DIAGNOSIS — E11.40 DIABETIC NEUROPATHY, PAINFUL (HCC): ICD-10-CM

## 2025-01-06 PROCEDURE — 3078F DIAST BP <80 MM HG: CPT | Performed by: PHYSICAL MEDICINE & REHABILITATION

## 2025-01-06 PROCEDURE — 99214 OFFICE O/P EST MOD 30 MIN: CPT | Performed by: PHYSICAL MEDICINE & REHABILITATION

## 2025-01-06 PROCEDURE — 3075F SYST BP GE 130 - 139MM HG: CPT | Performed by: PHYSICAL MEDICINE & REHABILITATION

## 2025-01-06 RX ORDER — FAMOTIDINE 40 MG/1
TABLET, FILM COATED ORAL
COMMUNITY
Start: 2024-12-10

## 2025-01-06 RX ORDER — BLOOD-GLUCOSE SENSOR
EACH MISCELLANEOUS
COMMUNITY
Start: 2025-01-02

## 2025-01-06 RX ORDER — EVOLOCUMAB 140 MG/ML
INJECTION, SOLUTION SUBCUTANEOUS
COMMUNITY

## 2025-01-06 NOTE — PROGRESS NOTES
Travis Lujan presents today for   Chief Complaint   Patient presents with    Pain    Shoulder Pain    Arm Pain       Is someone accompanying this pt? no    Is the patient using any DME equipment during OV? no    Depression Screening:       No data to display                Learning Assessment:  Failed to redirect to the Timeline version of the [employer] SmartLink.    Abuse Screening:       No data to display                Fall Risk  Failed to redirect to the Timeline version of the [employer] SmartLink.    OPIOID RISK TOOL  Failed to redirect to the Timeline version of the [employer] SmartLink.    Coordination of Care:  1. Have you been to the ER, urgent care clinic since your last visit? no  Hospitalized since your last visit? no    2. Have you seen or consulted any other health care providers outside of the [facility] since your last visit? no Include any pap smears or colon screening. no      
extremities.  Also c/o mid-back, lower back and leg pain. Pt also notes of recent weight gain. Pt notes of GI plans to do empty stomach study. Pt also c/o difficulty keeping food down and loss of appetite due to her diabetic shake in the morning.    Initially she was tolerating gabapentin 400 4 times daily.  However since she has had gastric issues, she is cut back on her medications.         1/6/2025    12:14 PM   AMB PAIN ASSESSMENT   Location of Pain Arm   Severity of Pain 8   Quality of Pain Dull;Aching   Duration of Pain Persistent   Frequency of Pain Intermittent   Aggravating Factors Standing;Walking;Stairs;Bending   Limiting Behavior Yes   Relieving Factors Other (Comment)   Result of Injury No     Onset of pain: 5/2021 LB. 8/2023 RUE, started as elbow \"knot\"      Investigations:   C MRI 10/2024: mild degen changes without CSS  CT A/P (08/2024): MSK no acute or aggressive osteo abnormailites.   RUE EMG 1/2024: mild right CTS  C MRI 11/2023: C6/C7 small disc protrusion  C XR AP/Lat 10/2023: normal alignment, nonspecific straightening  8/2023 UE Vascular Study: No DVT  L XR AP/lat/flex/ext 4V 9/29/2022 (I personally reviewed these images): normal alignment, no instability.   C MRI 11/2022: minimal DDD C6/C7  L MRI 8/2021: normal  BUE EMG 2020 (Dr. Boswell): severe right CTS, moderate left CTS  C MRI 2019: unremarkable  UE EMG 2019: mild right CTS  BLE EMG 2016: normal  Spine surgery consult: no      Treatments:  Physical therapy: Neck (02/2024). Past multiple rounds with no benefit.  Spinal injections: no  Spinal surgery- no  Beneficial medications: Flexeril (temporary), Voltaren Gel, gabapentin 400 mg (600 mg dyspepsia)  Failed medications:  Topamax, Lyrica, Cymbalta, Zanaflex (GI issues),  Lidoderm patches (ineffective)     Work Status: Disabled  Pertinent PMHx:  Asthma, DM, FM, GERD, IBS, chronic constipation, urinary incontinence. 2020 RF neg. Shoulder previously evaluated by Dr. Esteban for

## 2025-01-06 NOTE — PATIENT INSTRUCTIONS
Care Dr. Ganga Carrillo   [address]   [place] [address] [phone]  [phone]      [facility]    [facility] (Dr. Hodgson)   [address] [place] [id number]   [facility] OFFICE ADDRESS   [address] [place] [id number] [phone]

## 2025-01-07 ENCOUNTER — CARE COORDINATION (OUTPATIENT)
Dept: CARE COORDINATION | Age: 40
End: 2025-01-07

## 2025-01-07 NOTE — CARE COORDINATION
2025 1:18 PM  *  RPM Verification RPM Verification and Welcome Call Successful? Yes,     Remote Patient Monitoring Welcome Note  Date/Time:  2025 1:18 PM  Patient Current Location: Home: [address] [address]  Verified patients name and  as identifiers.       Completed and confirmed the following:    [x] Patient received all RPM equipment (tablet, scale, blood pressure device and cuff, and pulse oximeter)  Cuff Size: regular (9.05\"-15.74\")    Weight Scale:  none                    [x] Instructed patient keep box for use when returning equipment                                                          [x] Reviewed Patient Welcome Letter with patient    [x]  Reviewed Consent Form 2024  Copy of consent form in chart.                 [x] Reviewed expectations for patient and care team  Monitoring hours M-F 9-4pm  It is important to take your vitals every day, even on the weekends,to keep your care team aware of how you are doing every day of the week.  Completing monitoring by 12pm on  so that alerts can be responded to in the same day  Patient weighs self at same time every day (or after urinating and waking up)  Take blood pressure 1-2 hrs after medications   RPM team may have different phone area code (including [place], [place], [place] or [place])                              [x] Instructed patient to keep scale on flat surface                                                         [x] Instructed patient to keep tablet plugged in at all times                         [x] Instructed how to contact IT support  ([phone])  [x] Provided Remote Patient Monitoring care  information     Emergency Contact Verified: Larissa Welsh   [phone]               All questions answered at this time.      Updated HRS notes:    Emergency Contact: Larissa Welsh   [phone]  The first vital set call completed  notified CCSS consent form is updated           RPM Verification and Welcome Call 
[employer]

## 2025-01-09 ENCOUNTER — CARE COORDINATION (OUTPATIENT)
Facility: CLINIC | Age: 40
End: 2025-01-09

## 2025-01-13 DIAGNOSIS — E78.5 HYPERLIPIDEMIA, UNSPECIFIED HYPERLIPIDEMIA TYPE: ICD-10-CM

## 2025-01-16 ENCOUNTER — CARE COORDINATION (OUTPATIENT)
Facility: CLINIC | Age: 40
End: 2025-01-16

## 2025-01-16 DIAGNOSIS — J44.9 CHRONIC OBSTRUCTIVE PULMONARY DISEASE, UNSPECIFIED COPD TYPE (HCC): ICD-10-CM

## 2025-01-16 DIAGNOSIS — Z79.4 TYPE 2 DIABETES MELLITUS WITH DIABETIC NEUROPATHY, WITH LONG-TERM CURRENT USE OF INSULIN (HCC): ICD-10-CM

## 2025-01-16 DIAGNOSIS — I1A.0 RESISTANT HYPERTENSION: Primary | ICD-10-CM

## 2025-01-16 DIAGNOSIS — E11.40 TYPE 2 DIABETES MELLITUS WITH DIABETIC NEUROPATHY, WITH LONG-TERM CURRENT USE OF INSULIN (HCC): ICD-10-CM

## 2025-01-16 NOTE — CARE COORDINATION
for in-home monitoring: Patient being discharged from remote patient monitoring program today.    Patient discharged from [facility] 1/16/2025. Patient given instructions in how to prepare equipment for return and that patient will receive a call when the items will be picked up. Patient has ACM contact information if needed. Un-enroll message sent to [facility] team.      Assessments Completed:   No changes since last call    Medications Reviewed:   Patient denies any changes with medications and reports taking all medications as prescribed.    Advance Care Planning:   Not on file; education provided     Care Planning:    Goals Addressed                   This Visit's Progress     COMPLETED: Attend follow up appointments on schedule        COMPLETED: Prepare patients and caregivers for end of life decisions (ie. need for hospice, pain management, symptom relief, advance directives etc.)        COMPLETED: Take all medications as ordered                PCP/Specialist follow up:   Future Appointments         Provider Specialty Dept Phone    1/27/2025 9:45 AM Jesus Brady MD Family Medicine [phone]    4/10/2025 10:30 AM Vaibhav Vivas MD Cardiology [phone]    5/12/2025 11:00 AM Jesus Brady MD Family Medicine [phone]            Follow Up:   No further Ambulatory Care Management follow-up scheduled at this time.  Patient  has Ambulatory Care Manager's contact information for any further questions, concerns or needs.

## 2025-01-16 NOTE — PROGRESS NOTES
Remote Patient Order Discontinued    Received request from Jovani Reyna RN   to discontinue order for remote patient monitoring of COPD, Diabetes, and HTN and order completed.

## 2025-01-16 NOTE — CARE COORDINATION
Kit Return Travis Lujan  1/16/25    Care Coordination  placed call to Patient  to arrange RPM kit  through [employer].     CCSS spoke to Patient reviewed with Patient how to pack equipment in original packing. Patient aware [employer] will  equipment in 2-4 days.   All questions and concerns answered.

## 2025-01-27 ENCOUNTER — OFFICE VISIT (OUTPATIENT)
Facility: CLINIC | Age: 40
End: 2025-01-27
Payer: MEDICARE

## 2025-01-27 VITALS
WEIGHT: 169 LBS | OXYGEN SATURATION: 99 % | TEMPERATURE: 97.8 F | DIASTOLIC BLOOD PRESSURE: 78 MMHG | HEART RATE: 88 BPM | HEIGHT: 59 IN | BODY MASS INDEX: 34.07 KG/M2 | SYSTOLIC BLOOD PRESSURE: 118 MMHG | RESPIRATION RATE: 20 BRPM

## 2025-01-27 DIAGNOSIS — E78.5 HYPERLIPIDEMIA, UNSPECIFIED HYPERLIPIDEMIA TYPE: ICD-10-CM

## 2025-01-27 DIAGNOSIS — R06.02 SOB (SHORTNESS OF BREATH): ICD-10-CM

## 2025-01-27 DIAGNOSIS — G62.9 NEUROPATHY: ICD-10-CM

## 2025-01-27 DIAGNOSIS — K21.9 GASTRO-ESOPHAGEAL REFLUX DISEASE WITHOUT ESOPHAGITIS: ICD-10-CM

## 2025-01-27 DIAGNOSIS — J45.40 MODERATE PERSISTENT ASTHMA WITHOUT COMPLICATION: ICD-10-CM

## 2025-01-27 DIAGNOSIS — R11.0 NAUSEA: ICD-10-CM

## 2025-01-27 DIAGNOSIS — E07.9 THYROID DISORDER: ICD-10-CM

## 2025-01-27 DIAGNOSIS — E55.9 HYPOVITAMINOSIS D: ICD-10-CM

## 2025-01-27 DIAGNOSIS — Z79.4 TYPE 2 DIABETES MELLITUS WITH DIABETIC NEUROPATHY, WITH LONG-TERM CURRENT USE OF INSULIN (HCC): Primary | ICD-10-CM

## 2025-01-27 DIAGNOSIS — E11.40 TYPE 2 DIABETES MELLITUS WITH DIABETIC NEUROPATHY, WITH LONG-TERM CURRENT USE OF INSULIN (HCC): Primary | ICD-10-CM

## 2025-01-27 DIAGNOSIS — I10 ESSENTIAL (PRIMARY) HYPERTENSION: ICD-10-CM

## 2025-01-27 PROBLEM — E11.10 DIABETIC KETOACIDOSIS WITHOUT COMA ASSOCIATED WITH TYPE 2 DIABETES MELLITUS (HCC): Status: ACTIVE | Noted: 2024-08-02

## 2025-01-27 LAB — HBA1C MFR BLD: 6.5 %

## 2025-01-27 PROCEDURE — 3074F SYST BP LT 130 MM HG: CPT | Performed by: FAMILY MEDICINE

## 2025-01-27 PROCEDURE — 99215 OFFICE O/P EST HI 40 MIN: CPT | Performed by: FAMILY MEDICINE

## 2025-01-27 PROCEDURE — 83036 HEMOGLOBIN GLYCOSYLATED A1C: CPT | Performed by: FAMILY MEDICINE

## 2025-01-27 PROCEDURE — 3078F DIAST BP <80 MM HG: CPT | Performed by: FAMILY MEDICINE

## 2025-01-27 RX ORDER — ERENUMAB-AOOE 140 MG/ML
INJECTION, SOLUTION SUBCUTANEOUS
COMMUNITY
Start: 2025-01-22

## 2025-01-27 SDOH — ECONOMIC STABILITY: FOOD INSECURITY: WITHIN THE PAST 12 MONTHS, THE FOOD YOU BOUGHT JUST DIDN'T LAST AND YOU DIDN'T HAVE MONEY TO GET MORE.: NEVER TRUE

## 2025-01-27 SDOH — ECONOMIC STABILITY: FOOD INSECURITY: WITHIN THE PAST 12 MONTHS, THE FOOD YOU BOUGHT JUST DIDN'T LAST AND YOU DIDN'T HAVE MONEY TO GET MORE.: PATIENT DECLINED

## 2025-01-27 SDOH — ECONOMIC STABILITY: FOOD INSECURITY: WITHIN THE PAST 12 MONTHS, YOU WORRIED THAT YOUR FOOD WOULD RUN OUT BEFORE YOU GOT MONEY TO BUY MORE.: NEVER TRUE

## 2025-01-27 SDOH — ECONOMIC STABILITY: TRANSPORTATION INSECURITY
IN THE PAST 12 MONTHS, HAS THE LACK OF TRANSPORTATION KEPT YOU FROM MEDICAL APPOINTMENTS OR FROM GETTING MEDICATIONS?: NO

## 2025-01-27 SDOH — ECONOMIC STABILITY: INCOME INSECURITY: IN THE LAST 12 MONTHS, WAS THERE A TIME WHEN YOU WERE NOT ABLE TO PAY THE MORTGAGE OR RENT ON TIME?: NO

## 2025-01-27 SDOH — ECONOMIC STABILITY: TRANSPORTATION INSECURITY
IN THE PAST 12 MONTHS, HAS LACK OF TRANSPORTATION KEPT YOU FROM MEETINGS, WORK, OR FROM GETTING THINGS NEEDED FOR DAILY LIVING?: NO

## 2025-01-27 SDOH — ECONOMIC STABILITY: FOOD INSECURITY: WITHIN THE PAST 12 MONTHS, YOU WORRIED THAT YOUR FOOD WOULD RUN OUT BEFORE YOU GOT MONEY TO BUY MORE.: PATIENT DECLINED

## 2025-01-27 ASSESSMENT — PATIENT HEALTH QUESTIONNAIRE - PHQ9
2. FEELING DOWN, DEPRESSED OR HOPELESS: NOT AT ALL
1. LITTLE INTEREST OR PLEASURE IN DOING THINGS: NOT AT ALL
SUM OF ALL RESPONSES TO PHQ QUESTIONS 1-9: 0
SUM OF ALL RESPONSES TO PHQ9 QUESTIONS 1 & 2: 0

## 2025-01-27 NOTE — PROGRESS NOTES
[facility]  Travis Lujan comes in for follow up care.  SOB: Patient has shortness of breath with exertion.  She has been seen by the pulmonologist for the shortness of breath.  She has also seen the cardiologist.  Cardiac parameters have been stable.  She had previous test done by the pulmonologist and these were stable.  Advised that this is likely due to her weight gain.  I will check CBC, CMP and a proBNP.  I will follow-up with the results.    Weight gain: Patient has gained more than 20 pounds over the past few months.  She has not been actively trying to gain weight.  States that she is not eating more and in fact her appetite has diminished.  She however continues to gain weight.  Would like to have lab work checked.  She has been on insulin and this can cause some weight gain.  We will check labs.  I will follow-up with the results.  Nausea chronic: Patient has nausea and abdominal fullness.  Patient is on Zofran as needed.  Will check labs.  Malaise and fatigue: Patient has generalized malaise and fatigue.  States that she has no energy and is unable to do a lot of activities of daily living.  I will check labs.  I will follow-up with the results.  DM2: Patient has type 2 diabetes mellitus.  She has been followed up by the endocrinologist.  She is on an insulin pump.  Her HbA1c is 6.5.  Patient should intensify lifestyle and dietary modification.  I will recheck labs.   Low back pain: Patient has chronic low back pain.  She has been seen by the spine specialist.  Patient is on ibuprofen, Voltaren gel, Flexeril as a muscle relaxant.  She will continue current treatment plan.    Dyslipidemia: Patient has dyslipidemia.  She is on Crestor 40 mg daily.  She should exercise and take a diet low in polysaturated fats.  Will recheck lipid panel.  Neuropathy: Patient has neuropathy with numbness and tingling bilateral lower extremities.  Patient has been on gabapentin in the past.  GERD: Patient has gastroesophageal

## 2025-01-27 NOTE — PROGRESS NOTES
\"Have you been to the ER, urgent care clinic since your last visit?  Hospitalized since your last visit?\"    NO    “Have you seen or consulted any other health care providers outside our system since your last visit?”    NO      “Have you had a diabetic eye exam?”    YES - Where: [facility] in [facility] Nurse/CMA to request most recent records if not in the chart     Date of last diabetic eye exam: 1/22/2024

## 2025-02-17 ENCOUNTER — CARE COORDINATION (OUTPATIENT)
Facility: CLINIC | Age: 40
End: 2025-02-17

## 2025-02-17 NOTE — CARE COORDINATION
Ambulatory Care Coordination Note     2/17/2025 5:01 PM     Patient outreach attempt by this ACM today to perform care management follow up . ACM was unable to reach the patient by telephone today;   left voice message requesting a return phone call to this ACM.     ACM: Jovani Reyna RN     Care Summary Note: NA    PCP/Specialist follow up:   Future Appointments         Provider Specialty Dept Phone    3/10/2025 12:00 PM Jesus Brady MD Family Medicine [phone]    4/10/2025 10:30 AM Vaibhav Vivas MD Cardiology [phone]    5/12/2025 11:00 AM Jesus Brady MD Family Medicine [phone]            Follow Up:   Plan for next ACM outreach in approximately 1-2 days  to complete:  - outreach attempt to offer care management services.

## 2025-02-18 ENCOUNTER — CARE COORDINATION (OUTPATIENT)
Facility: CLINIC | Age: 40
End: 2025-02-18

## 2025-02-18 DIAGNOSIS — Z79.4 TYPE 2 DIABETES MELLITUS WITH DIABETIC NEUROPATHY, WITH LONG-TERM CURRENT USE OF INSULIN (HCC): ICD-10-CM

## 2025-02-18 DIAGNOSIS — I10 PRIMARY HYPERTENSION: Primary | ICD-10-CM

## 2025-02-18 DIAGNOSIS — E11.40 TYPE 2 DIABETES MELLITUS WITH DIABETIC NEUROPATHY, WITH LONG-TERM CURRENT USE OF INSULIN (HCC): ICD-10-CM

## 2025-02-18 ASSESSMENT — PATIENT HEALTH QUESTIONNAIRE - PHQ9
SUM OF ALL RESPONSES TO PHQ9 QUESTIONS 1 & 2: 0
SUM OF ALL RESPONSES TO PHQ QUESTIONS 1-9: 0
SUM OF ALL RESPONSES TO PHQ QUESTIONS 1-9: 0
1. LITTLE INTEREST OR PLEASURE IN DOING THINGS: NOT AT ALL
2. FEELING DOWN, DEPRESSED OR HOPELESS: NOT AT ALL
SUM OF ALL RESPONSES TO PHQ QUESTIONS 1-9: 0
SUM OF ALL RESPONSES TO PHQ QUESTIONS 1-9: 0

## 2025-02-18 NOTE — CARE COORDINATION
[phone]     [x]  A member from the care coordination team will reach out to notify the patient once the RPM kit is ordered.  [x]  Once the kit is delivered, the HRS team will contact the patient after [employer] delivers to assist with set up.  [x]  Determined BP cuff size: regular (9.05\"-15.74\")  [x]  Determined weight scale: regular (<330lbs)  [x]  Hours of ACM monitoring - Monday-Friday [id number]  []  It is important to take your vitals every day, even on the weekends, to keep your care team aware of how you are doing every day of the week.    All questions about RPM program answered at this time.     Assessments Completed:   Diabetes Assessment    Medic Alert ID: No  Meal Planning: Carb counting, Avoidance of concentrated sweets   How often do you test your blood sugar?: Daily (Comment: Patient has a dexcom monitoring  system)   Do you have barriers with adherence to non-pharmacologic self-management interventions? (Nutrition/Exercise/Self-Monitoring): Yes   Have you ever had to go to the ED for symptoms of low blood sugar?: No       No patient-reported symptoms         ,   Hypertension - Encounter Level          ,   Asthma - Encounter Level           ,   General Assessment    Do you have any symptoms that are causing concern?: Yes  Progression since Onset: Intermittent - Waxing/Waning  Reported Symptoms: Shortness of Breath          Medications Reviewed:   Patient denies any changes with medications and reports taking all medications as prescribed.    Advance Care Planning:   Not on file; education provided     Care Planning:    Goals Addressed                   This Visit's Progress     Attend follow up appointments on schedule   On track     Prepare patients and caregivers for end of life decisions (ie. need for hospice, pain management, symptom relief, advance directives etc.)   On track     Take all medications as ordered   On track             PCP/Specialist follow up:   Future Appointments         Provider

## 2025-02-19 NOTE — PROGRESS NOTES
Remote Patient Monitoring Treatment Plan    Received request from ACM/Jovani Coleman, HOWARD   to order remote patient monitoring for in home monitoring of Diabetes and HTN; Condition managed by PCP.  and order completed.     Patient will be monitoring blood pressure   glucose and weight.  Please set alert for ONLY weight gain of 5# in 7 days. Pt has no documented hx of HF.   Patient has had recent unexplained weight gain and is being monitored by PCP.     Patient will engage in Remote Patient Monitoring each day to develop the skills necessary for self management.       RPM Care Team Responsibilities:   Alerts will be reviewed daily and addressed within 2-4 hours during operational hours (Monday -Friday 9 am-4 pm)  Alert response and intervention documented in patient medical record  Alert response escalated to PCP per protocol and documented in patient medical record  Patient monitored over approximately  days  Discharge from program based on self-management readiness    See care coordination encounters for additional details.

## 2025-02-25 ENCOUNTER — CARE COORDINATION (OUTPATIENT)
Facility: CLINIC | Age: 40
End: 2025-02-25

## 2025-02-25 NOTE — CARE COORDINATION
Ambulatory Care Coordination Note     2/25/2025 10:29 AM     Patient outreach attempt by this ACM today to perform care management follow up . ACM was unable to reach the patient by telephone today;   left voice message requesting a return phone call to this ACM.     ACM: Jovani Reyna RN     Care Summary Note: NA    PCP/Specialist follow up:   Future Appointments         Provider Specialty Dept Phone    3/10/2025 12:00 PM Jesus Brady MD Family Medicine [phone]    4/10/2025 10:30 AM Vaibhav Vivas MD Cardiology [phone]    5/12/2025 11:00 AM Jesus Brady MD Family Medicine [phone]            Follow Up:   Plan for next ACM outreach in approximately 1 week to complete:  - medication review  - advance care planning  - goal progression  - education   - RPM.

## 2025-03-04 ENCOUNTER — CARE COORDINATION (OUTPATIENT)
Facility: CLINIC | Age: 40
End: 2025-03-04

## 2025-03-04 NOTE — CARE COORDINATION
Ambulatory Care Coordination Note     3/4/2025 1:56 PM     Patient outreach attempt by this ACM today to perform care management follow up . ACM was unable to reach the patient by telephone today;   left voice message requesting a return phone call to this ACM.     ACM: Jovani Reyna RN     Care Summary Note: NA      PCP/Specialist follow up:   Future Appointments         Provider Specialty Dept Phone    3/10/2025 12:00 PM Jesus Brady MD Family Medicine [phone]    4/10/2025 10:30 AM Vaibhav Vivas MD Cardiology [phone]    5/12/2025 11:00 AM Jesus Brady MD Family Medicine [phone]            Follow Up:   Plan for next ACM outreach in approximately 1 week to complete:  - medication review  - advance care planning  - goal progression  - education   - RPM.

## 2025-03-07 ENCOUNTER — TELEPHONE (OUTPATIENT)
Age: 40
End: 2025-03-07

## 2025-03-07 DIAGNOSIS — Z12.31 SCREENING MAMMOGRAM FOR BREAST CANCER: ICD-10-CM

## 2025-03-07 DIAGNOSIS — N64.4 PAIN OF BOTH BREASTS: Primary | ICD-10-CM

## 2025-03-07 DIAGNOSIS — Z12.39 ENCOUNTER FOR BREAST CANCER SCREENING OTHER THAN MAMMOGRAM: ICD-10-CM

## 2025-03-07 DIAGNOSIS — R92.8 ABNORMAL MAMMOGRAM: ICD-10-CM

## 2025-03-07 DIAGNOSIS — N63.20 MASS OF LEFT BREAST, UNSPECIFIED QUADRANT: ICD-10-CM

## 2025-03-07 NOTE — TELEPHONE ENCOUNTER
Central Scheduling called to advise that the order was for a screening mammogram but it was coded for a diagnostic mammogram and needs to be corrected. Thank you.

## 2025-03-11 ENCOUNTER — CARE COORDINATION (OUTPATIENT)
Facility: CLINIC | Age: 40
End: 2025-03-11

## 2025-03-11 NOTE — CARE COORDINATION
Ambulatory Care Coordination Note     3/11/2025 11:38 AM     Patient outreach attempt by this ACM today to perform care management follow up . ACM was unable to reach the patient by telephone today;   left voice message requesting a return phone call to this ACM.     ACM: Jovani Reyna RN     Care Summary Note: NA    PCP/Specialist follow up:   Future Appointments         Provider Specialty Dept Phone    3/19/2025 1:15 PM Jesus Brady MD Family Medicine [phone]    4/10/2025 10:30 AM Vaibhav Vivas MD Cardiology [phone]    5/12/2025 11:00 AM Jesus Brady MD Family Medicine [phone]            Follow Up:   Plan for next ACM outreach in approximately 1 week to complete:  - medication review  - advance care planning  - goal progression  - education   - RPM.

## 2025-03-18 ENCOUNTER — CARE COORDINATION (OUTPATIENT)
Facility: CLINIC | Age: 40
End: 2025-03-18

## 2025-03-19 ENCOUNTER — OFFICE VISIT (OUTPATIENT)
Facility: CLINIC | Age: 40
End: 2025-03-19
Payer: MEDICARE

## 2025-03-19 VITALS
BODY MASS INDEX: 34.68 KG/M2 | TEMPERATURE: 98 F | HEART RATE: 90 BPM | SYSTOLIC BLOOD PRESSURE: 133 MMHG | OXYGEN SATURATION: 95 % | WEIGHT: 172 LBS | DIASTOLIC BLOOD PRESSURE: 82 MMHG | RESPIRATION RATE: 24 BRPM | HEIGHT: 59 IN

## 2025-03-19 DIAGNOSIS — E11.40 TYPE 2 DIABETES MELLITUS WITH DIABETIC NEUROPATHY, WITH LONG-TERM CURRENT USE OF INSULIN (HCC): Primary | ICD-10-CM

## 2025-03-19 DIAGNOSIS — R20.0 NUMBNESS AND TINGLING: ICD-10-CM

## 2025-03-19 DIAGNOSIS — Z79.4 TYPE 2 DIABETES MELLITUS WITH STAGE 3 CHRONIC KIDNEY DISEASE, WITH LONG-TERM CURRENT USE OF INSULIN, UNSPECIFIED WHETHER STAGE 3A OR 3B CKD (HCC): ICD-10-CM

## 2025-03-19 DIAGNOSIS — J02.9 SORE THROAT: ICD-10-CM

## 2025-03-19 DIAGNOSIS — Z79.4 TYPE 2 DIABETES MELLITUS WITH DIABETIC NEUROPATHY, WITH LONG-TERM CURRENT USE OF INSULIN (HCC): Primary | ICD-10-CM

## 2025-03-19 DIAGNOSIS — E11.21 TYPE 2 DIABETES WITH NEPHROPATHY (HCC): ICD-10-CM

## 2025-03-19 DIAGNOSIS — G43.909 MIGRAINE WITHOUT STATUS MIGRAINOSUS, NOT INTRACTABLE, UNSPECIFIED MIGRAINE TYPE: ICD-10-CM

## 2025-03-19 DIAGNOSIS — G62.9 NEUROPATHY: ICD-10-CM

## 2025-03-19 DIAGNOSIS — R20.2 NUMBNESS AND TINGLING: ICD-10-CM

## 2025-03-19 DIAGNOSIS — J45.40 MODERATE PERSISTENT ASTHMA WITHOUT COMPLICATION: ICD-10-CM

## 2025-03-19 DIAGNOSIS — R63.5 WEIGHT GAIN: ICD-10-CM

## 2025-03-19 DIAGNOSIS — E78.5 HYPERLIPIDEMIA, UNSPECIFIED HYPERLIPIDEMIA TYPE: ICD-10-CM

## 2025-03-19 DIAGNOSIS — R53.81 MALAISE AND FATIGUE: ICD-10-CM

## 2025-03-19 DIAGNOSIS — K21.9 GASTRO-ESOPHAGEAL REFLUX DISEASE WITHOUT ESOPHAGITIS: ICD-10-CM

## 2025-03-19 DIAGNOSIS — N18.30 TYPE 2 DIABETES MELLITUS WITH STAGE 3 CHRONIC KIDNEY DISEASE, WITH LONG-TERM CURRENT USE OF INSULIN, UNSPECIFIED WHETHER STAGE 3A OR 3B CKD (HCC): ICD-10-CM

## 2025-03-19 DIAGNOSIS — I10 ESSENTIAL (PRIMARY) HYPERTENSION: ICD-10-CM

## 2025-03-19 DIAGNOSIS — R53.83 MALAISE AND FATIGUE: ICD-10-CM

## 2025-03-19 DIAGNOSIS — E11.22 TYPE 2 DIABETES MELLITUS WITH STAGE 3 CHRONIC KIDNEY DISEASE, WITH LONG-TERM CURRENT USE OF INSULIN, UNSPECIFIED WHETHER STAGE 3A OR 3B CKD (HCC): ICD-10-CM

## 2025-03-19 LAB
BILIRUBIN, URINE, POC: NEGATIVE
BLOOD URINE, POC: NEGATIVE
GLUCOSE URINE, POC: NEGATIVE
KETONES, URINE, POC: NEGATIVE
LEUKOCYTE ESTERASE, URINE, POC: NEGATIVE
NITRITE, URINE, POC: NEGATIVE
PH, URINE, POC: 6 (ref 4.6–8)
PROTEIN,URINE, POC: NORMAL
SPECIFIC GRAVITY, URINE, POC: 1.03 (ref 1–1.03)
URINALYSIS CLARITY, POC: NORMAL
URINALYSIS COLOR, POC: NORMAL
UROBILINOGEN, POC: NORMAL MG/DL

## 2025-03-19 PROCEDURE — 3075F SYST BP GE 130 - 139MM HG: CPT | Performed by: FAMILY MEDICINE

## 2025-03-19 PROCEDURE — 3079F DIAST BP 80-89 MM HG: CPT | Performed by: FAMILY MEDICINE

## 2025-03-19 PROCEDURE — 99215 OFFICE O/P EST HI 40 MIN: CPT | Performed by: FAMILY MEDICINE

## 2025-03-19 PROCEDURE — 3044F HG A1C LEVEL LT 7.0%: CPT | Performed by: FAMILY MEDICINE

## 2025-03-19 PROCEDURE — 81001 URINALYSIS AUTO W/SCOPE: CPT | Performed by: FAMILY MEDICINE

## 2025-03-19 SDOH — ECONOMIC STABILITY: FOOD INSECURITY: WITHIN THE PAST 12 MONTHS, THE FOOD YOU BOUGHT JUST DIDN'T LAST AND YOU DIDN'T HAVE MONEY TO GET MORE.: NEVER TRUE

## 2025-03-19 SDOH — ECONOMIC STABILITY: FOOD INSECURITY: WITHIN THE PAST 12 MONTHS, YOU WORRIED THAT YOUR FOOD WOULD RUN OUT BEFORE YOU GOT MONEY TO BUY MORE.: NEVER TRUE

## 2025-03-19 ASSESSMENT — PATIENT HEALTH QUESTIONNAIRE - PHQ9
2. FEELING DOWN, DEPRESSED OR HOPELESS: NOT AT ALL
SUM OF ALL RESPONSES TO PHQ QUESTIONS 1-9: 0
SUM OF ALL RESPONSES TO PHQ QUESTIONS 1-9: 0
1. LITTLE INTEREST OR PLEASURE IN DOING THINGS: NOT AT ALL
SUM OF ALL RESPONSES TO PHQ QUESTIONS 1-9: 0
SUM OF ALL RESPONSES TO PHQ QUESTIONS 1-9: 0

## 2025-03-19 NOTE — PROGRESS NOTES
Low Risk  (10/24/2024)    Overall Financial Resource Strain (CARDIA)     Difficulty of Paying Living Expenses: Not hard at all   Food Insecurity: No Food Insecurity (3/19/2025)    Hunger Vital Sign     Worried About Running Out of Food in the Last Year: Never true     Ran Out of Food in the Last Year: Never true   Transportation Needs: No Transportation Needs (3/19/2025)    PRAPARE - Transportation     Lack of Transportation (Medical): No     Lack of Transportation (Non-Medical): No   Physical Activity: Inactive (5/8/2024)    Exercise Vital Sign     Days of Exercise per Week: 0 days     Minutes of Exercise per Session: 0 min   Stress: Not on file   Social Connections: Not on file   Intimate Partner Violence: Not At Risk (8/2/2024)    Received from [employer] [employer]    Humiliation, Afraid, Rape, and Kick questionnaire     Fear of Current or Ex-Partner: No     Emotionally Abused: No     Physically Abused: No     Sexually Abused: No   Housing Stability: Low Risk  (3/19/2025)    Housing Stability Vital Sign     Unable to Pay for Housing in the Last Year: No     Number of Times Moved in the Last Year: 0     Homeless in the Last Year: No       Review of Systems  Review of Systems - Review of all systems is negative except as noted above in the HPI.    Vital Signs  /82   Pulse 90   Temp 98 °F (36.7 °C)   Resp 24   Ht 1.499 m (4' 11\")   Wt 78 kg (172 lb)   SpO2 95%   BMI 34.74 kg/m²       Physical Exam  Physical Examination: General appearance - oriented to person, place, and time, overweight, and acyanotic, in no respiratory distress  Mental status - alert, oriented to person, place, and time, affect appropriate to mood  Neck - supple, no significant adenopathy  Lymphatics - no palpable lymphadenopathy  Chest - no tachypnea, retractions or cyanosis  Heart - normal rate and regular rhythm, S1 and S2 normal  Abdomen - no rebound tenderness noted  Back exam - limited range of motion  Neurological -

## 2025-03-26 ENCOUNTER — CARE COORDINATION (OUTPATIENT)
Facility: CLINIC | Age: 40
End: 2025-03-26

## 2025-03-26 NOTE — CARE COORDINATION
Ambulatory Care Coordination Note     3/26/2025 11:18 AM     patient outreach attempt by this ACM today to perform care management follow up . ACM was unable to reach the patient by telephone today;   left voice message requesting a return phone call to this ACM.     Patient closed (unable to reach patient) from the High Risk Care Management program on 3/26/2025.  Patient  Unable to reach .  Care management goals have been completed. No further Ambulatory Care Manager follow up scheduled.

## 2025-03-31 ENCOUNTER — HOSPITAL ENCOUNTER (OUTPATIENT)
Age: 40
Discharge: HOME | End: 2025-04-03
Attending: SURGERY
Payer: MEDICARE

## 2025-03-31 VITALS — HEIGHT: 59 IN | BODY MASS INDEX: 34.68 KG/M2 | WEIGHT: 172 LBS

## 2025-03-31 DIAGNOSIS — Z12.31 SCREENING MAMMOGRAM FOR BREAST CANCER: ICD-10-CM

## 2025-03-31 PROCEDURE — 77063 BREAST TOMOSYNTHESIS BI: CPT

## 2025-04-07 ENCOUNTER — OFFICE VISIT (OUTPATIENT)
Age: 40
End: 2025-04-07
Payer: MEDICARE

## 2025-04-07 VITALS
WEIGHT: 172.6 LBS | BODY MASS INDEX: 34.8 KG/M2 | HEART RATE: 76 BPM | OXYGEN SATURATION: 100 % | DIASTOLIC BLOOD PRESSURE: 76 MMHG | SYSTOLIC BLOOD PRESSURE: 122 MMHG | RESPIRATION RATE: 16 BRPM | HEIGHT: 59 IN | TEMPERATURE: 97.5 F

## 2025-04-07 DIAGNOSIS — N64.4 BREAST PAIN, RIGHT: Primary | ICD-10-CM

## 2025-04-07 DIAGNOSIS — Z12.31 SCREENING MAMMOGRAM FOR BREAST CANCER: ICD-10-CM

## 2025-04-07 PROCEDURE — 99214 OFFICE O/P EST MOD 30 MIN: CPT | Performed by: SURGERY

## 2025-04-07 PROCEDURE — 3074F SYST BP LT 130 MM HG: CPT | Performed by: SURGERY

## 2025-04-07 PROCEDURE — 3078F DIAST BP <80 MM HG: CPT | Performed by: SURGERY

## 2025-04-07 ASSESSMENT — ENCOUNTER SYMPTOMS
CHEST TIGHTNESS: 0
SHORTNESS OF BREATH: 0

## 2025-04-07 NOTE — PROGRESS NOTES
Travis Lujan is a 40 y.o. female (: 1985)     Chief Complaint   Patient presents with    Follow-up     Bilateral breast        Medication list and allergies have been reviewed with Travis Lujan and updated as of today's date.     I have gone over all Medical, Surgical and Social History with Travis Lujan and updated/added the information accordingly.     1. Have you been to the ER, urgent care clinic since your last visit?  Hospitalized since your last visit?No    2. Have you seen or consulted any other health care providers outside of the [facility] since your last visit?  Include any pap smears or colon screening. No    
mixed       Surgical History:  Past Surgical History:   Procedure Laterality Date     SECTION  , 2016    COLONOSCOPY      DILATION AND CURETTAGE OF UTERUS      PELVIC LAPAROSCOPY      UPPER GASTROINTESTINAL ENDOSCOPY         Social History:  Social History     Socioeconomic History    Marital status: Single     Spouse name: None    Number of children: None    Years of education: None    Highest education level: None   Tobacco Use    Smoking status: Never    Smokeless tobacco: Never   Vaping Use    Vaping status: Never Used   Substance and Sexual Activity    Alcohol use: No    Drug use: No    Sexual activity: Yes     Partners: Male     Social Drivers of Health     Financial Resource Strain: Low Risk  (10/24/2024)    Overall Financial Resource Strain (CARDIA)     Difficulty of Paying Living Expenses: Not hard at all   Food Insecurity: No Food Insecurity (3/19/2025)    Hunger Vital Sign     Worried About Running Out of Food in the Last Year: Never true     Ran Out of Food in the Last Year: Never true   Transportation Needs: No Transportation Needs (3/19/2025)    PRAPARE - Transportation     Lack of Transportation (Medical): No     Lack of Transportation (Non-Medical): No   Physical Activity: Inactive (2024)    Exercise Vital Sign     Days of Exercise per Week: 0 days     Minutes of Exercise per Session: 0 min   Intimate Partner Violence: Not At Risk (2024)    Received from [employer] [employer]    Humiliation, Afraid, Rape, and Kick questionnaire     Fear of Current or Ex-Partner: No     Emotionally Abused: No     Physically Abused: No     Sexually Abused: No   Housing Stability: Low Risk  (3/19/2025)    Housing Stability Vital Sign     Unable to Pay for Housing in the Last Year: No     Number of Times Moved in the Last Year: 0     Homeless in the Last Year: No       Family History:  Family History   Problem Relation Age of Onset    Heart Attack Father     Heart Disease Father     Kidney

## 2025-05-12 ENCOUNTER — OFFICE VISIT (OUTPATIENT)
Facility: CLINIC | Age: 40
End: 2025-05-12
Payer: MEDICARE

## 2025-05-12 VITALS
SYSTOLIC BLOOD PRESSURE: 130 MMHG | HEIGHT: 59 IN | WEIGHT: 174 LBS | DIASTOLIC BLOOD PRESSURE: 88 MMHG | HEART RATE: 74 BPM | TEMPERATURE: 98 F | RESPIRATION RATE: 20 BRPM | BODY MASS INDEX: 35.08 KG/M2

## 2025-05-12 DIAGNOSIS — M79.672 PAIN IN BOTH FEET: ICD-10-CM

## 2025-05-12 DIAGNOSIS — E83.42 HYPOMAGNESEMIA: ICD-10-CM

## 2025-05-12 DIAGNOSIS — B00.9 HSV INFECTION: ICD-10-CM

## 2025-05-12 DIAGNOSIS — E11.9 COMPREHENSIVE DIABETIC FOOT EXAMINATION, TYPE 2 DM, ENCOUNTER FOR (HCC): ICD-10-CM

## 2025-05-12 DIAGNOSIS — Z79.4 TYPE 2 DIABETES MELLITUS WITH DIABETIC NEUROPATHY, WITH LONG-TERM CURRENT USE OF INSULIN (HCC): ICD-10-CM

## 2025-05-12 DIAGNOSIS — G62.9 NEUROPATHY: ICD-10-CM

## 2025-05-12 DIAGNOSIS — E78.5 HYPERLIPIDEMIA, UNSPECIFIED HYPERLIPIDEMIA TYPE: ICD-10-CM

## 2025-05-12 DIAGNOSIS — R51.9 NONINTRACTABLE HEADACHE, UNSPECIFIED CHRONICITY PATTERN, UNSPECIFIED HEADACHE TYPE: ICD-10-CM

## 2025-05-12 DIAGNOSIS — M79.671 PAIN IN BOTH FEET: ICD-10-CM

## 2025-05-12 DIAGNOSIS — T78.2XXA ANAPHYLAXIS, INITIAL ENCOUNTER: ICD-10-CM

## 2025-05-12 DIAGNOSIS — I10 ESSENTIAL (PRIMARY) HYPERTENSION: ICD-10-CM

## 2025-05-12 DIAGNOSIS — E11.40 TYPE 2 DIABETES MELLITUS WITH DIABETIC NEUROPATHY, WITH LONG-TERM CURRENT USE OF INSULIN (HCC): ICD-10-CM

## 2025-05-12 DIAGNOSIS — E55.9 HYPOVITAMINOSIS D: ICD-10-CM

## 2025-05-12 DIAGNOSIS — H69.93 DYSFUNCTION OF BOTH EUSTACHIAN TUBES: ICD-10-CM

## 2025-05-12 DIAGNOSIS — Z00.00 MEDICARE ANNUAL WELLNESS VISIT, SUBSEQUENT: Primary | ICD-10-CM

## 2025-05-12 DIAGNOSIS — J02.9 ACUTE VIRAL PHARYNGITIS: ICD-10-CM

## 2025-05-12 PROCEDURE — 99215 OFFICE O/P EST HI 40 MIN: CPT | Performed by: FAMILY MEDICINE

## 2025-05-12 PROCEDURE — G0439 PPPS, SUBSEQ VISIT: HCPCS | Performed by: FAMILY MEDICINE

## 2025-05-12 PROCEDURE — 3079F DIAST BP 80-89 MM HG: CPT | Performed by: FAMILY MEDICINE

## 2025-05-12 PROCEDURE — 3044F HG A1C LEVEL LT 7.0%: CPT | Performed by: FAMILY MEDICINE

## 2025-05-12 PROCEDURE — 3075F SYST BP GE 130 - 139MM HG: CPT | Performed by: FAMILY MEDICINE

## 2025-05-12 RX ORDER — ERGOCALCIFEROL 1.25 MG/1
CAPSULE, LIQUID FILLED ORAL
COMMUNITY
Start: 2025-05-07

## 2025-05-12 RX ORDER — VALACYCLOVIR HYDROCHLORIDE 500 MG/1
500 TABLET, FILM COATED ORAL DAILY
Qty: 30 TABLET | Refills: 2 | Status: SHIPPED | OUTPATIENT
Start: 2025-05-12

## 2025-05-12 RX ORDER — EPINEPHRINE 0.3 MG/.3ML
INJECTION SUBCUTANEOUS
Qty: 1 EACH | Refills: 1 | Status: SHIPPED | OUTPATIENT
Start: 2025-05-12

## 2025-05-12 ASSESSMENT — PATIENT HEALTH QUESTIONNAIRE - PHQ9
1. LITTLE INTEREST OR PLEASURE IN DOING THINGS: NOT AT ALL
SUM OF ALL RESPONSES TO PHQ QUESTIONS 1-9: 0
2. FEELING DOWN, DEPRESSED OR HOPELESS: NOT AT ALL
SUM OF ALL RESPONSES TO PHQ QUESTIONS 1-9: 0

## 2025-05-12 NOTE — PROGRESS NOTES
Have you been to the ER, urgent care clinic since your last visit?  Hospitalized since your last visit?   NO    Have you seen or consulted any other health care providers outside our system since your last visit?   NO      “Have you had a diabetic eye exam?”    YES - Where: [facility] to request most recent records if not in the chart     Date of last diabetic eye exam: 1/22/2024

## 2025-05-12 NOTE — PROGRESS NOTES
[facility]  Travis Lujan comes in for follow-up care.  Diabetes mellitus type 2: Patient has type 2 diabetes mellitus.  She is on insulin and has been followed up by the endocrinologist.  Her last HbA1c was 6.5.  She will intensify lifestyle and dietary modification.  Will recheck labs.  She does have neuropathy both feet.  I did a foot exam.  Foot pain: Patient has pain both feet.  This is worse on the right foot.  No swelling.  Denies history of trauma.  Pain is mainly in the toes and it is a radicular type pain.  Suspect neuropathic pain.  She has a follow-up to see her podiatrist this afternoon.  She takes gabapentin.  She can take Tylenol for pain as needed.  Muscle spasms: Patient has muscle spasms bilateral lower extremities.  She is on Flexeril.  Symptoms persist.  We will recheck labs.  I will check magnesium level and electrolytes.  Dyslipidemia: Patient has dyslipidemia.  She takes Crestor daily.  Will continue current treatment plan.  Neuropathy: Patient has neuropathy with numbness and tingling lower extremities.  She is on gabapentin.  She gets transient relief from this medication.  Continue current treatment plan.  She will continue with supportive care.  Hypertension: Patient has hypertension.  Blood pressure is stable.  Denies headache, changes in vision or focal weakness.  She is on atenolol 50 mg daily.  Will continue current treatment plan.  Eustachian tube dysfunction: Patient has plugged up sensation both ears.  She has been doing supportive care.  Denies ear drainage, fever or chills.  Ear exam is stable.  This is likely due to eustachian tube dysfunction.  She will take antihistamines.  She is on Zyrtec.  She will continue to use the nasal spray.  Throat irritation: Patient has throat irritation.  Has been going for more than a week.  Denies odynophagia or dysphagia.  This is acute pharyngitis.  We discussed supportive care measures.  Will follow-up in case of no improvement or worsening

## 2025-05-13 LAB
ALBUMIN/CREAT UR: 7 MG/G CREAT (ref 0–29)
CREAT UR-MCNC: 172 MG/DL
MICROALBUMIN UR-MCNC: 12.7 UG/ML

## 2025-05-13 NOTE — PATIENT INSTRUCTIONS

## 2025-05-13 NOTE — PROGRESS NOTES
Medicare Annual Wellness Visit    Travis Lujan is here for Medicare AWV and Ear Fullness (both)    Assessment & Plan    Diagnosis Orders   1. Medicare annual wellness visit, subsequent        2. Type 2 diabetes mellitus with diabetic neuropathy, with long-term current use of insulin ([facility])  Albumin/Creatinine Ratio, Urine    Albumin/Creatinine Ratio, Urine    Hemoglobin A1C    Lipid Panel    CBC with Auto Differential    Comprehensive Metabolic Panel     DIABETES FOOT EXAM      3. Anaphylaxis, initial encounter  EPINEPHrine (EPIPEN) 0.3 MG/0.3ML SOAJ injection      4. Hypomagnesemia  Magnesium      5. Pain in both feet        6. Comprehensive diabetic foot examination, type 2 DM, encounter for ([facility])   DIABETES FOOT EXAM      7. Dysfunction of both eustachian tubes        8. Acute viral pharyngitis        9. HSV infection        10. Neuropathy        11. Hypovitaminosis D        12. Hyperlipidemia, unspecified hyperlipidemia type        13. Essential (primary) hypertension        14. Nonintractable headache, unspecified chronicity pattern, unspecified headache type             Return in about 3 months (around 8/12/2025), or if symptoms worsen or fail to improve, for Hypertension, Diabetes Mellitus.     Subjective   Travis Lujan comes in for Medicare wellness exam.      Patient's complete Health Risk Assessment and screening values have been reviewed and are found in Flowsheets. The following problems were reviewed today and where indicated follow up appointments were made and/or referrals ordered.    Positive Risk Factor Screenings with Interventions:            Self-assessment of health:  In general, how would you say your health is?: (!) Poor    Interventions:  Patient advised to follow-up in this office for further evaluation and treatment    General HRA Questions:  Select all that apply: (!) New or Increased Fatigue  Interventions Fatigue:  Patient advised to follow up in the office for further

## 2025-05-14 ENCOUNTER — HOSPITAL ENCOUNTER (OUTPATIENT)
Facility: HOSPITAL | Age: 40
Setting detail: SPECIMEN
Discharge: HOME | End: 2025-05-17

## 2025-05-14 ENCOUNTER — LAB (OUTPATIENT)
Facility: CLINIC | Age: 40
End: 2025-05-14

## 2025-05-14 DIAGNOSIS — Z79.4 TYPE 2 DIABETES MELLITUS WITH DIABETIC NEUROPATHY, WITH LONG-TERM CURRENT USE OF INSULIN (HCC): ICD-10-CM

## 2025-05-14 DIAGNOSIS — E83.42 HYPOMAGNESEMIA: ICD-10-CM

## 2025-05-14 DIAGNOSIS — E11.40 TYPE 2 DIABETES MELLITUS WITH DIABETIC NEUROPATHY, WITH LONG-TERM CURRENT USE OF INSULIN (HCC): ICD-10-CM

## 2025-05-14 LAB — LABCORP SPECIMEN COLLECTION: NORMAL

## 2025-05-14 PROCEDURE — 99001 SPECIMEN HANDLING PT-LAB: CPT

## 2025-05-15 LAB
ALBUMIN SERPL-MCNC: 4.2 G/DL (ref 3.9–4.9)
ALP SERPL-CCNC: 97 IU/L (ref 44–121)
ALT SERPL-CCNC: 17 IU/L (ref 0–32)
AST SERPL-CCNC: 19 IU/L (ref 0–40)
BASOPHILS # BLD AUTO: 0 X10E3/UL (ref 0–0.2)
BASOPHILS NFR BLD AUTO: 0 %
BILIRUB SERPL-MCNC: 0.4 MG/DL (ref 0–1.2)
BUN SERPL-MCNC: 10 MG/DL (ref 6–24)
BUN/CREAT SERPL: 13 (ref 9–23)
CALCIUM SERPL-MCNC: 9.1 MG/DL (ref 8.7–10.2)
CHLORIDE SERPL-SCNC: 108 MMOL/L (ref 96–106)
CHOLEST SERPL-MCNC: 177 MG/DL (ref 100–199)
CO2 SERPL-SCNC: 20 MMOL/L (ref 20–29)
CREAT SERPL-MCNC: 0.76 MG/DL (ref 0.57–1)
EGFRCR SERPLBLD CKD-EPI 2021: 102 ML/MIN/1.73
EOSINOPHIL # BLD AUTO: 0 X10E3/UL (ref 0–0.4)
EOSINOPHIL NFR BLD AUTO: 0 %
ERYTHROCYTE [DISTWIDTH] IN BLOOD BY AUTOMATED COUNT: 13.6 % (ref 11.7–15.4)
GLOBULIN SER CALC-MCNC: 2.5 G/DL (ref 1.5–4.5)
GLUCOSE SERPL-MCNC: 78 MG/DL (ref 70–99)
HBA1C MFR BLD: 6.7 % (ref 4.8–5.6)
HCT VFR BLD AUTO: 34.9 % (ref 34–46.6)
HDLC SERPL-MCNC: 33 MG/DL
HGB BLD-MCNC: 11.1 G/DL (ref 11.1–15.9)
IMM GRANULOCYTES # BLD AUTO: 0 X10E3/UL (ref 0–0.1)
IMM GRANULOCYTES NFR BLD AUTO: 0 %
LDLC SERPL CALC-MCNC: 129 MG/DL (ref 0–99)
LYMPHOCYTES # BLD AUTO: 2.1 X10E3/UL (ref 0.7–3.1)
LYMPHOCYTES NFR BLD AUTO: 41 %
MAGNESIUM SERPL-MCNC: 1.9 MG/DL (ref 1.6–2.3)
MCH RBC QN AUTO: 27.1 PG (ref 26.6–33)
MCHC RBC AUTO-ENTMCNC: 31.8 G/DL (ref 31.5–35.7)
MCV RBC AUTO: 85 FL (ref 79–97)
MONOCYTES # BLD AUTO: 0.3 X10E3/UL (ref 0.1–0.9)
MONOCYTES NFR BLD AUTO: 5 %
NEUTROPHILS # BLD AUTO: 2.7 X10E3/UL (ref 1.4–7)
NEUTROPHILS NFR BLD AUTO: 54 %
PLATELET # BLD AUTO: 220 X10E3/UL (ref 150–450)
POTASSIUM SERPL-SCNC: 3.9 MMOL/L (ref 3.5–5.2)
PROT SERPL-MCNC: 6.7 G/DL (ref 6–8.5)
RBC # BLD AUTO: 4.1 X10E6/UL (ref 3.77–5.28)
SODIUM SERPL-SCNC: 144 MMOL/L (ref 134–144)
TRIGL SERPL-MCNC: 80 MG/DL (ref 0–149)
VLDLC SERPL CALC-MCNC: 15 MG/DL (ref 5–40)
WBC # BLD AUTO: 5.1 X10E3/UL (ref 3.4–10.8)

## 2025-05-29 RX ORDER — EZETIMIBE 10 MG/1
10 TABLET ORAL DAILY
Qty: 30 TABLET | Refills: 3 | Status: SHIPPED | OUTPATIENT
Start: 2025-05-29

## 2025-06-02 ENCOUNTER — RESULTS FOLLOW-UP (OUTPATIENT)
Facility: CLINIC | Age: 40
End: 2025-06-02

## 2025-06-06 ENCOUNTER — OFFICE VISIT (OUTPATIENT)
Age: 40
End: 2025-06-06
Payer: MEDICARE

## 2025-06-06 VITALS
DIASTOLIC BLOOD PRESSURE: 78 MMHG | HEIGHT: 59 IN | OXYGEN SATURATION: 98 % | BODY MASS INDEX: 35.28 KG/M2 | HEART RATE: 84 BPM | WEIGHT: 175 LBS | SYSTOLIC BLOOD PRESSURE: 143 MMHG

## 2025-06-06 DIAGNOSIS — E11.22 TYPE 2 DIABETES MELLITUS WITH STAGE 3 CHRONIC KIDNEY DISEASE, WITH LONG-TERM CURRENT USE OF INSULIN, UNSPECIFIED WHETHER STAGE 3A OR 3B CKD (HCC): ICD-10-CM

## 2025-06-06 DIAGNOSIS — N18.30 TYPE 2 DIABETES MELLITUS WITH STAGE 3 CHRONIC KIDNEY DISEASE, WITH LONG-TERM CURRENT USE OF INSULIN, UNSPECIFIED WHETHER STAGE 3A OR 3B CKD (HCC): ICD-10-CM

## 2025-06-06 DIAGNOSIS — R07.2 PRECORDIAL PAIN: Primary | ICD-10-CM

## 2025-06-06 DIAGNOSIS — Z79.4 TYPE 2 DIABETES MELLITUS WITH STAGE 3 CHRONIC KIDNEY DISEASE, WITH LONG-TERM CURRENT USE OF INSULIN, UNSPECIFIED WHETHER STAGE 3A OR 3B CKD (HCC): ICD-10-CM

## 2025-06-06 DIAGNOSIS — T46.6X5A MYALGIA DUE TO STATIN: ICD-10-CM

## 2025-06-06 DIAGNOSIS — E66.01 MORBID (SEVERE) OBESITY DUE TO EXCESS CALORIES (HCC): ICD-10-CM

## 2025-06-06 DIAGNOSIS — M79.10 MYALGIA DUE TO STATIN: ICD-10-CM

## 2025-06-06 DIAGNOSIS — I10 ESSENTIAL (PRIMARY) HYPERTENSION: ICD-10-CM

## 2025-06-06 DIAGNOSIS — Z82.49 FAMILY HISTORY OF ISCHEMIC HEART DISEASE AND OTHER DISEASES OF THE CIRCULATORY SYSTEM: ICD-10-CM

## 2025-06-06 DIAGNOSIS — I50.32 CHRONIC DIASTOLIC CONGESTIVE HEART FAILURE (HCC): ICD-10-CM

## 2025-06-06 DIAGNOSIS — R07.9 CHEST PAIN, UNSPECIFIED TYPE: ICD-10-CM

## 2025-06-06 PROCEDURE — 99214 OFFICE O/P EST MOD 30 MIN: CPT | Performed by: INTERNAL MEDICINE

## 2025-06-06 PROCEDURE — 3077F SYST BP >= 140 MM HG: CPT | Performed by: INTERNAL MEDICINE

## 2025-06-06 PROCEDURE — 3044F HG A1C LEVEL LT 7.0%: CPT | Performed by: INTERNAL MEDICINE

## 2025-06-06 PROCEDURE — 3078F DIAST BP <80 MM HG: CPT | Performed by: INTERNAL MEDICINE

## 2025-06-06 RX ORDER — ATENOLOL 50 MG/1
50 TABLET ORAL 2 TIMES DAILY
Qty: 180 TABLET | Refills: 3 | Status: SHIPPED | OUTPATIENT
Start: 2025-06-06

## 2025-06-06 ASSESSMENT — PATIENT HEALTH QUESTIONNAIRE - PHQ9
SUM OF ALL RESPONSES TO PHQ QUESTIONS 1-9: 0
SUM OF ALL RESPONSES TO PHQ QUESTIONS 1-9: 0
1. LITTLE INTEREST OR PLEASURE IN DOING THINGS: NOT AT ALL
SUM OF ALL RESPONSES TO PHQ QUESTIONS 1-9: 0
2. FEELING DOWN, DEPRESSED OR HOPELESS: NOT AT ALL
SUM OF ALL RESPONSES TO PHQ QUESTIONS 1-9: 0

## 2025-06-06 ASSESSMENT — ENCOUNTER SYMPTOMS
EYES NEGATIVE: 1
GASTROINTESTINAL NEGATIVE: 1
SHORTNESS OF BREATH: 1
TROUBLE SWALLOWING: 1

## 2025-06-06 NOTE — PROGRESS NOTES
Travis Lujan is a 40 y.o. year old female.    Follow-up of hypertension, hyperlipidemia, family history of CAD  History of diabetes    7/2022  Patient is seen today for follow-up of her chronic conditions.  Patient continues to have chest pains more frequently left-sided.  Episodic.  Shortness of breath stable  Recent flareup of asthma for which she is under care of pulmonary  8/2022  Patient seen in follow-up for chest pain.  On Aug 4th was diagnosed with COVID.  She complains of dyspnea on exertion, with memory loss since being diagnosed with COVID.  She complains of occasional chest pressure worse with a deep breath or raising arms overhead.  She denies palpitations or edema.  11/2022  Patient seen in follow-up for atypical chest pain.  She reports has recovered from COVID.  She complains of occasional chest pressure  She denies palpitations or edema.  4/5/2024 had an episode of syncope while waiting in line for food at a family function. 1 day prior, had severe dizziness and feeling of heart all over the body while standing on the porch.  Has been nauseous and having intermittent sweating for the last 3 to 4 weeks.  Feels funny in the left side of the body.  Denies any pain.  Dyspnea on exertion persists if she goes up the steps. No edema.  8/2024  Patient seen following recent admission for DKA.  She complains of difficulty swallowing for which she has upcoming appointment with ENT as she also complains of constant chest pain worse with exertion has mild shortness of breath with history of asthma denies palpitations or edema denies dizziness reports having strong family history of CAD.  Patient also reports blood sugars have been difficult to control in the 400-500 range.  She has frequent follow-up with endocrine.  10/25/2024 now on the insulin pump.  Not taking rosuvastatin regularly.  Tries to exercise regularly on steps as well as walking around house.  Chest pains are not as bad but still present.

## 2025-06-06 NOTE — PROGRESS NOTES
1. Have you been to the ER, urgent care clinic since your last visit?  Hospitalized since your last visit?     Yes, [facility]      2.  Where do you normally have your labs drawn?   [facility]    3. Do you need any refills today?   No    4. Which local pharmacy do you use (enter pharmacy)?   [facility]    5. Which mail order pharmacy do you use (enter pharmacy)?   No     6. Are you here for surgical clearance and if so who will be doing your     procedure/surgery (care team)?   No

## 2025-06-27 ENCOUNTER — CARE COORDINATION (OUTPATIENT)
Facility: CLINIC | Age: 40
End: 2025-06-27

## 2025-06-27 DIAGNOSIS — I10 ESSENTIAL (PRIMARY) HYPERTENSION: Primary | ICD-10-CM

## 2025-06-27 DIAGNOSIS — E11.40 TYPE 2 DIABETES MELLITUS WITH DIABETIC NEUROPATHY, UNSPECIFIED WHETHER LONG TERM INSULIN USE (HCC): ICD-10-CM

## 2025-06-27 NOTE — PROGRESS NOTES
Remote Patient Order Discontinued    Received request from Jovani Reyna RN   to discontinue order for remote patient monitoring of Diabetes and HTN and order completed.

## 2025-06-27 NOTE — CARE COORDINATION
Patient Travis Lujan  06/27/25     Care Coordination  placed call to patient to arrange RPM kit  through [employer]. Left HIPAA Compliant Message     provided return and how to pack equipment in original packing via the patients voicemail if available and provided call back number should patient have questions.    Patient made aware [employer] will  equipment in 2-4 days.

## 2025-07-02 ENCOUNTER — TELEPHONE (OUTPATIENT)
Age: 40
End: 2025-07-02

## 2025-07-16 ENCOUNTER — OFFICE VISIT (OUTPATIENT)
Age: 40
End: 2025-07-16

## 2025-07-16 VITALS
WEIGHT: 175 LBS | HEIGHT: 59 IN | SYSTOLIC BLOOD PRESSURE: 133 MMHG | DIASTOLIC BLOOD PRESSURE: 75 MMHG | BODY MASS INDEX: 35.28 KG/M2

## 2025-07-16 DIAGNOSIS — E11.9 DIABETES MELLITUS WITHOUT COMPLICATION (HCC): ICD-10-CM

## 2025-07-16 DIAGNOSIS — M65.312 TRIGGER THUMB OF LEFT HAND: ICD-10-CM

## 2025-07-16 DIAGNOSIS — G56.02 LEFT CARPAL TUNNEL SYNDROME: Primary | ICD-10-CM

## 2025-07-16 DIAGNOSIS — G56.01 RIGHT CARPAL TUNNEL SYNDROME: ICD-10-CM

## 2025-07-16 DIAGNOSIS — Z01.818 PREOP EXAMINATION: Primary | ICD-10-CM

## 2025-07-16 DIAGNOSIS — G56.02 LEFT CARPAL TUNNEL SYNDROME: ICD-10-CM

## 2025-07-16 RX ORDER — PRUCALOPRIDE 2 MG/1
2 TABLET, FILM COATED ORAL DAILY
COMMUNITY
Start: 2025-06-09

## 2025-07-16 NOTE — PROGRESS NOTES
release.    Observation for right carpal tunnel syndrome.    Discussed operative versus nonoperative treatment, postoperative convalescence, and answered all questions.  We discussed the risks, benefits, and alternatives to procedure/surgery including non operative management. All identified risk factors were discussed with the patient.     The risks of procedure/surgery include, but are not limited to: damage to soft tissue structures such as nerves, blood vessels, tendons and ligaments, numbness and paresthesias, bleeding and bruising, swelling, stiffness, instability, infection, wound healing complications, failure of surgical repair, tendon or ligament rupture, recurrence, continuing or worsening pain, complex regional pain syndrome, compartment syndrome, amputation, loss of life, medical and anesthesia complications, need for further surgery, failure to achieve desired results.    There are no guarantees regarding outcome with procedure/surgery. The patient appears to understand the risks of procedure/surgery and desires to proceed. All identified risk factors for the above procedure were discussed with the patient. Informed consent to be obtained from the patient and/or legal guardian on the date of surgery.     Plan was reviewed with patient, who verbalized agreement and understanding of the plan.     Return for Postop.     Robert Person DO  7/18/2025 8:50 AM    Note: This note was completed using voice recognition software.  Any typographical/name errors or mistakes are unintentional.

## 2025-08-08 PROBLEM — G56.02 LEFT CARPAL TUNNEL SYNDROME: Status: ACTIVE | Noted: 2025-08-08

## 2025-08-08 PROBLEM — M65.312 TRIGGER THUMB OF LEFT HAND: Status: ACTIVE | Noted: 2025-08-08

## 2025-08-12 ENCOUNTER — OFFICE VISIT (OUTPATIENT)
Facility: CLINIC | Age: 40
End: 2025-08-12

## 2025-08-12 VITALS
OXYGEN SATURATION: 99 % | DIASTOLIC BLOOD PRESSURE: 86 MMHG | HEART RATE: 85 BPM | BODY MASS INDEX: 34.56 KG/M2 | WEIGHT: 171.4 LBS | SYSTOLIC BLOOD PRESSURE: 134 MMHG | HEIGHT: 59 IN | TEMPERATURE: 97.2 F

## 2025-08-12 DIAGNOSIS — E78.5 HYPERLIPIDEMIA, UNSPECIFIED HYPERLIPIDEMIA TYPE: ICD-10-CM

## 2025-08-12 DIAGNOSIS — R51.9 NONINTRACTABLE HEADACHE, UNSPECIFIED CHRONICITY PATTERN, UNSPECIFIED HEADACHE TYPE: ICD-10-CM

## 2025-08-12 DIAGNOSIS — I10 ESSENTIAL (PRIMARY) HYPERTENSION: ICD-10-CM

## 2025-08-12 DIAGNOSIS — H93.12 TINNITUS OF LEFT EAR: ICD-10-CM

## 2025-08-12 DIAGNOSIS — S69.91XA INJURY OF FINGER OF RIGHT HAND, INITIAL ENCOUNTER: Primary | ICD-10-CM

## 2025-08-12 DIAGNOSIS — J02.0 SEPTIC SORE THROAT: ICD-10-CM

## 2025-08-12 DIAGNOSIS — G62.9 NEUROPATHY: ICD-10-CM

## 2025-08-12 DIAGNOSIS — Z79.4 TYPE 2 DIABETES MELLITUS WITH DIABETIC NEUROPATHY, WITH LONG-TERM CURRENT USE OF INSULIN (HCC): ICD-10-CM

## 2025-08-12 DIAGNOSIS — E11.40 TYPE 2 DIABETES MELLITUS WITH DIABETIC NEUROPATHY, WITH LONG-TERM CURRENT USE OF INSULIN (HCC): ICD-10-CM

## 2025-08-12 DIAGNOSIS — K21.9 GASTRO-ESOPHAGEAL REFLUX DISEASE WITHOUT ESOPHAGITIS: ICD-10-CM

## 2025-08-12 RX ORDER — AZITHROMYCIN 250 MG/1
TABLET, FILM COATED ORAL
Qty: 6 TABLET | Refills: 0 | Status: SHIPPED | OUTPATIENT
Start: 2025-08-12 | End: 2025-08-22

## 2025-08-12 SDOH — ECONOMIC STABILITY: FOOD INSECURITY: WITHIN THE PAST 12 MONTHS, YOU WORRIED THAT YOUR FOOD WOULD RUN OUT BEFORE YOU GOT MONEY TO BUY MORE.: NEVER TRUE

## 2025-08-12 SDOH — ECONOMIC STABILITY: FOOD INSECURITY: WITHIN THE PAST 12 MONTHS, THE FOOD YOU BOUGHT JUST DIDN'T LAST AND YOU DIDN'T HAVE MONEY TO GET MORE.: NEVER TRUE

## 2025-08-12 ASSESSMENT — PATIENT HEALTH QUESTIONNAIRE - PHQ9
SUM OF ALL RESPONSES TO PHQ QUESTIONS 1-9: 0
2. FEELING DOWN, DEPRESSED OR HOPELESS: NOT AT ALL
1. LITTLE INTEREST OR PLEASURE IN DOING THINGS: NOT AT ALL
SUM OF ALL RESPONSES TO PHQ QUESTIONS 1-9: 0

## 2025-08-18 ENCOUNTER — ANESTHESIA EVENT (OUTPATIENT)
Facility: HOSPITAL | Age: 40
End: 2025-08-18
Payer: MEDICARE

## 2025-08-19 ENCOUNTER — HOSPITAL ENCOUNTER (OUTPATIENT)
Facility: HOSPITAL | Age: 40
Setting detail: OUTPATIENT SURGERY
Discharge: HOME | End: 2025-08-19
Attending: ORTHOPAEDIC SURGERY | Admitting: ORTHOPAEDIC SURGERY
Payer: MEDICARE

## 2025-08-19 ENCOUNTER — ANESTHESIA (OUTPATIENT)
Facility: HOSPITAL | Age: 40
End: 2025-08-19
Payer: MEDICARE

## 2025-08-19 VITALS
BODY MASS INDEX: 34.51 KG/M2 | WEIGHT: 171.2 LBS | HEART RATE: 64 BPM | SYSTOLIC BLOOD PRESSURE: 126 MMHG | OXYGEN SATURATION: 99 % | TEMPERATURE: 97.9 F | HEIGHT: 59 IN | RESPIRATION RATE: 18 BRPM | DIASTOLIC BLOOD PRESSURE: 82 MMHG

## 2025-08-19 DIAGNOSIS — M65.312 TRIGGER THUMB OF LEFT HAND: ICD-10-CM

## 2025-08-19 DIAGNOSIS — G56.02 LEFT CARPAL TUNNEL SYNDROME: Primary | ICD-10-CM

## 2025-08-19 LAB
GLUCOSE BLD STRIP.AUTO-MCNC: 169 MG/DL (ref 70–110)
GLUCOSE BLD STRIP.AUTO-MCNC: 85 MG/DL (ref 70–110)
GLUCOSE BLD STRIP.AUTO-MCNC: 99 MG/DL (ref 70–110)
HCG UR QL: NEGATIVE

## 2025-08-19 PROCEDURE — 2580000003 HC RX 258: Performed by: NURSE ANESTHETIST, CERTIFIED REGISTERED

## 2025-08-19 PROCEDURE — 64721 CARPAL TUNNEL SURGERY: CPT | Performed by: ORTHOPAEDIC SURGERY

## 2025-08-19 PROCEDURE — 2500000003 HC RX 250 WO HCPCS: Performed by: ORTHOPAEDIC SURGERY

## 2025-08-19 PROCEDURE — 3600000003 HC SURGERY LEVEL 3 BASE: Performed by: ORTHOPAEDIC SURGERY

## 2025-08-19 PROCEDURE — 6360000002 HC RX W HCPCS: Performed by: NURSE ANESTHETIST, CERTIFIED REGISTERED

## 2025-08-19 PROCEDURE — 2709999900 HC NON-CHARGEABLE SUPPLY: Performed by: ORTHOPAEDIC SURGERY

## 2025-08-19 PROCEDURE — 6360000002 HC RX W HCPCS: Performed by: ORTHOPAEDIC SURGERY

## 2025-08-19 PROCEDURE — 7100000001 HC PACU RECOVERY - ADDTL 15 MIN: Performed by: ORTHOPAEDIC SURGERY

## 2025-08-19 PROCEDURE — 6370000000 HC RX 637 (ALT 250 FOR IP)

## 2025-08-19 PROCEDURE — 26055 INCISE FINGER TENDON SHEATH: CPT | Performed by: ORTHOPAEDIC SURGERY

## 2025-08-19 PROCEDURE — 3600000013 HC SURGERY LEVEL 3 ADDTL 15MIN: Performed by: ORTHOPAEDIC SURGERY

## 2025-08-19 PROCEDURE — 3700000001 HC ADD 15 MINUTES (ANESTHESIA): Performed by: ORTHOPAEDIC SURGERY

## 2025-08-19 PROCEDURE — 81025 URINE PREGNANCY TEST: CPT

## 2025-08-19 PROCEDURE — 7100000011 HC PHASE II RECOVERY - ADDTL 15 MIN: Performed by: ORTHOPAEDIC SURGERY

## 2025-08-19 PROCEDURE — 82962 GLUCOSE BLOOD TEST: CPT

## 2025-08-19 PROCEDURE — 6370000000 HC RX 637 (ALT 250 FOR IP): Performed by: NURSE ANESTHETIST, CERTIFIED REGISTERED

## 2025-08-19 PROCEDURE — 7100000000 HC PACU RECOVERY - FIRST 15 MIN: Performed by: ORTHOPAEDIC SURGERY

## 2025-08-19 PROCEDURE — 7100000010 HC PHASE II RECOVERY - FIRST 15 MIN: Performed by: ORTHOPAEDIC SURGERY

## 2025-08-19 PROCEDURE — 3700000000 HC ANESTHESIA ATTENDED CARE: Performed by: ORTHOPAEDIC SURGERY

## 2025-08-19 RX ORDER — LIDOCAINE HYDROCHLORIDE 10 MG/ML
1 INJECTION, SOLUTION EPIDURAL; INFILTRATION; INTRACAUDAL; PERINEURAL
Status: DISCONTINUED | OUTPATIENT
Start: 2025-08-19 | End: 2025-08-19 | Stop reason: HOSPADM

## 2025-08-19 RX ORDER — SODIUM CHLORIDE 0.9 % (FLUSH) 0.9 %
5-40 SYRINGE (ML) INJECTION EVERY 12 HOURS SCHEDULED
Status: DISCONTINUED | OUTPATIENT
Start: 2025-08-19 | End: 2025-08-19 | Stop reason: HOSPADM

## 2025-08-19 RX ORDER — PROPOFOL 10 MG/ML
INJECTION, EMULSION INTRAVENOUS
Status: DISCONTINUED | OUTPATIENT
Start: 2025-08-19 | End: 2025-08-19 | Stop reason: SDUPTHER

## 2025-08-19 RX ORDER — FAMOTIDINE 20 MG/1
20 TABLET, FILM COATED ORAL ONCE
Status: COMPLETED | OUTPATIENT
Start: 2025-08-19 | End: 2025-08-19

## 2025-08-19 RX ORDER — SODIUM CHLORIDE, SODIUM LACTATE, POTASSIUM CHLORIDE, CALCIUM CHLORIDE 600; 310; 30; 20 MG/100ML; MG/100ML; MG/100ML; MG/100ML
INJECTION, SOLUTION INTRAVENOUS CONTINUOUS
Status: DISCONTINUED | OUTPATIENT
Start: 2025-08-19 | End: 2025-08-19 | Stop reason: HOSPADM

## 2025-08-19 RX ORDER — FENTANYL CITRATE 50 UG/ML
25 INJECTION, SOLUTION INTRAMUSCULAR; INTRAVENOUS EVERY 5 MIN PRN
Status: DISCONTINUED | OUTPATIENT
Start: 2025-08-19 | End: 2025-08-19 | Stop reason: HOSPADM

## 2025-08-19 RX ORDER — DICLOFENAC SODIUM 75 MG/1
75 TABLET, DELAYED RELEASE ORAL EVERY 12 HOURS PRN
Qty: 20 TABLET | Refills: 0 | Status: SHIPPED | OUTPATIENT
Start: 2025-08-19 | End: 2025-08-29

## 2025-08-19 RX ORDER — DEXTROSE MONOHYDRATE 100 MG/ML
INJECTION, SOLUTION INTRAVENOUS CONTINUOUS PRN
Status: DISCONTINUED | OUTPATIENT
Start: 2025-08-19 | End: 2025-08-19 | Stop reason: HOSPADM

## 2025-08-19 RX ORDER — SODIUM CHLORIDE 0.9 % (FLUSH) 0.9 %
5-40 SYRINGE (ML) INJECTION PRN
Status: DISCONTINUED | OUTPATIENT
Start: 2025-08-19 | End: 2025-08-19 | Stop reason: HOSPADM

## 2025-08-19 RX ORDER — BUPIVACAINE HYDROCHLORIDE 2.5 MG/ML
INJECTION, SOLUTION EPIDURAL; INFILTRATION; INTRACAUDAL; PERINEURAL PRN
Status: DISCONTINUED | OUTPATIENT
Start: 2025-08-19 | End: 2025-08-19 | Stop reason: ALTCHOICE

## 2025-08-19 RX ORDER — FENTANYL CITRATE 50 UG/ML
INJECTION, SOLUTION INTRAMUSCULAR; INTRAVENOUS
Status: DISCONTINUED | OUTPATIENT
Start: 2025-08-19 | End: 2025-08-19 | Stop reason: SDUPTHER

## 2025-08-19 RX ORDER — SODIUM CHLORIDE 9 MG/ML
INJECTION, SOLUTION INTRAVENOUS PRN
Status: DISCONTINUED | OUTPATIENT
Start: 2025-08-19 | End: 2025-08-19 | Stop reason: HOSPADM

## 2025-08-19 RX ORDER — HYDROCODONE BITARTRATE AND ACETAMINOPHEN 5; 325 MG/1; MG/1
1 TABLET ORAL EVERY 6 HOURS PRN
Qty: 12 TABLET | Refills: 0 | Status: SHIPPED | OUTPATIENT
Start: 2025-08-19 | End: 2025-08-19 | Stop reason: HOSPADM

## 2025-08-19 RX ORDER — MIDAZOLAM HYDROCHLORIDE 1 MG/ML
INJECTION, SOLUTION INTRAMUSCULAR; INTRAVENOUS
Status: DISCONTINUED | OUTPATIENT
Start: 2025-08-19 | End: 2025-08-19 | Stop reason: SDUPTHER

## 2025-08-19 RX ORDER — TRAMADOL HYDROCHLORIDE 50 MG/1
50 TABLET ORAL EVERY 6 HOURS PRN
Qty: 12 TABLET | Refills: 0 | Status: SHIPPED | OUTPATIENT
Start: 2025-08-19 | End: 2025-08-22

## 2025-08-19 RX ADMIN — Medication 16 G: at 12:10

## 2025-08-19 RX ADMIN — FENTANYL CITRATE 50 MCG: 50 INJECTION INTRAMUSCULAR; INTRAVENOUS at 10:55

## 2025-08-19 RX ADMIN — FENTANYL CITRATE 25 MCG: 50 INJECTION INTRAMUSCULAR; INTRAVENOUS at 11:19

## 2025-08-19 RX ADMIN — DEXTROSE MONOHYDRATE 125 ML: 100 INJECTION, SOLUTION INTRAVENOUS at 12:12

## 2025-08-19 RX ADMIN — FENTANYL CITRATE 25 MCG: 50 INJECTION INTRAMUSCULAR; INTRAVENOUS at 11:10

## 2025-08-19 RX ADMIN — WATER 2000 MG: 1 INJECTION INTRAMUSCULAR; INTRAVENOUS; SUBCUTANEOUS at 10:55

## 2025-08-19 RX ADMIN — PROPOFOL 50 MCG/KG/MIN: 10 INJECTION, EMULSION INTRAVENOUS at 10:57

## 2025-08-19 RX ADMIN — FAMOTIDINE 20 MG: 20 TABLET, FILM COATED ORAL at 09:28

## 2025-08-19 RX ADMIN — MIDAZOLAM 2 MG: 1 INJECTION, SOLUTION INTRAMUSCULAR; INTRAVENOUS at 10:51

## 2025-08-19 RX ADMIN — SODIUM CHLORIDE, SODIUM LACTATE, POTASSIUM CHLORIDE, AND CALCIUM CHLORIDE: .6; .31; .03; .02 INJECTION, SOLUTION INTRAVENOUS at 09:29

## 2025-08-19 ASSESSMENT — ENCOUNTER SYMPTOMS: SHORTNESS OF BREATH: 1

## 2025-08-19 ASSESSMENT — PAIN SCALES - GENERAL
PAINLEVEL_OUTOF10: 0

## 2025-08-19 ASSESSMENT — PAIN - FUNCTIONAL ASSESSMENT
PAIN_FUNCTIONAL_ASSESSMENT: 0-10
PAIN_FUNCTIONAL_ASSESSMENT: 0-10

## 2025-08-27 ENCOUNTER — TELEPHONE (OUTPATIENT)
Age: 40
End: 2025-08-27

## 2025-08-27 DIAGNOSIS — G56.02 LEFT CARPAL TUNNEL SYNDROME: Primary | ICD-10-CM

## 2025-08-27 DIAGNOSIS — M65.312 TRIGGER THUMB OF LEFT HAND: ICD-10-CM

## 2025-08-27 RX ORDER — TRAMADOL HYDROCHLORIDE 50 MG/1
50 TABLET ORAL EVERY 8 HOURS PRN
Qty: 9 TABLET | Refills: 0 | Status: SHIPPED | OUTPATIENT
Start: 2025-08-27 | End: 2025-08-30

## 2025-09-03 ENCOUNTER — OFFICE VISIT (OUTPATIENT)
Age: 40
End: 2025-09-03

## 2025-09-03 VITALS — BODY MASS INDEX: 34.47 KG/M2 | WEIGHT: 171 LBS | HEIGHT: 59 IN

## 2025-09-03 DIAGNOSIS — Z98.890 S/P CARPAL TUNNEL RELEASE: ICD-10-CM

## 2025-09-03 DIAGNOSIS — M65.312 TRIGGER THUMB OF LEFT HAND: ICD-10-CM

## 2025-09-03 DIAGNOSIS — Z98.890 S/P TRIGGER FINGER RELEASE: ICD-10-CM

## 2025-09-03 DIAGNOSIS — G56.02 LEFT CARPAL TUNNEL SYNDROME: Primary | ICD-10-CM

## 2025-09-03 PROCEDURE — 99024 POSTOP FOLLOW-UP VISIT: CPT

## (undated) DEVICE — CUFF TRNQT W4XL18IN 2 PRT SGL BLDR CYL PLC W/ SL DISP

## (undated) DEVICE — SPONGE GZ W4XL4IN 100% COT WVN 12 PLY 2 PER PK NON21424

## (undated) DEVICE — CORD ES L12FT BPLR FRCP

## (undated) DEVICE — CANNULA PERF L2IN BLNT TIP 2MM VES CLR RADPQ BODY FEM LUER

## (undated) DEVICE — APPLICATOR MEDICATED 26 CC SOLUTION HI LT ORNG CHLORAPREP

## (undated) DEVICE — BOWL MED L 32OZ PLAS W/ MOLD GRAD EZ OPN PEEL PCH

## (undated) DEVICE — Device

## (undated) DEVICE — DRESSING PETRO W1XL8IN 3% BISMUTH TRIBROMOPHENATE XRFRM

## (undated) DEVICE — KNIFE CARPAL TUNNEL 15 DEG

## (undated) DEVICE — GLOVE SURG SZ 8 L12IN FNGR THK79MIL GRN LTX FREE

## (undated) DEVICE — BANDAGE COMPR W1INXL5YD WHT COT E TAPE RUB BASE ADH CURAD

## (undated) DEVICE — GLOVE SURG SZ 8 CRM LTX FREE POLYISOPRENE POLYMER BEAD ANTI

## (undated) DEVICE — BLADE OPHTH GRN ROUNDED TIP 1 SIDE SHRP GRINDLESS MINI-BLDE

## (undated) DEVICE — DRAPE SURG W53XL77IN STD SMS POLYPR 3 QTR ABSRB REINF W/O

## (undated) DEVICE — STRAP POS FOAM SFT STR FOR KNEE BODY

## (undated) DEVICE — GOWN SURG 2XL L49IN BLU FABRICREINFORCED SET IN SL HK LOOP

## (undated) DEVICE — BANDAGE 4 IN ESMARCH COMPR W4INXL9FT E SMOOTH FINISH

## (undated) DEVICE — SPONGE GZ 16 PLY WVN COT 4INX4IN STERIL